# Patient Record
Sex: MALE | Race: WHITE | Employment: OTHER | URBAN - METROPOLITAN AREA
[De-identification: names, ages, dates, MRNs, and addresses within clinical notes are randomized per-mention and may not be internally consistent; named-entity substitution may affect disease eponyms.]

---

## 2017-01-23 ENCOUNTER — HOSPITAL ENCOUNTER (OUTPATIENT)
Age: 72
Setting detail: OUTPATIENT SURGERY
Discharge: HOME | End: 2017-01-23
Attending: INTERNAL MEDICINE | Admitting: INTERNAL MEDICINE
Payer: MEDICARE

## 2017-01-23 ENCOUNTER — ANESTHESIA (OUTPATIENT)
Dept: ENDOSCOPY | Age: 72
End: 2017-01-23
Payer: MEDICARE

## 2017-01-23 ENCOUNTER — ANESTHESIA EVENT (OUTPATIENT)
Dept: ENDOSCOPY | Age: 72
End: 2017-01-23
Payer: MEDICARE

## 2017-01-23 VITALS
TEMPERATURE: 97.4 F | WEIGHT: 156.25 LBS | DIASTOLIC BLOOD PRESSURE: 86 MMHG | BODY MASS INDEX: 23.68 KG/M2 | HEIGHT: 68 IN | SYSTOLIC BLOOD PRESSURE: 141 MMHG | RESPIRATION RATE: 20 BRPM | HEART RATE: 86 BPM | OXYGEN SATURATION: 96 %

## 2017-01-23 LAB
GLUCOSE BLD STRIP.AUTO-MCNC: 127 MG/DL (ref 65–100)
SERVICE CMNT-IMP: ABNORMAL

## 2017-01-23 PROCEDURE — 74011000250 HC RX REV CODE- 250

## 2017-01-23 PROCEDURE — 76040000019: Performed by: INTERNAL MEDICINE

## 2017-01-23 PROCEDURE — 77030019988 HC FCPS ENDOSC DISP BSC -B: Performed by: INTERNAL MEDICINE

## 2017-01-23 PROCEDURE — 74011250636 HC RX REV CODE- 250/636

## 2017-01-23 PROCEDURE — 88305 TISSUE EXAM BY PATHOLOGIST: CPT | Performed by: INTERNAL MEDICINE

## 2017-01-23 PROCEDURE — 76060000031 HC ANESTHESIA FIRST 0.5 HR: Performed by: INTERNAL MEDICINE

## 2017-01-23 PROCEDURE — 74011250636 HC RX REV CODE- 250/636: Performed by: INTERNAL MEDICINE

## 2017-01-23 PROCEDURE — 82962 GLUCOSE BLOOD TEST: CPT

## 2017-01-23 RX ORDER — LIDOCAINE HYDROCHLORIDE 20 MG/ML
INJECTION, SOLUTION EPIDURAL; INFILTRATION; INTRACAUDAL; PERINEURAL AS NEEDED
Status: DISCONTINUED | OUTPATIENT
Start: 2017-01-23 | End: 2017-01-23 | Stop reason: HOSPADM

## 2017-01-23 RX ORDER — SODIUM CHLORIDE 9 MG/ML
100 INJECTION, SOLUTION INTRAVENOUS CONTINUOUS
Status: DISCONTINUED | OUTPATIENT
Start: 2017-01-23 | End: 2017-01-23 | Stop reason: HOSPADM

## 2017-01-23 RX ORDER — ATROPINE SULFATE 0.1 MG/ML
0.5 INJECTION INTRAVENOUS
Status: DISCONTINUED | OUTPATIENT
Start: 2017-01-23 | End: 2017-01-23 | Stop reason: HOSPADM

## 2017-01-23 RX ORDER — SODIUM CHLORIDE 0.9 % (FLUSH) 0.9 %
5-10 SYRINGE (ML) INJECTION AS NEEDED
Status: DISCONTINUED | OUTPATIENT
Start: 2017-01-23 | End: 2017-01-23 | Stop reason: HOSPADM

## 2017-01-23 RX ORDER — SODIUM CHLORIDE 0.9 % (FLUSH) 0.9 %
5-10 SYRINGE (ML) INJECTION EVERY 8 HOURS
Status: DISCONTINUED | OUTPATIENT
Start: 2017-01-23 | End: 2017-01-23 | Stop reason: HOSPADM

## 2017-01-23 RX ORDER — MIDAZOLAM HYDROCHLORIDE 1 MG/ML
.25-5 INJECTION, SOLUTION INTRAMUSCULAR; INTRAVENOUS
Status: DISCONTINUED | OUTPATIENT
Start: 2017-01-23 | End: 2017-01-23 | Stop reason: HOSPADM

## 2017-01-23 RX ORDER — EPINEPHRINE 0.1 MG/ML
1 INJECTION INTRACARDIAC; INTRAVENOUS
Status: DISCONTINUED | OUTPATIENT
Start: 2017-01-23 | End: 2017-01-23 | Stop reason: HOSPADM

## 2017-01-23 RX ORDER — MIDAZOLAM HYDROCHLORIDE 1 MG/ML
1-2 INJECTION, SOLUTION INTRAMUSCULAR; INTRAVENOUS
Status: DISCONTINUED | OUTPATIENT
Start: 2017-01-23 | End: 2017-01-23 | Stop reason: HOSPADM

## 2017-01-23 RX ORDER — FLUMAZENIL 0.1 MG/ML
0.2 INJECTION INTRAVENOUS
Status: DISCONTINUED | OUTPATIENT
Start: 2017-01-23 | End: 2017-01-23 | Stop reason: HOSPADM

## 2017-01-23 RX ORDER — DEXTROMETHORPHAN/PSEUDOEPHED 2.5-7.5/.8
1.2 DROPS ORAL
Status: DISCONTINUED | OUTPATIENT
Start: 2017-01-23 | End: 2017-01-23 | Stop reason: HOSPADM

## 2017-01-23 RX ORDER — PROPOFOL 10 MG/ML
INJECTION, EMULSION INTRAVENOUS AS NEEDED
Status: DISCONTINUED | OUTPATIENT
Start: 2017-01-23 | End: 2017-01-23 | Stop reason: HOSPADM

## 2017-01-23 RX ORDER — GLYCOPYRROLATE 0.2 MG/ML
INJECTION INTRAMUSCULAR; INTRAVENOUS AS NEEDED
Status: DISCONTINUED | OUTPATIENT
Start: 2017-01-23 | End: 2017-01-23 | Stop reason: HOSPADM

## 2017-01-23 RX ORDER — NALOXONE HYDROCHLORIDE 0.4 MG/ML
0.4 INJECTION, SOLUTION INTRAMUSCULAR; INTRAVENOUS; SUBCUTANEOUS
Status: DISCONTINUED | OUTPATIENT
Start: 2017-01-23 | End: 2017-01-23 | Stop reason: HOSPADM

## 2017-01-23 RX ADMIN — SODIUM CHLORIDE 100 ML/HR: 900 INJECTION, SOLUTION INTRAVENOUS at 13:33

## 2017-01-23 RX ADMIN — PROPOFOL 180 MG: 10 INJECTION, EMULSION INTRAVENOUS at 14:08

## 2017-01-23 RX ADMIN — GLYCOPYRROLATE 0.2 MG: 0.2 INJECTION INTRAMUSCULAR; INTRAVENOUS at 13:59

## 2017-01-23 RX ADMIN — LIDOCAINE HYDROCHLORIDE 80 MG: 20 INJECTION, SOLUTION EPIDURAL; INFILTRATION; INTRACAUDAL; PERINEURAL at 13:59

## 2017-01-23 NOTE — ANESTHESIA POSTPROCEDURE EVALUATION
Post-Anesthesia Evaluation and Assessment    Patient: Юлия Castillo MRN: [id number]  SSN: [id number]    YOB: 1945  Age: 80 y.o. Sex: male       Cardiovascular Function/Vital Signs  Visit Vitals    /86    Pulse 86    Temp 36.3 °C (97.4 °F)    Resp 20    Ht 5' 8\" (1.727 m)    Wt 70.9 kg (156 lb 4 oz)    SpO2 96%    BMI 23.76 kg/m2       Patient is status post total IV anesthesia anesthesia for Procedure(s):  ESOPHAGOGASTRODUODENOSCOPY (EGD)  ESOPHAGOGASTRODUODENAL (EGD) BIOPSY. Nausea/Vomiting: None    Postoperative hydration reviewed and adequate. Pain:  Pain Scale 1: Numeric (0 - 10) (01/23/17 1435)  Pain Intensity 1: 0 (01/23/17 1435)   Managed    Neurological Status: At baseline    Mental Status and Level of Consciousness: Arousable    Pulmonary Status:   O2 Device: Room air (01/23/17 1435)   Adequate oxygenation and airway patent    Complications related to anesthesia: None    Post-anesthesia assessment completed.  No concerns    Signed By: Юлия CastilloDO     January 23, 2017
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
## 2017-01-23 NOTE — ANESTHESIA PREPROCEDURE EVALUATION
Anesthetic History   No history of anesthetic complications            Review of Systems / Medical History  Patient summary reviewed, nursing notes reviewed and pertinent labs reviewed    Pulmonary          Smoker (EX, 40 pk yr, quit in [facility])         Neuro/Psych   Within defined limits           Cardiovascular    Hypertension          Hyperlipidemia    Exercise tolerance: >4 METS  Comments: 9-2016 EKG: NSR   GI/Hepatic/Renal     GERD          Comments: Hernadez's esoph, GERD    Hx of colon polyps Endo/Other    Diabetes: using insulin    Arthritis     Other Findings   Comments: DDD, cervical    hearing loss  Glaucoma  Bell's palsy    Polycythemia    7 oz alc per week         Physical Exam    Airway  Mallampati: I  TM Distance: > 6 cm  Neck ROM: normal range of motion        Cardiovascular    Rhythm: regular  Rate: normal         Dental  No notable dental hx       Pulmonary  Breath sounds clear to auscultation               Abdominal  GI exam deferred       Other Findings            Anesthetic Plan    ASA: 3  Anesthesia type: total IV anesthesia          Induction: Intravenous  Anesthetic plan and risks discussed with: Patient

## 2017-01-23 NOTE — DISCHARGE INSTRUCTIONS
Sav Hawley MD  [facility], [address]  [place], 200 [facility]  [phone]  [email or web address]  [id number]  1945    EGD DISCHARGE INSTRUCTIONS    Discomfort:  Sore throat- throat lozenges or warm salt water gargle  redness at IV site- apply warm compress to area; if redness or soreness persist- contact your physician  Gaseous discomfort- walking, belching will help relieve any discomfort  You may not operate a vehicle for 12 hours  You may not engage in an occupation involving machinery or appliances for rest of today  You may not drink alcoholic beverages for at least 12 hours  Avoid making any critical decisions for at least 24 hour  DIET  You may have anything by mouth. You may eat and drink immediately. You may resume your regular diet - however -  remember your colon is empty and a heavy meal will produce gas. Avoid these foods:  vegetables, fried / greasy foods, carbonated drinks      ACTIVITY  You may resume your normal daily activities   Spend the remainder of the day resting -  avoid any strenuous activity. CALL M.D. ANY SIGN OF   Increasing pain, nausea, vomiting  Abdominal distension (swelling)  New increased bleeding (oral or rectal)  Fever (chills)  Pain in chest area  Bloody discharge from nose or mouth  Shortness of breath    Follow-up Instructions:   Call Dr. Sav Hawley for any questions or problems. Telephone # [phone]  Dr. Lane [place] office will notify you of the biopsy results available  Within 7 to 10 days. We will call you or send a letter   Can take a motilium 10 mg at bedtime. Can take gaviscon before bed too. Would like to get a gastric emptying scan. ENDOSCOPY FINDINGS:   Your endoscopy showed the Hernadez's esophagus looks the same as before and was biopsied. The Nissen fundoplication is somewhat loose. Charles Barrett       DISCHARGE SUMMARY from Nurse    The following personal items collected during your admission are returned to you:   Dental Appliance: Dental Appliances: None  Vision: Visual Aid: Glasses  Hearing Aid:    Jewelry:    Clothing:    Other Valuables:    Valuables sent to safe: Airseedhart Activation    Thank you for requesting access to MyBuilder. Please follow the instructions below to securely access and download your online medical record. MyBuilder allows you to send messages to your doctor, view your test results, renew your prescriptions, schedule appointments, and more. How Do I Sign Up? 1. In your internet browser, go to [email or web address]  2. Click on the First Time User? Click Here link in the Sign In box. You will be redirect to the New Member Sign Up page. 3. Enter your MyBuilder Access Code exactly as it appears below. You will not need to use this code after youve completed the sign-up process. If you do not sign up before the expiration date, you must request a new code. MyBuilder Access Code: Activation code not generated  Current MyBuilder Status: Active (This is the date your MyBuilder access code will )    4. Enter the last four digits of your Social Security Number (xxxx) and Date of Birth (mm/dd/yyyy) as indicated and click Submit. You will be taken to the next sign-up page. 5. Create a MyBuilder ID. This will be your MyBuilder login ID and cannot be changed, so think of one that is secure and easy to remember. 6. Create a MyBuilder password. You can change your password at any time. 7. Enter your Password Reset Question and Answer. This can be used at a later time if you forget your password. 8. Enter your e-mail address. You will receive e-mail notification when new information is available in [address] 9. Click Sign Up. You can now view and download portions of your medical record. 10. Click the Download Summary menu link to download a portable copy of your medical information.     Additional Information    If you have questions, please visit the Frequently Asked Questions section of the CyActive website at [email or web address] Remember, CyActive is NOT to be used for urgent needs. For medical emergencies, dial 911.

## 2017-01-23 NOTE — PROGRESS NOTES
[facility]  1945  [id number]    Situation:  Verbal report received from: Colette Mirza rn  Procedure: Procedure(s):  ESOPHAGOGASTRODUODENOSCOPY (EGD)  ESOPHAGOGASTRODUODENAL (EGD) BIOPSY    Background:    Preoperative diagnosis: BARRETTS ESOPHAGUS, GERD  Postoperative diagnosis: barretts    :  Dr. Jeanette Solorio  Assistant(s): Endoscopy Technician-1: Korin Medel  Endoscopy RN-1: Mariza Barnes    Specimens:   ID Type Source Tests Collected by Time Destination   1 : bx Preservative Esophagus, Distal  Vernon Jack MD 1/23/2017 1404 Pathology     H. Pylori  no    Assessment:  Intra-procedure medications       Anesthesia gave intra-procedure sedation and medications, see anesthesia flow sheet yes    Intravenous fluids: NS@ KVO     Vital signs stable  yes    Abdominal assessment: round and soft  yes    Recommendation:  Discharge patient per MD order yes.   Family or Friend  yes  Permission to share finding with family or friend yes

## 2017-01-23 NOTE — IP AVS SNAPSHOT
[address] 
[phone] Patient: Modesto Clay MRN: [id number] GAJ:[birth year] You are allergic to the following Allergen Reactions Contrast Agent (Iodine) Hives Itching IVP dye Recent Documentation Height Weight BMI Smoking Status 1.727 m 70.9 kg 23.76 kg/m2 Former Smoker Emergency Contacts Name Discharge Info Relation Home Work Mobile [address] [facility] [place]  Spouse [3] [phone] About your hospitalization You were admitted on:  January 23, 2017 You last received care in the:  [facility] [place] END[facility]COPY You were discharged on:  January 23, 2017 Unit phone number:  [phone] Why you were hospitalized Your primary diagnosis was:  Not on File Providers Seen During Your Hospitalizations Provider Role Specialty Primary office phone Risa Perales MD Attending Provider Gastroenterology [phone] Your Primary Care Physician (PCP) Primary Care Physician Office Phone Office Fax Jose J Montes [phone] Follow-up Information Follow up With Details Comments Contact Info Krishan Olsen MD   [address] [facility] [address] [phone] Your Appointments Wednesday March 08, 2017  9:15 AM EST  
ROUTINE CARE with Krishan Olsen, [address] [place] [address],[address] [place] [address]) [address] [phone] Current Discharge Medication List  
  
CONTINUE these medications which have NOT CHANGED Dose & Instructions Dispensing Information Comments Morning Noon Evening Bedtime  
 aspirin delayed-release 81 mg tablet Your next dose is: Today, Tomorrow Other:  _________ Dose:  81 mg Take 81 mg by mouth daily. Refills:  0 COLCRYS 0.6 mg tablet Generic drug:  colchicine Your next dose is: Today, Tomorrow Other:  _________ TAKE 1 TABLET DAILY Quantity:  90 Tab Refills:  3  
     
   
   
   
  
 esomeprazole 40 mg capsule Commonly known as:  NexIUM Your next dose is: Today, Tomorrow Other:  _________ Dose:  40 mg Take 1 Cap by mouth two (2) times a day. Quantity:  180 Cap Refills:  3  
     
   
   
   
  
 fosinopril 40 mg tablet Commonly known as:  MONOPRIL Your next dose is: Today, Tomorrow Other:  _________ TAKE 1 TABLET DAILY Quantity:  90 Tab Refills:  3  
     
   
   
   
  
 * glucose blood VI test strips strip Commonly known as:  ASCENSIA AUTODISC VI, ONE TOUCH ULTRA TEST VI Your next dose is: Today, Tomorrow Other:  _________ Check fsbs bid 250.02  One touch ultra touch strips Quantity:  3 Package Refills:  3  
     
   
   
   
  
 * ONETOUCH ULTRA TEST strip Generic drug:  glucose blood VI test strips Your next dose is: Today, Tomorrow Other:  _________ CHECK FASTING BLOOD SUGAR T WICE A DAY Quantity:  100 Strip Refills:  11  
     
   
   
   
  
 insulin lispro 100 unit/mL kwikpen Commonly known as:  HUMALOG Your next dose is: Today, Tomorrow Other:  _________  
   
   
 3 units ac dinner Quantity:  3 mL Refills:  11 Insulin Needles (Disposable) 31 gauge x 5/16\" Ndle Your next dose is: Today, Tomorrow Other:  _________  
   
   
 by SubCUTAneous route daily. Use qd with levemir pen 250.02 Quantity:  1 Package Refills:  11  
     
   
   
   
  
 * Insulin Needles (Disposable) 30 gauge x 1/3\" Commonly known as:  NOVOFINE 30 Your next dose is: Today, Tomorrow Other:  _________ USE WITH LEVEMIR FLEXPEN TO INJECT SUBQ DAILY Quantity:  300 Pen Needle Refills:  3 * Insulin Needles (Disposable) 32 gauge x 5/32\" Ndle Commonly known as:  Kaylen Pen Needle Your next dose is: Today, Tomorrow Other:  _________ Use to inject Humalog daily. ICD-10: E11.65 Quantity:  100 Pen Needle Refills:  0  
     
   
   
   
  
 * Insulin Needles (Disposable) 32 gauge x 5/32\" Ndle Commonly known as:  Kaylen Pen Needle Your next dose is: Today, Tomorrow Other:  _________ Use to inject Humalog daily. ICD-10: E11.65 Quantity:  100 Pen Needle Refills:  11 JANUVIA 50 mg tablet Generic drug:  SITagliptin Your next dose is: Today, Tomorrow Other:  _________ TAKE 1 TABLET DAILY Quantity:  90 Tab Refills:  2 LEVEMIR FLEXTOUCH 100 unit/mL (3 mL) Inpn Generic drug:  insulin detemir Your next dose is: Today, Tomorrow Other:  _________ INJECT 8 UNITS SUBCUTANEOUSLY EVERY ONCE   DAILY AS DIRECTED Quantity:  15 mL Refills:  6 OTHER Your next dose is: Today, Tomorrow Other:  _________  
   
   
 motilium 10 mg daily (patient states drug not made in [place]) Refills:  0  
     
   
   
   
  
 pravastatin 20 mg tablet Commonly known as:  PRAVACHOL Your next dose is: Today, Tomorrow Other:  _________ TAKE 1 TABLET DAILY Quantity:  90 Tab Refills:  2  
     
   
   
   
  
 * Notice: This list has 5 medication(s) that are the same as other medications prescribed for you. Read the directions carefully, and ask your doctor or other care provider to review them with you. Discharge Instructions Kaushik Hart MD 
[facility], [address] [place] [address] [address] 
[phone] 
[email or web address] [email or web address] Roll Goad [id number] 
1945 EGD DISCHARGE INSTRUCTIONS Discomfort: Sore throat- throat lozenges or warm salt water gargle 
redness at IV site- apply warm compress to area; if redness or soreness persist- contact your physician Gaseous discomfort- walking, belching will help relieve any discomfort You may not operate a vehicle for 12 hours You may not engage in an occupation involving machinery or appliances for rest of today You may not drink alcoholic beverages for at least 12 hours Avoid making any critical decisions for at least 24 hour DIET You may have anything by mouth. You may eat and drink immediately. You may resume your regular diet  however -  remember your colon is empty and a heavy meal will produce gas. Avoid these foods:  vegetables, fried / greasy foods, carbonated drinks ACTIVITY You may resume your normal daily activities Spend the remainder of the day resting -  avoid any strenuous activity. CALL M.D. ANY SIGN OF Increasing pain, nausea, vomiting Abdominal distension (swelling) New increased bleeding (oral or rectal) Fever (chills) Pain in chest area Bloody discharge from nose or mouth Shortness of breath Follow-up Instructions: 
 Call Dr. Cristian Michele for any questions or problems. Telephone # [phone] Dr. Isma Jensen office will notify you of the biopsy results available  Within 7 to 10 days. We will call you or send a letter Can take a motilium 10 mg at bedtime. Can take gaviscon before bed too. Would like to get a gastric emptying scan. ENDOSCOPY FINDINGS: 
 Your endoscopy showed the Hernadez's esophagus looks the same as before and was biopsied. The Nissen fundoplication is somewhat loose. Daisy Roberts DISCHARGE SUMMARY from Nurse The following personal items collected during your admission are returned to you:  
Dental Appliance: Dental Appliances: None Vision: Visual Aid: Glasses Hearing Aid:   
Jewelry:   
Clothing:   
Other Valuables:   
Valuables sent to safe: MyChart Activation Thank you for requesting access to ParinGenix. Please follow the instructions below to securely access and download your online medical record. ParinGenix allows you to send messages to your doctor, view your test results, renew your prescriptions, schedule appointments, and more. How Do I Sign Up? 1. In your internet browser, go to [email or web address]. 
2. Click on the First Time User? Click Here link in the Sign In box. You will be redirect to the New Member Sign Up page. 3. Enter your ParinGenix Access Code exactly as it appears below. You will not need to use this code after youve completed the sign-up process. If you do not sign up before the expiration date, you must request a new code. ParinGenix Access Code: Activation code not generated Current ParinGenix Status: Active (This is the date your ParinGenix access code will ) 4. Enter the last four digits of your Social Security Number (xxxx) and Date of Birth (mm/dd/yyyy) as indicated and click Submit. You will be taken to the next sign-up page. 5. Create a ParinGenix ID. This will be your ParinGenix login ID and cannot be changed, so think of one that is secure and easy to remember. 6. Create a ParinGenix password. You can change your password at any time. 7. Enter your Password Reset Question and Answer. This can be used at a later time if you forget your password. 8. Enter your e-mail address. You will receive e-mail notification when new information is available in [address] 9. Click Sign Up. You can now view and download portions of your medical record. 10. Click the Download Summary menu link to download a portable copy of your medical information. Additional Information If you have questions, please visit the Frequently Asked Questions section of the ParinGenix website at [email or web address] Remember, ParinGenix is NOT to be used for urgent needs. For medical emergencies, dial 911. Discharge Orders None Introducing [facility]! Dear Christopher Bernard: Thank you for requesting a LikeLike.com account. Our records indicate that you already have an active LikeLike.com account. You can access your account anytime at [email or web address] Did you know that you can access your hospital and ER discharge instructions at any time in LikeLike.com? You can also review all of your test results from your hospital stay or ER visit. Additional Information If you have questions, please visit the Frequently Asked Questions section of the [email or web address] website at [email or web address] Remember, LikeLike.com is NOT to be used for urgent needs. For medical emergencies, dial 911. Now available from your iPhone and Android! General Information Please provide this summary of care documentation to your next provider. Patient Signature:  ____________________________________________________________ Date:  ____________________________________________________________  
  
Kirk Chester Provider Signature:  ____________________________________________________________ Date:  ____________________________________________________________

## 2017-01-23 NOTE — PERIOP NOTES
Anesthesia reports 180 mg Propofol, 80 mg Lidocaine, 0.2 mg Robinul  and 400 mL NS given during procedure. Received report from anesthesia staff on vital signs and status of patient. Glasses returned to pt.

## 2017-01-23 NOTE — PROCEDURES
[facility]                   Endoscopic Gastroduodenoscopy Procedure Note      1/23/2017  J Luis Brown  1945  [id number]    Procedure: Endoscopic Gastroduodenoscopy with biopsy    Indication: Worsening GERD and hx of ivory's     Pre-operative Diagnosis: see indication above    Post-operative Diagnosis: see findings below    : ELENITA Silveira MD    Referring Provider:  Ela Monterroso MD      Anesthesia/Sedation:  MAC anesthesia Propofol    Airway assessment: No airway problems anticipated    Pre-Procedural Exam:      Airway: clear, no airway problems anticipated  Heart: RRR, without gallops or rubs  Lungs: clear bilaterally without wheezes, crackles, or rhonchi  Abdomen: soft, nontender, nondistended, bowel sounds present  Mental Status: awake, alert and oriented to person, place and time       Procedure Details     After infomed consent was obtained for the procedure, with all risks and benefits of procedure explained the patient was taken to the endoscopy suite and placed in the left lateral decubitus position. Following sequential administration of sedation as per above, the endoscope was inserted into the mouth and advanced under direct vision to second portion of the duodenum. A careful inspection was made as the gastroscope was withdrawn, including a retroflexed view of the proximal stomach; findings and interventions are described below. Findings:   Esophagus:Ivory's mucosa extends from 27 to 38 cm. O3Q51. Unchanged from last EGD. Multiple biopsies obtained. Stomach: Retroflexion shows the Nissen to be somewhat loose. No food in stomach. Duodenum: normal    Therapies:  biopsy of esophagus    Specimens: biopsies of the esophagus           Complications:   None; patient tolerated the procedure well. EBL:  None.             Impression:      -Ivory's esophagus  -Loose appearing Nissen fundoplication    Recommendations:  -Continue acid suppression. , -Await pathology. , -Gastric emptying scan. Repeat EGD in 2 years. Gaviscon prn.  May need to add another dose of domperidone 10 mg qhs    Shreya Giang MD1/23/2017

## 2017-02-28 ENCOUNTER — OFFICE VISIT (OUTPATIENT)
Dept: ENDOCRINOLOGY | Age: 72
End: 2017-02-28

## 2017-02-28 VITALS
BODY MASS INDEX: 24.25 KG/M2 | HEIGHT: 68 IN | WEIGHT: 160 LBS | SYSTOLIC BLOOD PRESSURE: 147 MMHG | DIASTOLIC BLOOD PRESSURE: 81 MMHG | HEART RATE: 69 BPM

## 2017-02-28 DIAGNOSIS — E11.9 TYPE 2 DIABETES MELLITUS WITHOUT COMPLICATION, WITH LONG-TERM CURRENT USE OF INSULIN (HCC): Primary | ICD-10-CM

## 2017-02-28 DIAGNOSIS — I10 ESSENTIAL HYPERTENSION, BENIGN: ICD-10-CM

## 2017-02-28 DIAGNOSIS — Z79.4 TYPE 2 DIABETES MELLITUS WITHOUT COMPLICATION, WITH LONG-TERM CURRENT USE OF INSULIN (HCC): Primary | ICD-10-CM
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
## 2017-02-28 NOTE — MR AVS SNAPSHOT
Visit Information Date & Time Provider Department Dept. Phone Encounter #  
 2/28/2017  8:50 AM Aquiles Franco, [address] [facility] [phone] [id number] Follow-up Instructions Return in about 3 months (around 5/28/2017). Your Appointments 3/8/2017  9:15 AM  
ROUTINE CARE with [facility], [address] [facility]) Appt Note: 4 month follow up dm-2 htn hld  
 [facility] [address] [phone]  
[address] [facility] [address] [facility] Upcoming Health Maintenance Date Due  
 GLAUCOMA SCREENING Q2Y 4/18/2015 EYE EXAM RETINAL OR DILATED Q1 12/1/2015 FOOT EXAM Q1 3/22/2017 MICROALBUMIN Q1 3/22/2017 MEDICARE YEARLY EXAM 3/23/2017 HEMOGLOBIN A1C Q6M 5/8/2017 LIPID PANEL Q1 7/26/2017 Pneumococcal 65+ High/Highest Risk (2 of 2 - PPSV23) 8/20/2017 COLONOSCOPY 4/10/2018 DTaP/Tdap/Td series (2 - Td) 5/9/2025 Allergies as of 2/28/2017  Review Complete On: 2/28/2017 By: Aquiles Franco MD  
  
 Severity Noted Reaction Type Reactions Contrast Agent [Iodine]  10/26/2009   Side Effect Hives, Itching IVP dye Current Immunizations  Reviewed on 12/21/2012 Name Date Influenza High Dose Vaccine PF 11/8/2016 Influenza Vaccine 11/19/2014, 9/16/2013 Influenza Vaccine (Quad) PF 10/20/2015 Influenza Vaccine Split 12/7/2010 Pneumococcal Vaccine (Unspecified Type) 8/20/2012  8:39 AM  
 Tdap 5/9/2015  1:21 PM  
 Zoster 12/20/2012 Not reviewed this visit You Were Diagnosed With   
  
 Codes Comments Type 2 diabetes mellitus without complication, with long-term current use of insulin (HCC)    -  Primary ICD-10-CM: E11.9, Z79.4 ICD-9-CM: 250.00, V58.67 Essential hypertension, benign     ICD-10-CM: I10 
ICD-9-CM: 401.1 Vitals BP  
  
  
  
  
  
 147/81 Vitals History BMI and BSA Data Body Mass Index Body Surface Area  
 24.33 kg/m 2 1.87 m 2 Your Updated Medication List  
  
   
This list is accurate as of: 2/28/17 10:23 AM.  Always use your most recent med list.  
  
  
  
  
 aspirin delayed-release 81 mg tablet Take 81 mg by mouth daily. COLCRYS 0.6 mg tablet Generic drug:  colchicine TAKE 1 TABLET DAILY  
  
 esomeprazole 40 mg capsule Commonly known as:  NexIUM Take 1 Cap by mouth two (2) times a day. fosinopril 40 mg tablet Commonly known as:  MONOPRIL  
TAKE 1 TABLET DAILY * glucose blood VI test strips strip Commonly known as:  ASCENSIA AUTODISC VI, ONE TOUCH ULTRA TEST VI Check fsbs bid 250.02  One touch ultra touch strips * ONETOUCH ULTRA TEST strip Generic drug:  glucose blood VI test strips CHECK FASTING BLOOD SUGAR T WICE A DAY  
  
 insulin lispro 100 unit/mL kwikpen Commonly known as:  HUMALOG  
3 units ac dinner Insulin Needles (Disposable) 31 gauge x 5/16\" Ndle  
by SubCUTAneous route daily. Use qd with levemir pen 250.02 * Insulin Needles (Disposable) 30 gauge x 1/3\" Commonly known as:  NOVOFINE 30  
USE WITH LEVEMIR FLEXPEN TO INJECT SUBQ DAILY * Insulin Needles (Disposable) 32 gauge x 5/32\" Ndle Commonly known as:  Kaylen Pen Needle Use to inject Humalog daily. ICD-10: E11.65 * Insulin Needles (Disposable) 32 gauge x 5/32\" Ndle Commonly known as:  Kaylen Pen Needle Use to inject Humalog daily. ICD-10: E11.65 JANUVIA 50 mg tablet Generic drug:  SITagliptin TAKE 1 TABLET DAILY LEVEMIR FLEXTOUCH 100 unit/mL (3 mL) Inpn Generic drug:  insulin detemir INJECT 8 UNITS SUBCUTANEOUSLY EVERY ONCE   DAILY AS DIRECTED Liraglutide 0.6 mg/0.1 mL (18 mg/3 mL) sub-q pen Commonly known as:  VICTOZA  
0.2 mL by SubCUTAneous route daily. OTHER  
two (2) times a day. motilium 10 mg daily (patient states drug not made in [place]) pravastatin 20 mg tablet Commonly known as:  PRAVACHOL  
TAKE 1 TABLET DAILY * Notice: This list has 5 medication(s) that are the same as other medications prescribed for you. Read the directions carefully, and ask your doctor or other care provider to review them with you. Follow-up Instructions Return in about 3 months (around 5/28/2017). Patient Instructions Diabetes. Watch carbohydrate intake with meals and aim to keep this less than 30-45 grams per meal. 
 
A Mediterranean style diet with 55% or less of calories from carbohydrates has been shown to be very helpful for people with diabetes. This diet consists of vegetables, whole fruit, nuts, whole grains, beans, lentils, olive oil, fish, chicken, and less refined grains, red and processed meats. Exercise: work up to 30 minutes 5 days weekly of walking, or any other activity that gets your heart rate up. Make sure you are getting enough sleep - at least 7 hours/night. Medications: 
Continue Levemir 14 units daily - follow trend from bedtime to fasting to assess Add Victoza. Start with 0.6 mg daily x 7 days. Increase to 1.2 mg dose in a week. When you increase to the 1.2 mg dose, this will lower your blood sugars some and may cause nausea. ** when you increase Victoza to 1.2 mg daily, Stop Januvia AND Humalog Goals for blood sugars: Follow them periodically before lunch, supper and bedtime to see how you tolerate meals/carbohydrates Fasting  (less than 150) Other times -  (less than 180). Introducing [facility]! Dear Ganesh Espinal: Thank you for requesting a [employer] account. Our records indicate that you already have an active [employer] account. You can access your account anytime at [email or web address] Did you know that you can access your hospital and ER discharge instructions at any time in [employer]? You can also review all of your test results from your hospital stay or ER visit. Additional Information If you have questions, please visit the Frequently Asked Questions section of the [employer] website at [email or web address] Remember, [employer] is NOT to be used for urgent needs. For medical emergencies, dial 911. Now available from your iPhone and Android! Please provide this summary of care documentation to your next provider. Your primary care clinician is listed as Chuckie MANTILLA. If you have any questions after today's visit, please call [phone].

## 2017-02-28 NOTE — PROGRESS NOTES
Patient stated he is retired and often does not take Humalog if he is in [employer] or traveling. He would like to get a better understanding of diabetes in general and would like to understand the importance of medication and why he needs to take it. He monitors blood sugar once daily in the morning and would like to discuss the importance of monitoring. He complains of neuropathy that seems to be worse when he drives a lot.

## 2017-02-28 NOTE — PATIENT INSTRUCTIONS
Diabetes. Watch carbohydrate intake with meals and aim to keep this less than 30-45 grams per meal.    A Mediterranean style diet with 55% or less of calories from carbohydrates has been shown to be very helpful for people with diabetes. This diet consists of vegetables, whole fruit, nuts, whole grains, beans, lentils, olive oil, fish, chicken, and less refined grains, red and processed meats. Exercise: work up to 30 minutes 5 days weekly of walking, or any other activity that gets your heart rate up. Make sure you are getting enough sleep - at least 7 hours/night. Medications:  Continue Levemir 14 units daily - follow trend from bedtime to fasting to assess    Add Victoza. Start with 0.6 mg daily x 7 days. Increase to 1.2 mg dose in a week. When you increase to the 1.2 mg dose, this will lower your blood sugars some and may cause nausea. ** when you increase Victoza to 1.2 mg daily, Stop Januvia AND Humalog      Goals for blood sugars: Follow them periodically before lunch, supper and bedtime to see how you tolerate meals/carbohydrates    Fasting  (less than 150)  Other times -  (less than 180).

## 2017-03-11 ENCOUNTER — HOSPITAL ENCOUNTER (EMERGENCY)
Age: 72
Discharge: HOME | End: 2017-03-11
Attending: EMERGENCY MEDICINE

## 2017-03-11 VITALS
BODY MASS INDEX: 24.4 KG/M2 | HEART RATE: 94 BPM | HEIGHT: 68 IN | RESPIRATION RATE: 16 BRPM | OXYGEN SATURATION: 97 % | DIASTOLIC BLOOD PRESSURE: 72 MMHG | SYSTOLIC BLOOD PRESSURE: 143 MMHG | TEMPERATURE: 98.3 F | WEIGHT: 161 LBS

## 2017-03-11 DIAGNOSIS — J20.9 ACUTE BRONCHITIS, UNSPECIFIED ORGANISM: Primary | ICD-10-CM

## 2017-03-11 RX ORDER — AZITHROMYCIN 250 MG/1
TABLET, FILM COATED ORAL
Qty: 6 TAB | Refills: 0 | Status: SHIPPED | OUTPATIENT
Start: 2017-03-11 | End: 2017-03-16

## 2017-03-11 NOTE — DISCHARGE INSTRUCTIONS
Bronchitis: Care Instructions  Your Care Instructions    Bronchitis is inflammation of the bronchial tubes, which carry air to the lungs. The tubes swell and produce mucus, or phlegm. The mucus and inflamed bronchial tubes make you cough. You may have trouble breathing. Most cases of bronchitis are caused by viruses like those that cause colds. Antibiotics usually do not help and they may be harmful. Bronchitis usually develops rapidly and lasts about 2 to 3 weeks in otherwise healthy people. Follow-up care is a key part of your treatment and safety. Be sure to make and go to all appointments, and call your doctor if you are having problems. It's also a good idea to know your test results and keep a list of the medicines you take. How can you care for yourself at home? · Take all medicines exactly as prescribed. Call your doctor if you think you are having a problem with your medicine. · Get some extra rest.  · Take an over-the-counter pain medicine, such as acetaminophen (Tylenol), ibuprofen (Advil, Motrin), or naproxen (Aleve) to reduce fever and relieve body aches. Read and follow all instructions on the label. · Do not take two or more pain medicines at the same time unless the doctor told you to. Many pain medicines have acetaminophen, which is Tylenol. Too much acetaminophen (Tylenol) can be harmful. · Take an over-the-counter cough medicine that contains dextromethorphan to help quiet a dry, hacking cough so that you can sleep. Avoid cough medicines that have more than one active ingredient. Read and follow all instructions on the label. · Breathe moist air from a humidifier, hot shower, or sink filled with hot water. The heat and moisture will thin mucus so you can cough it out. · Do not smoke. Smoking can make bronchitis worse. If you need help quitting, talk to your doctor about stop-smoking programs and medicines. These can increase your chances of quitting for good.   When should you call for help? Call 911 anytime you think you may need emergency care. For example, call if:  · You have severe trouble breathing. Call your doctor now or seek immediate medical care if:  · You have new or worse trouble breathing. · You cough up dark brown or bloody mucus (sputum). · You have a new or higher fever. · You have a new rash. Watch closely for changes in your health, and be sure to contact your doctor if:  · You cough more deeply or more often, especially if you notice more mucus or a change in the color of your mucus. · You are not getting better as expected. Where can you learn more? Go to http://allan-scout.info/. Enter H333 in the search box to learn more about \"Bronchitis: Care Instructions. \"  Current as of: May 23, 2016  Content Version: 11.1  © 4227-4229 [employer]. Care instructions adapted under license by [employer] (which disclaims liability or warranty for this information). If you have questions about a medical condition or this instruction, always ask your healthcare professional. [address] any warranty or liability for your use of this information.
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
## 2017-03-27 RX ORDER — FOSINOPRIL SODIUM 40 MG/1
TABLET ORAL
Qty: 90 TAB | Refills: 2 | Status: SHIPPED | OUTPATIENT
Start: 2017-03-27 | End: 2017-12-03 | Stop reason: SDUPTHER

## 2017-05-11 ENCOUNTER — TELEPHONE (OUTPATIENT)
Dept: INTERNAL MEDICINE CLINIC | Age: 72
End: 2017-05-11

## 2017-05-11 DIAGNOSIS — E78.00 PURE HYPERCHOLESTEROLEMIA: ICD-10-CM

## 2017-05-11 DIAGNOSIS — E11.9 TYPE 2 DIABETES MELLITUS WITHOUT COMPLICATION, WITHOUT LONG-TERM CURRENT USE OF INSULIN (HCC): ICD-10-CM

## 2017-05-11 DIAGNOSIS — I10 ESSENTIAL HYPERTENSION, BENIGN: Primary | ICD-10-CM

## 2017-05-30 ENCOUNTER — OFFICE VISIT (OUTPATIENT)
Dept: ENDOCRINOLOGY | Age: 72
End: 2017-05-30

## 2017-05-30 VITALS
SYSTOLIC BLOOD PRESSURE: 140 MMHG | HEIGHT: 68 IN | BODY MASS INDEX: 24.4 KG/M2 | HEART RATE: 89 BPM | WEIGHT: 161 LBS | DIASTOLIC BLOOD PRESSURE: 92 MMHG

## 2017-05-30 DIAGNOSIS — E11.9 TYPE 2 DIABETES MELLITUS WITHOUT COMPLICATION, WITH LONG-TERM CURRENT USE OF INSULIN (HCC): Primary | ICD-10-CM

## 2017-05-30 DIAGNOSIS — R79.89 ELEVATED SERUM CREATININE: ICD-10-CM

## 2017-05-30 DIAGNOSIS — E78.5 DYSLIPIDEMIA: ICD-10-CM

## 2017-05-30 DIAGNOSIS — I10 HTN (HYPERTENSION), BENIGN: ICD-10-CM

## 2017-05-30 DIAGNOSIS — Z79.4 TYPE 2 DIABETES MELLITUS WITHOUT COMPLICATION, WITH LONG-TERM CURRENT USE OF INSULIN (HCC): Primary | ICD-10-CM

## 2017-05-30 NOTE — MR AVS SNAPSHOT
Visit Information Date & Time Provider Department Dept. Phone Encounter #  
 5/30/2017  9:50 AM Domenica Rutledge, [address] [facility] End[facility]ology [phone] Follow-up Instructions Return in about 3 months (around 8/30/2017). Your Appointments 6/20/2017  9:15 AM  
ROUTINE CARE with Caroline Friedman, [address] [facility] [address] [place] [address]) Appt Note: o/b mychart - Medicare Yearly Exam; [facility] [address] [address]  
[address] [facility] [address] [place] [address] Upcoming Health Maintenance Date Due  
 GLAUCOMA SCREENING Q2Y 4/18/2015 EYE EXAM RETINAL OR DILATED Q1 12/1/2015 FOOT EXAM Q1 3/22/2017 MICROALBUMIN Q1 3/22/2017 MEDICARE YEARLY EXAM 3/23/2017 HEMOGLOBIN A1C Q6M 5/8/2017 LIPID PANEL Q1 7/26/2017 INFLUENZA AGE 9 TO ADULT 8/1/2017 Pneumococcal 65+ Low/Medium Risk (2 of 2 - PPSV23) 8/20/2017 COLONOSCOPY 4/10/2018 DTaP/Tdap/Td series (2 - Td) 5/9/2025 Allergies as of 5/30/2017  Review Complete On: 5/30/2017 By: Domenica Rutledge MD  
  
 Severity Noted Reaction Type Reactions Contrast Agent [Iodine]  10/26/2009   Side Effect Hives, Itching IVP dye Current Immunizations  Reviewed on 12/21/2012 Name Date Influenza High Dose Vaccine PF 11/8/2016 Influenza Vaccine 11/19/2014, 9/16/2013 Influenza Vaccine (Quad) PF 10/20/2015 Influenza Vaccine Split 12/7/2010 Pneumococcal Vaccine (Unspecified Type) 8/20/2012  8:39 AM  
 Tdap 5/9/2015  1:21 PM  
 Zoster 12/20/2012 Not reviewed this visit You Were Diagnosed With   
  
 Codes Comments Type 2 diabetes mellitus without complication, with long-term current use of insulin (HCC)    -  Primary ICD-10-CM: E11.9, Z79.4 ICD-9-CM: 250.00, V58.67 HTN (hypertension), benign     ICD-10-CM: I10 
ICD-9-CM: 401.1 Dyslipidemia     ICD-10-CM: E78.5 ICD-9-CM: 272.4 Vitals BP Pulse Height(growth percentile) Weight(growth percentile) BMI Smoking Status (!) 140/92 89 5' 8\" (1.727 m) 161 lb (73 kg) 24.48 kg/m2 Former Smoker Vitals History BMI and BSA Data Body Mass Index Body Surface Area  
 24.48 kg/m 2 1.87 m 2 Preferred Pharmacy Pharmacy Name Phone [address] [phone] Your Updated Medication List  
  
   
This list is accurate as of: 5/30/17 10:36 AM.  Always use your most recent med list.  
  
  
  
  
 aspirin delayed-release 81 mg tablet Take 81 mg by mouth daily. COLCRYS 0.6 mg tablet Generic drug:  colchicine TAKE 1 TABLET DAILY  
  
 esomeprazole 40 mg capsule Commonly known as:  NexIUM Take 1 Cap by mouth two (2) times a day. fosinopril 40 mg tablet Commonly known as:  MONOPRIL  
TAKE 1 TABLET DAILY  
  
 glucose blood VI test strips strip Commonly known as:  NoteWagonter OneTouch Verio strips. Monitoring 3 times daily. Dx E11.9 Insulin Needles (Disposable) 31 gauge x 5/16\" Ndle  
by SubCUTAneous route daily. Use qd with levemir pen 250.02 Insulin Needles (Disposable) 32 gauge x 5/32\" Ndle Commonly known as:  Kaylen Pen Needle Use to inject Humalog daily. ICD-10: E11.65 LEVEMIR FLEXTOUCH 100 unit/mL (3 mL) Inpn Generic drug:  insulin detemir INJECT 8 UNITS SUBCUTANEOUSLY EVERY ONCE   DAILY AS DIRECTED Liraglutide 0.6 mg/0.1 mL (18 mg/3 mL) sub-q pen Commonly known as:  VICTOZA 3-DONELL  
0.2 mL by SubCUTAneous route daily. Inject 1.2 mg subcutaneously daily OTHER  
two (2) times a day. motilium 10 mg daily (patient states drug not made in [place]) pravastatin 20 mg tablet Commonly known as:  PRAVACHOL  
TAKE 1 TABLET DAILY We Performed the Following HEMOGLOBIN A1C WITH EAG [53520 CPT(R)] LIPID PANEL [96423 CPT(R)] METABOLIC PANEL, COMPREHENSIVE [86589 CPT(R)] MICROALBUMIN, UR, RAND W/ MICROALBUMIN/CREA RATIO [id number] CPT(R)] Follow-up Instructions Return in about 3 months (around 8/30/2017). Patient Instructions Diabetes. Continue efforts to limit carbohydrate intake Medications: 
Continue Levemir 14 units daily - follow trend from bedtime to fasting to assess Continue Victoza 1.2 mg daily. Goals for blood sugars: Follow them periodically before lunch, supper and bedtime to see how you tolerate meals/carbohydrates Fasting  (less than 150) Other times -  (less than 180). Blood pressure: May need second BP medication I recommend monitoring this at home. Goal BP is less than 130/80. Cholesterol Will reassess on pravastatin. Introducing [facility]! Dear Crissy Maury: Thank you for requesting a Gameyeeeah account. Our records indicate that you already have an active Gameyeeeah account. You can access your account anytime at [email or web address] Did you know that you can access your hospital and ER discharge instructions at any time in Gameyeeeah? You can also review all of your test results from your hospital stay or ER visit. Additional Information If you have questions, please visit the Frequently Asked Questions section of the Gameyeeeah website at [email or web address] Remember, Gameyeeeah is NOT to be used for urgent needs. For medical emergencies, dial 911. Now available from your iPhone and Android! Please provide this summary of care documentation to your next provider. Your primary care clinician is listed as Marlene MANTILLA. If you have any questions after today's visit, please call [phone].

## 2017-05-30 NOTE — PROGRESS NOTES
History of Present Illness: Yeison Campbell is a 80 y.o. male presents for follow-up of diabetes. He was diagnosed in 2009. He also has Hernadez's esophagus and reports being told he has slowed stomach emptying    Diabetes related complications:  No microalbumin  Does have occ sx of neuropathy in feet. Current diabetes regimen:  - did not tolerate metformin - GI side effects  Levemir 15 units daily  After last visit Victoza 1.2 mg replaced Januvia and Humalog. Overall tolerating Victoza well. Having some challenges with injection due to how pen works    Glucoses:  110s-150s fasting. Not checking at other times other than fasting. A typical day is as follows:  - Breakfast: oatmeal most morning.    - Lunch:  Granby and diet coke. May have some thin cookies  - Dinner: eats out with some frequency. May have decreased this a little. - Beverages: water. Wt very stable. Exercise - walks often. golfs once a week. BP is mildly elevated consistently on fosinopril. Does not monitor at home, but his wife has a machine, so he can    Lipids - taking pravastatin 20 mg. Past Medical History:   Diagnosis Date    Chronic kidney disease     Colon polyps 2007    Dr. Timothy Clayton DDD (degenerative disc disease), cervical     Diabetes ([facility] [address]     GERD (gastroesophageal reflux disease)     Hernadez's esophagus long segment    Gout     Hearing loss     Hypercholesterolemia     Hypertension     Ill-defined condition     Hernadez's esophagus    Ill-defined condition     Glaucoma, Bell's palsy    Other ill-defined conditions     gout    Other ill-defined conditions     lipids    Other ill-defined conditions     BPH     Current Outpatient Prescriptions   Medication Sig    fosinopril (MONOPRIL) 40 mg tablet TAKE 1 TABLET DAILY    Liraglutide (VICTOZA 3-DONELL) 0.6 mg/0.1 mL (18 mg/3 mL) sub-q pen 0.2 mL by SubCUTAneous route daily.  Inject 1.2 mg subcutaneously daily    glucose blood VI test strips (ONETOUCH VERIO) strip OneTouch Verio strips. Monitoring 3 times daily. Dx E11.9    Insulin Needles, Disposable, (GAYATRI PEN NEEDLE) 32 gauge x 5/32\" ndle Use to inject Humalog daily. ICD-10: E11.65    pravastatin (PRAVACHOL) 20 mg tablet TAKE 1 TABLET DAILY    COLCRYS 0.6 mg tablet TAKE 1 TABLET DAILY    LEVEMIR FLEXTOUCH 100 unit/mL (3 mL) pen INJECT 8 UNITS SUBCUTANEOUSLY EVERY ONCE   DAILY AS DIRECTED (Patient taking differently: 15 units in the morning)    OTHER two (2) times a day. motilium 10 mg daily (patient states drug not made in [place])     Insulin Needles, Disposable, 31 X 5/16 \" ndle by SubCUTAneous route daily. Use qd with levemir pen 250.02    esomeprazole (NEXIUM) 40 mg capsule Take 1 Cap by mouth two (2) times a day.  aspirin delayed-release 81 mg tablet Take 81 mg by mouth daily.  JANUVIA 50 mg tablet TAKE 1 TABLET DAILY    insulin lispro (HUMALOG) 100 unit/mL kwikpen 3 units ac dinner (Patient taking differently: 5 units ac dinner)     No current facility-administered medications for this visit.       Allergies   Allergen Reactions    Contrast Agent [Iodine] Hives and Itching     IVP dye       Review of Systems:  - Eyes: no blurry vision or double vision  - Cardiovascular: no chest pain  - Respiratory: no shortness of breath  - Musculoskeletal: no myalgias  - Neurological: having less neuropathy sensations in feet    Physical Examination:  Visit Vitals    BP (!) 140/92    Pulse 89    Ht 5' 8\" (1.727 m)    Wt 161 lb (73 kg)    BMI 24.48 kg/m2   -   - General: pleasant, no distress, normal gait  - Cardiovascular: regular, normal rate   - Respiratory: normal effort  - Integumentary: no edema   Diabetic foot exam:     Right: Filament test normal sensation with micro filament   Pulse DP: 2+ (normal)   Deformities: None    - Psychiatric: normal mood and affect    Data Reviewed:   Component      Latest Ref Rng & Units 11/8/2016 7/26/2016 7/26/2016 3/22/2016           3:47 PM 9:56 AM  7:47 AM  1:30 PM   Cholesterol, total      100 - 199 mg/dL  175     Triglyceride      0 - 149 mg/dL  293 (H)     HDL Cholesterol      >39 mg/dL  47     VLDL, calculated      5 - 40 mg/dL  59 (H)     LDL, calculated      0 - 99 mg/dL  69     Creatinine, urine      22.0 - 328.0 mg/dL       Microalbumin, urine      0.0 - 17.0 ug/mL       Microalbumin/Creat. Ratio      0.0 - 30.0 mg/g creat       Hemoglobin A1c, (calculated)      4.8 - 5.6 %       Hemoglobin A1c (POC)      4.8 - 5.6 % 7.8 (A)  7.6 (A) 7.1 (A)     Component      Latest Ref Rng & Units 3/22/2016          11:43 AM   Cholesterol, total      100 - 199 mg/dL    Triglyceride      0 - 149 mg/dL    HDL Cholesterol      >39 mg/dL    VLDL, calculated      5 - 40 mg/dL    LDL, calculated      0 - 99 mg/dL    Creatinine, urine      22.0 - 328.0 mg/dL 26.7   Microalbumin, urine      0.0 - 17.0 ug/mL <3.0   Microalbumin/Creat. Ratio      0.0 - 30.0 mg/g creat <11.2   Hemoglobin A1c, (calculated)      4.8 - 5.6 %    Hemoglobin A1c (POC)      4.8 - 5.6 %        Assessment/Plan:   1. Type 2 diabetes mellitus without complication, with long-term current use of insulin (HCC)   - control overall seems reasonable  - check A1c  - continue Victoza  - recommend he assess glucoses at lunch, dinner and bedtime too to help us adjsut Levemir dose. May need higher dose to control overnight. 2. HTN (hypertension), benign   - BP high on only fosinopril  - recommended he monitor at home  - may add amlodipine if BP remains elevated. 3. Dyslipidemia   - reviewed how lower LDL goal likely advisable due to age and presence of diabetes. Will reassess. May change to more potent statin   4. Elevated serum creatinine - repeat. Patient Instructions   Diabetes. Continue efforts to limit carbohydrate intake    Medications:  Continue Levemir 14 units daily - follow trend from bedtime to fasting to assess    Continue Victoza 1.2 mg daily. Goals for blood sugars:   Follow them periodically before lunch, supper and bedtime to see how you tolerate meals/carbohydrates    Fasting  (less than 150)  Other times -  (less than 180). Blood pressure: May need second BP medication  I recommend monitoring this at home. Goal BP is less than 130/80. Cholesterol  Will reassess on pravastatin. Follow-up Disposition:  Return in about 3 months (around 8/30/2017).     Copy sent to:

## 2017-05-30 NOTE — PATIENT INSTRUCTIONS
Diabetes. Continue efforts to limit carbohydrate intake    Medications:  Continue Levemir 14 units daily - follow trend from bedtime to fasting to assess    Continue Victoza 1.2 mg daily. Goals for blood sugars: Follow them periodically before lunch, supper and bedtime to see how you tolerate meals/carbohydrates    Fasting  (less than 150)  Other times -  (less than 180). Blood pressure: May need second BP medication  I recommend monitoring this at home. Goal BP is less than 130/80. Cholesterol  Will reassess on pravastatin.

## 2017-05-31 LAB
ALBUMIN SERPL-MCNC: 4.5 G/DL (ref 3.5–4.8)
ALBUMIN/CREAT UR: <12.9 MG/G CREAT (ref 0–30)
ALBUMIN/GLOB SERPL: 1.8 {RATIO} (ref 1.2–2.2)
ALP SERPL-CCNC: 82 IU/L (ref 39–117)
ALT SERPL-CCNC: 21 IU/L (ref 0–44)
AST SERPL-CCNC: 15 IU/L (ref 0–40)
BILIRUB SERPL-MCNC: 0.4 MG/DL (ref 0–1.2)
BUN SERPL-MCNC: 19 MG/DL (ref 8–27)
BUN/CREAT SERPL: 15 (ref 10–24)
CALCIUM SERPL-MCNC: 9.5 MG/DL (ref 8.6–10.2)
CHLORIDE SERPL-SCNC: 98 MMOL/L (ref 96–106)
CHOLEST SERPL-MCNC: 135 MG/DL (ref 100–199)
CO2 SERPL-SCNC: 20 MMOL/L (ref 18–29)
CREAT SERPL-MCNC: 1.24 MG/DL (ref 0.76–1.27)
CREAT UR-MCNC: 23.3 MG/DL
EST. AVERAGE GLUCOSE BLD GHB EST-MCNC: 169 MG/DL
GLOBULIN SER CALC-MCNC: 2.5 G/DL (ref 1.5–4.5)
GLUCOSE SERPL-MCNC: 116 MG/DL (ref 65–99)
HBA1C MFR BLD: 7.5 % (ref 4.8–5.6)
HDLC SERPL-MCNC: 48 MG/DL
LDLC SERPL CALC-MCNC: 55 MG/DL (ref 0–99)
MICROALBUMIN UR-MCNC: <3 UG/ML
POTASSIUM SERPL-SCNC: 4.3 MMOL/L (ref 3.5–5.2)
PROT SERPL-MCNC: 7 G/DL (ref 6–8.5)
SODIUM SERPL-SCNC: 136 MMOL/L (ref 134–144)
TRIGL SERPL-MCNC: 161 MG/DL (ref 0–149)
VLDLC SERPL CALC-MCNC: 32 MG/DL (ref 5–40)

## 2017-05-31 NOTE — PROGRESS NOTES
Will send mychart message  Cholesterol is improved. a1c improved, but above goal. Will have him monitor at other times to determine if adjustment in Levemir is indicated.

## 2017-06-07 ENCOUNTER — HOSPITAL ENCOUNTER (EMERGENCY)
Age: 72
Discharge: HOME | End: 2017-06-08
Attending: EMERGENCY MEDICINE
Payer: MEDICARE

## 2017-06-07 ENCOUNTER — APPOINTMENT (OUTPATIENT)
Dept: GENERAL RADIOLOGY | Age: 72
End: 2017-06-07
Attending: EMERGENCY MEDICINE
Payer: MEDICARE

## 2017-06-07 ENCOUNTER — APPOINTMENT (OUTPATIENT)
Dept: CT IMAGING | Age: 72
End: 2017-06-07
Attending: EMERGENCY MEDICINE
Payer: MEDICARE

## 2017-06-07 DIAGNOSIS — I95.1 ORTHOSTATIC HYPOTENSION: ICD-10-CM

## 2017-06-07 DIAGNOSIS — R73.9 ELEVATED BLOOD SUGAR: ICD-10-CM

## 2017-06-07 DIAGNOSIS — R42 DIZZINESS: Primary | ICD-10-CM

## 2017-06-07 DIAGNOSIS — R06.02 SOB (SHORTNESS OF BREATH): ICD-10-CM

## 2017-06-07 LAB
ALBUMIN SERPL BCP-MCNC: 3.3 G/DL (ref 3.5–5)
ALBUMIN/GLOB SERPL: 1 {RATIO} (ref 1.1–2.2)
ALP SERPL-CCNC: 75 U/L (ref 45–117)
ALT SERPL-CCNC: 36 U/L (ref 12–78)
ANION GAP BLD CALC-SCNC: 8 MMOL/L (ref 5–15)
AST SERPL W P-5'-P-CCNC: 19 U/L (ref 15–37)
BASOPHILS # BLD AUTO: 0 K/UL (ref 0–0.1)
BASOPHILS # BLD: 1 % (ref 0–1)
BILIRUB SERPL-MCNC: 0.3 MG/DL (ref 0.2–1)
BUN SERPL-MCNC: 20 MG/DL (ref 6–20)
BUN/CREAT SERPL: 15 (ref 12–20)
CALCIUM SERPL-MCNC: 8.6 MG/DL (ref 8.5–10.1)
CHLORIDE SERPL-SCNC: 108 MMOL/L (ref 97–108)
CK SERPL-CCNC: 92 U/L (ref 39–308)
CO2 SERPL-SCNC: 22 MMOL/L (ref 21–32)
CREAT SERPL-MCNC: 1.33 MG/DL (ref 0.7–1.3)
EOSINOPHIL # BLD: 0.3 K/UL (ref 0–0.4)
EOSINOPHIL NFR BLD: 4 % (ref 0–7)
ERYTHROCYTE [DISTWIDTH] IN BLOOD BY AUTOMATED COUNT: 14.5 % (ref 11.5–14.5)
GLOBULIN SER CALC-MCNC: 3.2 G/DL (ref 2–4)
GLUCOSE SERPL-MCNC: 288 MG/DL (ref 65–100)
HCT VFR BLD AUTO: 38.6 % (ref 36.6–50.3)
HGB BLD-MCNC: 13.1 G/DL (ref 12.1–17)
LYMPHOCYTES # BLD AUTO: 24 % (ref 12–49)
LYMPHOCYTES # BLD: 1.8 K/UL (ref 0.8–3.5)
MCH RBC QN AUTO: 31.1 PG (ref 26–34)
MCHC RBC AUTO-ENTMCNC: 33.9 G/DL (ref 30–36.5)
MCV RBC AUTO: 91.7 FL (ref 80–99)
MONOCYTES # BLD: 0.8 K/UL (ref 0–1)
MONOCYTES NFR BLD AUTO: 10 % (ref 5–13)
NEUTS SEG # BLD: 4.5 K/UL (ref 1.8–8)
NEUTS SEG NFR BLD AUTO: 61 % (ref 32–75)
PLATELET # BLD AUTO: 193 K/UL (ref 150–400)
POTASSIUM SERPL-SCNC: 4.4 MMOL/L (ref 3.5–5.1)
PROT SERPL-MCNC: 6.5 G/DL (ref 6.4–8.2)
RBC # BLD AUTO: 4.21 M/UL (ref 4.1–5.7)
SODIUM SERPL-SCNC: 138 MMOL/L (ref 136–145)
TROPONIN I SERPL-MCNC: <0.04 NG/ML
WBC # BLD AUTO: 7.4 K/UL (ref 4.1–11.1)

## 2017-06-07 PROCEDURE — 70450 CT HEAD/BRAIN W/O DYE: CPT

## 2017-06-07 PROCEDURE — 74011250636 HC RX REV CODE- 250/636: Performed by: EMERGENCY MEDICINE

## 2017-06-07 PROCEDURE — 36415 COLL VENOUS BLD VENIPUNCTURE: CPT | Performed by: EMERGENCY MEDICINE

## 2017-06-07 PROCEDURE — 85025 COMPLETE CBC W/AUTO DIFF WBC: CPT | Performed by: EMERGENCY MEDICINE

## 2017-06-07 PROCEDURE — 99285 EMERGENCY DEPT VISIT HI MDM: CPT

## 2017-06-07 PROCEDURE — 80053 COMPREHEN METABOLIC PANEL: CPT | Performed by: EMERGENCY MEDICINE

## 2017-06-07 PROCEDURE — 96361 HYDRATE IV INFUSION ADD-ON: CPT

## 2017-06-07 PROCEDURE — 93005 ELECTROCARDIOGRAM TRACING: CPT

## 2017-06-07 PROCEDURE — 82550 ASSAY OF CK (CPK): CPT | Performed by: EMERGENCY MEDICINE

## 2017-06-07 PROCEDURE — 96360 HYDRATION IV INFUSION INIT: CPT

## 2017-06-07 PROCEDURE — 71010 XR CHEST PORT: CPT

## 2017-06-07 PROCEDURE — 84484 ASSAY OF TROPONIN QUANT: CPT | Performed by: EMERGENCY MEDICINE

## 2017-06-07 RX ADMIN — SODIUM CHLORIDE 1000 ML: 900 INJECTION, SOLUTION INTRAVENOUS at 23:41

## 2017-06-08 VITALS
TEMPERATURE: 97.8 F | HEART RATE: 81 BPM | BODY MASS INDEX: 25.11 KG/M2 | HEIGHT: 67 IN | SYSTOLIC BLOOD PRESSURE: 147 MMHG | DIASTOLIC BLOOD PRESSURE: 80 MMHG | OXYGEN SATURATION: 97 % | RESPIRATION RATE: 22 BRPM | WEIGHT: 160 LBS

## 2017-06-08 LAB
ATRIAL RATE: 89 BPM
CALCULATED P AXIS, ECG09: 48 DEGREES
CALCULATED R AXIS, ECG10: 73 DEGREES
CALCULATED T AXIS, ECG11: 61 DEGREES
DIAGNOSIS, 93000: NORMAL
P-R INTERVAL, ECG05: 120 MS
Q-T INTERVAL, ECG07: 346 MS
QRS DURATION, ECG06: 66 MS
QTC CALCULATION (BEZET), ECG08: 420 MS
VENTRICULAR RATE, ECG03: 89 BPM

## 2017-06-08 PROCEDURE — 74011250636 HC RX REV CODE- 250/636: Performed by: EMERGENCY MEDICINE

## 2017-06-08 RX ORDER — LANOLIN ALCOHOL/MO/W.PET/CERES
1000 CREAM (GRAM) TOPICAL DAILY
COMMUNITY
End: 2021-12-17

## 2017-06-08 RX ORDER — BACLOFEN 20 MG
1 TABLET ORAL DAILY
COMMUNITY

## 2017-06-08 RX ADMIN — SODIUM CHLORIDE 1000 ML: 900 INJECTION, SOLUTION INTRAVENOUS at 00:36

## 2017-06-08 NOTE — DISCHARGE INSTRUCTIONS
Lightheadedness or Faintness: Care Instructions  Your Care Instructions  Lightheadedness is a feeling that you are about to faint or \"pass out. \" You do not feel as if you or your surroundings are moving. It is different from vertigo, which is the feeling that you or things around you are spinning or tilting. Lightheadedness usually goes away or gets better when you lie down. If lightheadedness gets worse, it can lead to a fainting spell. It is common to feel lightheaded from time to time. Lightheadedness usually is not caused by a serious problem. It often is caused by a short-lasting drop in blood pressure and blood flow to your head that occurs when you get up too quickly from a seated or lying position. Follow-up care is a key part of your treatment and safety. Be sure to make and go to all appointments, and call your doctor if you are having problems. It's also a good idea to know your test results and keep a list of the medicines you take. How can you care for yourself at home? · Lie down for 1 or 2 minutes when you feel lightheaded. After lying down, sit up slowly and remain sitting for 1 to 2 minutes before slowly standing up. · Avoid movements, positions, or activities that have made you lightheaded in the past.  · Get plenty of rest, especially if you have a cold or flu, which can cause lightheadedness. · Make sure you drink plenty of fluids, especially if you have a fever or have been sweating. · Do not drive or put yourself and others in danger while you feel lightheaded. When should you call for help? Call 911 anytime you think you may need emergency care. For example, call if:  · You have symptoms of a stroke. These may include:  ¨ Sudden numbness, tingling, weakness, or loss of movement in your face, arm, or leg, especially on only one side of your body. ¨ Sudden vision changes. ¨ Sudden trouble speaking. ¨ Sudden confusion or trouble understanding simple statements.   ¨ Sudden problems with walking or balance. ¨ A sudden, severe headache that is different from past headaches. · You have symptoms of a heart attack. These may include:  ¨ Chest pain or pressure, or a strange feeling in the chest.  ¨ Sweating. ¨ Shortness of breath. ¨ Nausea or vomiting. ¨ Pain, pressure, or a strange feeling in the back, neck, jaw, or upper belly or in one or both shoulders or arms. ¨ Lightheadedness or sudden weakness. ¨ A fast or irregular heartbeat. After you call 911, the  may tell you to chew 1 adult-strength or 2 to 4 low-dose aspirin. Wait for an ambulance. Do not try to drive yourself. Watch closely for changes in your health, and be sure to contact your doctor if:  · Your lightheadedness gets worse or does not get better with home care. Where can you learn more? Go to http://allan-scout.info/. Enter K585 in the search box to learn more about \"Lightheadedness or Faintness: Care Instructions. \"  Current as of: May 27, 2016  Content Version: 11.2  © 9637-0079 [employer]. Care instructions adapted under license by [employer] (which disclaims liability or warranty for this information). If you have questions about a medical condition or this instruction, always ask your healthcare professional. [address] any warranty or liability for your use of this information.

## 2017-06-08 NOTE — ED NOTES
______Handy__________________ in to talk with patient and explain plan of care with  understanding and  written & verbal instructions.
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
## 2017-06-08 NOTE — ED NOTES
Care assumed of patient @ this time & intro with rounding done, with report from __Demetria GUTIERRES RN_________________. Patient resting quietly on stretcher without verbal complaints.

## 2017-06-08 NOTE — ED NOTES
Patient walked from room # 29 to x-ray & back without s/s of dizziness & Dr Rebecca Jacobson notified

## 2017-06-08 NOTE — ED TRIAGE NOTES
Pt arrives by EMS from home for sudden onset of dizziness/diaphoresis/shortness of breath/R headache onset 20:30 tonight while walking dog outside.  Denies chest pain/LOC/fall/injury    Blood sugar 310 (history DM II)  Took 325mg Aspirin PTA

## 2017-06-09 ENCOUNTER — TELEPHONE (OUTPATIENT)
Dept: ENDOCRINOLOGY | Age: 72
End: 2017-06-09

## 2017-06-09 NOTE — TELEPHONE ENCOUNTER
I returned patient's call. He had an episode Wednesday night of feeling sweaty, light headed/dizzy, and as if he was going to pass out. He was transported to [facility] ED and was told he was dehydrated. Patient stated he drinks plenty of water and his habits have not changed. He wonders how he became dehydrated. He read online dehydration is a side effect of Victoza and decided to discontinue this on his own starting today. Also, patient complains of black tarry stool that started a few days ago. He denies abdominal pain, nausea, vomiting. He follows with Dr. Vivian Ramos for Hernadez's esophagus and is planning to schedule a colonoscopy in the near future. He had the black tarry stool before ED visit, but did not mention it there. Blood sugar has been around 130. Patient would like a call back from Dr. Shawnee Chambers to discuss this.

## 2017-06-09 NOTE — TELEPHONE ENCOUNTER
----- Message from Lillian Hendrickson sent at 6/9/2017 10:54 AM EDT -----  Regarding: Dr. Cristobal Lr Telephone  Patient would like a call back regarding some issues he having, will discuss with nurse. Contact is [phone]

## 2017-06-10 NOTE — TELEPHONE ENCOUNTER
I did call Mr Casey Anderson around 5 pm on Friday June 9. He describes having tarry stools for the last few days. Forgot to mention this to ED team.  He has contacted Dr Dominick Issa for evaluation. Scheduled to see him Friday the 16th. Noted that HGB had dropped about 2 points from his prior baseline  Recommended he stop taking aspirin and avoid alcoholic beverages and NSAIDs. Encouraged adequate fluid intake. He is travelling to [facility] for talk on Sunday the 11th. He is feeling well currently.

## 2017-06-10 NOTE — TELEPHONE ENCOUNTER
----- Message from Niraj Jennings JODYMIGNON sent at 0/5/6864 10:40 AM EDT -----  Regarding: FW: Non-Urgent Medical Question  Contact: [phone]      ----- Message -----     From: Samuel Speck     Sent: 6/8/2017  10:14 PM       To: [facility] Nurse Pool  Subject: Non-Urgent Medical Question                      Thinking I may not inject Victoza while on trip but increasing Levimir to 14 from 18 while gone unless you say otherwise. I will be home tomorrow until noon and will have cell phone [phone] and e-mail. Blood sugar this evening was 133 at 9:00 p.m. Have been drinking plenty of water today. If possible please call me on Friday to discuss before we leave. Many thanks for you help.   Art

## 2017-06-19 ENCOUNTER — HOSPITAL ENCOUNTER (OUTPATIENT)
Age: 72
Setting detail: OUTPATIENT SURGERY
Discharge: HOME | End: 2017-06-19
Attending: INTERNAL MEDICINE | Admitting: INTERNAL MEDICINE
Payer: MEDICARE

## 2017-06-19 ENCOUNTER — ANESTHESIA EVENT (OUTPATIENT)
Dept: ENDOSCOPY | Age: 72
End: 2017-06-19
Payer: MEDICARE

## 2017-06-19 ENCOUNTER — ANESTHESIA (OUTPATIENT)
Dept: ENDOSCOPY | Age: 72
End: 2017-06-19
Payer: MEDICARE

## 2017-06-19 VITALS
DIASTOLIC BLOOD PRESSURE: 81 MMHG | SYSTOLIC BLOOD PRESSURE: 140 MMHG | OXYGEN SATURATION: 100 % | TEMPERATURE: 97.7 F | BODY MASS INDEX: 24.19 KG/M2 | HEIGHT: 67 IN | RESPIRATION RATE: 16 BRPM | HEART RATE: 76 BPM | WEIGHT: 154.13 LBS

## 2017-06-19 LAB
GLUCOSE BLD STRIP.AUTO-MCNC: 120 MG/DL (ref 65–100)
SERVICE CMNT-IMP: ABNORMAL

## 2017-06-19 PROCEDURE — 76060000032 HC ANESTHESIA 0.5 TO 1 HR: Performed by: INTERNAL MEDICINE

## 2017-06-19 PROCEDURE — 77030003657 HC NDL SCLER BSC -B: Performed by: INTERNAL MEDICINE

## 2017-06-19 PROCEDURE — 74011250636 HC RX REV CODE- 250/636

## 2017-06-19 PROCEDURE — 74011000250 HC RX REV CODE- 250

## 2017-06-19 PROCEDURE — 82962 GLUCOSE BLOOD TEST: CPT

## 2017-06-19 PROCEDURE — 74011250637 HC RX REV CODE- 250/637: Performed by: INTERNAL MEDICINE

## 2017-06-19 PROCEDURE — 74011250636 HC RX REV CODE- 250/636: Performed by: INTERNAL MEDICINE

## 2017-06-19 PROCEDURE — 76040000007: Performed by: INTERNAL MEDICINE

## 2017-06-19 PROCEDURE — 74011000250 HC RX REV CODE- 250: Performed by: ANESTHESIOLOGY

## 2017-06-19 RX ORDER — ATROPINE SULFATE 0.1 MG/ML
0.5 INJECTION INTRAVENOUS
Status: DISCONTINUED | OUTPATIENT
Start: 2017-06-19 | End: 2017-06-19 | Stop reason: HOSPADM

## 2017-06-19 RX ORDER — PROPOFOL 10 MG/ML
INJECTION, EMULSION INTRAVENOUS AS NEEDED
Status: DISCONTINUED | OUTPATIENT
Start: 2017-06-19 | End: 2017-06-19 | Stop reason: HOSPADM

## 2017-06-19 RX ORDER — SODIUM CHLORIDE 0.9 % (FLUSH) 0.9 %
5-10 SYRINGE (ML) INJECTION AS NEEDED
Status: DISCONTINUED | OUTPATIENT
Start: 2017-06-19 | End: 2017-06-19 | Stop reason: HOSPADM

## 2017-06-19 RX ORDER — SODIUM CHLORIDE 9 MG/ML
100 INJECTION, SOLUTION INTRAVENOUS CONTINUOUS
Status: DISCONTINUED | OUTPATIENT
Start: 2017-06-19 | End: 2017-06-19 | Stop reason: HOSPADM

## 2017-06-19 RX ORDER — SODIUM CHLORIDE 0.9 % (FLUSH) 0.9 %
5-10 SYRINGE (ML) INJECTION EVERY 8 HOURS
Status: DISCONTINUED | OUTPATIENT
Start: 2017-06-19 | End: 2017-06-19 | Stop reason: HOSPADM

## 2017-06-19 RX ORDER — MIDAZOLAM HYDROCHLORIDE 1 MG/ML
1-2 INJECTION, SOLUTION INTRAMUSCULAR; INTRAVENOUS
Status: DISCONTINUED | OUTPATIENT
Start: 2017-06-19 | End: 2017-06-19 | Stop reason: HOSPADM

## 2017-06-19 RX ORDER — DEXTROMETHORPHAN/PSEUDOEPHED 2.5-7.5/.8
1.2 DROPS ORAL
Status: DISCONTINUED | OUTPATIENT
Start: 2017-06-19 | End: 2017-06-19 | Stop reason: HOSPADM

## 2017-06-19 RX ORDER — GLYCOPYRROLATE 0.2 MG/ML
INJECTION INTRAMUSCULAR; INTRAVENOUS AS NEEDED
Status: DISCONTINUED | OUTPATIENT
Start: 2017-06-19 | End: 2017-06-19 | Stop reason: HOSPADM

## 2017-06-19 RX ORDER — ALBUTEROL SULFATE 0.83 MG/ML
2.5 SOLUTION RESPIRATORY (INHALATION)
Status: COMPLETED | OUTPATIENT
Start: 2017-06-19 | End: 2017-06-19

## 2017-06-19 RX ORDER — LIDOCAINE HYDROCHLORIDE 20 MG/ML
INJECTION, SOLUTION EPIDURAL; INFILTRATION; INTRACAUDAL; PERINEURAL AS NEEDED
Status: DISCONTINUED | OUTPATIENT
Start: 2017-06-19 | End: 2017-06-19 | Stop reason: HOSPADM

## 2017-06-19 RX ORDER — FLUMAZENIL 0.1 MG/ML
0.2 INJECTION INTRAVENOUS
Status: DISCONTINUED | OUTPATIENT
Start: 2017-06-19 | End: 2017-06-19 | Stop reason: HOSPADM

## 2017-06-19 RX ORDER — EPINEPHRINE 0.1 MG/ML
1 INJECTION INTRACARDIAC; INTRAVENOUS
Status: DISCONTINUED | OUTPATIENT
Start: 2017-06-19 | End: 2017-06-19 | Stop reason: HOSPADM

## 2017-06-19 RX ADMIN — PROPOFOL 350 MG: 10 INJECTION, EMULSION INTRAVENOUS at 14:18

## 2017-06-19 RX ADMIN — SODIUM CHLORIDE 100 ML/HR: 900 INJECTION, SOLUTION INTRAVENOUS at 13:05

## 2017-06-19 RX ADMIN — ALBUTEROL SULFATE 2.5 MG: 2.5 SOLUTION RESPIRATORY (INHALATION) at 14:47

## 2017-06-19 RX ADMIN — GLYCOPYRROLATE 0.2 MG: 0.2 INJECTION INTRAMUSCULAR; INTRAVENOUS at 13:47

## 2017-06-19 RX ADMIN — EPINEPHRINE 0.3 MG: 0.1 INJECTION, SOLUTION ENDOTRACHEAL; INTRACARDIAC; INTRAVENOUS at 14:00

## 2017-06-19 RX ADMIN — LIDOCAINE HYDROCHLORIDE 60 MG: 20 INJECTION, SOLUTION EPIDURAL; INFILTRATION; INTRACAUDAL; PERINEURAL at 13:47

## 2017-06-19 RX ADMIN — SIMETHICONE 80 MG: 20 SUSPENSION/ DROPS ORAL at 13:50

## 2017-06-19 NOTE — IP AVS SNAPSHOT
[address] [facility] 
[phone] Patient: Frank Plascencia MRN: [id number] RFM:[birth year] You are allergic to the following Allergen Reactions Contrast Agent (Iodine) Hives Itching IVP dye Recent Documentation Height Weight BMI Smoking Status 1.702 m 69.9 kg 24.14 kg/m2 Former Smoker Emergency Contacts Name Discharge Info Relation Home Work Mobile [facility]  Spouse [3] [phone] About your hospitalization You were admitted on:  June 19, 2017 You last received care in the:  [facility] You were discharged on:  June 19, 2017 Unit phone number:  [phone] Why you were hospitalized Your primary diagnosis was:  Not on File Providers Seen During Your Hospitalizations Provider Role Specialty Primary office phone Eleuterio Klinefelter., MD Attending Provider Gastroenterology [phone] Your Primary Care Physician (PCP) Primary Care Physician Office Phone Office Fax [facility] [phone] Follow-up Information Follow up With Details Comments Contact Info Caroline Friedman MD   [address] [facility] 
[phone] Current Discharge Medication List  
  
CONTINUE these medications which have CHANGED Dose & Instructions Dispensing Information Comments Morning Noon Evening Bedtime LEVEMIR FLEXTOUCH 100 unit/mL (3 mL) Inpn Generic drug:  insulin detemir What changed:  See the new instructions. Your last dose was: Your next dose is: INJECT 10UNITS UNDER THE SKIN ONCE DAILY AS DIRECTED Quantity:  15 mL Refills:  11 CONTINUE these medications which have NOT CHANGED Dose & Instructions Dispensing Information Comments Morning Noon Evening Bedtime  
 aspirin delayed-release 81 mg tablet Your last dose was: Your next dose is:    
   
   
 Dose:  81 mg Take 81 mg by mouth daily. Refills:  0  
     
   
   
   
  
 COLCRYS 0.6 mg tablet Generic drug:  colchicine Your last dose was: Your next dose is: TAKE 1 TABLET DAILY Quantity:  90 Tab Refills:  3  
     
   
   
   
  
 cyanocobalamin 1,000 mcg tablet Your last dose was: Your next dose is:    
   
   
 Dose:  1000 mcg Take 1,000 mcg by mouth daily. Refills:  0  
     
   
   
   
  
 esomeprazole 40 mg capsule Commonly known as:  NexIUM Your last dose was: Your next dose is:    
   
   
 Dose:  40 mg Take 1 Cap by mouth two (2) times a day. Quantity:  180 Cap Refills:  3  
     
   
   
   
  
 fosinopril 40 mg tablet Commonly known as:  MONOPRIL Your last dose was: Your next dose is: TAKE 1 TABLET DAILY Quantity:  90 Tab Refills:  2  
     
   
   
   
  
 glucose blood VI test strips strip Commonly known as:  Seamus Kj Your last dose was: Your next dose is:    
   
   
 OneTouch Verio strips. Monitoring 3 times daily. Dx E11.9 Quantity:  300 Strip Refills:  3 Insulin Needles (Disposable) 31 gauge x 5/16\" Ndle Your last dose was: Your next dose is:    
   
   
 by SubCUTAneous route daily. Use qd with levemir pen 250.02 Quantity:  1 Package Refills:  11 Insulin Needles (Disposable) 32 gauge x 5/32\" Ndle Commonly known as:  Kaylen Pen Needle Your last dose was: Your next dose is:    
   
   
 Use to inject Humalog daily. ICD-10: E11.65 Quantity:  100 Pen Needle Refills:  0 Liraglutide 0.6 mg/0.1 mL (18 mg/3 mL) sub-q pen Commonly known as:  [place] Bucy 3-DONELL Your last dose was: Your next dose is: Dose:  1.2 mg  
0.2 mL by SubCUTAneous route daily. Inject 1.2 mg subcutaneously daily Quantity:  6 Each Refills:  3  
 6 pens for 90 day supply  
    
   
   
   
  
 magnesium oxide 500 mg Tab Your last dose was: Your next dose is: Take  by mouth daily. Refills:  0  
     
   
   
   
  
 OTHER Your last dose was: Your next dose is:    
   
   
 two (2) times a day. motilium 10 mg daily (patient states drug not made in [facility]) Refills:  0  
     
   
   
   
  
 pravastatin 20 mg tablet Commonly known as:  PRAVACHOL Your last dose was: Your next dose is: TAKE 1 TABLET DAILY Quantity:  90 Tab Refills:  2 Discharge Instructions Ana Hernandez MD 
[facility], [address] [place] [address] [address] [place] [phone] 
[email or web address] Violette Bamberger [id number] 
1945 EGD DISCHARGE INSTRUCTIONS Discomfort: 
Sore throat- throat lozenges or warm salt water gargle 
redness at IV site- apply warm compress to area; if redness or soreness persist- contact your physician Gaseous discomfort- walking, belching will help relieve any discomfort You may not operate a vehicle for 12 hours You may not engage in an occupation involving machinery or appliances for rest of today You may not drink alcoholic beverages for at least 12 hours Avoid making any critical decisions for at least 24 hour DIET You may have anything by mouth. You may eat and drink immediately. You may resume your regular diet  however -  remember your colon is empty and a heavy meal will produce gas. Avoid these foods:  vegetables, fried / greasy foods, carbonated drinks ACTIVITY You may resume your normal daily activities Spend the remainder of the day resting -  avoid any strenuous activity. CALL M.D.   ANY SIGN OF  
 Increasing pain, nausea, vomiting Abdominal distension (swelling) New increased bleeding (oral or rectal) Fever (chills) Pain in chest area Bloody discharge from nose or mouth Shortness of breath Follow-up Instructions: 
 Call Dr. Kobe Wayne for any questions or problems. Telephone # [phone] Dr. Naseem Tillman office will notify you of the biopsy results available  Within 7 to 10 days. We will call you or send a letter Continue same medications. ENDOSCOPY FINDINGS: 
 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 DISCHARGE SUMMARY from Nurse The following personal items collected during your admission are returned to you:  
Dental Appliance: Dental Appliances: None Vision: Visual Aid: None Hearing Aid:   
Jewelry:   
Clothing:   
Other Valuables:   
Valuables sent to safe:   
 
Discharge Orders None ACO Transitions of Care [facility] [address] [place] [address] offers a voluntary care coordination program to provide high quality service and care to [facility] fee-for-service beneficiaries. [facility] was designed to help you enhance your health and well-being through the following services: ? Transitions of Care  support for individuals who are transitioning from one care setting to another (example: Hospital to home). ? Chronic and Complex Care Coordination  support for individuals and caregivers of those with serious or chronic illnesses or with more than one chronic (ongoing) condition and those who take a number of different medications. If you meet specific medical criteria, a [address] [place] [address] Rd may call you directly to coordinate your care with your primary care physician and your other care providers. For questions about the [facility] [place] programs, please, contact your physicians office. For general questions or additional information about Accountable Care Organizations: 
Please visit www.medicare.gov/acos. html or call 1-800-MEDICARE (7-194.265.4554) TT users should call 7-933.311.9195. Introducing [facility] SERVICES! Dear Teofilo Vinson: Thank you for requesting a [employer] account. Our records indicate that you already have an active [employer] account. You can access your account anytime at [email or web address] Did you know that you can access your hospital and ER discharge instructions at any time in [employer]? You can also review all of your test results from your hospital stay or ER visit. Additional Information If you have questions, please visit the Frequently Asked Questions section of the [employer] website at [email or web address] Remember, [employer] is NOT to be used for urgent needs. For medical emergencies, dial 911. Now available from your iPhone and Android! General Information Please provide this summary of care documentation to your next provider. Patient Signature:  ____________________________________________________________ Date:  ____________________________________________________________  
  
Yumiko Mendoza Provider Signature:  ____________________________________________________________ Date:  ____________________________________________________________

## 2017-06-19 NOTE — H&P
Kary Limon MD  [facility], [address]  [place], [address] [facility]  [phone]  [email or web address]      See office H and P. No interval change. Date of Surgery Update:  Ariana Hernandez was seen and examined. History and physical has been reviewed. The patient has been examined.  There have been no significant clinical changes since the completion of the originally dated History and Physical.    Signed By: Ammy Christianson MD     June 19, 2017 1:45 PM

## 2017-06-19 NOTE — ANESTHESIA POSTPROCEDURE EVALUATION
Post-Anesthesia Evaluation and Assessment    Patient: Silver Archuleta MRN: [id number]  SSN: [id number]    YOB: 1945  Age: 80 y.o. Sex: male       Cardiovascular Function/Vital Signs  Visit Vitals    /81    Pulse 76    Temp 36.5 °C (97.7 °F)    Resp 16    Ht 5' 7\" (1.702 m)    Wt 69.9 kg (154 lb 2 oz)    SpO2 100%    BMI 24.14 kg/m2       Patient is status post total IV anesthesia anesthesia for Procedure(s):  ESOPHAGOGASTRODUODENOSCOPY (EGD)  BICAP  INJECTION. Nausea/Vomiting: None    Postoperative hydration reviewed and adequate. Pain:  Pain Scale 1: Visual (06/19/17 1458)  Pain Intensity 1: 0 (06/19/17 1458)   Managed    Neurological Status: At baseline    Mental Status and Level of Consciousness: Arousable    Pulmonary Status:   O2 Device: Room air (06/19/17 1458)   Adequate oxygenation and airway patent    Complications related to anesthesia: None    Post-anesthesia assessment completed. No concerns    Signed By: Silver Archuleta DO     June 19, 2017       Pt stable in PACU, no signs of aspiration. Breathing treatment given prophylactically , saturations remained %, lungs remained CTA bilaterally, VSS, discussed with pt and wife symptoms of aspiration and what to do.   Pt and wife voiced understanding

## 2017-06-19 NOTE — DISCHARGE INSTRUCTIONS
Robin Thomas MD  [facility], [address] [place] [address], [address] [facility]  [phone]  [facility].[place]. [facility]  [id number]  1945    EGD DISCHARGE INSTRUCTIONS    Discomfort:  Sore throat- throat lozenges or warm salt water gargle  redness at IV site- apply warm compress to area; if redness or soreness persist- contact your physician  Gaseous discomfort- walking, belching will help relieve any discomfort  You may not operate a vehicle for 12 hours  You may not engage in an occupation involving machinery or appliances for rest of today  You may not drink alcoholic beverages for at least 12 hours  Avoid making any critical decisions for at least 24 hour  DIET  You may have anything by mouth. You may eat and drink immediately. You may resume your regular diet - however -  remember your colon is empty and a heavy meal will produce gas. Avoid these foods:  vegetables, fried / greasy foods, carbonated drinks      ACTIVITY  You may resume your normal daily activities   Spend the remainder of the day resting -  avoid any strenuous activity. CALL M.D. ANY SIGN OF   Increasing pain, nausea, vomiting  Abdominal distension (swelling)  New increased bleeding (oral or rectal)  Fever (chills)  Pain in chest area  Bloody discharge from nose or mouth  Shortness of breath    Follow-up Instructions:   Call Dr. Robin hTomas for any questions or problems. Telephone # [phone]  Dr. Jeana Grant office will notify you of the biopsy results available  Within 7 to 10 days. We will call you or send a letter   Continue same medications. 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       DISCHARGE SUMMARY from Nurse    The following personal items collected during your admission are returned to you:   Dental Appliance: Dental Appliances: None  Vision: Visual Aid: None  Hearing Aid:    Jewelry:    Clothing:    Other Valuables:    Valuables sent to safe:

## 2017-06-19 NOTE — PROCEDURES
[facility]                   Endoscopic Gastroduodenoscopy Procedure Note      6/19/2017  Ariana Settle  1945  [id number]    Procedure: Endoscopic Gastroduodenoscopy with control of bleeding    Indication: Melena, acute blood loss anemia     Pre-operative Diagnosis: see indication above    Post-operative Diagnosis: see findings below    : P. Delano Holter. MD    Referring Provider:  Raymundo Dillard MD      Anesthesia/Sedation:  MAC anesthesia Propofol    Airway assessment: No airway problems anticipated    Pre-Procedural Exam:      Airway: clear, no airway problems anticipated  Heart: RRR, without gallops or rubs  Lungs: clear bilaterally without wheezes, crackles, or rhonchi  Abdomen: soft, nontender, nondistended, bowel sounds present  Mental Status: awake, alert and oriented to person, place and time       Procedure Details     After infomed consent was obtained for the procedure, with all risks and benefits of procedure explained the patient was taken to the endoscopy suite and placed in the left lateral decubitus position. Following sequential administration of sedation as per above, the endoscope was inserted into the mouth and advanced under direct vision to second portion of the duodenum. A careful inspection was made as the gastroscope was withdrawn, including a retroflexed view of the proximal stomach; findings and interventions are described below. 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 When touched with bicap it began bleeding significantly. Hemostasis achieved with injection of Epi 1:10,000, 3cc and then cautery with 7 Fr bicap. Duodenum: normal    Therapies:  See above    Specimens: none           Complications:   Pt vomited bloody fluid at end of procedure. Did not appear to aspirate.     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 Recommendations:  -Continue acid suppression. , -Follow clinical symptoms and laboratory studies for evidence of rebleeding. , -Repeat CBC in 1 week. Repeat EGD in 2-3 months when doing colonoscopy.     David Tellez MD6/19/2017

## 2017-06-19 NOTE — ANESTHESIA PREPROCEDURE EVALUATION
Anesthetic History   No history of anesthetic complications            Review of Systems / Medical History  Patient summary reviewed, nursing notes reviewed and pertinent labs reviewed    Pulmonary          Smoker (EX, 40 pk yr, quit in [facility])         Neuro/Psych   Within defined limits           Cardiovascular    Hypertension          Hyperlipidemia    Exercise tolerance: >4 METS  Comments: 6-2017 EKG: NSR   GI/Hepatic/Renal     GERD          Comments: Hx of colon polyps, melena    Hx of nissen    Hernadez's esoph, GERD Endo/Other    Diabetes: using insulin    Arthritis     Other Findings   Comments: DDD, cervical    hearing loss  Glaucoma  Bell's palsy    Polycythemia    7 oz alc per week  Gout         Physical Exam    Airway  Mallampati: I  TM Distance: > 6 cm  Neck ROM: normal range of motion   Mouth opening: Normal     Cardiovascular    Rhythm: regular  Rate: normal         Dental  No notable dental hx       Pulmonary  Breath sounds clear to auscultation               Abdominal  GI exam deferred       Other Findings            Anesthetic Plan    ASA: 3  Anesthesia type: total IV anesthesia          Induction: Intravenous  Anesthetic plan and risks discussed with: Patient

## 2017-06-19 NOTE — PROGRESS NOTES
Anesthesia reports 350 mg Propofol, 60 mg Lidocaine, 0.2 mg Robinul and 950 ml of Normal Saline were given during procedure. Received report from anesthesia staff on vital signs and status of patient.

## 2017-06-19 NOTE — ROUTINE PROCESS
TRANSFER - IN REPORT:    Verbal report received from Reese RN(name) on [place] Screen  being received from endo(unit) for ordered procedure      Report consisted of patients Situation, Background, Assessment and   Recommendations(SBAR). Information from the following report(s) SBAR was reviewed with the receiving nurse. Opportunity for questions and clarification was provided. Assessment completed upon patients arrival to unit and care assumed.

## 2017-07-26 ENCOUNTER — TELEPHONE (OUTPATIENT)
Dept: INTERNAL MEDICINE CLINIC | Age: 72
End: 2017-07-26

## 2017-07-26 NOTE — TELEPHONE ENCOUNTER
Pt's wife states pt needs to be seen and wants to see Dr. Alvin Yanez. Pt had light headed feeling with sweats. Pt is diabetic. Please call pt to go over this as pt didn't want to see another physician.

## 2017-07-27 ENCOUNTER — ANESTHESIA EVENT (OUTPATIENT)
Dept: ENDOSCOPY | Age: 72
DRG: 378 | End: 2017-07-27
Payer: MEDICARE

## 2017-07-27 ENCOUNTER — HOSPITAL ENCOUNTER (INPATIENT)
Age: 72
LOS: 2 days | Discharge: HOME | DRG: 378 | End: 2017-07-29
Attending: EMERGENCY MEDICINE | Admitting: INTERNAL MEDICINE
Payer: MEDICARE

## 2017-07-27 DIAGNOSIS — K92.1 MELENA: Primary | ICD-10-CM

## 2017-07-27 PROBLEM — K92.2 GI BLEED: Status: ACTIVE | Noted: 2017-07-27

## 2017-07-27 LAB
ABO + RH BLD: NORMAL
ALBUMIN SERPL BCP-MCNC: 3.9 G/DL (ref 3.5–5)
ALBUMIN/GLOB SERPL: 1 {RATIO} (ref 1.1–2.2)
ALP SERPL-CCNC: 80 U/L (ref 45–117)
ALT SERPL-CCNC: 31 U/L (ref 12–78)
ANION GAP BLD CALC-SCNC: 8 MMOL/L (ref 5–15)
AST SERPL W P-5'-P-CCNC: 18 U/L (ref 15–37)
BASOPHILS # BLD AUTO: 0 K/UL (ref 0–0.1)
BASOPHILS # BLD: 0 % (ref 0–1)
BILIRUB SERPL-MCNC: 0.3 MG/DL (ref 0.2–1)
BLOOD GROUP ANTIBODIES SERPL: NORMAL
BUN SERPL-MCNC: 31 MG/DL (ref 6–20)
BUN/CREAT SERPL: 21 (ref 12–20)
CALCIUM SERPL-MCNC: 9.3 MG/DL (ref 8.5–10.1)
CHLORIDE SERPL-SCNC: 103 MMOL/L (ref 97–108)
CO2 SERPL-SCNC: 22 MMOL/L (ref 21–32)
CREAT SERPL-MCNC: 1.48 MG/DL (ref 0.7–1.3)
EOSINOPHIL # BLD: 0.1 K/UL (ref 0–0.4)
EOSINOPHIL NFR BLD: 1 % (ref 0–7)
ERYTHROCYTE [DISTWIDTH] IN BLOOD BY AUTOMATED COUNT: 16.6 % (ref 11.5–14.5)
GLOBULIN SER CALC-MCNC: 3.8 G/DL (ref 2–4)
GLUCOSE BLD STRIP.AUTO-MCNC: 253 MG/DL (ref 65–100)
GLUCOSE BLD STRIP.AUTO-MCNC: 265 MG/DL (ref 65–100)
GLUCOSE SERPL-MCNC: 104 MG/DL (ref 65–100)
HCT VFR BLD AUTO: 35.4 % (ref 36.6–50.3)
HEMOCCULT STL QL: POSITIVE
HGB BLD-MCNC: 10.9 G/DL (ref 12.1–17)
LYMPHOCYTES # BLD AUTO: 24 % (ref 12–49)
LYMPHOCYTES # BLD: 1.9 K/UL (ref 0.8–3.5)
MCH RBC QN AUTO: 25.5 PG (ref 26–34)
MCHC RBC AUTO-ENTMCNC: 30.8 G/DL (ref 30–36.5)
MCV RBC AUTO: 82.9 FL (ref 80–99)
MONOCYTES # BLD: 0.3 K/UL (ref 0–1)
MONOCYTES NFR BLD AUTO: 3 % (ref 5–13)
NEUTS SEG # BLD: 5.4 K/UL (ref 1.8–8)
NEUTS SEG NFR BLD AUTO: 72 % (ref 32–75)
PLATELET # BLD AUTO: 232 K/UL (ref 150–400)
POTASSIUM SERPL-SCNC: 4.3 MMOL/L (ref 3.5–5.1)
PROT SERPL-MCNC: 7.7 G/DL (ref 6.4–8.2)
RBC # BLD AUTO: 4.27 M/UL (ref 4.1–5.7)
SERVICE CMNT-IMP: ABNORMAL
SERVICE CMNT-IMP: ABNORMAL
SODIUM SERPL-SCNC: 133 MMOL/L (ref 136–145)
SPECIMEN EXP DATE BLD: NORMAL
WBC # BLD AUTO: 7.7 K/UL (ref 4.1–11.1)

## 2017-07-27 PROCEDURE — 74011250637 HC RX REV CODE- 250/637: Performed by: INTERNAL MEDICINE

## 2017-07-27 PROCEDURE — 36415 COLL VENOUS BLD VENIPUNCTURE: CPT | Performed by: EMERGENCY MEDICINE

## 2017-07-27 PROCEDURE — 65660000000 HC RM CCU STEPDOWN

## 2017-07-27 PROCEDURE — 93005 ELECTROCARDIOGRAM TRACING: CPT

## 2017-07-27 PROCEDURE — 85025 COMPLETE CBC W/AUTO DIFF WBC: CPT | Performed by: EMERGENCY MEDICINE

## 2017-07-27 PROCEDURE — 86900 BLOOD TYPING SEROLOGIC ABO: CPT | Performed by: EMERGENCY MEDICINE

## 2017-07-27 PROCEDURE — 74011000250 HC RX REV CODE- 250: Performed by: INTERNAL MEDICINE

## 2017-07-27 PROCEDURE — 80053 COMPREHEN METABOLIC PANEL: CPT | Performed by: EMERGENCY MEDICINE

## 2017-07-27 PROCEDURE — 82272 OCCULT BLD FECES 1-3 TESTS: CPT | Performed by: EMERGENCY MEDICINE

## 2017-07-27 PROCEDURE — 82962 GLUCOSE BLOOD TEST: CPT

## 2017-07-27 PROCEDURE — C9113 INJ PANTOPRAZOLE SODIUM, VIA: HCPCS | Performed by: INTERNAL MEDICINE

## 2017-07-27 PROCEDURE — 74011250636 HC RX REV CODE- 250/636: Performed by: INTERNAL MEDICINE

## 2017-07-27 PROCEDURE — 99285 EMERGENCY DEPT VISIT HI MDM: CPT

## 2017-07-27 RX ORDER — ACETAMINOPHEN 325 MG/1
650 TABLET ORAL
Status: DISCONTINUED | OUTPATIENT
Start: 2017-07-27 | End: 2017-07-29 | Stop reason: HOSPADM

## 2017-07-27 RX ORDER — INSULIN LISPRO 100 [IU]/ML
INJECTION, SOLUTION INTRAVENOUS; SUBCUTANEOUS
Status: DISCONTINUED | OUTPATIENT
Start: 2017-07-27 | End: 2017-07-27

## 2017-07-27 RX ORDER — SODIUM CHLORIDE 0.9 % (FLUSH) 0.9 %
5-10 SYRINGE (ML) INJECTION AS NEEDED
Status: DISCONTINUED | OUTPATIENT
Start: 2017-07-27 | End: 2017-07-29 | Stop reason: HOSPADM

## 2017-07-27 RX ORDER — PRAVASTATIN SODIUM 10 MG/1
20 TABLET ORAL
Status: DISCONTINUED | OUTPATIENT
Start: 2017-07-27 | End: 2017-07-29 | Stop reason: HOSPADM

## 2017-07-27 RX ORDER — HYDRALAZINE HYDROCHLORIDE 20 MG/ML
10 INJECTION INTRAMUSCULAR; INTRAVENOUS
Status: DISCONTINUED | OUTPATIENT
Start: 2017-07-27 | End: 2017-07-29 | Stop reason: HOSPADM

## 2017-07-27 RX ORDER — BISACODYL 5 MG
5 TABLET, DELAYED RELEASE (ENTERIC COATED) ORAL DAILY PRN
Status: DISCONTINUED | OUTPATIENT
Start: 2017-07-27 | End: 2017-07-29 | Stop reason: HOSPADM

## 2017-07-27 RX ORDER — SODIUM CHLORIDE 9 MG/ML
100 INJECTION, SOLUTION INTRAVENOUS CONTINUOUS
Status: DISCONTINUED | OUTPATIENT
Start: 2017-07-27 | End: 2017-07-28

## 2017-07-27 RX ORDER — ONDANSETRON 2 MG/ML
4 INJECTION INTRAMUSCULAR; INTRAVENOUS
Status: DISCONTINUED | OUTPATIENT
Start: 2017-07-27 | End: 2017-07-29 | Stop reason: HOSPADM

## 2017-07-27 RX ORDER — SODIUM CHLORIDE 0.9 % (FLUSH) 0.9 %
5-10 SYRINGE (ML) INJECTION EVERY 8 HOURS
Status: DISCONTINUED | OUTPATIENT
Start: 2017-07-27 | End: 2017-07-29 | Stop reason: HOSPADM

## 2017-07-27 RX ORDER — INSULIN GLARGINE 100 [IU]/ML
10 INJECTION, SOLUTION SUBCUTANEOUS
Status: DISCONTINUED | OUTPATIENT
Start: 2017-07-27 | End: 2017-07-28

## 2017-07-27 RX ORDER — MAGNESIUM SULFATE 100 %
4 CRYSTALS MISCELLANEOUS AS NEEDED
Status: DISCONTINUED | OUTPATIENT
Start: 2017-07-27 | End: 2017-07-29 | Stop reason: HOSPADM

## 2017-07-27 RX ORDER — DEXTROSE 50 % IN WATER (D50W) INTRAVENOUS SYRINGE
12.5-25 AS NEEDED
Status: DISCONTINUED | OUTPATIENT
Start: 2017-07-27 | End: 2017-07-29 | Stop reason: HOSPADM

## 2017-07-27 RX ADMIN — SODIUM CHLORIDE 40 MG: 9 INJECTION INTRAMUSCULAR; INTRAVENOUS; SUBCUTANEOUS at 15:41

## 2017-07-27 RX ADMIN — SODIUM CHLORIDE 100 ML/HR: 900 INJECTION, SOLUTION INTRAVENOUS at 15:38

## 2017-07-27 RX ADMIN — PRAVASTATIN SODIUM 20 MG: 10 TABLET ORAL at 21:18

## 2017-07-27 RX ADMIN — SODIUM CHLORIDE 40 MG: 9 INJECTION INTRAMUSCULAR; INTRAVENOUS; SUBCUTANEOUS at 21:18

## 2017-07-27 RX ADMIN — Medication 10 ML: at 21:19

## 2017-07-27 RX ADMIN — Medication 10 ML: at 15:45
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
## 2017-07-27 NOTE — ED NOTES
TRANSFER - OUT REPORT:    Verbal report given to Missael Donnelly on Huy Liz  being transferred to [facility](unit) for routine progression of care       Report consisted of patients Situation, Background, Assessment and   Recommendations(SBAR). Information from the following report(s) SBAR, Kardex, ED Summary, [facility] and Recent Results was reviewed with the receiving nurse. Lines:   Peripheral IV 07/27/17 Right Wrist (Active)   Site Assessment Clean, dry, & intact 7/27/2017 11:46 AM   Phlebitis Assessment 0 7/27/2017 11:46 AM   Infiltration Assessment 0 7/27/2017 11:46 AM   Dressing Status Clean, dry, & intact 7/27/2017 11:46 AM   Dressing Type Transparent 7/27/2017 11:46 AM   Hub Color/Line Status Flushed 7/27/2017 11:46 AM   Action Taken Blood drawn 7/27/2017 11:46 AM        Opportunity for questions and clarification was provided.       Patient transported with:   Tello
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
## 2017-07-27 NOTE — ED TRIAGE NOTES
Pt ambulatory from triage with wife. Pt placed on monitor x3. VSS. Pt reports black stool and weakness a month ago. Pt was diagnosed with an \"AVM\" that was treated. Yesterday pt reports weakness and sweats and dark stools. This morning reports black stools and very lethargic. Pt in position of comfort with call bell and wife at side.
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
## 2017-07-27 NOTE — PROGRESS NOTES
[facility] SHIFT NURSING NOTE    Bedside shift change report given to Juan Maza RN (oncoming nurse) by Sean José RN (offgoing nurse). Report included the following information SBAR, Kardex, MAR, Recent Results and Cardiac Rhythm NSR.    SHIFT SUMMARY: 15:45 - Primary Nurse Alejandro Domínguez RN and JO Alfonso performed a dual skin assessment on this patient No impairment noted, incision to R neck with sutures, bandaid D+I. Giuseppe score is 22.  16:34 - Patient refused SSI, wants to take his own Victoza per home regimen. He is ok with Lantus in substitute for his Levimir, but prefers to take it in AM per home regimen. Messaged Dr Hosea Moreland. 17:18 - Ok to DC SSI. MD aware that wife will bring Victoza. Admission Date 7/27/2017   Admission Diagnosis Melena  GI bleed   Consults IP CONSULT TO GASTROENTEROLOGY  IP CONSULT TO HOSPITALIST        Consults   [] PT   [] OT   [] Speech   [] Palliative      [] Hospice    [] Case Management   [] None   Cardiac Monitoring   [x] Yes   [] No     Antibiotics   [] Yes   [x] No   GI Prophylaxis  (Ex: Protonix, Pepcid, etc,.)   [x] Yes   [] No          DVT Prophylaxis   SCDs:             Brady stockings:         [] Medication (Ex: Lovenox, Eliquis, Brilinta, Coumadin,  Heparin, etc..)   [] Contraindicated   [x] No VTE needed       Urinary Catheter             LDAs               Peripheral IV 07/27/17 Right Wrist (Active)   Site Assessment Clean, dry, & intact 7/27/2017  3:57 PM   Phlebitis Assessment 0 7/27/2017  3:57 PM   Infiltration Assessment 0 7/27/2017  3:57 PM   Dressing Status Clean, dry, & intact 7/27/2017  3:57 PM   Dressing Type Tape;Transparent 7/27/2017  3:57 PM   Hub Color/Line Status Pink; Infusing 7/27/2017  3:57 PM   Action Taken Blood drawn 7/27/2017 11:46 AM                      I/Os No intake or output data in the 24 hours ending 07/27/17 1650      Activity Level Activity Level: Up ad johnnie     Activity Assistance: No assistance needed   Diet Active Orders   Diet    DIET DIABETIC CONSISTENT CARB Regular    DIET NPO      Purposeful Rounding every 1-2 hour? [x] Yes    Binu Score  Total Score: 1   Bed Alarm (If score 3 or >)   [] Yes    [] Refused (See signed refusal form in chart)   Giuseppe Score  Giuseppe Score: 22       Giuseppe Score (if score 14 or less)   [] PMT consult   [] Nutrition consult   [] Wound Care consult      []  Specialty bed         Influenza Vaccine Received Flu Vaccine for Current Season (usually Sept-March): Not Flu Season               Needs prior to discharge:   Home O2 required:    [] Yes   [x] No     If yes, how much O2 required?     Other:    Last Bowel Movement Date: 07/27/17   Readmission Risk Assessment Tool Score High Risk            27       Total Score        3 Relationship with PCP    2 Patient Living Status    4 More than 1 Admission in calendar year    5 Patient Insurance is Medicare, Medicaid or Self Pay    13 Charlson Comorbidity Score        Criteria that do not apply:    Patient Length of Stay > 5       Expected Length of Stay - - -   Actual Length of Stay 0

## 2017-07-27 NOTE — IP AVS SNAPSHOT
[address] [facility] 
[phone] Patient: Bib Villasenor MRN: [id number] FNN:[birth year] You are allergic to the following Allergen Reactions Contrast Agent (Iodine) Hives Itching IVP dye Recent Documentation Weight BMI Smoking Status 68.9 kg 23.79 kg/m2 Former Smoker Emergency Contacts Name Discharge Info Relation Home Work Mobile [address] [facility]  Spouse [3] [phone] About your hospitalization You were admitted on:  July 27, 2017 You last received care in the:  [facility] 2 CARDIOPULMONARY CARE You were discharged on:  July 29, 2017 Unit phone number:  [phone] Why you were hospitalized Your primary diagnosis was:  Not on File Your diagnoses also included:  Gi Bleed Providers Seen During Your Hospitalizations Provider Role Specialty Primary office phone Jossue Mcdowell DO Attending Provider Emergency Medicine [phone] Trever Rosales MD Attending Provider Internal Medicine [phone] Your Primary Care Physician (PCP) Primary Care Physician Office Phone Office Fax Lei Egan [phone] Follow-up Information Follow up With Details Comments Contact Info Heaven Elder MD   [address] [facility] 
[phone] Your Appointments Tuesday August 29, 2017 10:10 AM EDT ROUTINE CARE with Luis Lacy MD  
[facility] [address] [place] [address]) [address] [place] [address] [address] [facility]  
[phone] Friday September 01, 2017 COLONOSCOPY, ESOPHAGOGASTRODUODENOSCOPY (EGD) with Charan Cuello MD  
[facility] ENDOSCOPY (RI OR PRE ASSESSMENT) [facility]  
[phone] Current Discharge Medication List  
  
 CONTINUE these medications which have CHANGED Dose & Instructions Dispensing Information Comments Morning Noon Evening Bedtime LEVEMIR FLEXTOUCH 100 unit/mL (3 mL) Inpn Generic drug:  insulin detemir What changed:  See the new instructions. Your last dose was: Your next dose is: INJECT 10UNITS UNDER THE SKIN ONCE DAILY AS DIRECTED Quantity:  15 mL Refills:  11 CONTINUE these medications which have NOT CHANGED Dose & Instructions Dispensing Information Comments Morning Noon Evening Bedtime COLCRYS 0.6 mg tablet Generic drug:  colchicine Your last dose was: Your next dose is: TAKE 1 TABLET DAILY Quantity:  90 Tab Refills:  3  
     
   
   
   
  
 cyanocobalamin 1,000 mcg tablet Your last dose was: Your next dose is:    
   
   
 Dose:  1000 mcg Take 1,000 mcg by mouth daily. Refills:  0  
     
   
   
   
  
 esomeprazole 40 mg capsule Commonly known as:  NexIUM Your last dose was: Your next dose is:    
   
   
 Dose:  40 mg Take 1 Cap by mouth two (2) times a day. Quantity:  180 Cap Refills:  3  
     
   
   
   
  
 fosinopril 40 mg tablet Commonly known as:  MONOPRIL Your last dose was: Your next dose is: TAKE 1 TABLET DAILY Quantity:  90 Tab Refills:  2  
     
   
   
   
  
 glucose blood VI test strips strip Commonly known as:  Jaspal Rojas Your last dose was: Your next dose is:    
   
   
 OneTouch Verio strips. Monitoring 3 times daily. Dx E11.9 Quantity:  300 Strip Refills:  3 Insulin Needles (Disposable) 31 gauge x 5/16\" Ndle Your last dose was: Your next dose is:    
   
   
 by SubCUTAneous route daily. Use qd with levemir pen 250.02 Quantity:  1 Package Refills:  11  
     
   
   
   
  
 Insulin Needles (Disposable) 32 gauge x 5/32\" Ndle Commonly known as:  Kaylen Pen Needle Your last dose was: Your next dose is:    
   
   
 Use to inject Humalog daily. ICD-10: E11.65 Quantity:  100 Pen Needle Refills:  0 Liraglutide 0.6 mg/0.1 mL (18 mg/3 mL) sub-q pen Commonly known as:  Tylene [place] 3-DONELL Your last dose was: Your next dose is:    
   
   
 Dose:  1.2 mg  
0.2 mL by SubCUTAneous route daily. Inject 1.2 mg subcutaneously daily Quantity:  6 Each Refills:  3  
 6 pens for 90 day supply  
    
   
   
   
  
 magnesium oxide 500 mg Tab Your last dose was: Your next dose is: Take  by mouth daily. Refills:  0  
     
   
   
   
  
 OTHER Your last dose was: Your next dose is:    
   
   
 two (2) times a day. motilium 10 mg daily (patient states drug not made in [place]) Refills:  0  
     
   
   
   
  
 pravastatin 20 mg tablet Commonly known as:  PRAVACHOL Your last dose was: Your next dose is: TAKE 1 TABLET DAILY Quantity:  90 Tab Refills:  2 STOP taking these medications   
 aspirin delayed-release 81 mg tablet Discharge Instructions None Discharge Orders None ACO Transitions of Care Introducing [facility] [address] Dagoberto offers a voluntary care coordination program to provide high quality service and care to [facility] fee-for-service beneficiaries. Chio Lamb was designed to help you enhance your health and well-being through the following services: ? Transitions of Care  support for individuals who are transitioning from one care setting to another (example: Hospital to home). ?  Chronic and Complex Care Coordination  support for individuals and caregivers of those with serious or chronic illnesses or with more than one chronic (ongoing) condition and those who take a number of different medications. If you meet specific medical criteria, a [facility] may call you directly to coordinate your care with your primary care physician and your other care providers. For questions about the [facility] programs, please, contact your physicians office. For general questions or additional information about Accountable Care Organizations: 
Please visit www.medicare.gov/acos. html or call 1-800-MEDICARE (7-203.714.2958) TTY users should call 3-320.646.5331. [employer] Announcement We are excited to announce that we are making your provider's discharge notes available to you in [employer]. You will see these notes when they are completed and signed by the physician that discharged you from your recent hospital stay. If you have any questions or concerns about any information you see in [employer], please call the Health Information Department where you were seen or reach out to your Primary Care Provider for more information about your plan of care. Introducing [address] Dear Benjamin Do: Thank you for requesting a [employer] account. Our records indicate that you already have an active [employer] account. You can access your account anytime at [email or web address] [email or web address] Did you know that you can access your hospital and ER discharge instructions at any time in [employer]? You can also review all of your test results from your hospital stay or ER visit. Additional Information If you have questions, please visit the Frequently Asked Questions section of the [employer] website at [email or web address] [email or web address] Remember, [employer] is NOT to be used for urgent needs. For medical emergencies, dial 911. Now available from your iPhone and Android! General Information Please provide this summary of care documentation to your next provider. Patient Signature:  ____________________________________________________________ Date:  ____________________________________________________________  
  
Rafia Jermaine Provider Signature:  ____________________________________________________________ Date:  ____________________________________________________________

## 2017-07-27 NOTE — IP AVS SNAPSHOT
[address] [facility] 
[phone] Patient: Shelbi Saleh MRN: [id number] UGD:2/4/4957 Current Discharge Medication List  
  
CONTINUE these medications which have CHANGED Dose & Instructions Dispensing Information Comments Morning Noon Evening Bedtime LEVEMIR FLEXTOUCH 100 unit/mL (3 mL) Inpn Generic drug:  insulin detemir What changed:  See the new instructions. Your last dose was: Your next dose is: INJECT 10UNITS UNDER THE SKIN ONCE DAILY AS DIRECTED Quantity:  15 mL Refills:  11 CONTINUE these medications which have NOT CHANGED Dose & Instructions Dispensing Information Comments Morning Noon Evening Bedtime COLCRYS 0.6 mg tablet Generic drug:  colchicine Your last dose was: Your next dose is: TAKE 1 TABLET DAILY Quantity:  90 Tab Refills:  3  
     
   
   
   
  
 cyanocobalamin 1,000 mcg tablet Your last dose was: Your next dose is:    
   
   
 Dose:  1000 mcg Take 1,000 mcg by mouth daily. Refills:  0  
     
   
   
   
  
 esomeprazole 40 mg capsule Commonly known as:  NexIUM Your last dose was: Your next dose is:    
   
   
 Dose:  40 mg Take 1 Cap by mouth two (2) times a day. Quantity:  180 Cap Refills:  3  
     
   
   
   
  
 fosinopril 40 mg tablet Commonly known as:  MONOPRIL Your last dose was: Your next dose is: TAKE 1 TABLET DAILY Quantity:  90 Tab Refills:  2  
     
   
   
   
  
 glucose blood VI test strips strip Commonly known as:  Ailyn Fried Your last dose was: Your next dose is:    
   
   
 OneTouch Verio strips. Monitoring 3 times daily. Dx E11.9 Quantity:  300 Strip Refills:  3 Insulin Needles (Disposable) 31 gauge x 5/16\" Ndle Your last dose was: Your next dose is:    
   
   
 by SubCUTAneous route daily. Use qd with levemir pen 250.02 Quantity:  1 Package Refills:  11 Insulin Needles (Disposable) 32 gauge x 5/32\" Ndle Commonly known as:  Kaylen Pen Needle Your last dose was: Your next dose is:    
   
   
 Use to inject Humalog daily. ICD-10: E11.65 Quantity:  100 Pen Needle Refills:  0 Liraglutide 0.6 mg/0.1 mL (18 mg/3 mL) sub-q pen Commonly known as:  Melissa Banuelos 3-DONELL Your last dose was: Your next dose is:    
   
   
 Dose:  1.2 mg  
0.2 mL by SubCUTAneous route daily. Inject 1.2 mg subcutaneously daily Quantity:  6 Each Refills:  3  
 6 pens for 90 day supply  
    
   
   
   
  
 magnesium oxide 500 mg Tab Your last dose was: Your next dose is: Take  by mouth daily. Refills:  0  
     
   
   
   
  
 OTHER Your last dose was: Your next dose is:    
   
   
 two (2) times a day. motilium 10 mg daily (patient states drug not made in [place]) Refills:  0  
     
   
   
   
  
 pravastatin 20 mg tablet Commonly known as:  PRAVACHOL Your last dose was: Your next dose is: TAKE 1 TABLET DAILY Quantity:  90 Tab Refills:  2 STOP taking these medications   
 aspirin delayed-release 81 mg tablet

## 2017-07-28 ENCOUNTER — ANESTHESIA (OUTPATIENT)
Dept: ENDOSCOPY | Age: 72
DRG: 378 | End: 2017-07-28
Payer: MEDICARE

## 2017-07-28 LAB
ANION GAP BLD CALC-SCNC: 6 MMOL/L (ref 5–15)
ATRIAL RATE: 82 BPM
BASOPHILS # BLD AUTO: 0 K/UL (ref 0–0.1)
BASOPHILS # BLD: 1 % (ref 0–1)
BUN SERPL-MCNC: 23 MG/DL (ref 6–20)
BUN/CREAT SERPL: 18 (ref 12–20)
CALCIUM SERPL-MCNC: 8.5 MG/DL (ref 8.5–10.1)
CALCULATED P AXIS, ECG09: 59 DEGREES
CALCULATED R AXIS, ECG10: 78 DEGREES
CALCULATED T AXIS, ECG11: 35 DEGREES
CHLORIDE SERPL-SCNC: 108 MMOL/L (ref 97–108)
CO2 SERPL-SCNC: 24 MMOL/L (ref 21–32)
CREAT SERPL-MCNC: 1.25 MG/DL (ref 0.7–1.3)
DIAGNOSIS, 93000: NORMAL
EOSINOPHIL # BLD: 0.2 K/UL (ref 0–0.4)
EOSINOPHIL NFR BLD: 3 % (ref 0–7)
ERYTHROCYTE [DISTWIDTH] IN BLOOD BY AUTOMATED COUNT: 16.5 % (ref 11.5–14.5)
GLUCOSE BLD STRIP.AUTO-MCNC: 130 MG/DL (ref 65–100)
GLUCOSE SERPL-MCNC: 109 MG/DL (ref 65–100)
HCT VFR BLD AUTO: 29 % (ref 36.6–50.3)
HGB BLD-MCNC: 9.5 G/DL (ref 12.1–17)
LYMPHOCYTES # BLD AUTO: 26 % (ref 12–49)
LYMPHOCYTES # BLD: 1.5 K/UL (ref 0.8–3.5)
MCH RBC QN AUTO: 27 PG (ref 26–34)
MCHC RBC AUTO-ENTMCNC: 32.8 G/DL (ref 30–36.5)
MCV RBC AUTO: 82.4 FL (ref 80–99)
MONOCYTES # BLD: 0.5 K/UL (ref 0–1)
MONOCYTES NFR BLD AUTO: 9 % (ref 5–13)
NEUTS SEG # BLD: 3.7 K/UL (ref 1.8–8)
NEUTS SEG NFR BLD AUTO: 61 % (ref 32–75)
P-R INTERVAL, ECG05: 134 MS
PLATELET # BLD AUTO: 180 K/UL (ref 150–400)
POTASSIUM SERPL-SCNC: 4.1 MMOL/L (ref 3.5–5.1)
Q-T INTERVAL, ECG07: 358 MS
QRS DURATION, ECG06: 70 MS
QTC CALCULATION (BEZET), ECG08: 418 MS
RBC # BLD AUTO: 3.52 M/UL (ref 4.1–5.7)
SERVICE CMNT-IMP: ABNORMAL
SODIUM SERPL-SCNC: 138 MMOL/L (ref 136–145)
VENTRICULAR RATE, ECG03: 82 BPM
WBC # BLD AUTO: 5.9 K/UL (ref 4.1–11.1)

## 2017-07-28 PROCEDURE — 65660000000 HC RM CCU STEPDOWN

## 2017-07-28 PROCEDURE — 76060000032 HC ANESTHESIA 0.5 TO 1 HR: Performed by: INTERNAL MEDICINE

## 2017-07-28 PROCEDURE — 76040000007: Performed by: INTERNAL MEDICINE

## 2017-07-28 PROCEDURE — 85025 COMPLETE CBC W/AUTO DIFF WBC: CPT | Performed by: INTERNAL MEDICINE

## 2017-07-28 PROCEDURE — 74011636637 HC RX REV CODE- 636/637: Performed by: INTERNAL MEDICINE

## 2017-07-28 PROCEDURE — 82962 GLUCOSE BLOOD TEST: CPT

## 2017-07-28 PROCEDURE — 77030008565 HC TBNG SUC IRR ERBE -B: Performed by: INTERNAL MEDICINE

## 2017-07-28 PROCEDURE — 77030022875 HC PRB AR PLSM COAG ERBE -C: Performed by: INTERNAL MEDICINE

## 2017-07-28 PROCEDURE — 74011250637 HC RX REV CODE- 250/637: Performed by: INTERNAL MEDICINE

## 2017-07-28 PROCEDURE — 80048 BASIC METABOLIC PNL TOTAL CA: CPT | Performed by: INTERNAL MEDICINE

## 2017-07-28 PROCEDURE — 74011250636 HC RX REV CODE- 250/636: Performed by: INTERNAL MEDICINE

## 2017-07-28 PROCEDURE — 0W3P8ZZ CONTROL BLEEDING IN GASTROINTESTINAL TRACT, VIA NATURAL OR ARTIFICIAL OPENING ENDOSCOPIC: ICD-10-PCS | Performed by: INTERNAL MEDICINE

## 2017-07-28 PROCEDURE — 77030010936 HC CLP LIG BSC -C: Performed by: INTERNAL MEDICINE

## 2017-07-28 PROCEDURE — 77030014179 HC APPL CLP RESOL BSC -C: Performed by: INTERNAL MEDICINE

## 2017-07-28 PROCEDURE — 74011000250 HC RX REV CODE- 250

## 2017-07-28 PROCEDURE — 36415 COLL VENOUS BLD VENIPUNCTURE: CPT | Performed by: INTERNAL MEDICINE

## 2017-07-28 PROCEDURE — 74011250636 HC RX REV CODE- 250/636

## 2017-07-28 PROCEDURE — C9113 INJ PANTOPRAZOLE SODIUM, VIA: HCPCS | Performed by: INTERNAL MEDICINE

## 2017-07-28 PROCEDURE — 74011000250 HC RX REV CODE- 250: Performed by: INTERNAL MEDICINE

## 2017-07-28 PROCEDURE — 77030011640 HC PAD GRND REM COVD -A: Performed by: INTERNAL MEDICINE

## 2017-07-28 PROCEDURE — 0D568ZZ DESTRUCTION OF STOMACH, VIA NATURAL OR ARTIFICIAL OPENING ENDOSCOPIC: ICD-10-PCS | Performed by: INTERNAL MEDICINE

## 2017-07-28 RX ORDER — MIDAZOLAM HYDROCHLORIDE 1 MG/ML
.25-5 INJECTION, SOLUTION INTRAMUSCULAR; INTRAVENOUS
Status: DISCONTINUED | OUTPATIENT
Start: 2017-07-28 | End: 2017-07-28 | Stop reason: HOSPADM

## 2017-07-28 RX ORDER — DEXTROMETHORPHAN/PSEUDOEPHED 2.5-7.5/.8
1.2 DROPS ORAL
Status: DISCONTINUED | OUTPATIENT
Start: 2017-07-28 | End: 2017-07-28 | Stop reason: HOSPADM

## 2017-07-28 RX ORDER — PROPOFOL 10 MG/ML
INJECTION, EMULSION INTRAVENOUS AS NEEDED
Status: DISCONTINUED | OUTPATIENT
Start: 2017-07-28 | End: 2017-07-28 | Stop reason: HOSPADM

## 2017-07-28 RX ORDER — LIDOCAINE HYDROCHLORIDE 20 MG/ML
INJECTION, SOLUTION EPIDURAL; INFILTRATION; INTRACAUDAL; PERINEURAL AS NEEDED
Status: DISCONTINUED | OUTPATIENT
Start: 2017-07-28 | End: 2017-07-28 | Stop reason: HOSPADM

## 2017-07-28 RX ORDER — SODIUM CHLORIDE 9 MG/ML
100 INJECTION, SOLUTION INTRAVENOUS CONTINUOUS
Status: DISPENSED | OUTPATIENT
Start: 2017-07-28 | End: 2017-07-28

## 2017-07-28 RX ORDER — INSULIN GLARGINE 100 [IU]/ML
10 INJECTION, SOLUTION SUBCUTANEOUS DAILY
Status: DISCONTINUED | OUTPATIENT
Start: 2017-07-29 | End: 2017-07-29 | Stop reason: HOSPADM

## 2017-07-28 RX ORDER — PROPOFOL 10 MG/ML
10-1000 INJECTION, EMULSION INTRAVENOUS
Status: DISCONTINUED | OUTPATIENT
Start: 2017-07-28 | End: 2017-07-28 | Stop reason: HOSPADM

## 2017-07-28 RX ORDER — EPINEPHRINE 0.1 MG/ML
1 INJECTION INTRACARDIAC; INTRAVENOUS
Status: DISCONTINUED | OUTPATIENT
Start: 2017-07-28 | End: 2017-07-28 | Stop reason: HOSPADM

## 2017-07-28 RX ORDER — NALOXONE HYDROCHLORIDE 0.4 MG/ML
0.4 INJECTION, SOLUTION INTRAMUSCULAR; INTRAVENOUS; SUBCUTANEOUS
Status: DISCONTINUED | OUTPATIENT
Start: 2017-07-28 | End: 2017-07-28 | Stop reason: HOSPADM

## 2017-07-28 RX ORDER — FENTANYL CITRATE 50 UG/ML
25 INJECTION, SOLUTION INTRAMUSCULAR; INTRAVENOUS
Status: DISCONTINUED | OUTPATIENT
Start: 2017-07-28 | End: 2017-07-28 | Stop reason: HOSPADM

## 2017-07-28 RX ORDER — MIDAZOLAM HYDROCHLORIDE 1 MG/ML
1-2 INJECTION, SOLUTION INTRAMUSCULAR; INTRAVENOUS
Status: DISCONTINUED | OUTPATIENT
Start: 2017-07-28 | End: 2017-07-28 | Stop reason: HOSPADM

## 2017-07-28 RX ORDER — SODIUM CHLORIDE 0.9 % (FLUSH) 0.9 %
5-10 SYRINGE (ML) INJECTION EVERY 8 HOURS
Status: ACTIVE | OUTPATIENT
Start: 2017-07-28 | End: 2017-07-28

## 2017-07-28 RX ORDER — GLYCOPYRROLATE 0.2 MG/ML
INJECTION INTRAMUSCULAR; INTRAVENOUS AS NEEDED
Status: DISCONTINUED | OUTPATIENT
Start: 2017-07-28 | End: 2017-07-28 | Stop reason: HOSPADM

## 2017-07-28 RX ORDER — ATROPINE SULFATE 0.1 MG/ML
0.5 INJECTION INTRAVENOUS
Status: DISCONTINUED | OUTPATIENT
Start: 2017-07-28 | End: 2017-07-28 | Stop reason: HOSPADM

## 2017-07-28 RX ORDER — FLUMAZENIL 0.1 MG/ML
0.2 INJECTION INTRAVENOUS
Status: DISCONTINUED | OUTPATIENT
Start: 2017-07-28 | End: 2017-07-28 | Stop reason: HOSPADM

## 2017-07-28 RX ORDER — SODIUM CHLORIDE 0.9 % (FLUSH) 0.9 %
5-10 SYRINGE (ML) INJECTION AS NEEDED
Status: ACTIVE | OUTPATIENT
Start: 2017-07-28 | End: 2017-07-28

## 2017-07-28 RX ADMIN — PRAVASTATIN SODIUM 20 MG: 10 TABLET ORAL at 22:18

## 2017-07-28 RX ADMIN — SODIUM CHLORIDE 40 MG: 9 INJECTION INTRAMUSCULAR; INTRAVENOUS; SUBCUTANEOUS at 22:18

## 2017-07-28 RX ADMIN — SODIUM CHLORIDE 100 ML/HR: 900 INJECTION, SOLUTION INTRAVENOUS at 07:48

## 2017-07-28 RX ADMIN — SODIUM CHLORIDE 100 ML/HR: 900 INJECTION, SOLUTION INTRAVENOUS at 00:55

## 2017-07-28 RX ADMIN — LIDOCAINE HYDROCHLORIDE 80 MG: 20 INJECTION, SOLUTION EPIDURAL; INFILTRATION; INTRACAUDAL; PERINEURAL at 07:59

## 2017-07-28 RX ADMIN — INSULIN GLARGINE 10 UNITS: 100 INJECTION, SOLUTION SUBCUTANEOUS at 09:14

## 2017-07-28 RX ADMIN — Medication 10 ML: at 14:56

## 2017-07-28 RX ADMIN — Medication 10 ML: at 22:19

## 2017-07-28 RX ADMIN — PROPOFOL 120 MG: 10 INJECTION, EMULSION INTRAVENOUS at 08:18

## 2017-07-28 RX ADMIN — SODIUM CHLORIDE 40 MG: 9 INJECTION INTRAMUSCULAR; INTRAVENOUS; SUBCUTANEOUS at 09:14

## 2017-07-28 RX ADMIN — GLYCOPYRROLATE 0.2 MG: 0.2 INJECTION INTRAMUSCULAR; INTRAVENOUS at 07:59

## 2017-07-28 NOTE — PROGRESS NOTES
[address] SHIFT NURSING NOTE    Bedside shift change report given to Dorothy Villalobos RN (oncoming nurse) by Laverne Hannon RN (offgoing nurse). Report included the following information SBAR, Kardex, MAR, Recent Results and Cardiac Rhythm SR.    SHIFT SUMMARY: 09:14 - Patient returned from Endo. 10:15 - Patient may take his own Victoza per MD.  Ordered and message sent to pharmacy to verify. 19:00 - Patient tolerating full liquids. Admission Date 7/27/2017   Admission Diagnosis Melena  GI bleed   Consults IP CONSULT TO GASTROENTEROLOGY        Consults   [] PT   [] OT   [] Speech   [] Palliative      [] Hospice    [] Case Management   [x] None   Cardiac Monitoring   [x] Yes   [] No     Antibiotics   [] Yes   [x] No   GI Prophylaxis  (Ex: Protonix, Pepcid, etc,.)   [x] Yes   [] No          DVT Prophylaxis   SCDs:             Brady stockings:         [] Medication (Ex: Lovenox, Eliquis, Brilinta, Coumadin,  Heparin, etc..)   [] Contraindicated   [x] No VTE needed       Urinary Catheter             LDAs               Peripheral IV 07/27/17 Right Wrist (Active)   Site Assessment Clean, dry, & intact 7/28/2017  7:24 AM   Phlebitis Assessment 0 7/28/2017  7:24 AM   Infiltration Assessment 0 7/28/2017  7:24 AM   Dressing Status Clean, dry, & intact 7/28/2017  7:24 AM   Dressing Type Tape;Transparent 7/28/2017  7:24 AM   Hub Color/Line Status Pink; Infusing 7/28/2017  7:24 AM   Action Taken Blood drawn 7/27/2017 11:46 AM                      I/Os   Intake/Output Summary (Last 24 hours) at 07/28/17 1024  Last data filed at 07/28/17 1021   Gross per 24 hour   Intake          2631.66 ml   Output                0 ml   Net          2631.66 ml         Activity Level Activity Level: Up ad johnnie     Activity Assistance: No assistance needed   Diet Active Orders   Diet    DIET CLEAR LIQUID      Purposeful Rounding every 1-2 hour?    [x] Yes    Binu Score  Total Score: 1   Bed Alarm (If score 3 or >)   [] Yes    [] Refused (See signed refusal form in chart)   Giuseppe Score  Giuseppe Score: 22       Giuseppe Score (if score 14 or less)   [] PMT consult   [] Nutrition consult   [] Wound Care consult      []  Specialty bed         Influenza Vaccine Received Flu Vaccine for Current Season (usually Sept-March): Not Flu Season               Needs prior to discharge:   Home O2 required:    [] Yes   [x] No     If yes, how much O2 required? Other:    Last Bowel Movement Date: 07/27/17   Readmission Risk Assessment Tool Score High Risk            23       Total Score        3 Has Seen PCP in Last 6 Months (Yes=3, No=0)    2 . Living with Significant Other. Assisted Living. LTAC. SNF. or   Rehab    5 Pt.  Coverage (Medicare=5 , Medicaid, or Self-Pay=4)    13 Charlson Comorbidity Score (Age + Comorbid Conditions)        Criteria that do not apply:    Patient Length of Stay (>5 days = 3)    IP Visits Last 12 Months (1-3=4, 4=9, >4=11)       Expected Length of Stay - - -   Actual Length of Stay 1

## 2017-07-28 NOTE — PROGRESS NOTES
Bedside shift change report given to JO Stallworth (oncoming nurse) by Darren Iverson RN (offgoing nurse). Report included the following information SBAR, Kardex, MAR, Recent Results and Cardiac Rhythm NSR.

## 2017-07-28 NOTE — DISCHARGE SUMMARY
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  male  pmhx significant for PMH HTN, T2DM, HLD, ivory's esophagus, follows with Dr Svitlana Ortiz. Pt presented to ED c/o melena started this morning in additional to generalized weakness. Patient had EGD done last month for bleeding AVM. He recently restarted ASA, however held it since yesterday due to generalized weakness (similar symptoms as last time he had bleeding AVM). In the ED, patient was not orthostatic. He denies any dizziness, cp, sob, palpitations, n/v/d. Vitals:    T 97.9, P 121, /85  Pertinent labs: hgb 10.3, Na 133, cr 1.48, heme occult positive     We were asked to admit for work up and evaluation of the above problems.      Brief Hospital Course by Main Problems:   Acute GI bleed  Hx of Barretts esophagus  Hx of ivory esophagus, s/p recent EGD in 6/2017 for bleeding AVM. 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 Patient remained hemodynamically stable with stable H/H. He did not required blood transfusions while inpt. ASA hold until he follows with GI outpt. Patient tolerated full liquid diet well. Advised no solid food until Sunday 7/30 as per GI's rec. He has outpt f/u with Dr Svitlana Ortiz scheduled.       HTN, resume home meds      T2DM  Resume home meds, Victoza, levemir      HLD  Resume pravastatin     CKD 4, cr stable    Discharge Exam:  Patient seen and examined by me on discharge day.  Pertinent Findings:  Visit Vitals    /77 (BP 1 Location: Left arm, BP Patient Position: At rest)    Pulse 73    Temp 98 °F (36.7 °C)    Resp 16    Wt 68.9 kg (151 lb 14.4 oz)    SpO2 95%    BMI 23.79 kg/m2     Gen:    Not in distress  Chest: Clear lungs  CVS:   Regular rhythm. No edema  Abd:  Soft, not distended, not tender    Discharge/Recent Laboratory Results:  Recent Labs      07/28/17   0027   NA  138   K  4.1   CL  108   CO2  24   BUN  23*   GLU  109*   CA  8.5     Recent Labs      07/29/17   0354   HGB  9.5*   HCT  31.1*   WBC  6.2   PLT  177       Discharge Medications:  Current Discharge Medication List      CONTINUE these medications which have NOT CHANGED    Details   magnesium oxide 500 mg tab Take  by mouth daily. LEVEMIR FLEXTOUCH 100 unit/mL (3 mL) inpn INJECT 10UNITS UNDER THE SKIN ONCE DAILY AS DIRECTED  Qty: 15 mL, Refills: 11      fosinopril (MONOPRIL) 40 mg tablet TAKE 1 TABLET DAILY  Qty: 90 Tab, Refills: 2      Liraglutide (VICTOZA 3-DONELL) 0.6 mg/0.1 mL (18 mg/3 mL) sub-q pen 0.2 mL by SubCUTAneous route daily. Inject 1.2 mg subcutaneously daily  Qty: 6 Each, Refills: 3    Comments: 6 pens for 90 day supply      glucose blood VI test strips (ONETOUCH VERIO) strip OneTouch Verio strips. Monitoring 3 times daily. Dx E11.9  Qty: 300 Strip, Refills: 3      Insulin Needles, Disposable, (GAYATRI PEN NEEDLE) 32 gauge x 5/32\" ndle Use to inject Humalog daily. ICD-10: E11.65  Qty: 100 Pen Needle, Refills: 0      pravastatin (PRAVACHOL) 20 mg tablet TAKE 1 TABLET DAILY  Qty: 90 Tab, Refills: 2      COLCRYS 0.6 mg tablet TAKE 1 TABLET DAILY  Qty: 90 Tab, Refills: 3      OTHER two (2) times a day. motilium 10 mg daily (patient states drug not made in [facility])       esomeprazole (NEXIUM) 40 mg capsule Take 1 Cap by mouth two (2) times a day. Qty: 180 Cap, Refills: 3      cyanocobalamin 1,000 mcg tablet Take 1,000 mcg by mouth daily.       Insulin Needles, Disposable, 31 X 5/16 \" ndle by SubCUTAneous route daily.  Use qd with levemir pen 250.02  Qty: 1 Package, Refills: 11         STOP taking these medications       aspirin delayed-release 81 mg tablet Comments:   Reason for Stopping:               DISPOSITION:    Home with Family: x   Home with HH/PT/OT/RN:    SNF/LTC:    CHELSIE:    OTHER:          Follow up with:   PCP : Jeremi Keene MD  Follow-up Information     Follow up With Details Hu Ballard MD   [address]  [facility] [address]  [facility].  [phone]            Diet: diabetic    Total time in minutes spent coordinating this discharge (includes going over instructions, follow-up, prescriptions, and preparing report for sign off to her PCP) :  35 minutes

## 2017-07-28 NOTE — ROUTINE PROCESS
TRANSFER - OUT REPORT:    Verbal report given to C. Cherlyn Sever, RN (name) on Bg Reid  being transferred to 2219 (unit) for routine progression of care       Report consisted of patients Situation, Background, Assessment and   Recommendations(SBAR). Information from the following report(s) Procedure Summary was reviewed with the receiving nurse. Lines:   Peripheral IV 07/27/17 Right Wrist (Active)   Site Assessment Clean, dry, & intact 7/28/2017  7:24 AM   Phlebitis Assessment 0 7/28/2017  7:24 AM   Infiltration Assessment 0 7/28/2017  7:24 AM   Dressing Status Clean, dry, & intact 7/28/2017  7:24 AM   Dressing Type Tape;Transparent 7/28/2017  7:24 AM   Hub Color/Line Status Pink; Infusing 7/28/2017  7:24 AM   Action Taken Blood drawn 7/27/2017 11:46 AM        Opportunity for questions and clarification was provided.       Patient transported with:   [employer]

## 2017-07-28 NOTE — ANESTHESIA PREPROCEDURE EVALUATION
Anesthetic History   No history of anesthetic complications            Review of Systems / Medical History  Patient summary reviewed, nursing notes reviewed and pertinent labs reviewed    Pulmonary  Within defined limits                 Neuro/Psych   Within defined limits           Cardiovascular    Hypertension          Hyperlipidemia    Exercise tolerance: >4 METS     GI/Hepatic/Renal     GERD           Endo/Other    Diabetes    Arthritis and anemia     Other Findings   Comments: ivory esophagus           Physical Exam    Airway  Mallampati: II  TM Distance: 4 - 6 cm  Neck ROM: normal range of motion   Mouth opening: Normal     Cardiovascular  Regular rate and rhythm,  S1 and S2 normal,  no murmur, click, rub, or gallop             Dental  No notable dental hx       Pulmonary  Breath sounds clear to auscultation               Abdominal  GI exam deferred       Other Findings            Anesthetic Plan    ASA: 2  Anesthesia type: total IV anesthesia and MAC          Induction: Intravenous  Anesthetic plan and risks discussed with: Patient

## 2017-07-28 NOTE — ANESTHESIA POSTPROCEDURE EVALUATION
Post-Anesthesia Evaluation and Assessment    Patient: Glen Keita MRN: [id number]  SSN: [id number]    YOB: 1945  Age: 80 y.o. Sex: male       Cardiovascular Function/Vital Signs  Visit Vitals    /86    Pulse 80    Temp 36.6 °C (97.9 °F)    Resp 13    Wt 68.9 kg (151 lb 14.4 oz)    SpO2 98%    BMI 23.79 kg/m2       Patient is status post total IV anesthesia anesthesia for Procedure(s):  ESOPHAGOGASTRODUODENOSCOPY (EGD)  ENDOSCOPIC ARGON PLASMA COAGULATION  RESOLUTION CLIP. Nausea/Vomiting: None    Postoperative hydration reviewed and adequate. Pain:  Pain Scale 1: Visual (07/28/17 0857)  Pain Intensity 1: 0 (07/28/17 0857)   Managed    Neurological Status: At baseline    Mental Status and Level of Consciousness: Arousable    Pulmonary Status:   O2 Device: Room air (07/28/17 0857)   Adequate oxygenation and airway patent    Complications related to anesthesia: None    Post-anesthesia assessment completed.  No concerns    Signed By: Glen Keita DO     July 28, 2017

## 2017-07-28 NOTE — PROGRESS NOTES
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 Low padua score  GI Prophylaxis: not indicated     Baseline: independent     Subjective:     Chief Complaint / Reason for Physician Visit  Pt seen post EGD. Tolerating clears well. Wife at bedside. No acute complaints. No BM since admisison. Discussed with RN events overnight. Review of Systems:  Symptom Y/N Comments  Symptom Y/N Comments   Fever/Chills n   Chest Pain n    Poor Appetite    Edema n    Cough    Abdominal Pain n    Sputum    Joint Pain     SOB/DALEY n   Pruritis/Rash     Nausea/vomit n   Tolerating PT/OT     Diarrhea n   Tolerating Diet y    Constipation n   Other       Could NOT obtain due to:      Objective:     VITALS:   Last 24hrs VS reviewed since prior progress note.  Most recent are:  Patient Vitals for the past 24 hrs:   Temp Pulse Resp BP SpO2   17 0857 - 80 13 113/86 98 %   17 0852 - 85 15 124/71 98 %   17 0847 - 79 16 113/70 98 %   17 0842 - 83 19 116/69 99 %   17 0837 - 87 19 124/77 97 %   17 0825 - 91 25 103/78 97 %   17 0820 - 78 18 91/65 100 %   17 0749 - 72 24 125/73 100 %   17 0718 97.9 °F (36.6 °C) 81 17 131/69 95 %   17 0240 98.3 °F (36.8 °C) 65 16 109/62 95 %   07/27/17 2233 98 °F (36.7 °C) 70 16 110/61 95 %   07/27/17 1925 97.6 °F (36.4 °C) 80 18 147/69 99 %   07/27/17 1545 97.2 °F (36.2 °C) 87 16 135/66 100 %   07/27/17 1430 - 79 16 135/66 96 %   07/27/17 1336 - 68 - (!) 115/91 98 %   07/27/17 1139 - - - - 100 %   07/27/17 1130 - 78 - (!) 126/92 98 %       Intake/Output Summary (Last 24 hours) at 07/28/17 1123  Last data filed at 07/28/17 1021   Gross per 24 hour   Intake          2631.66 ml   Output                0 ml   Net          2631.66 ml        PHYSICAL EXAM:  General: WD, WN. Alert, cooperative, no acute distress    EENT:  EOMI. Anicteric sclerae. MMM  Resp:  CTA bilaterally, no wheezing or rales. No accessory muscle use  CV:  Regular  rhythm,  No edema  GI:  Soft, Non distended, Non tender.  +BS  Neurologic:  Alert and oriented X 3, normal speech   Psych:   Good insight. Not anxious nor agitated  Skin:  No rashes. No jaundice    Reviewed most current lab test results and cultures  YES  Reviewed most current radiology test results   YES  Review and summation of old records today    NO  Reviewed patient's current orders and MAR    YES  PMH/ reviewed - no change compared to H&P  ________________________________________________________________________  Care Plan discussed with:    Comments   Patient x    Family  x wife   RN x    Care Manager     Consultant                        Multidiciplinary team rounds were held today with , nursing, pharmacist and clinical coordinator. Patient's plan of care was discussed; medications were reviewed and discharge planning was addressed.      ________________________________________________________________________  Total NON critical care TIME:  35   Minutes    Total CRITICAL CARE TIME Spent:   Minutes non procedure based      Comments   >50% of visit spent in counseling and coordination of care     ________________________________________________________________________  Amanda Coleman MD Procedures: see electronic medical records for all procedures/Xrays and details which were not copied into this note but were reviewed prior to creation of Plan. LABS:  I reviewed today's most current labs and imaging studies.   Pertinent labs include:  Recent Labs      07/28/17 0027  07/27/17   1148   WBC  5.9  7.7   HGB  9.5*  10.9*   HCT  29.0*  35.4*   PLT  180  232     Recent Labs      07/28/17 0027  07/27/17   1148   NA  138  133*   K  4.1  4.3   CL  108  103   CO2  24  22   GLU  109*  104*   BUN  23*  31*   CREA  1.25  1.48*   CA  8.5  9.3   ALB   --   3.9   TBILI   --   0.3   SGOT   --   18   ALT   --   31       Signed: Sherly Chen MD

## 2017-07-28 NOTE — PROCEDURES
[facility]                   Endoscopic Gastroduodenoscopy Procedure Note      7/28/2017  Jurtorie Heal  1945  [id number]    Procedure: Endoscopic Gastroduodenoscopy with control of bleeding    Indication: Melena, acute blood loss anemia. Hx of bleeding avm of gastric cardai June 2017     Pre-operative Diagnosis: see indication above    Post-operative Diagnosis: see findings below    : ELENITA Lee MD    Referring Provider:  Luz Elena Morton MD      Anesthesia/Sedation:  MAC anesthesia Propofol    Airway assessment: No airway problems anticipated    Pre-Procedural Exam:      Airway: clear, no airway problems anticipated  Heart: RRR, without gallops or rubs  Lungs: clear bilaterally without wheezes, crackles, or rhonchi  Abdomen: soft, nontender, nondistended, bowel sounds present  Mental Status: awake, alert and oriented to person, place and time       Procedure Details     After infomed consent was obtained for the procedure, with all risks and benefits of procedure explained the patient was taken to the endoscopy suite and placed in the left lateral decubitus position. Following sequential administration of sedation as per above, the endoscope was inserted into the mouth and advanced under direct vision to second portion of the duodenum. A careful inspection was made as the gastroscope was withdrawn, including a retroflexed view of the proximal stomach; findings and interventions are described below. Findings:   Esophagus: Hernadez's mucosa 28-38 cm, not biopsied  Stomach: Nissen fundoplication. There is  a 7-8 mm avm just below the z-line up under the wrap. When touched with  it began bleeding significantly. Treated with APC.  Clip applied which induced more bleeding which was stopped with more APC  Duodenum: normal    Therapies:  Argon Plasma Coagulation of gastric avm    Specimens: none           Complications:   None; patient tolerated the procedure well.    EBL:  None.             Impression:      -Bleeding from gastric cardia avm up under the Nissen fundoplication  -Treated with Argon Plasma Coagulation    Recommendations:  -clear liquids this am, can advance later today to full liquids, BUT no solid food until Sunday    [facility] Aisha Reyes MD7/28/2017

## 2017-07-28 NOTE — PERIOP NOTES
Miguel Aguilera  1945  [id number]    Situation:  Verbal report received from: Marsha Martinez RN  Procedure: Procedure(s) with comments:  ESOPHAGOGASTRODUODENOSCOPY (EGD) - egd  ENDOSCOPIC ARGON PLASMA COAGULATION  RESOLUTION CLIP    Background:    Preoperative diagnosis: Melena  Postoperative diagnosis: GASTRIC CARDIA ULCER     :  Dr. Adarsh Muñiz  Assistant(s): Endoscopy Technician-1: Francesca Baldwin  Endoscopy RN-1: Brooklynn Shay RN    Specimens: * No specimens in log *  H. Pylori  no    Assessment:  Intra-procedure medications    Anesthesia gave intra-procedure sedation and medications, see anesthesia flow sheet yes    Intravenous fluids: NS@ KVO     Vital signs stable     Abdominal assessment: round and soft     Recommendation:  Return to floor  Family or Friend  Permission to share finding with family or friend yes

## 2017-07-29 VITALS
DIASTOLIC BLOOD PRESSURE: 77 MMHG | TEMPERATURE: 98 F | OXYGEN SATURATION: 95 % | BODY MASS INDEX: 23.79 KG/M2 | SYSTOLIC BLOOD PRESSURE: 126 MMHG | WEIGHT: 151.9 LBS | HEART RATE: 73 BPM | RESPIRATION RATE: 16 BRPM

## 2017-07-29 LAB
BASOPHILS # BLD AUTO: 0 K/UL (ref 0–0.1)
BASOPHILS # BLD: 1 % (ref 0–1)
EOSINOPHIL # BLD: 0.2 K/UL (ref 0–0.4)
EOSINOPHIL NFR BLD: 4 % (ref 0–7)
ERYTHROCYTE [DISTWIDTH] IN BLOOD BY AUTOMATED COUNT: 16.6 % (ref 11.5–14.5)
GLUCOSE BLD STRIP.AUTO-MCNC: 122 MG/DL (ref 65–100)
HCT VFR BLD AUTO: 31.1 % (ref 36.6–50.3)
HGB BLD-MCNC: 9.5 G/DL (ref 12.1–17)
LYMPHOCYTES # BLD AUTO: 23 % (ref 12–49)
LYMPHOCYTES # BLD: 1.4 K/UL (ref 0.8–3.5)
MCH RBC QN AUTO: 25.5 PG (ref 26–34)
MCHC RBC AUTO-ENTMCNC: 30.5 G/DL (ref 30–36.5)
MCV RBC AUTO: 83.4 FL (ref 80–99)
MONOCYTES # BLD: 0.2 K/UL (ref 0–1)
MONOCYTES NFR BLD AUTO: 4 % (ref 5–13)
NEUTS SEG # BLD: 4.3 K/UL (ref 1.8–8)
NEUTS SEG NFR BLD AUTO: 68 % (ref 32–75)
PLATELET # BLD AUTO: 177 K/UL (ref 150–400)
RBC # BLD AUTO: 3.73 M/UL (ref 4.1–5.7)
SERVICE CMNT-IMP: ABNORMAL
WBC # BLD AUTO: 6.2 K/UL (ref 4.1–11.1)

## 2017-07-29 PROCEDURE — 36415 COLL VENOUS BLD VENIPUNCTURE: CPT | Performed by: INTERNAL MEDICINE

## 2017-07-29 PROCEDURE — C9113 INJ PANTOPRAZOLE SODIUM, VIA: HCPCS | Performed by: INTERNAL MEDICINE

## 2017-07-29 PROCEDURE — 74011636637 HC RX REV CODE- 636/637: Performed by: INTERNAL MEDICINE

## 2017-07-29 PROCEDURE — 85025 COMPLETE CBC W/AUTO DIFF WBC: CPT | Performed by: INTERNAL MEDICINE

## 2017-07-29 PROCEDURE — 74011250636 HC RX REV CODE- 250/636: Performed by: INTERNAL MEDICINE

## 2017-07-29 PROCEDURE — 74011000250 HC RX REV CODE- 250: Performed by: INTERNAL MEDICINE

## 2017-07-29 PROCEDURE — 82962 GLUCOSE BLOOD TEST: CPT

## 2017-07-29 RX ADMIN — SODIUM CHLORIDE 40 MG: 9 INJECTION INTRAMUSCULAR; INTRAVENOUS; SUBCUTANEOUS at 09:12

## 2017-07-29 RX ADMIN — INSULIN GLARGINE 10 UNITS: 100 INJECTION, SOLUTION SUBCUTANEOUS at 09:09

## 2017-07-29 NOTE — PROGRESS NOTES
Bedside shift change report given to Laruen Lamb RN (oncoming nurse) by [facility], RN (offgoing nurse). Report included the following information SBAR, Kardex, MAR, Recent Results and Cardiac Rhythm NSR.

## 2017-07-29 NOTE — PROGRESS NOTES
[address] SHIFT NURSING NOTE      SHIFT SUMMARY:   Pt's victoza pen returned to him. Discharge instructions reviewed with pt and spouse. They deny further questions. Copy of discharge instructions given to them and one on pt's paper chart. Iv and tele removed.

## 2017-07-31 ENCOUNTER — TELEPHONE (OUTPATIENT)
Dept: INTERNAL MEDICINE CLINIC | Age: 72
End: 2017-07-31

## 2017-07-31 NOTE — TELEPHONE ENCOUNTER
----- Message from Emily Osman sent at 7/31/2017  3:25 PM EDT -----  Regarding: Dr. Ivette Cuenca telephone  Contact: [phone]  Pt's wife  Lise Montes is requesting a call back to schedule a hosp f/u for upper gi bleed. Pt would like to be seen within the next week.  Best contact number is [phone]

## 2017-07-31 NOTE — TELEPHONE ENCOUNTER
Spoke with patient after 2 patient identifiers being note and advised that the next appt with Dr. Bruce Yee was a week out, pt advised he wanted an appt with any doctor. I offered and pt accepted appt with Dr. Abram Rasheed on Thursday, August 3, 2017 01:15 PM. Patient expressed understanding and has no further questions at this time.

## 2017-08-01 ENCOUNTER — PATIENT OUTREACH (OUTPATIENT)
Dept: INTERNAL MEDICINE CLINIC | Age: 72
End: 2017-08-01

## 2017-08-01 NOTE — PROGRESS NOTES
NNTOCIP Post Hospitalization           Referral from [facility]. Patient admitted to [facility] on 7/27/17 and discharged on 7/29/17 with diagnosis of Acute GI Bleed. - s/p Endoscopic Gastroduodenoscopy with control of bleeding (Dr. Maximino Felder) on 7/28/17. Significant Lab/Diagnostic Findings:  Lab Results  Component Value Date/Time   WBC 6.2 07/29/2017 03:54 AM   HGB 9.5 07/29/2017 03:54 AM   HCT 31.1 07/29/2017 03:54 AM   PLATELET 049 89/04/5785 03:54 AM   MCV 83.4 07/29/2017 03:54 AM     Lab Results   Component Value Date/Time    Sodium 138 07/28/2017 12:27 AM    Potassium 4.1 07/28/2017 12:27 AM    Chloride 108 07/28/2017 12:27 AM    CO2 24 07/28/2017 12:27 AM    Anion gap 6 07/28/2017 12:27 AM    Glucose 109 07/28/2017 12:27 AM    BUN 23 07/28/2017 12:27 AM    Creatinine 1.25 07/28/2017 12:27 AM    BUN/Creatinine ratio 18 07/28/2017 12:27 AM    GFR est AA >60 07/28/2017 12:27 AM    GFR est non-AA 57 07/28/2017 12:27 AM    Calcium 8.5 07/28/2017 12:27 AM      Lab Results   Component Value Date/Time    Sodium 138 07/28/2017 12:27 AM    Potassium 4.1 07/28/2017 12:27 AM    Chloride 108 07/28/2017 12:27 AM    CO2 24 07/28/2017 12:27 AM    Anion gap 6 07/28/2017 12:27 AM    Glucose 109 07/28/2017 12:27 AM    BUN 23 07/28/2017 12:27 AM    Creatinine 1.25 07/28/2017 12:27 AM    BUN/Creatinine ratio 18 07/28/2017 12:27 AM    GFR est AA >60 07/28/2017 12:27 AM    GFR est non-AA 57 07/28/2017 12:27 AM    Calcium 8.5 07/28/2017 12:27 AM    Bilirubin, total 0.3 07/27/2017 11:48 AM    ALT (SGPT) 31 07/27/2017 11:48 AM    AST (SGOT) 18 07/27/2017 11:48 AM    Alk.  phosphatase 80 07/27/2017 11:48 AM    Protein, total 7.7 07/27/2017 11:48 AM    Albumin 3.9 07/27/2017 11:48 AM    Globulin 3.8 07/27/2017 11:48 AM    A-G Ratio 1.0 07/27/2017 11:48 AM      Component      Latest Ref Rng & Units 7/27/2017          12:37 PM   Occult blood, stool      NEG   POSITIVE (A)                     RRAT score: Medium Risk            18       Total Score        3 Has Seen PCP in Last 6 Months (Yes=3, No=0)    2 . Living with Significant Other. Assisted Living. LTAC. SNF. or   Rehab    13 Charlson Comorbidity Score (Age + Comorbid Conditions)        Criteria that do not apply:    Patient Length of Stay (>5 days = 3)    IP Visits Last 12 Months (1-3=4, 4=9, >4=11)    Pt. Coverage (Medicare=5 , Medicaid, or Self-Pay=4)              Inpatient RRAT Score: 23  VBD: No      - Advance Medical Directive on file in EMR? Yes; has an advanced directive - a copy has been provided. - Case Management Consult during this Hospital Admission:  No.  - Discharge Disposition: Home. - Recommended Post-Hospital Discharge follow-up (see below as listed on Hospital Discharge AVS/Instructions). Follow-up Information     Follow up With Details Comments [address], [place]     [facility] [address] [place], [place] [phone]                 Most recent HIPAA form in the patient's chart (dated 11/8/16) was reviewed; authorized individual(s) listed on HIPAA form include Mago Haas (Spouse), Rich Ferguson (Daughter). NN follow-up phone call  NN contacted the patient today to complete NN post-hospital discharge assessment. Two patient identifiers verified. NN introduced self to the patient, including NN role and purpose of NN follow-up phone call; patient verbalizes understanding. Subjective:  NN inquired about the patient's current condition and how the patient is feeling. Symptoms:    Nausea: No  Vomiting: No  Fever: No  Chills: No  Shortness of Breath: No  Chest Pain:  No  Pain: No  Dizziness: No  Abdominal Pain/Discomfort: No  Black/Bloody/Tarry Stools: Reports one black stool yesterday; states, \"That's to be expected because I haven't had a bowel movement since he did the procedure, so there was still some blood in there. \"          Functional Assessment - Completed with patient. Living Situation: With spouse. Support System: spouse, Daughter, family/friends; reports a good support system and denies any concerns regarding support system. ADLs:   Medication Management Independent  Driving Independent  Hygiene Independent. Mobility: Independent. DME: none. Barriers to care? No . None. Medication:  Patient allergies Reviewed during this call:  Yes   Medication Reconciliation completed during this call: Yes  Medication Reconciliation completed with: patient. New medications at discharge include: N/A  Medication(s) changed at hospital discharge include: Levemir  Medication(s) discontinued at hospital discharge include: Aspirin  Able to fill/pickup new medications without difficulty: N/A. Any Questions/Concerns regarding new Medication(s) and/or Medication Change(s): No. compliant with all meds. Med Rec during this call  Current Outpatient Prescriptions   Medication Sig    magnesium oxide 500 mg tab Take 1 Tab by mouth daily.  cyanocobalamin 1,000 mcg tablet Take 1,000 mcg by mouth daily.  LEVEMIR FLEXTOUCH 100 unit/mL (3 mL) inpn INJECT 10UNITS UNDER THE SKIN ONCE DAILY AS DIRECTED (Patient taking differently: INJECT 14UNITS UNDER THE SKIN ONCE DAILY AS DIRECTED)    fosinopril (MONOPRIL) 40 mg tablet TAKE 1 TABLET DAILY    Liraglutide (VICTOZA 3-DONELL) 0.6 mg/0.1 mL (18 mg/3 mL) sub-q pen 0.2 mL by SubCUTAneous route daily. Inject 1.2 mg subcutaneously daily    glucose blood VI test strips (ONETOUCH VERIO) strip OneTouch Verio strips. Monitoring 3 times daily. Dx E11.9    Insulin Needles, Disposable, (GAYATRI PEN NEEDLE) 32 gauge x 5/32\" ndle Use to inject Humalog daily. ICD-10: E11.65    pravastatin (PRAVACHOL) 20 mg tablet TAKE 1 TABLET DAILY    COLCRYS 0.6 mg tablet TAKE 1 TABLET DAILY    OTHER two (2) times a day.  motilium 10 mg daily (patient states drug not made in [place])     Insulin Needles, Disposable, 31 X 5/16 \" ndle by SubCUTAneous route daily. Use qd with levemir pen 250.02    esomeprazole (NEXIUM) 40 mg capsule Take 1 Cap by mouth two (2) times a day. No current facility-administered medications for this visit. There are no discontinued medications. - Patient Care Team Updated. - patient verbalizes understanding of discharge instructions: Yes.  - Red Flags reviewed with patient; verbalizes understanding of when to contact Dr. Casey Pittman office versus use of EMS. - Red Flags:  Fever, chills, dizziness, chest pain, shortness of breath, abdominal pain/discomfort, nausea, vomiting, black/bloody/tarry stools, new/worsened symptoms          patient expressed no questions, concerns or needs for this NN at this time. patient  verbalized understanding of all information discussed. NN contact information provided and patient advised to contact NN as needed. PLAN    - Reports FSBS once daily at home    - Transitions Of Care Appointment scheduled with Dr. Ivy Barrera on 8/3/17.  - Patient to follow-up with Surgeon(if applicable) and/or Speciality Provider(s): Reports routine colonoscopy scheduled with Dr. Jak Nelson on 9/1/17.    - patient to contact this NN and/or PCP office with any questions/concerns.  -Notified of availability of PCP on-call physician after-hours and on the weekends for non-emergent/non-life threatening medical questions/concerns. -Goals:  Goals Addressed        Post Hospitalization     Prevent complications post hospitalization.                   8/1/17  - discharge diagnosis of Acute GI Bleed  - RRAT: 23  - VBD: No  - discharged from [facility] to [address] [place] [address] previously scheduled with PCP for 8/3/17 (Dr. Ivy Barrera)  - no social barriers to care reported/identified at this time  - Med Rec completed; patient reports medication compliance  - Reports routine colonoscopy scheduled with Dr. Jak Nelson for 9/1/1/7  - red flags reviewed                    Future Appointments  Date Time Provider Braxton Rice   8/3/2017 1:15 PM Gloria Vasquez MD [address]   8/29/2017 10:10 AM Vinh Lam MD  [facility] Appointment My Department:  Visit date not found                NN will route this encounter to Breonna Coleman MD for notification/review. This note will not be viewable in [address]

## 2017-08-03 ENCOUNTER — HOSPITAL ENCOUNTER (OUTPATIENT)
Dept: LAB | Age: 72
Discharge: HOME | End: 2017-08-03
Payer: MEDICARE

## 2017-08-03 ENCOUNTER — OFFICE VISIT (OUTPATIENT)
Dept: INTERNAL MEDICINE CLINIC | Age: 72
End: 2017-08-03

## 2017-08-03 VITALS
DIASTOLIC BLOOD PRESSURE: 70 MMHG | TEMPERATURE: 98 F | OXYGEN SATURATION: 99 % | SYSTOLIC BLOOD PRESSURE: 133 MMHG | HEART RATE: 79 BPM | HEIGHT: 67 IN | BODY MASS INDEX: 24.96 KG/M2 | WEIGHT: 159 LBS | RESPIRATION RATE: 18 BRPM

## 2017-08-03 DIAGNOSIS — D50.0 IRON DEFICIENCY ANEMIA DUE TO CHRONIC BLOOD LOSS: Primary | ICD-10-CM

## 2017-08-03 DIAGNOSIS — K92.2 GASTROINTESTINAL HEMORRHAGE, UNSPECIFIED GASTROINTESTINAL HEMORRHAGE TYPE: ICD-10-CM

## 2017-08-03 DIAGNOSIS — Q27.30 AVM (ARTERIOVENOUS MALFORMATION): ICD-10-CM

## 2017-08-03 LAB
BASOPHILS # BLD AUTO: 0 X10E3/UL (ref 0–0.2)
BASOPHILS NFR BLD AUTO: 1 %
EOSINOPHIL # BLD AUTO: 0.1 X10E3/UL (ref 0–0.4)
EOSINOPHIL NFR BLD AUTO: 2 %
ERYTHROCYTE [DISTWIDTH] IN BLOOD BY AUTOMATED COUNT: 18.3 % (ref 12.3–15.4)
HCT VFR BLD AUTO: 29 % (ref 37.5–51)
HGB BLD-MCNC: 8.3 G/DL
HGB BLD-MCNC: 9.5 G/DL (ref 12.6–17.7)
LYMPHOCYTES # BLD AUTO: 1.1 X10E3/UL (ref 0.7–3.1)
LYMPHOCYTES NFR BLD AUTO: 19 %
MCH RBC QN AUTO: 25.7 PG (ref 26.6–33)
MCHC RBC AUTO-ENTMCNC: 32.8 G/DL (ref 31.5–35.7)
MCV RBC AUTO: 79 FL (ref 79–97)
MONOCYTES # BLD AUTO: 0.5 X10E3/UL (ref 0.1–0.9)
MONOCYTES NFR BLD AUTO: 8 %
NEUTROPHILS # BLD AUTO: 4.1 X10E3/UL (ref 1.4–7)
NEUTROPHILS NFR BLD AUTO: 70 %
PLATELET # BLD AUTO: 237 X10E3/UL (ref 150–379)
RBC # BLD AUTO: 3.69 X10E6/UL (ref 4.14–5.8)
WBC # BLD AUTO: 5.8 X10E3/UL (ref 3.4–10.8)

## 2017-08-03 PROCEDURE — 82728 ASSAY OF FERRITIN: CPT

## 2017-08-03 PROCEDURE — 36415 COLL VENOUS BLD VENIPUNCTURE: CPT

## 2017-08-03 PROCEDURE — 85025 COMPLETE CBC W/AUTO DIFF WBC: CPT

## 2017-08-03 PROCEDURE — 83540 ASSAY OF IRON: CPT

## 2017-08-03 NOTE — PATIENT INSTRUCTIONS
If you have any recurrence of blood in the stool ( dark stools or bright red blood ) go to emergency room   ( prefer [facility] given nature of problem)     Make appointment with Dr Karolina Borja at [facility]    Return for blood check next week in clinic

## 2017-08-03 NOTE — PROGRESS NOTES
Chief Complaint   Patient presents with   [facility] Follow Up     July 27,2017 for Acute GI bleed     1. Have you been to the ER, urgent care clinic since your last visit? Hospitalized since your last visit? Yes When: 7/27/17 Where: ED [facility] Reason for visit: Acute GI bleed    2. Have you seen or consulted any other health care providers outside of the [address] [place] [address] since your last visit? Include any pap smears or colon screening.  No

## 2017-08-03 NOTE — MR AVS SNAPSHOT
Visit Information Date & Time Provider Department Dept. Phone Encounter #  
 8/3/2017  1:15 PM Booker Hines, [address] [place] [address],[address] [phone] [id number] Follow-up Instructions Return in about 3 months (around 11/3/2017) for anemia. Your Appointments 8/29/2017 10:10 AM  
ROUTINE CARE with MD [facility]) Appt Note: f/u diabetes cp0.00  
 [address]  
[phone] [address] [address] Upcoming Health Maintenance Date Due  
 GLAUCOMA SCREENING Q2Y 4/18/2015 EYE EXAM RETINAL OR DILATED Q1 12/1/2015 FOOT EXAM Q1 3/22/2017 MEDICARE YEARLY EXAM 3/23/2017 INFLUENZA AGE 9 TO ADULT 8/1/2017 Pneumococcal 65+ Low/Medium Risk (2 of 2 - PPSV23) 8/20/2017 HEMOGLOBIN A1C Q6M 11/30/2017 COLONOSCOPY 4/10/2018 MICROALBUMIN Q1 5/30/2018 LIPID PANEL Q1 5/30/2018 DTaP/Tdap/Td series (2 - Td) 5/9/2025 Allergies as of 8/3/2017  Review Complete On: 8/3/2017 By: aLla Ruiz Severity Noted Reaction Type Reactions Contrast Agent [Iodine]  10/26/2009   Side Effect Hives, Itching IVP dye Current Immunizations  Reviewed on 12/21/2012 Name Date Influenza High Dose Vaccine PF 11/8/2016 Influenza Vaccine 11/19/2014, 9/16/2013 Influenza Vaccine (Quad) PF 10/20/2015 Influenza Vaccine Split 12/7/2010 Tdap 5/9/2015  1:21 PM  
 ZZZ-RETIRED (DO NOT USE) Pneumococcal Vaccine (Unspecified Type) 8/20/2012  8:39 AM  
 Zoster 12/20/2012 Not reviewed this visit You Were Diagnosed With   
  
 Codes Comments Iron deficiency anemia due to chronic blood loss    -  Primary ICD-10-CM: D50.0 ICD-9-CM: 280.0 Gastrointestinal hemorrhage, unspecified gastrointestinal hemorrhage type     ICD-10-CM: K92.2 ICD-9-CM: 578.9 AVM (arteriovenous malformation)     ICD-10-CM: Q27.30 ICD-9-CM: 747.60 Vitals BP Pulse Temp Resp Height(growth percentile) Weight(growth percentile) 133/70 (BP 1 Location: Right arm, BP Patient Position: Sitting) 79 98 °F (36.7 °C) (Oral) 18 5' 7\" (1.702 m) 159 lb (72.1 kg) SpO2 BMI Smoking Status 99% 24.9 kg/m2 Former Smoker BMI and BSA Data Body Mass Index Body Surface Area 24.9 kg/m 2 1.85 m 2 Preferred Pharmacy Pharmacy Name [facility], [facility] [address] [facility] [phone] Your Updated Medication List  
  
   
This list is accurate as of: 8/3/17  2:22 PM.  Always use your most recent med list.  
  
  
  
  
 COLCRYS 0.6 mg tablet Generic drug:  colchicine TAKE 1 TABLET DAILY  
  
 cyanocobalamin 1,000 mcg tablet Take 1,000 mcg by mouth daily. esomeprazole 40 mg capsule Commonly known as:  NexIUM Take 1 Cap by mouth two (2) times a day. fosinopril 40 mg tablet Commonly known as:  MONOPRIL  
TAKE 1 TABLET DAILY  
  
 glucose blood VI test strips strip Commonly known as:  [employer] OneTouch Verio strips. Monitoring 3 times daily. Dx E11.9 Insulin Needles (Disposable) 31 gauge x 5/16\" Ndle  
by SubCUTAneous route daily. Use qd with levemir pen 250.02 Insulin Needles (Disposable) 32 gauge x 5/32\" Ndle Commonly known as:  Kaylen Pen Needle Use to inject Humalog daily. ICD-10: E11.65 LEVEMIR FLEXTOUCH 100 unit/mL (3 mL) Inpn Generic drug:  insulin detemir INJECT 10UNITS UNDER THE SKIN ONCE DAILY AS DIRECTED Liraglutide 0.6 mg/0.1 mL (18 mg/3 mL) sub-q pen Commonly known as:  VICTOZA 3-DONELL  
0.2 mL by SubCUTAneous route daily. Inject 1.2 mg subcutaneously daily  
  
 magnesium oxide 500 mg Tab Take 1 Tab by mouth daily. OTHER  
two (2) times a day. motilium 10 mg daily (patient states drug not made in [place]) pravastatin 20 mg tablet Commonly known as:  PRAVACHOL  
TAKE 1 TABLET DAILY We Performed the Following AMB POC HEMOGLOBIN (HGB) [16973 CPT(R)] CBC WITH AUTOMATED DIFF [90018 CPT(R)] Comments:  
 anemia FERRITIN [32114 CPT(R)] IRON [id number] CPT(R)] REFERRAL TO GENERAL SURGERY [REF27 Custom] Comments:  
 Dr Sandra Frye at [facility] Nissen fundoplication/  7-8 mm avm just below the z-line up under the wrap. Follow-up Instructions Return in about 3 months (around 11/3/2017) for anemia. Referral Information Referral ID Referred By Referred To  
  
 [id number] SALUD Saurabh Wells Not Available Visits Status Start Date End Date 1 New Request 8/3/17 8/3/18 If your referral has a status of pending review or denied, additional information will be sent to support the outcome of this decision. Patient Instructions If you have any recurrence of blood in the stool ( dark stools or bright red blood ) go to emergency room  
( prefer [facility] given nature of problem) Make appointment with Dr Neetu Dominguez at [facility] Return for blood check next week in clinic Introducing [facility] SERVICES! Dear Caesar Birmingham: Thank you for requesting a [employer] account. Our records indicate that you already have an active [employer] account. You can access your account anytime at [email or web address] Did you know that you can access your hospital and ER discharge instructions at any time in [employer]? You can also review all of your test results from your hospital stay or ER visit. Additional Information If you have questions, please visit the Frequently Asked Questions section of the [employer] website at [email or web address] Remember, [employer] is NOT to be used for urgent needs. For medical emergencies, dial 911. Now available from your iPhone and Android! Please provide this summary of care documentation to your next provider. Your primary care clinician is listed as Gabriel MANTILLA. If you have any questions after today's visit, please call [phone].
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
## 2017-08-04 LAB
FERRITIN SERPL-MCNC: 5 NG/ML (ref 30–400)
IRON SERPL-MCNC: 13 UG/DL (ref 38–169)

## 2017-08-11 ENCOUNTER — HOSPITAL ENCOUNTER (OUTPATIENT)
Dept: LAB | Age: 72
Discharge: HOME | End: 2017-08-11
Payer: MEDICARE

## 2017-08-11 ENCOUNTER — LAB ONLY (OUTPATIENT)
Dept: INTERNAL MEDICINE CLINIC | Age: 72
End: 2017-08-11

## 2017-08-11 DIAGNOSIS — Q27.30 AVM (ARTERIOVENOUS MALFORMATION): ICD-10-CM

## 2017-08-11 DIAGNOSIS — D50.0 IRON DEFICIENCY ANEMIA DUE TO CHRONIC BLOOD LOSS: ICD-10-CM

## 2017-08-11 DIAGNOSIS — K92.2 GASTROINTESTINAL HEMORRHAGE, UNSPECIFIED GASTROINTESTINAL HEMORRHAGE TYPE: ICD-10-CM

## 2017-08-11 PROCEDURE — 36415 COLL VENOUS BLD VENIPUNCTURE: CPT

## 2017-08-11 PROCEDURE — 85025 COMPLETE CBC W/AUTO DIFF WBC: CPT

## 2017-08-12 LAB
BASOPHILS # BLD AUTO: 0.1 X10E3/UL (ref 0–0.2)
BASOPHILS NFR BLD AUTO: 1 %
EOSINOPHIL # BLD AUTO: 0.2 X10E3/UL (ref 0–0.4)
EOSINOPHIL NFR BLD AUTO: 3 %
ERYTHROCYTE [DISTWIDTH] IN BLOOD BY AUTOMATED COUNT: 17.2 % (ref 12.3–15.4)
HCT VFR BLD AUTO: 32.7 % (ref 37.5–51)
HGB BLD-MCNC: 9.5 G/DL (ref 12.6–17.7)
IMM GRANULOCYTES # BLD: 0 X10E3/UL (ref 0–0.1)
IMM GRANULOCYTES NFR BLD: 0 %
LYMPHOCYTES # BLD AUTO: 1.6 X10E3/UL (ref 0.7–3.1)
LYMPHOCYTES NFR BLD AUTO: 26 %
MCH RBC QN AUTO: 24.1 PG (ref 26.6–33)
MCHC RBC AUTO-ENTMCNC: 29.1 G/DL (ref 31.5–35.7)
MCV RBC AUTO: 83 FL (ref 79–97)
MONOCYTES # BLD AUTO: 0.5 X10E3/UL (ref 0.1–0.9)
MONOCYTES NFR BLD AUTO: 8 %
NEUTROPHILS # BLD AUTO: 3.8 X10E3/UL (ref 1.4–7)
NEUTROPHILS NFR BLD AUTO: 62 %
PLATELET # BLD AUTO: 298 X10E3/UL (ref 150–379)
RBC # BLD AUTO: 3.94 X10E6/UL (ref 4.14–5.8)
WBC # BLD AUTO: 6.2 X10E3/UL (ref 3.4–10.8)

## 2017-08-25 RX ORDER — PRAVASTATIN SODIUM 20 MG/1
TABLET ORAL
Qty: 90 TAB | Refills: 2 | Status: SHIPPED | OUTPATIENT
Start: 2017-08-25 | End: 2018-05-24 | Stop reason: SDUPTHER

## 2017-08-29 ENCOUNTER — OFFICE VISIT (OUTPATIENT)
Dept: ENDOCRINOLOGY | Age: 72
End: 2017-08-29

## 2017-08-29 VITALS
BODY MASS INDEX: 24.8 KG/M2 | WEIGHT: 158 LBS | DIASTOLIC BLOOD PRESSURE: 77 MMHG | HEIGHT: 67 IN | SYSTOLIC BLOOD PRESSURE: 133 MMHG | HEART RATE: 67 BPM

## 2017-08-29 DIAGNOSIS — Z79.4 TYPE 2 DIABETES MELLITUS WITHOUT COMPLICATION, WITH LONG-TERM CURRENT USE OF INSULIN (HCC): Primary | ICD-10-CM

## 2017-08-29 DIAGNOSIS — E78.00 PURE HYPERCHOLESTEROLEMIA: ICD-10-CM

## 2017-08-29 DIAGNOSIS — I10 ESSENTIAL HYPERTENSION, BENIGN: ICD-10-CM

## 2017-08-29 DIAGNOSIS — E11.9 TYPE 2 DIABETES MELLITUS WITHOUT COMPLICATION, WITH LONG-TERM CURRENT USE OF INSULIN (HCC): Primary | ICD-10-CM

## 2017-08-29 NOTE — MR AVS SNAPSHOT
Visit Information Date & Time Provider Department Dept. Phone Encounter #  
 8/29/2017 10:10 AM Vaishnavi Quezada, [address] [place] [address] Diabetes and Endocrinology [phone] [id number] Follow-up Instructions Return in about 4 months (around 12/29/2017). Upcoming Health Maintenance Date Due  
 GLAUCOMA SCREENING Q2Y 4/18/2015 EYE EXAM RETINAL OR DILATED Q1 12/1/2015 FOOT EXAM Q1 3/22/2017 MEDICARE YEARLY EXAM 3/23/2017 INFLUENZA AGE 9 TO ADULT 8/1/2017 Pneumococcal 65+ Low/Medium Risk (2 of 2 - PPSV23) 8/20/2017 HEMOGLOBIN A1C Q6M 11/30/2017 COLONOSCOPY 4/10/2018 MICROALBUMIN Q1 5/30/2018 LIPID PANEL Q1 5/30/2018 DTaP/Tdap/Td series (2 - Td) 5/9/2025 Allergies as of 8/29/2017  Review Complete On: 8/29/2017 By: Vaishnavi Quezada MD  
  
 Severity Noted Reaction Type Reactions Contrast Agent [Iodine]  10/26/2009   Side Effect Hives, Itching IVP dye Current Immunizations  Reviewed on 12/21/2012 Name Date Influenza High Dose Vaccine PF 11/8/2016 Influenza Vaccine 11/19/2014, 9/16/2013 Influenza Vaccine (Quad) PF 10/20/2015 Influenza Vaccine Split 12/7/2010 Tdap 5/9/2015  1:21 PM  
 ZZZ-RETIRED (DO NOT USE) Pneumococcal Vaccine (Unspecified Type) 8/20/2012  8:39 AM  
 Zoster 12/20/2012 Not reviewed this visit You Were Diagnosed With   
  
 Codes Comments Type 2 diabetes mellitus without complication, with long-term current use of insulin (HCC)    -  Primary ICD-10-CM: E11.9, Z79.4 ICD-9-CM: 250.00, V58.67 Essential hypertension, benign     ICD-10-CM: I10 
ICD-9-CM: 401.1 Pure hypercholesterolemia     ICD-10-CM: E78.00 ICD-9-CM: 272.0 Vitals BP Pulse Height(growth percentile) Weight(growth percentile) BMI Smoking Status 133/77 67 5' 7\" (1.702 m) 158 lb (71.7 kg) 24.75 kg/m2 Former Smoker Vitals History BMI and BSA Data Body Mass Index Body Surface Area  24.75 kg/m 2 1.84 m 2  
  
  
 Preferred Pharmacy Pharmacy Name Phone [address] [facility] [phone] Your Updated Medication List  
  
   
This list is accurate as of: 8/29/17 11:18 AM.  Always use your most recent med list.  
  
  
  
  
 COLCRYS 0.6 mg tablet Generic drug:  colchicine TAKE 1 TABLET DAILY  
  
 cyanocobalamin 1,000 mcg tablet Take 1,000 mcg by mouth daily. esomeprazole 40 mg capsule Commonly known as:  NexIUM Take 1 Cap by mouth two (2) times a day. FERROUS SULFATE PO Take  by mouth. fosinopril 40 mg tablet Commonly known as:  MONOPRIL  
TAKE 1 TABLET DAILY  
  
 glucose blood VI test strips strip Commonly known as:  Wolm Litten OneTouch Verio strips. Monitoring 3 times daily. Dx E11.9 Insulin Needles (Disposable) 31 gauge x 5/16\" Ndle  
by SubCUTAneous route daily. Use qd with levemir pen 250.02 Insulin Needles (Disposable) 32 gauge x 5/32\" Ndle Commonly known as:  Kaylen Pen Needle Use to inject Humalog daily. ICD-10: E11.65 LEVEMIR FLEXTOUCH 100 unit/mL (3 mL) Inpn Generic drug:  insulin detemir INJECT 10UNITS UNDER THE SKIN ONCE DAILY AS DIRECTED Liraglutide 0.6 mg/0.1 mL (18 mg/3 mL) Pnij Commonly known as:  VICTOZA 3-DONELL  
0.2 mL by SubCUTAneous route daily. Inject 1.2 mg subcutaneously daily  
  
 magnesium oxide 500 mg Tab Take 1 Tab by mouth daily. OTHER  
two (2) times a day. motilium 10 mg daily (patient states drug not made in [place]) pravastatin 20 mg tablet Commonly known as:  PRAVACHOL  
TAKE 1 TABLET DAILY We Performed the Following HEMOGLOBIN A1C WITH EAG [85955 CPT(R)] METABOLIC PANEL, COMPREHENSIVE [59252 CPT(R)] Follow-up Instructions Return in about 4 months (around 12/29/2017). Patient Instructions Diabetes. Continue efforts to limit carbohydrate intake Medications: Continue Levemir 12 units daily in AM. Continue Victoza 1.2 mg daily. Goals for blood sugars: Follow them periodically before lunch, supper and bedtime Fasting  (less than 150) Other times -  (less than 180). Blood pressure: 
Continue fosinopril Cholesterol Continue  pravastatin. Introducing [facility] SERVICES! Dear Marcelino Rouse: Thank you for requesting a [employer] account. Our records indicate that you already have an active [email or web address] account. You can access your account anytime at [email or web address] Did you know that you can access your hospital and ER discharge instructions at any time in [employer].? You can also review all of your test results from your hospital stay or ER visit. Additional Information If you have questions, please visit the Frequently Asked Questions section of the [email or web address] website at [email or web address] Remember, [employer] is NOT to be used for urgent needs. For medical emergencies, dial 911. Now available from your iPhone and Android! Please provide this summary of care documentation to your next provider. Your primary care clinician is listed as Makayla MANTILLA. If you have any questions after today's visit, please call [phone].

## 2017-08-29 NOTE — PATIENT INSTRUCTIONS
Diabetes. Continue efforts to limit carbohydrate intake    Medications:  Continue Levemir 12 units daily in AM.     Continue Victoza 1.2 mg daily. Goals for blood sugars: Follow them periodically before lunch, supper and bedtime     Fasting  (less than 150)  Other times -  (less than 180). Blood pressure:  Continue fosinopril      Cholesterol  Continue  pravastatin.

## 2017-08-29 NOTE — PROGRESS NOTES
History of Present Illness: Adarsh Stone is a 80 y.o. male presents for follow-up of diabetes. He was diagnosed in 2009. 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 Started iron about one week ago. Ferritin level was 5.      Diabetes related complications:  No microalbumin  Does have occ sx of neuropathy in feet.     Current diabetes regimen:  - did not tolerate metformin - GI side effects  Levemir 12 units daily  Victoza 1.2 mg daily.     Glucoses:  Fastings as low as 80s. Generally fasting values 100s-130. Recent bedtime value 188. Highest was around 300 when having GI bleed and under stress.     Wt very stable.      Exercise - walks often. golfs once a week.         Lipids - taking pravastatin 20 mg. Past Medical History:   Diagnosis Date    Chronic kidney disease     Colon polyps 2007    Dr. Maria Luz Philip DDD (degenerative disc disease), cervical     Diabetes ([facility] [address]     GERD (gastroesophageal reflux disease)     Hernadez's esophagus long segment    Gout     Hearing loss     Hypercholesterolemia     Hypertension     Ill-defined condition     Hernadez's esophagus    Ill-defined condition     Glaucoma, Bell's palsy    Other ill-defined conditions     gout    Other ill-defined conditions     lipids    Other ill-defined conditions     BPH     Current Outpatient Prescriptions   Medication Sig    FERROUS SULFATE PO Take  by mouth.  pravastatin (PRAVACHOL) 20 mg tablet TAKE 1 TABLET DAILY    magnesium oxide 500 mg tab Take 1 Tab by mouth daily.  cyanocobalamin 1,000 mcg tablet Take 1,000 mcg by mouth daily.  LEVEMIR FLEXTOUCH 100 unit/mL (3 mL) inpn INJECT 10UNITS UNDER THE SKIN ONCE DAILY AS DIRECTED (Patient taking differently: 12 units daily)    fosinopril (MONOPRIL) 40 mg tablet TAKE 1 TABLET DAILY    Liraglutide (VICTOZA 3-DONELL) 0.6 mg/0.1 mL (18 mg/3 mL) sub-q pen 0.2 mL by SubCUTAneous route daily.  Inject 1.2 mg subcutaneously daily    glucose blood VI test strips (ONETOUCH VERIO) strip OneTouch Verio strips. Monitoring 3 times daily. Dx E11.9    Insulin Needles, Disposable, (GAYATRI PEN NEEDLE) 32 gauge x 5/32\" ndle Use to inject Humalog daily. ICD-10: E11.65    COLCRYS 0.6 mg tablet TAKE 1 TABLET DAILY    OTHER two (2) times a day. motilium 10 mg daily (patient states drug not made in [place])     Insulin Needles, Disposable, 31 X 5/16 \" ndle by SubCUTAneous route daily. Use qd with levemir pen 250.02    esomeprazole (NEXIUM) 40 mg capsule Take 1 Cap by mouth two (2) times a day. No current facility-administered medications for this visit.       Allergies   Allergen Reactions    Contrast Agent [Iodine] Hives and Itching     IVP dye       Review of Systems:  - Eyes: no blurry vision or double vision  - Cardiovascular: no chest pain  - Respiratory: no shortness of breath  - Musculoskeletal: no myalgias  - Neurological: no numbness/tingling in extremities    Physical Examination:  Visit Vitals    /77    Pulse 67    Ht 5' 7\" (1.702 m)    Wt 158 lb (71.7 kg)    BMI 24.75 kg/m2   -   - General: pleasant, no distress, normal gait   HEENT: hearing intact, EOMI, clear sclera without icterus  - Neck: no thyromegaly or LAD  - Cardiovascular: regular, normal rate   - Respiratory: normal effort  - Integumentary: no edema   Diabetic foot exam:     Right: Filament test normal sensation with micro filament   Pulse DP: 2+ (normal)   Deformities: None    - Psychiatric: normal mood and affect    Data Reviewed:   Component      Latest Ref Rng & Units 5/30/2017 5/30/2017 5/30/2017 5/30/2017          11:18 AM 11:18 AM 11:18 AM 11:18 AM   Glucose      65 - 99 mg/dL    116 (H)   BUN      8 - 27 mg/dL    19   Creatinine      0.76 - 1.27 mg/dL    1.24   GFR est non-AA      >59 mL/min/1.73    59 (L)   GFR est AA      >59 mL/min/1.73    68   BUN/Creatinine ratio      10 - 24    15   Sodium      134 - 144 mmol/L    136   Potassium      3.5 - 5.2 mmol/L    4.3   Chloride      96 - 106 mmol/L    98   CO2      18 - 29 mmol/L    20   Calcium      8.6 - 10.2 mg/dL    9.5   Protein, total      6.0 - 8.5 g/dL    7.0   Albumin      3.5 - 4.8 g/dL    4.5   GLOBULIN, TOTAL      1.5 - 4.5 g/dL    2.5   A-G Ratio      1.2 - 2.2    1.8   Bilirubin, total      0.0 - 1.2 mg/dL    0.4   Alk. phosphatase      39 - 117 IU/L    82   AST      0 - 40 IU/L    15   ALT (SGPT)      0 - 44 IU/L    21   Cholesterol, total      100 - 199 mg/dL   135    Triglyceride      0 - 149 mg/dL   161 (H)    HDL Cholesterol      >39 mg/dL   48    VLDL, calculated      5 - 40 mg/dL   32    LDL, calculated      0 - 99 mg/dL   55    Creatinine, urine      Not Estab. mg/dL 23.3      Microalbumin, urine      Not Estab. ug/mL <3.0      Microalbumin/Creat. Ratio      0.0 - 30.0 mg/g creat <12.9      Hemoglobin A1c, (calculated)      4.8 - 5.6 %  7.5 (H)     Estimated average glucose      mg/dL  169         Assessment/Plan:   1. Type 2 diabetes mellitus without complication, with long-term current use of insulin (HCC)  Diabetes appears well controlled. Continue Levemir 12 units and Victoza 1.2 mg daily     2. Essential hypertension, benign   continue fosinopril       3. Pure hypercholesterolemia   Continue pravastatin. LDL is near 55     Patient Instructions   Diabetes. Continue efforts to limit carbohydrate intake    Medications:  Continue Levemir 12 units daily in AM.     Continue Victoza 1.2 mg daily. Goals for blood sugars: Follow them periodically before lunch, supper and bedtime     Fasting  (less than 150)  Other times -  (less than 180). Blood pressure:  Continue fosinopril      Cholesterol  Continue  pravastatin. Follow-up Disposition:  Return in about 4 months (around 12/29/2017).     Copy sent to:

## 2017-08-29 NOTE — PROGRESS NOTES
Patient stated since last visit he was diagnosed with GI bleed. He had surgery to cauterize this. A month later, had another GI bleed and surgery. He has lost blood from the bleeds and surgeries.

## 2017-10-17 ENCOUNTER — OFFICE VISIT (OUTPATIENT)
Dept: INTERNAL MEDICINE CLINIC | Age: 72
End: 2017-10-17

## 2017-10-17 ENCOUNTER — HOSPITAL ENCOUNTER (OUTPATIENT)
Dept: LAB | Age: 72
Discharge: HOME | End: 2017-10-17
Payer: MEDICARE

## 2017-10-17 VITALS
TEMPERATURE: 97.5 F | BODY MASS INDEX: 24.96 KG/M2 | HEART RATE: 71 BPM | SYSTOLIC BLOOD PRESSURE: 137 MMHG | HEIGHT: 67 IN | DIASTOLIC BLOOD PRESSURE: 76 MMHG | WEIGHT: 159 LBS | OXYGEN SATURATION: 98 %

## 2017-10-17 DIAGNOSIS — D50.9 IRON DEFICIENCY ANEMIA, UNSPECIFIED IRON DEFICIENCY ANEMIA TYPE: Primary | ICD-10-CM

## 2017-10-17 DIAGNOSIS — Z23 ENCOUNTER FOR IMMUNIZATION: ICD-10-CM

## 2017-10-17 DIAGNOSIS — Z00.00 MEDICARE ANNUAL WELLNESS VISIT, SUBSEQUENT: ICD-10-CM

## 2017-10-17 PROCEDURE — 85027 COMPLETE CBC AUTOMATED: CPT

## 2017-10-17 PROCEDURE — 82728 ASSAY OF FERRITIN: CPT

## 2017-10-17 PROCEDURE — 83550 IRON BINDING TEST: CPT

## 2017-10-17 PROCEDURE — 36415 COLL VENOUS BLD VENIPUNCTURE: CPT

## 2017-10-17 NOTE — PATIENT INSTRUCTIONS

## 2017-10-17 NOTE — MR AVS SNAPSHOT
Visit Information Date & Time Provider Department Dept. Phone Encounter #  
 10/17/2017  9:30 AM Max Meraz, [address] [place] [address],[address] [phone] [id number] Follow-up Instructions Return in about 6 months (around 4/17/2018) for anemia htn. Your Appointments 12/21/2017 10:10 AM  
ROUTINE CARE with MD Chavez [facility] Appt Note: f/u diabetes cp0.00  
 [address]  
[address] [facility] [address] [id number] Upcoming Health Maintenance Date Due  
 GLAUCOMA SCREENING Q2Y 4/18/2015 EYE EXAM RETINAL OR DILATED Q1 12/1/2015 MEDICARE YEARLY EXAM 3/23/2017 Pneumococcal 65+ Low/Medium Risk (2 of 2 - PPSV23) 8/20/2017 COLONOSCOPY 4/10/2018 HEMOGLOBIN A1C Q6M 11/30/2017 MICROALBUMIN Q1 5/30/2018 LIPID PANEL Q1 5/30/2018 FOOT EXAM Q1 8/29/2018 DTaP/Tdap/Td series (2 - Td) 5/9/2025 Allergies as of 10/17/2017  Review Complete On: 10/17/2017 By: Max Meraz MD  
  
 Severity Noted Reaction Type Reactions Contrast Agent [Iodine]  10/26/2009   Side Effect Hives, Itching IVP dye Current Immunizations  Reviewed on 12/21/2012 Name Date Influenza High Dose Vaccine PF 11/8/2016 Influenza Vaccine 11/19/2014, 9/16/2013 Influenza Vaccine (Quad) PF 10/20/2015 Influenza Vaccine Split 12/7/2010 Pneumococcal Conjugate (PCV-13)  Incomplete Tdap 5/9/2015  1:21 PM  
 ZZZ-RETIRED (DO NOT USE) Pneumococcal Vaccine (Unspecified Type) 8/20/2012  8:39 AM  
 Zoster 12/20/2012 Not reviewed this visit You Were Diagnosed With   
  
 Codes Comments Iron deficiency anemia, unspecified iron deficiency anemia type    -  Primary ICD-10-CM: D50.9 ICD-9-CM: 280.9 Encounter for immunization     ICD-10-CM: T82 ICD-9-CM: V03.89 Vitals BP Pulse Temp Height(growth percentile) Weight(growth percentile) SpO2  
 137/76 (BP 1 Location: Left arm, BP Patient Position: Sitting) 71 97.5 °F (36.4 °C) (Oral) 5' 7\" (1.702 m) 159 lb (72.1 kg) 98% BMI Smoking Status 24.9 kg/m2 Former Smoker BMI and BSA Data Body Mass Index Body Surface Area 24.9 kg/m 2 1.85 m 2 Preferred Pharmacy Pharmacy Name Phone [facility] [phone] Your Updated Medication List  
  
   
This list is accurate as of: 10/17/17 10:17 AM.  Always use your most recent med list.  
  
  
  
  
 COLCRYS 0.6 mg tablet Generic drug:  colchicine TAKE 1 TABLET DAILY  
  
 cyanocobalamin 1,000 mcg tablet Take 1,000 mcg by mouth daily. esomeprazole 40 mg capsule Commonly known as:  NexIUM Take 1 Cap by mouth two (2) times a day. FERROUS SULFATE PO Take  by mouth. 1 bid  
  
 fosinopril 40 mg tablet Commonly known as:  MONOPRIL  
TAKE 1 TABLET DAILY  
  
 glucose blood VI test strips strip Commonly known as:  Warren Rear OneTouch Verio strips. Monitoring 3 times daily. Dx E11.9 Insulin Needles (Disposable) 31 gauge x 5/16\" Ndle  
by SubCUTAneous route daily. Use qd with levemir pen 250.02 Insulin Needles (Disposable) 32 gauge x 5/32\" Ndle Commonly known as:  Kaylen Pen Needle Use to inject Humalog daily. ICD-10: E11.65 LEVEMIR FLEXTOUCH 100 unit/mL (3 mL) Inpn Generic drug:  insulin detemir INJECT 10UNITS UNDER THE SKIN ONCE DAILY AS DIRECTED Liraglutide 0.6 mg/0.1 mL (18 mg/3 mL) Pnij Commonly known as:  VICTOZA 3-DONELL  
0.2 mL by SubCUTAneous route daily. Inject 1.2 mg subcutaneously daily  
  
 magnesium oxide 500 mg Tab Take 1 Tab by mouth daily. OTHER  
two (2) times a day. motilium 10 mg daily (patient states drug not made in [place]) pravastatin 20 mg tablet Commonly known as:  PRAVACHOL  
 TAKE 1 TABLET DAILY We Performed the Following CBC W/O DIFF [67241 CPT(R)] FERRITIN [16513 CPT(R)] IRON PROFILE E215555 CPT(R)] PNEUMOCOCCAL CONJ VACCINE 13 VALENT IM [id number] CPT(R)] Follow-up Instructions Return in about 6 months (around 4/17/2018) for anemia htn. Patient Instructions Medicare Wellness Visit, Male The best way to live healthy is to have a healthy lifestyle by eating a well-balanced diet, exercising regularly, limiting alcohol and stopping smoking. Regular physical exams and screening tests are another way to keep healthy. Preventive exams provided by your health care provider can find health problems before they become diseases or illnesses. Preventive services including immunizations, screening tests, monitoring and exams can help you take care of your own health. All people over age 72 should have a pneumovax  and and a prevnar shot to prevent pneumonia. These are once in a lifetime unless you and your provider decide differently. All people over 65 should have a yearly flu shot and a tetanus vaccine every 10 years. Screening for diabetes mellitus with a blood sugar test should be done every year. Glaucoma is a disease of the eye due to increased ocular pressure that can lead to blindness and it should be done every year by an eye professional. 
 
Cardiovascular screening tests that check for elevated lipids (fatty part of blood) which can lead to heart disease and strokes should be done every 5 years. Colorectal screening that evaluates for blood or polyps in your colon should be done yearly as a stool test or every five years as a flexible sigmoidoscope or every 10 years as a colonoscopy up to age 76. Men up to age 76 may need a screening blood test for prostate cancer at certain intervals, depending on their personal and family history. This decision is between the patient and his provider. If you have been a smoker or had family history of abdominal aortic aneurysms, you and your provider may decide to schedule an ultrasound test of your aorta. Hepatitis C screening is also recommended for anyone born between [birth year] through [address]. A shingles vaccine is also recommended once in a lifetime after age 61. Your Medicare Wellness Exam is recommended annually. Here is a list of your current Health Maintenance items with a due date: 
Health Maintenance Due Topic Date Due  Glaucoma Screening   04/18/2015 [employer] Eye Exam  12/01/2015 [employer] Annual Well Visit  03/23/2017  Pneumococcal Vaccine (2 of 2 - PPSV23) 08/20/2017  Colonoscopy  04/10/2018 Introducing [facility] SERVICES! Dear Jodi Em: Thank you for requesting a [employer] account. Our records indicate that you already have an active [employer] account. You can access your account anytime at [email or web address] Did you know that you can access your hospital and ER discharge instructions at any time in [employer]? You can also review all of your test results from your hospital stay or ER visit. Additional Information If you have questions, please visit the Frequently Asked Questions section of the [employer] website at [email or web address] Remember, [employer] is NOT to be used for urgent needs. For medical emergencies, dial 911. Now available from your iPhone and Android! Please provide this summary of care documentation to your next provider. Your primary care clinician is listed as Slava MANTILLA. If you have any questions after today's visit, please call [phone].

## 2017-10-17 NOTE — PROGRESS NOTES
Patient is here today for 3 month follow up   Concerning anemia and lab test. Patient is   due  For medicare wellness visit.
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
## 2017-10-18 LAB
ERYTHROCYTE [DISTWIDTH] IN BLOOD BY AUTOMATED COUNT: 24.2 % (ref 12.3–15.4)
FERRITIN SERPL-MCNC: 45 NG/ML (ref 30–400)
HCT VFR BLD AUTO: 47.6 % (ref 37.5–51)
HGB BLD-MCNC: 15.4 G/DL (ref 12.6–17.7)
IRON SATN MFR SERPL: 41 % (ref 15–55)
IRON SERPL-MCNC: 137 UG/DL (ref 38–169)
MCH RBC QN AUTO: 26.9 PG (ref 26.6–33)
MCHC RBC AUTO-ENTMCNC: 32.4 G/DL (ref 31.5–35.7)
MCV RBC AUTO: 83 FL (ref 79–97)
PLATELET # BLD AUTO: 140 X10E3/UL (ref 150–379)
RBC # BLD AUTO: 5.72 X10E6/UL (ref 4.14–5.8)
TIBC SERPL-MCNC: 331 UG/DL (ref 250–450)
UIBC SERPL-MCNC: 194 UG/DL (ref 111–343)
WBC # BLD AUTO: 6.6 X10E3/UL (ref 3.4–10.8)

## 2017-10-18 NOTE — PROGRESS NOTES
Tell pt he is no longer anbemia Hb up to 15.4 with normal iron levels.  He can decrease iron to one tab every day and repeat h/h in 3-6 mos

## 2017-10-20 ENCOUNTER — TELEPHONE (OUTPATIENT)
Dept: INTERNAL MEDICINE CLINIC | Age: 72
End: 2017-10-20

## 2017-10-20 NOTE — TELEPHONE ENCOUNTER
Call completed to patient, two identifiers verified. Lab results and recommendations reviewed. Patient verbalized an understanding and agrees with recommendations. Patient denies questions at this time.

## 2017-10-20 NOTE — PROGRESS NOTES
Call attempted to patient at [phone], there was no answer. Left a voice mail message requesting a return call to review lab results and recommendations.

## 2017-10-20 NOTE — TELEPHONE ENCOUNTER
----- Message from Gene Quintanilla sent at 10/20/2017  3:54 PM EDT -----  Regarding: Dr. Greer Davila  Patient returning a call from [facility].  Contact is [phone]      Message copied/pasted from Ervin Osullivan

## 2017-10-31 DIAGNOSIS — E78.00 PURE HYPERCHOLESTEROLEMIA: Primary | ICD-10-CM

## 2017-11-15 ENCOUNTER — CLINICAL SUPPORT (OUTPATIENT)
Dept: INTERNAL MEDICINE CLINIC | Age: 72
End: 2017-11-15

## 2017-11-15 ENCOUNTER — HOSPITAL ENCOUNTER (OUTPATIENT)
Dept: LAB | Age: 72
Discharge: HOME | End: 2017-11-15
Payer: MEDICARE

## 2017-11-15 ENCOUNTER — APPOINTMENT (OUTPATIENT)
Dept: INTERNAL MEDICINE CLINIC | Age: 72
End: 2017-11-15

## 2017-11-15 VITALS
HEIGHT: 67 IN | BODY MASS INDEX: 24.96 KG/M2 | TEMPERATURE: 97.8 F | HEART RATE: 68 BPM | RESPIRATION RATE: 16 BRPM | WEIGHT: 159 LBS | OXYGEN SATURATION: 100 %

## 2017-11-15 DIAGNOSIS — Z23 ENCOUNTER FOR IMMUNIZATION: Primary | ICD-10-CM

## 2017-11-15 PROCEDURE — 84443 ASSAY THYROID STIM HORMONE: CPT

## 2017-11-15 PROCEDURE — 36415 COLL VENOUS BLD VENIPUNCTURE: CPT

## 2017-11-15 NOTE — PROGRESS NOTES
Chief Complaint   Patient presents with    Immunization/Injection     1. Have you been to the ER, urgent care clinic since your last visit? Hospitalized since your last visit? No    2. Have you seen or consulted any other health care providers outside of the [address] [place] [address] since your last visit? Include any pap smears or colon screening. No    In the event something were to happen to you and you were unable to speak on your behalf, do you have an Advance Directive/ Living Will in place stating your wishes? Yes    If yes, do we have a copy on file: Yes    If no, would you like information:  No        Verbal with readback order received from Dr. Wil Weller MD to administer 0.5 ml of Influenza High-Dose 6548-2390 Formula  After obtaining written consent from the patient, Flu vaccine was administered to left  deltoid by Daniel Mary LPN, JODYN.  [employer]: [id number], Lot:[id number], Exp: 05/24/2018Manf: [employer]. Patient provided VIS & observed with no s/s of adverse reactions.

## 2017-11-15 NOTE — PATIENT INSTRUCTIONS
Vaccine Information Statement    Influenza (Flu) Vaccine (Inactivated or Recombinant): What you need to know    Many Vaccine Information Statements are available in Sinhala and other languages. See www.immunize.org/vis  Hojas de Información Sobre Vacunas están disponibles en Español y en muchos otros idiomas. Visite www.immunize.org/vis    1. Why get vaccinated? Influenza (flu) is a contagious disease that spreads around the [place] every year, usually between October and May. Flu is caused by influenza viruses, and is spread mainly by coughing, sneezing, and close contact. Anyone can get flu. Flu strikes suddenly and can last several days. Symptoms vary by age, but can include:   fever/chills   sore throat   muscle aches   fatigue   cough   headache    runny or stuffy nose    Flu can also lead to pneumonia and blood infections, and cause diarrhea and seizures in children. If you have a medical condition, such as heart or lung disease, flu can make it worse. Flu is more dangerous for some people. Infants and young children, people 72years of age and older, pregnant women, and people with certain health conditions or a weakened immune system are at greatest risk. Each year thousands of people in the [facility] die from flu, and many more are hospitalized. Flu vaccine can:   keep you from getting flu,   make flu less severe if you do get it, and   keep you from spreading flu to your family and other people. 2. Inactivated and recombinant flu vaccines    A dose of flu vaccine is recommended every flu season. Children 6 months through 6years of age may need two doses during the same flu season. Everyone else needs only one dose each flu season.        Some inactivated flu vaccines contain a very small amount of a mercury-based preservative called thimerosal. Studies have not shown thimerosal in vaccines to be harmful, but flu vaccines that do not contain thimerosal are available. There is no live flu virus in flu shots. They cannot cause the flu. There are many flu viruses, and they are always changing. Each year a new flu vaccine is made to protect against three or four viruses that are likely to cause disease in the upcoming flu season. But even when the vaccine doesnt exactly match these viruses, it may still provide some protection    Flu vaccine cannot prevent:   flu that is caused by a virus not covered by the vaccine, or   illnesses that look like flu but are not. It takes about 2 weeks for protection to develop after vaccination, and protection lasts through the flu season. 3. Some people should not get this vaccine    Tell the person who is giving you the vaccine:     If you have any severe, life-threatening allergies. If you ever had a life-threatening allergic reaction after a dose of flu vaccine, or have a severe allergy to any part of this vaccine, you may be advised not to get vaccinated. Most, but not all, types of flu vaccine contain a small amount of egg protein.  If you ever had Guillain-Barré Syndrome (also called GBS). Some people with a history of GBS should not get this vaccine. This should be discussed with your doctor.  If you are not feeling well. It is usually okay to get flu vaccine when you have a mild illness, but you might be asked to come back when you feel better. 4. Risks of a vaccine reaction    With any medicine, including vaccines, there is a chance of reactions. These are usually mild and go away on their own, but serious reactions are also possible. Most people who get a flu shot do not have any problems with it.      Minor problems following a flu shot include:    soreness, redness, or swelling where the shot was given     hoarseness   sore, red or itchy eyes   cough   fever   aches   headache   itching   fatigue  If these problems occur, they usually begin soon after the shot and last 1 or 2 days. More serious problems following a flu shot can include the following:     There may be a small increased risk of Guillain-Barré Syndrome (GBS) after inactivated flu vaccine. This risk has been estimated at 1 or 2 additional cases per million people vaccinated. This is much lower than the risk of severe complications from flu, which can be prevented by flu vaccine.  Young children who get the flu shot along with pneumococcal vaccine (PCV13) and/or DTaP vaccine at the same time might be slightly more likely to have a seizure caused by fever. Ask your doctor for more information. Tell your doctor if a child who is getting flu vaccine has ever had a seizure. Problems that could happen after any injected vaccine:      People sometimes faint after a medical procedure, including vaccination. Sitting or lying down for about 15 minutes can help prevent fainting, and injuries caused by a fall. Tell your doctor if you feel dizzy, or have vision changes or ringing in the ears.  Some people get severe pain in the shoulder and have difficulty moving the arm where a shot was given. This happens very rarely.  Any medication can cause a severe allergic reaction. Such reactions from a vaccine are very rare, estimated at about 1 in a million doses, and would happen within a few minutes to a few hours after the vaccination. As with any medicine, there is a very remote chance of a vaccine causing a serious injury or death. The safety of vaccines is always being monitored. For more information, visit: www.cdc.gov/vaccinesafety/    5. What if there is a serious reaction? What should I look for?  Look for anything that concerns you, such as signs of a severe allergic reaction, very high fever, or unusual behavior.     Signs of a severe allergic reaction can include hives, swelling of the face and throat, difficulty breathing, a fast heartbeat, dizziness, and weakness  usually within a few minutes to a few hours after the vaccination. What should I do?  If you think it is a severe allergic reaction or other emergency that cant wait, call 9-1-1 and get the person to the nearest hospital. Otherwise, call your doctor.  Reactions should be reported to the Vaccine Adverse Event Reporting System (VAERS). Your doctor should file this report, or you can do it yourself through  the VAERS web site at www.vaers. [facility].gov, or by calling 2-190.810.6270. VAERS does not give medical advice. 6. The National Vaccine Injury Compensation Program    The [facility] Vaccine Injury Compensation Program (VICP) is a federal program that was created to compensate people who may have been injured by certain vaccines. Persons who believe they may have been injured by a vaccine can learn about the program and about filing a claim by calling 7-257.580.4908 or visiting the [employer] website at www.[facility].gov/vaccinecompensation. There is a time limit to file a claim for compensation. 7. How can I learn more?  Ask your healthcare provider. He or she can give you the vaccine package insert or suggest other sources of information.  Call your local or state health department.  Contact the Centers for Disease Control and Prevention (CDC):  - Call 6-966.177.6884 (1-800-CDC-INFO) or  - Visit CDCs website at www.cdc.gov/flu    Vaccine Information Statement   Inactivated Influenza Vaccine   8/7/2015  42 EDWINA Alcantara [id number]    Department of Health and Human Services  Centers for Disease Control and Prevention    Office Use Only

## 2017-11-15 NOTE — MR AVS SNAPSHOT
Visit Information Date & Time Provider Department Dept. Phone Encounter #  
 11/15/2017  2:30 PM Torito Loo [id number] Follow-up Instructions Return if symptoms worsen or fail to improve. Follow-up and Disposition History Your Appointments 12/21/2017 10:10 AM  
ROUTINE CARE with MD Skyler [facility] [address] [facility]) Appt Note: f/u diabetes cp0.00  
 [address] [facility] [place] [address]  
[address] [facility] [address] [id number] Upcoming Health Maintenance Date Due  
 GLAUCOMA SCREENING Q2Y 4/18/2015 EYE EXAM RETINAL OR DILATED Q1 12/1/2015 HEMOGLOBIN A1C Q6M 11/30/2017 COLONOSCOPY 4/10/2018 MICROALBUMIN Q1 5/30/2018 LIPID PANEL Q1 5/30/2018 FOOT EXAM Q1 8/29/2018 MEDICARE YEARLY EXAM 10/18/2018 DTaP/Tdap/Td series (2 - Td) 5/9/2025 Allergies as of 11/15/2017  Review Complete On: 11/15/2017 By: Matti Hancock LPN Severity Noted Reaction Type Reactions Contrast Agent [Iodine]  10/26/2009   Side Effect Hives, Itching IVP dye Current Immunizations  Reviewed on 11/15/2017 Name Date Influenza High Dose Vaccine PF 11/15/2017  2:48 PM, 11/8/2016 Influenza Vaccine 11/19/2014, 9/16/2013 Influenza Vaccine (Quad) PF 10/20/2015 Influenza Vaccine Split 12/7/2010 Pneumococcal Conjugate (PCV-13) 10/17/2017 Tdap 5/9/2015  1:21 PM  
 ZZZ-RETIRED (DO NOT USE) Pneumococcal Vaccine (Unspecified Type) 8/20/2012  8:39 AM  
 Zoster 12/20/2012 Reviewed by Matti Hancock LPN on 38/18/4892 at  2:49 PM  
You Were Diagnosed With   
  
 Codes Comments Encounter for immunization    -  Primary ICD-10-CM: R18 ICD-9-CM: V03.89 Vitals Pulse Temp Resp Height(growth percentile) Weight(growth percentile) SpO2  
 68 97.8 °F (36.6 °C) (Oral) 16 5' 7\" (1.702 m) 159 lb (72.1 kg) 100% BMI Smoking Status 24.9 kg/m2 Former Smoker Vitals History BMI and BSA Data Body Mass Index Body Surface Area 24.9 kg/m 2 1.85 m 2 Preferred Pharmacy Pharmacy Name Phone [facility] [phone] Your Updated Medication List  
  
   
This list is accurate as of: 11/15/17  2:49 PM.  Always use your most recent med list.  
  
  
  
  
 COLCRYS 0.6 mg tablet Generic drug:  colchicine TAKE 1 TABLET DAILY  
  
 cyanocobalamin 1,000 mcg tablet Take 1,000 mcg by mouth daily. esomeprazole 40 mg capsule Commonly known as:  NexIUM Take 1 Cap by mouth two (2) times a day. FERROUS SULFATE PO Take  by mouth. 1 bid  
  
 fosinopril 40 mg tablet Commonly known as:  MONOPRIL  
TAKE 1 TABLET DAILY  
  
 glucose blood VI test strips strip Commonly known as:  Sofi Locket OneTouch Verio strips. Monitoring 3 times daily. Dx E11.9 Insulin Needles (Disposable) 31 gauge x 5/16\" Ndle  
by SubCUTAneous route daily. Use qd with levemir pen 250.02 Insulin Needles (Disposable) 32 gauge x 5/32\" Ndle Commonly known as:  Kaylen Pen Needle Use to inject Humalog daily. ICD-10: E11.65 LEVEMIR FLEXTOUCH 100 unit/mL (3 mL) Inpn Generic drug:  insulin detemir INJECT 10UNITS UNDER THE SKIN ONCE DAILY AS DIRECTED Liraglutide 0.6 mg/0.1 mL (18 mg/3 mL) Pnij Commonly known as:  VICTOZA 3-DONELL  
0.2 mL by SubCUTAneous route daily. Inject 1.2 mg subcutaneously daily  
  
 magnesium oxide 500 mg Tab Take 1 Tab by mouth daily. OTHER  
two (2) times a day. motilium 10 mg daily (patient states drug not made in [place]) pravastatin 20 mg tablet Commonly known as:  PRAVACHOL  
TAKE 1 TABLET DAILY We Performed the Following INFLUENZA VIRUS VACCINE, HIGH DOSE SEASONAL, PRESERVATIVE FREE [43117 CPT(R)] Follow-up Instructions Return if symptoms worsen or fail to improve. Patient Instructions Vaccine Information Statement Influenza (Flu) Vaccine (Inactivated or Recombinant): What you need to know Many Vaccine Information Statements are available in Thai and other languages. See www.immunize.org/vis Hojas de Información Sobre Vacunas están disponibles en Español y en muchos otros idiomas. Visite www.immunize.org/vis 1. Why get vaccinated? Influenza (flu) is a contagious disease that spreads around the [place] [facility] every year, usually between October and May. Flu is caused by influenza viruses, and is spread mainly by coughing, sneezing, and close contact. Anyone can get flu. Flu strikes suddenly and can last several days. Symptoms vary by age, but can include: 
 fever/chills  sore throat  muscle aches  fatigue  cough  headache  runny or stuffy nose Flu can also lead to pneumonia and blood infections, and cause diarrhea and seizures in children. If you have a medical condition, such as heart or lung disease, flu can make it worse. Flu is more dangerous for some people. Infants and young children, people 72years of age and older, pregnant women, and people with certain health conditions or a weakened immune system are at greatest risk. Each year thousands of people in the [facility] die from flu, and many more are hospitalized. Flu vaccine can: 
 keep you from getting flu, 
 make flu less severe if you do get it, and 
 keep you from spreading flu to your family and other people. 2. Inactivated and recombinant flu vaccines A dose of flu vaccine is recommended every flu season. Children 6 months through 6years of age may need two doses during the same flu season. Everyone else needs only one dose each flu season.   
 
 
Some inactivated flu vaccines contain a very small amount of a mercury-based preservative called thimerosal. Studies have not shown thimerosal in vaccines to be harmful, but flu vaccines that do not contain thimerosal are available. There is no live flu virus in flu shots. They cannot cause the flu. There are many flu viruses, and they are always changing. Each year a new flu vaccine is made to protect against three or four viruses that are likely to cause disease in the upcoming flu season. But even when the vaccine doesnt exactly match these viruses, it may still provide some protection Flu vaccine cannot prevent: 
 flu that is caused by a virus not covered by the vaccine, or 
 illnesses that look like flu but are not. It takes about 2 weeks for protection to develop after vaccination, and protection lasts through the flu season. 3. Some people should not get this vaccine Tell the person who is giving you the vaccine:  If you have any severe, life-threatening allergies. If you ever had a life-threatening allergic reaction after a dose of flu vaccine, or have a severe allergy to any part of this vaccine, you may be advised not to get vaccinated. Most, but not all, types of flu vaccine contain a small amount of egg protein.  If you ever had Guillain-Barré Syndrome (also called GBS). Some people with a history of GBS should not get this vaccine. This should be discussed with your doctor.  If you are not feeling well. It is usually okay to get flu vaccine when you have a mild illness, but you might be asked to come back when you feel better. 4. Risks of a vaccine reaction With any medicine, including vaccines, there is a chance of reactions. These are usually mild and go away on their own, but serious reactions are also possible. Most people who get a flu shot do not have any problems with it. Minor problems following a flu shot include:  
 soreness, redness, or swelling where the shot was given  hoarseness  sore, red or itchy eyes  cough  fever  aches  headache  itching  fatigue If these problems occur, they usually begin soon after the shot and last 1 or 2 days. More serious problems following a flu shot can include the following:  There may be a small increased risk of Guillain-Barré Syndrome (GBS) after inactivated flu vaccine. This risk has been estimated at 1 or 2 additional cases per million people vaccinated. This is much lower than the risk of severe complications from flu, which can be prevented by flu vaccine.  Young children who get the flu shot along with pneumococcal vaccine (PCV13) and/or DTaP vaccine at the same time might be slightly more likely to have a seizure caused by fever. Ask your doctor for more information. Tell your doctor if a child who is getting flu vaccine has ever had a seizure. Problems that could happen after any injected vaccine:  People sometimes faint after a medical procedure, including vaccination. Sitting or lying down for about 15 minutes can help prevent fainting, and injuries caused by a fall. Tell your doctor if you feel dizzy, or have vision changes or ringing in the ears.  Some people get severe pain in the shoulder and have difficulty moving the arm where a shot was given. This happens very rarely.  Any medication can cause a severe allergic reaction. Such reactions from a vaccine are very rare, estimated at about 1 in a million doses, and would happen within a few minutes to a few hours after the vaccination. As with any medicine, there is a very remote chance of a vaccine causing a serious injury or death. The safety of vaccines is always being monitored. For more information, visit: www.cdc.gov/vaccinesafety/ 
 
5. What if there is a serious reaction? What should I look for?  Look for anything that concerns you, such as signs of a severe allergic reaction, very high fever, or unusual behavior.  
 
Signs of a severe allergic reaction can include hives, swelling of the face and throat, difficulty breathing, a fast heartbeat, dizziness, and weakness  usually within a few minutes to a few hours after the vaccination. What should I do?  If you think it is a severe allergic reaction or other emergency that cant wait, call 9-1-1 and get the person to the nearest hospital. Otherwise, call your doctor.  Reactions should be reported to the Vaccine Adverse Event Reporting System (VAERS). Your doctor should file this report, or you can do it yourself through  the VAERS web site at www.vaers. [facility].gov, or by calling 6-165.967.5709. VAERS does not give medical advice. 6. The National Vaccine Injury Compensation Program 
 
The [facility] Vaccine Injury Compensation Program (VICP) is a federal program that was created to compensate people who may have been injured by certain vaccines. Persons who believe they may have been injured by a vaccine can learn about the program and about filing a claim by calling 2-996.543.2306 or visiting the [employer] website at www.[facility].gov/vaccinecompensation. There is a time limit to file a claim for compensation. 7. How can I learn more?  Ask your healthcare provider. He or she can give you the vaccine package insert or suggest other sources of information.  Call your local or state health department.  Contact the Centers for Disease Control and Prevention (CDC): 
- Call 7-885.636.2159 (1-800-CDC-INFO) or 
- Visit CDCs website at www.cdc.gov/flu Vaccine Information Statement Inactivated Influenza Vaccine 8/7/2015 
42 EDWINA Ojeda [id number] Department of [facility] and [employer] Centers for Disease Control and Prevention Office Use Only [facility] SERVICES! Dear Leslie Zuniga: Thank you for requesting a [employer] account. Our records indicate that you already have an active [employer] account. You can access your account anytime at [email or web address] Did you know that you can access your hospital and ER discharge instructions at any time in [employer]? You can also review all of your test results from your hospital stay or ER visit. Additional Information If you have questions, please visit the Frequently Asked Questions section of the [employer] website at [email or web address] Remember, [employer] is NOT to be used for urgent needs. For medical emergencies, dial 911. Now available from your iPhone and Android! Please provide this summary of care documentation to your next provider. Your primary care clinician is listed as Lonny MANTILLA. If you have any questions after today's visit, please call [phone].

## 2017-11-16 LAB — TSH SERPL DL<=0.005 MIU/L-ACNC: 1.49 UIU/ML (ref 0.45–4.5)

## 2017-12-03 RX ORDER — FOSINOPRIL SODIUM 40 MG/1
TABLET ORAL
Qty: 90 TAB | Refills: 2 | Status: SHIPPED | OUTPATIENT
Start: 2017-12-03 | End: 2018-08-07 | Stop reason: SDUPTHER

## 2017-12-06 RX ORDER — PEN NEEDLE, DIABETIC 30 GX3/16"
NEEDLE, DISPOSABLE MISCELLANEOUS DAILY
Qty: 1 PACKAGE | Refills: 11 | Status: SHIPPED | OUTPATIENT
Start: 2017-12-06 | End: 2018-09-10

## 2017-12-06 NOTE — TELEPHONE ENCOUNTER
From: Charise Lombard  To: Tay Hernandez MD  Sent: 12/6/2017 11:52 AM EST  Subject: Medication Renewal Request    Original authorizing provider: Tay Hernandez MD    Ruthie Libman Wingo would like a refill of the following medications:  LEVEMIR FLEXTOUCH 100 unit/mL (3 mL) inpn Tay Hernandez MD]    Preferred pharmacy: [facility] [place]    Comment:  Dr Alejandra Nobles; Please approve the pending refill [facility] for the Levemir. Thanks for your help with this.  [employer]

## 2017-12-19 LAB
ALBUMIN SERPL-MCNC: 4.5 G/DL (ref 3.5–4.8)
ALBUMIN/GLOB SERPL: 1.6 {RATIO} (ref 1.2–2.2)
ALP SERPL-CCNC: 98 IU/L (ref 39–117)
ALT SERPL-CCNC: 28 IU/L (ref 0–44)
AST SERPL-CCNC: 20 IU/L (ref 0–40)
BILIRUB SERPL-MCNC: 0.4 MG/DL (ref 0–1.2)
BUN SERPL-MCNC: 16 MG/DL (ref 8–27)
BUN/CREAT SERPL: 12 (ref 10–24)
CALCIUM SERPL-MCNC: 9.6 MG/DL (ref 8.6–10.2)
CHLORIDE SERPL-SCNC: 101 MMOL/L (ref 96–106)
CO2 SERPL-SCNC: 23 MMOL/L (ref 18–29)
CREAT SERPL-MCNC: 1.36 MG/DL (ref 0.76–1.27)
EST. AVERAGE GLUCOSE BLD GHB EST-MCNC: 186 MG/DL
GFR SERPLBLD CREATININE-BSD FMLA CKD-EPI: 52 ML/MIN/1.73
GFR SERPLBLD CREATININE-BSD FMLA CKD-EPI: 60 ML/MIN/1.73
GLOBULIN SER CALC-MCNC: 2.9 G/DL (ref 1.5–4.5)
GLUCOSE SERPL-MCNC: 106 MG/DL (ref 65–99)
HBA1C MFR BLD: 8.1 % (ref 4.8–5.6)
POTASSIUM SERPL-SCNC: 4.8 MMOL/L (ref 3.5–5.2)
PROT SERPL-MCNC: 7.4 G/DL (ref 6–8.5)
SODIUM SERPL-SCNC: 139 MMOL/L (ref 134–144)

## 2017-12-21 ENCOUNTER — OFFICE VISIT (OUTPATIENT)
Dept: ENDOCRINOLOGY | Age: 72
End: 2017-12-21

## 2017-12-21 VITALS
DIASTOLIC BLOOD PRESSURE: 95 MMHG | WEIGHT: 156 LBS | HEART RATE: 71 BPM | HEIGHT: 67 IN | BODY MASS INDEX: 24.48 KG/M2 | SYSTOLIC BLOOD PRESSURE: 139 MMHG

## 2017-12-21 DIAGNOSIS — Z79.4 TYPE 2 DIABETES MELLITUS WITHOUT COMPLICATION, WITH LONG-TERM CURRENT USE OF INSULIN (HCC): Primary | ICD-10-CM

## 2017-12-21 DIAGNOSIS — E11.9 TYPE 2 DIABETES MELLITUS WITHOUT COMPLICATION, WITH LONG-TERM CURRENT USE OF INSULIN (HCC): Primary | ICD-10-CM

## 2017-12-21 DIAGNOSIS — I10 HTN (HYPERTENSION), BENIGN: ICD-10-CM

## 2017-12-21 DIAGNOSIS — E78.5 DYSLIPIDEMIA: ICD-10-CM

## 2017-12-21 PROBLEM — E11.21 TYPE 2 DIABETES MELLITUS WITH NEPHROPATHY (HCC): Status: ACTIVE | Noted: 2017-12-21

## 2017-12-21 NOTE — PROGRESS NOTES
History of Present Illness: Herby Favre is a 71 y.o. male presents for follow-up of diabetes. He was diagnosed in 2009. He also has Hernadez's esophagus and and has history of bleeding AVMs      Diabetes related complications:  No microalbumin  Does have occ sx of neuropathy in feet.     Current diabetes regimen:  - did not tolerate metformin - GI side effects  Levemir 12 units daily  Victoza 1.2 mg daily.     Glucoses:  Values on meter reviewed. Most days he monitors fasting - 110-140 generally. Few values in 160s. Values midday are monitored less frequently, but typically lower- . Very infrequently (every few months) has low glucose around noon. Hemoglobin A1c seems discordant with home monitoring and he is discouraged by the A1c value of 8.1. Diet: Reviewed  Has about 1 Pepsi per day - typically around noon. Plans to stop this. Breakfast: nutrigrain bar, Nabs, coffee. Lunch: small - cheese sandwich or pack of Nabs +/- soda  Dinner: Eating out often for dinner. Bowl of soup, coconut shrimp.      Exercise - walks often. golfs once a week.       Lipids - taking pravastatin 20 mg. Social:   and retired    Past Medical History:   Diagnosis Date    Chronic kidney disease     Colon polyps 2007    Dr. Jimmy Munoz DDD (degenerative disc disease), cervical     Diabetes ([facility] [address]     GERD (gastroesophageal reflux disease)     Hernadez's esophagus long segment    Gout     Hearing loss     Hypercholesterolemia     Hypertension     Ill-defined condition     Hernadez's esophagus    Ill-defined condition     Glaucoma, Bell's palsy    Other ill-defined conditions(799.89)     gout    Other ill-defined conditions(799.89)     lipids    Other ill-defined conditions(799.89)     BPH     Current Outpatient Prescriptions   Medication Sig    insulin detemir (LEVEMIR FLEXTOUCH) 100 unit/mL (3 mL) inpn 14 units daily (Patient taking differently: 12 Units.  14 units daily)    Insulin Needles, Disposable, 31 gauge x 5/16\" ndle by SubCUTAneous route daily. Use qd with levemir pen e11.9    fosinopril (MONOPRIL) 40 mg tablet TAKE 1 TABLET DAILY    NOVOFINE 30 30 gauge x 1/3\" USE WITH LEVEMIR FLEXPEN TO INJECT UNDER THE SKIN DAILY    pravastatin (PRAVACHOL) 20 mg tablet TAKE 1 TABLET DAILY    magnesium oxide 500 mg tab Take 1 Tab by mouth daily.  cyanocobalamin 1,000 mcg tablet Take 1,000 mcg by mouth daily.  Liraglutide (VICTOZA 3-DONELL) 0.6 mg/0.1 mL (18 mg/3 mL) sub-q pen 0.2 mL by SubCUTAneous route daily. Inject 1.2 mg subcutaneously daily    glucose blood VI test strips (ONETOUCH VERIO) strip OneTouch Verio strips. Monitoring 3 times daily. Dx E11.9    Insulin Needles, Disposable, (GAYATRI PEN NEEDLE) 32 gauge x 5/32\" ndle Use to inject Humalog daily. ICD-10: E11.65    COLCRYS 0.6 mg tablet TAKE 1 TABLET DAILY    OTHER daily. motilium 10 mg daily (patient states drug not made in [facility])     esomeprazole (NEXIUM) 40 mg capsule Take 1 Cap by mouth two (2) times a day.  FERROUS SULFATE PO Take  by mouth. 1 bid     No current facility-administered medications for this visit.       Allergies   Allergen Reactions    Contrast Agent [Iodine] Hives and Itching     IVP dye       Review of Systems:  - Eyes: no blurry vision or double vision  - Cardiovascular: no chest pain  - Respiratory: no shortness of breath  - Musculoskeletal: no myalgias  - Neurological: See HPI    Physical Examination:  Visit Vitals    BP (!) 139/95    Pulse 71    Ht 5' 7\" (1.702 m)    Wt 156 lb (70.8 kg)    BMI 24.43 kg/m2   -   - General: pleasant, no distress, normal gait   HEENT: hearing intact, EOMI, clear sclera without icterus  - Cardiovascular: regular, normal rate   - Respiratory: normal effort  - Integumentary: no edema  - Psychiatric: normal mood and affect    Data Reviewed:     Component      Latest Ref Rng & Units 12/18/2017 12/18/2017 5/30/2017 5/30/2017          11:54 AM 11:54 AM 11:18 AM 11:18 AM Glucose      65 - 99 mg/dL 106 (H)      BUN      8 - 27 mg/dL 16      Creatinine      0.76 - 1.27 mg/dL 1.36 (H)      GFR est non-AA      >59 mL/min/1.73 52 (L)      GFR est AA      >59 mL/min/1.73 60      BUN/Creatinine ratio      10 - 24 12      Sodium      134 - 144 mmol/L 139      Potassium      3.5 - 5.2 mmol/L 4.8      Chloride      96 - 106 mmol/L 101      CO2      18 - 29 mmol/L 23      Calcium      8.6 - 10.2 mg/dL 9.6      Protein, total      6.0 - 8.5 g/dL 7.4      Albumin      3.5 - 4.8 g/dL 4.5      GLOBULIN, TOTAL      1.5 - 4.5 g/dL 2.9      A-G Ratio      1.2 - 2.2 1.6      Bilirubin, total      0.0 - 1.2 mg/dL 0.4      Alk. phosphatase      39 - 117 IU/L 98      AST      0 - 40 IU/L 20      ALT (SGPT)      0 - 44 IU/L 28      Cholesterol, total      100 - 199 mg/dL    135   Triglyceride      0 - 149 mg/dL    161 (H)   HDL Cholesterol      >39 mg/dL    48   VLDL, calculated      5 - 40 mg/dL    32   LDL, calculated      0 - 99 mg/dL    55   Creatinine, urine      Not Estab. mg/dL   23.3    Microalbumin, urine      Not Estab. ug/mL   <3.0    Microalbumin/Creat. Ratio      0.0 - 30.0 mg/g creat   <12.9    Hemoglobin A1c, (calculated)      4.8 - 5.6 %  8.1 (H)     Estimated average glucose      mg/dL  186       Assessment/Plan:   1. Type 2 diabetes mellitus without complication, with long-term current use of insulin (HCC)   Hemoglobin A1c is discordant with home glucose monitoring. We will add fructosamine  Also recommended he monitor glucoses at bedtime. Continue Levemir 12 units daily. Discussed how this may not be lasting the full 24 hours. May consider changing to Lantus or Toujeo. Continue Victoza   2. Dyslipidemia   Continue pravastatin. Will reassess with next labs   3. HTN (hypertension), benign   Continue fosinopril. Patient Instructions   Diabetes. - possible the A1c may be inaccurate.  Will add on Fructosamine (2 week average glucose) to see if A1c is inaccurate for you.    Continue efforts to limit carbohydrate intake    Medications:  Continue Levemir 12 units daily in AM.     Continue Victoza 1.2 mg daily. Goals for blood sugars: Follow them periodically before supper and bedtime     Fasting  (less than 150)  Other times -  (less than 180). Blood pressure:  Continue fosinopril    Cholesterol  Continue  pravastatin. Follow-up Disposition:  Return in about 4 months (around 4/21/2018).     Copy sent to:

## 2017-12-21 NOTE — PROGRESS NOTES
Patient stated he has difficulty with mechanism of Victoza pen. Will fax release for eye exam. Foot exam and microalbumin up to date. Fructosamine added per Dr. Lan Marcano verbal order.

## 2017-12-21 NOTE — PATIENT INSTRUCTIONS
Diabetes. - possible the A1c may be inaccurate. Will add on Fructosamine (2 week average glucose) to see if A1c is inaccurate for you. Continue efforts to limit carbohydrate intake    Medications:  Continue Levemir 12 units daily in AM.     Continue Victoza 1.2 mg daily. Goals for blood sugars: Follow them periodically before supper and bedtime     Fasting  (less than 150)  Other times -  (less than 180). Blood pressure:  Continue fosinopril    Cholesterol  Continue  pravastatin.

## 2017-12-21 NOTE — MR AVS SNAPSHOT
Visit Information Date & Time Provider Department Dept. Phone Encounter #  
 12/21/2017 10:10 AM Selena Franklin, [address] [place] [address] Diabetes and Endocrinology [phone] [id number] Follow-up Instructions Return in about 4 months (around 4/21/2018). Upcoming Health Maintenance Date Due  
 GLAUCOMA SCREENING Q2Y 4/18/2015 EYE EXAM RETINAL OR DILATED Q1 12/1/2015 COLONOSCOPY 4/10/2018 MICROALBUMIN Q1 5/30/2018 LIPID PANEL Q1 5/30/2018 HEMOGLOBIN A1C Q6M 6/18/2018 FOOT EXAM Q1 8/29/2018 MEDICARE YEARLY EXAM 10/18/2018 DTaP/Tdap/Td series (2 - Td) 5/9/2025 Allergies as of 12/21/2017  Review Complete On: 12/21/2017 By: Selena Franklin MD  
  
 Severity Noted Reaction Type Reactions Contrast Agent [Iodine]  10/26/2009   Side Effect Hives, Itching IVP dye Current Immunizations  Reviewed on 11/15/2017 Name Date Influenza High Dose Vaccine PF 11/15/2017  2:48 PM, 11/8/2016 Influenza Vaccine 11/19/2014, 9/16/2013 Influenza Vaccine (Quad) PF 10/20/2015 Influenza Vaccine Split 12/7/2010 Pneumococcal Conjugate (PCV-13) 10/17/2017 Tdap 5/9/2015  1:21 PM  
 ZZZ-RETIRED (DO NOT USE) Pneumococcal Vaccine (Unspecified Type) 8/20/2012  8:39 AM  
 Zoster 12/20/2012 Not reviewed this visit You Were Diagnosed With   
  
 Codes Comments Type 2 diabetes mellitus without complication, with long-term current use of insulin (HCC)    -  Primary ICD-10-CM: E11.9, Z79.4 ICD-9-CM: 250.00, V58.67 Dyslipidemia     ICD-10-CM: E78.5 ICD-9-CM: 272.4 HTN (hypertension), benign     ICD-10-CM: I10 
ICD-9-CM: 401.1 Vitals BP Pulse Height(growth percentile) Weight(growth percentile) BMI Smoking Status (!) 139/95 71 5' 7\" (1.702 m) 156 lb (70.8 kg) 24.43 kg/m2 Former Smoker Vitals History BMI and BSA Data Body Mass Index Body Surface Area  
 24.43 kg/m 2 1.83 m 2 Preferred Pharmacy Pharmacy Name Phone [address] [facility] [phone] Your Updated Medication List  
  
   
This list is accurate as of: 12/21/17 11:12 AM.  Always use your most recent med list.  
  
  
  
  
 COLCRYS 0.6 mg tablet Generic drug:  colchicine TAKE 1 TABLET DAILY  
  
 cyanocobalamin 1,000 mcg tablet Take 1,000 mcg by mouth daily. esomeprazole 40 mg capsule Commonly known as:  NexIUM Take 1 Cap by mouth two (2) times a day. FERROUS SULFATE PO Take  by mouth. 1 bid  
  
 fosinopril 40 mg tablet Commonly known as:  MONOPRIL  
TAKE 1 TABLET DAILY  
  
 glucose blood VI test strips strip Commonly known as:  Ricardo Cocker OneTouch Verio strips. Monitoring 3 times daily. Dx E11.9  
  
 insulin detemir 100 unit/mL (3 mL) Inpn Commonly known as:  LEVEMIR FLEXTOUCH  
14 units daily * Insulin Needles (Disposable) 32 gauge x 5/32\" Ndle Commonly known as:  Kaylen Pen Needle Use to inject Humalog daily. ICD-10: E11.65  
  
 * NOVOFINE 30 30 gauge x 1/3\" Generic drug:  Insulin Needles (Disposable) USE WITH LEVEMIR FLEXPEN TO INJECT UNDER THE SKIN DAILY * Insulin Needles (Disposable) 31 gauge x 5/16\" Ndle  
by SubCUTAneous route daily. Use qd with levemir pen e11.9 Liraglutide 0.6 mg/0.1 mL (18 mg/3 mL) Pnij Commonly known as:  VICTOZA 3-DONELL  
0.2 mL by SubCUTAneous route daily. Inject 1.2 mg subcutaneously daily  
  
 magnesium oxide 500 mg Tab Take 1 Tab by mouth daily. OTHER  
daily. motilium 10 mg daily (patient states drug not made in [place]) pravastatin 20 mg tablet Commonly known as:  PRAVACHOL  
TAKE 1 TABLET DAILY * Notice: This list has 3 medication(s) that are the same as other medications prescribed for you. Read the directions carefully, and ask your doctor or other care provider to review them with you. We Performed the Following HEMOGLOBIN A1C WITH EAG [47500 CPT(R)] LIPID PANEL [09415 CPT(R)] METABOLIC PANEL, COMPREHENSIVE [52103 CPT(R)] MICROALBUMIN, UR, RAND W/ MICROALBUMIN/CREA RATIO [id number] CPT(R)] Follow-up Instructions Return in about 4 months (around 4/21/2018). Patient Instructions Diabetes. - possible the A1c may be inaccurate. Will add on Fructosamine (2 week average glucose) to see if A1c is inaccurate for you. Continue efforts to limit carbohydrate intake Medications: 
Continue Levemir 12 units daily in AM. Continue Victoza 1.2 mg daily. Goals for blood sugars: Follow them periodically before supper and bedtime Fasting  (less than 150) Other times -  (less than 180). Blood pressure: 
Continue fosinopril Cholesterol Continue  pravastatin. Introducing [facility] SERVICES! Dear Rosalia Lisa: Thank you for requesting a TheLocker account. Our records indicate that you already have an active TheLocker account. You can access your account anytime at [email or web address] Did you know that you can access your hospital and ER discharge instructions at any time in TheLocker? You can also review all of your test results from your hospital stay or ER visit. Additional Information If you have questions, please visit the Frequently Asked Questions section of the TheLocker website at [email or web address] Remember, TheLocker is NOT to be used for urgent needs. For medical emergencies, dial 911. Now available from your iPhone and Android! Please provide this summary of care documentation to your next provider. Your primary care clinician is listed as Raudel MANTILLA. If you have any questions after today's visit, please call [phone].

## 2017-12-27 LAB
FRUCTOSAMINE SERPL-SCNC: 334 UMOL/L (ref 0–285)
SPECIMEN STATUS REPORT, ROLRST: NORMAL

## 2017-12-27 NOTE — PROGRESS NOTES
Will send him mychart message. Fructosamine indicative of avg glucose around 180, which is concordant with A1c.

## 2018-02-19 RX ORDER — PEN NEEDLE, DIABETIC 32GX 5/32"
NEEDLE, DISPOSABLE MISCELLANEOUS
Qty: 90 PEN NEEDLE | Refills: 11 | Status: SHIPPED | OUTPATIENT
Start: 2018-02-19 | End: 2018-09-10

## 2018-02-24 RX ORDER — LIRAGLUTIDE 6 MG/ML
INJECTION SUBCUTANEOUS
Qty: 18 ML | Refills: 3 | Status: SHIPPED | OUTPATIENT
Start: 2018-02-24 | End: 2019-02-21 | Stop reason: SDUPTHER

## 2018-03-07 ENCOUNTER — PATIENT MESSAGE (OUTPATIENT)
Dept: ENDOCRINOLOGY | Age: 73
End: 2018-03-07

## 2018-03-08 NOTE — TELEPHONE ENCOUNTER
From: Dawna Preston MD  To: Nic Bates  Sent: 3/7/2018 11:38 AM EST  Subject: diabetes consideration    Mr Joie Ocampo,  I hope you are doing well. Your wife mentioned your glucoses were a little higher. If you are taking about 15 units Levemir and seeing values in 150 range fasting and no low glucoses, I recommend changing Levemir to twice daily dosing (morning and night) and taking 10 units twice daily. levemir's effects often do not last for full 24 hours, so sometimes twice daily dosing works a lot better    Hopefully we can achieve morning glucoses in the  range.     Josefina Giordano

## 2018-03-20 ENCOUNTER — OFFICE VISIT (OUTPATIENT)
Dept: INTERNAL MEDICINE CLINIC | Age: 73
End: 2018-03-20

## 2018-03-20 ENCOUNTER — HOSPITAL ENCOUNTER (OUTPATIENT)
Dept: LAB | Age: 73
Discharge: HOME | End: 2018-03-20
Payer: MEDICARE

## 2018-03-20 VITALS
HEART RATE: 69 BPM | SYSTOLIC BLOOD PRESSURE: 136 MMHG | HEIGHT: 67 IN | WEIGHT: 157 LBS | TEMPERATURE: 97.5 F | OXYGEN SATURATION: 97 % | BODY MASS INDEX: 24.64 KG/M2 | DIASTOLIC BLOOD PRESSURE: 72 MMHG

## 2018-03-20 DIAGNOSIS — D64.9 ANEMIA, UNSPECIFIED TYPE: ICD-10-CM

## 2018-03-20 DIAGNOSIS — H26.9 CATARACT OF BOTH EYES, UNSPECIFIED CATARACT TYPE: Primary | ICD-10-CM

## 2018-03-20 PROCEDURE — 36415 COLL VENOUS BLD VENIPUNCTURE: CPT

## 2018-03-20 PROCEDURE — 85018 HEMOGLOBIN: CPT

## 2018-03-20 NOTE — MR AVS SNAPSHOT
[address] [facility]. 
[phone] Patient: Wilfrido Self MRN: [id number] LKA:[id number] Visit Information Date & Time Provider Department Dept. Phone Encounter #  
 3/20/2018  2:15 PM Rosaura Sheth, [address] [place] [address],[address] [phone] [id number] Your Appointments 4/17/2018 10:10 AM  
ROUTINE CARE with Jaclyn Day MD  
[facility] [address] [place] [address]) Appt Note: f/u diabetes cp0.00  
 [address] [facility] [address] [place]  
[address] [place] [address] Upcoming Health Maintenance Date Due COLONOSCOPY 4/10/2018 MICROALBUMIN Q1 5/30/2018 LIPID PANEL Q1 5/30/2018 HEMOGLOBIN A1C Q6M 6/18/2018 FOOT EXAM Q1 8/29/2018 MEDICARE YEARLY EXAM 10/18/2018 EYE EXAM RETINAL OR DILATED Q1 12/22/2018 GLAUCOMA SCREENING Q2Y 12/22/2019 DTaP/Tdap/Td series (2 - Td) 5/9/2025 Allergies as of 3/20/2018  Review Complete On: 3/20/2018 By: Clarence Marvin LPN Severity Noted Reaction Type Reactions Contrast Agent [Iodine]  10/26/2009   Side Effect Hives, Itching IVP dye Current Immunizations  Reviewed on 11/15/2017 Name Date Influenza High Dose Vaccine PF 11/15/2017  2:48 PM, 11/8/2016 Influenza Vaccine 11/19/2014, 9/16/2013 Influenza Vaccine (Quad) PF 10/20/2015 Influenza Vaccine Split 12/7/2010 Pneumococcal Conjugate (PCV-13) 10/17/2017 Tdap 5/9/2015  1:21 PM  
 ZZZ-RETIRED (DO NOT USE) Pneumococcal Vaccine (Unspecified Type) 8/20/2012  8:39 AM  
 Zoster 12/20/2012 Not reviewed this visit You Were Diagnosed With   
  
 Codes Comments Cataract of both eyes, unspecified cataract type    -  Primary ICD-10-CM: H26.9 ICD-9-CM: 366.9 Anemia, unspecified type     ICD-10-CM: D64.9 ICD-9-CM: 951. 9 Vitals BP Pulse Temp Height(growth percentile) Weight(growth percentile) SpO2  
 136/72 69 97.5 °F (36.4 °C) (Oral) 5' 7\" (1.702 m) 157 lb (71.2 kg) 97% BMI Smoking Status 24.59 kg/m2 Former Smoker Vitals History BMI and BSA Data Body Mass Index Body Surface Area 24.59 kg/m 2 1.83 m 2 Preferred Pharmacy Pharmacy Name Phone [address] [facility] [phone] Your Updated Medication List  
  
   
This list is accurate as of 3/20/18  3:05 PM.  Always use your most recent med list.  
  
  
  
  
 COLCRYS 0.6 mg tablet Generic drug:  colchicine TAKE 1 TABLET DAILY  
  
 cyanocobalamin 1,000 mcg tablet Take 1,000 mcg by mouth daily. esomeprazole 40 mg capsule Commonly known as:  NexIUM Take 1 Cap by mouth two (2) times a day. FERROUS SULFATE PO Take  by mouth. 1 bid  
  
 fosinopril 40 mg tablet Commonly known as:  MONOPRIL  
TAKE 1 TABLET DAILY  
  
 glucose blood VI test strips strip Commonly known as:  Josph Held OneTouch Verio strips. Monitoring 3 times daily. Dx E11.9  
  
 insulin detemir U-100 100 unit/mL (3 mL) Inpn Commonly known as:  LEVEMIR FLEXTOUCH U-100 INSULN  
12 Units by SubCUTAneous route daily. * Insulin Needles (Disposable) 32 gauge x 5/32\" Ndle Commonly known as:  Kaylen Pen Needle Use to inject Humalog daily. ICD-10: E11.65  
  
 * NOVOFINE 30 30 gauge x 1/3\" Generic drug:  Insulin Needles (Disposable) USE WITH LEVEMIR FLEXPEN TO INJECT UNDER THE SKIN DAILY * Insulin Needles (Disposable) 31 gauge x 5/16\" Ndle  
by SubCUTAneous route daily. Use qd with levemir pen e11.9 * Kaylen Pen Needle 32 gauge x 5/32\" Ndle Generic drug:  Insulin Needles (Disposable) USE TO INJECT HUMALOG DAILY  
  
 magnesium oxide 500 mg Tab Take 1 Tab by mouth daily. OTHER  
daily. motilium 10 mg daily (patient states drug not made in [place]) pravastatin 20 mg tablet Commonly known as:  PRAVACHOL  
TAKE 1 TABLET DAILY  
  
 VICTOZA 3-DONELL 0.6 mg/0.1 mL (18 mg/3 mL) Pnij Generic drug:  Liraglutide INJECT 0.2 ML (1.2 MG) UNDER THE SKIN DAILY * Notice: This list has 4 medication(s) that are the same as other medications prescribed for you. Read the directions carefully, and ask your doctor or other care provider to review them with you. We Performed the Following HGB & HCT [44467 CPT(R)] Introducing [facility] SERVICES! Dear Sharon Lancaster: Thank you for requesting a uchoose account. Our records indicate that you already have an active uchoose account. You can access your account anytime at [email or web address] [email or web address] Did you know that you can access your hospital and ER discharge instructions at any time in uchoose? You can also review all of your test results from your hospital stay or ER visit. Additional Information If you have questions, please visit the Frequently Asked Questions section of the uchoose website at [email or web address] Remember, uchoose is NOT to be used for urgent needs. For medical emergencies, dial 911. Now available from your iPhone and Android! Please provide this summary of care documentation to your next provider. Your primary care clinician is listed as Clifford MANTILLA. If you have any questions after today's visit, please call [phone].

## 2018-03-20 NOTE — PROGRESS NOTES
Chief Complaint   Patient presents with    Pre-op Exam     cataract surgery right eye 3/29/18 and left eye 4/5/18  Manuel Salgado
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
## 2018-03-21 LAB
HCT VFR BLD AUTO: 45.4 % (ref 37.5–51)
HGB BLD-MCNC: 16.1 G/DL (ref 13–17.7)

## 2018-04-17 ENCOUNTER — OFFICE VISIT (OUTPATIENT)
Dept: ENDOCRINOLOGY | Age: 73
End: 2018-04-17

## 2018-04-17 VITALS
SYSTOLIC BLOOD PRESSURE: 116 MMHG | DIASTOLIC BLOOD PRESSURE: 72 MMHG | HEIGHT: 67 IN | WEIGHT: 159 LBS | HEART RATE: 72 BPM | BODY MASS INDEX: 24.96 KG/M2

## 2018-04-17 DIAGNOSIS — I10 ESSENTIAL HYPERTENSION, BENIGN: ICD-10-CM

## 2018-04-17 DIAGNOSIS — E11.9 TYPE 2 DIABETES MELLITUS WITHOUT COMPLICATION, WITH LONG-TERM CURRENT USE OF INSULIN (HCC): Primary | ICD-10-CM

## 2018-04-17 DIAGNOSIS — E78.00 PURE HYPERCHOLESTEROLEMIA: ICD-10-CM

## 2018-04-17 DIAGNOSIS — N18.30 CKD (CHRONIC KIDNEY DISEASE) STAGE 3, GFR 30-59 ML/MIN (HCC): ICD-10-CM

## 2018-04-17 DIAGNOSIS — Z79.4 TYPE 2 DIABETES MELLITUS WITHOUT COMPLICATION, WITH LONG-TERM CURRENT USE OF INSULIN (HCC): Primary | ICD-10-CM

## 2018-04-17 RX ORDER — INSULIN DEGLUDEC 100 U/ML
INJECTION, SOLUTION SUBCUTANEOUS
Qty: 1 PEN | Refills: 0 | Status: SHIPPED | COMMUNITY
Start: 2018-04-17 | End: 2018-09-10 | Stop reason: SDUPTHER

## 2018-04-17 RX ORDER — PREDNISOLONE ACETATE 10 MG/ML
1 SUSPENSION/ DROPS OPHTHALMIC 4 TIMES DAILY
COMMUNITY
End: 2019-02-14

## 2018-04-17 NOTE — MR AVS SNAPSHOT
[address] [facility] 
[phone] Patient: Yasmin Lennox MRN: [id number] UAL:[birth year] Visit Information Date & Time Provider Department Dept. Phone Encounter #  
 4/17/2018 10:10 AM Collette Creeks, [address] [place] [address] Diabetes and Endocrinology [id number] Follow-up Instructions Return in about 3 months (around 7/17/2018). Upcoming Health Maintenance Date Due COLONOSCOPY 4/10/2018 MICROALBUMIN Q1 5/30/2018 LIPID PANEL Q1 5/30/2018 HEMOGLOBIN A1C Q6M 6/18/2018 FOOT EXAM Q1 8/29/2018 MEDICARE YEARLY EXAM 10/18/2018 EYE EXAM RETINAL OR DILATED Q1 3/14/2019 GLAUCOMA SCREENING Q2Y 3/14/2020 DTaP/Tdap/Td series (2 - Td) 5/9/2025 Allergies as of 4/17/2018  Review Complete On: 4/17/2018 By: Eriberto Metzger LPN Severity Noted Reaction Type Reactions Contrast Agent [Iodine]  10/26/2009   Side Effect Hives, Itching IVP dye Current Immunizations  Reviewed on 11/15/2017 Name Date Influenza High Dose Vaccine PF 11/15/2017  2:48 PM, 11/8/2016 Influenza Vaccine 11/19/2014, 9/16/2013 Influenza Vaccine (Quad) PF 10/20/2015 Influenza Vaccine Split 12/7/2010 Pneumococcal Conjugate (PCV-13) 10/17/2017 Tdap 5/9/2015  1:21 PM  
 ZZZ-RETIRED (DO NOT USE) Pneumococcal Vaccine (Unspecified Type) 8/20/2012  8:39 AM  
 Zoster 12/20/2012 Not reviewed this visit You Were Diagnosed With   
  
 Codes Comments Type 2 diabetes mellitus without complication, with long-term current use of insulin (HCC)    -  Primary ICD-10-CM: E11.9, Z79.4 ICD-9-CM: 250.00, V58.67 Essential hypertension, benign     ICD-10-CM: I10 
ICD-9-CM: 401.1 Pure hypercholesterolemia     ICD-10-CM: E78.00 ICD-9-CM: 272.0 CKD (chronic kidney disease) stage 3, GFR 30-59 ml/min     ICD-10-CM: N18.3 ICD-9-CM: 693. 3 Vitals BP Pulse Height(growth percentile) Weight(growth percentile) BMI Smoking Status 116/72 72 5' 7\" (1.702 m) 159 lb (72.1 kg) 24.9 kg/m2 Former Smoker Vitals History BMI and BSA Data Body Mass Index Body Surface Area 24.9 kg/m 2 1.85 m 2 Preferred Pharmacy Pharmacy Name Phone Sean Valadez, [facility] [phone] Your Updated Medication List  
  
   
This list is accurate as of 4/17/18 11:00 AM.  Always use your most recent med list.  
  
  
  
  
 COLCRYS 0.6 mg tablet Generic drug:  colchicine TAKE 1 TABLET DAILY  
  
 cyanocobalamin 1,000 mcg tablet Take 1,000 mcg by mouth daily. esomeprazole 40 mg capsule Commonly known as:  NexIUM Take 1 Cap by mouth two (2) times a day. FERROUS SULFATE PO Take  by mouth. 1 bid  
  
 fosinopril 40 mg tablet Commonly known as:  MONOPRIL  
TAKE 1 TABLET DAILY  
  
 glucose blood VI test strips strip Commonly known as:  Atiya Fitzpatrick OneTouch Verio strips. Monitoring 3 times daily. Dx E11.9  
  
 insulin detemir U-100 100 unit/mL (3 mL) Inpn Commonly known as:  LEVEMIR FLEXTOUCH U-100 INSULN  
12 Units by SubCUTAneous route daily. * Insulin Needles (Disposable) 32 gauge x 5/32\" Ndle Commonly known as:  Kaylen Pen Needle Use to inject Humalog daily. ICD-10: E11.65  
  
 * NOVOFINE 30 30 gauge x 1/3\" Generic drug:  Insulin Needles (Disposable) USE WITH LEVEMIR FLEXPEN TO INJECT UNDER THE SKIN DAILY * Insulin Needles (Disposable) 31 gauge x 5/16\" Ndle  
by SubCUTAneous route daily. Use qd with levemir pen e11.9 * Kaylen Pen Needle 32 gauge x 5/32\" Ndle Generic drug:  Insulin Needles (Disposable) USE TO INJECT HUMALOG DAILY  
  
 magnesium oxide 500 mg Tab Take 1 Tab by mouth daily. OTHER  
daily. motilium 10 mg daily (patient states drug not made in [place]) pravastatin 20 mg tablet Commonly known as:  PRAVACHOL  
 TAKE 1 TABLET DAILY PRED FORTE 1 % ophthalmic suspension Generic drug:  prednisoLONE acetate Administer 1 Drop to both eyes four (4) times daily. VICTOZA 3-DONELL 0.6 mg/0.1 mL (18 mg/3 mL) Pnij Generic drug:  Liraglutide INJECT 0.2 ML (1.2 MG) UNDER THE SKIN DAILY * Notice: This list has 4 medication(s) that are the same as other medications prescribed for you. Read the directions carefully, and ask your doctor or other care provider to review them with you. We Performed the Following HEMOGLOBIN A1C WITH EAG [37007 CPT(R)] LIPID PANEL [06781 CPT(R)] METABOLIC PANEL, COMPREHENSIVE [89309 CPT(R)] MICROALBUMIN, UR, RAND W/ MICROALB/CREAT RATIO [id number] CPT(R)] Follow-up Instructions Return in about 3 months (around 7/17/2018). Patient Instructions Diabetes. Continue Levemir 10 units twice daily Continue Victoza 1.2 mg daily. Goals for blood glucose: 
Fasting  (less than 150) Lunch, Dinner, Bedtime -  (less than 180). Blood pressure: 
Continue fosinopril Cholesterol Continue  pravastatin. Introducing [facility]! Dear Jose Juan Galicia: Thank you for requesting a [employer] account. Our records indicate that you already have an active [employer] account. You can access your account anytime at [email or web address]. Fetch MD/[email or web address] Did you know that you can access your hospital and ER discharge instructions at any time in [employer]? You can also review all of your test results from your hospital stay or ER visit. Additional Information If you have questions, please visit the Frequently Asked Questions section of the [employer] website at [email or web address]. Fetch [email or web address]/. Remember, [employer] is NOT to be used for urgent needs. For medical emergencies, dial 911. Now available from your iPhone and Android! Please provide this summary of care documentation to your next provider. Your primary care clinician is listed as Hilaria MANTILLA. If you have any questions after today's visit, please call [phone].

## 2018-04-17 NOTE — PATIENT INSTRUCTIONS
Diabetes. Continue Levemir 10 units twice daily    Continue Victoza 1.2 mg daily. Goals for blood glucose:  Fasting  (less than 150)  Lunch, Dinner, Bedtime -  (less than 180). Blood pressure:  Continue fosinopril    Cholesterol  Continue  pravastatin.

## 2018-04-17 NOTE — PROGRESS NOTES
History of Present Illness: Miles De Leon is a 71 y.o. male presents for follow-up of diabetes. He was diagnosed in 2009. He also has Hernadez's esophagus and and has history of bleeding AVMs      Diabetes related complications:  No microalbumin  Does have occ sx of neuropathy in feet.     Current diabetes regimen:  - did not tolerate metformin - GI side effects  Levemir 12 units daily  Victoza 1.2 mg daily.     Glucoses: Brings meter and glucose log for review. AM values  generally  3-4 hours after dinner - 140 was lowest. 390 highest, but most are in 180-250 range. He reported often feeling low midday, but would  would not check these times. At the end of the visit, he felt low and when he checked glucose was 70. Diet: Reviewed  No longer drinking regular Pepsi. Having diet Pepsi now. Breakfast: NutriGrain bar and peanut butter crackers - 60 g carb. Lunch: sandwich or corn dogs. Chips. Dinner: pork, potatoes and spinach. Sunday night- 6 inch sub, cookies and jt food cake later. -> 353. Weight is stable.      Exercise - walks often. golfs once a week.       Lipids - taking pravastatin 20 mg. Social:   and retired      Past Medical History:   Diagnosis Date    Chronic kidney disease     Colon polyps 2007    Dr. Edgar Art DDD (degenerative disc disease), cervical     Diabetes ([facility] [address]     GERD (gastroesophageal reflux disease)     Hernadez's esophagus long segment    Gout     Hearing loss     Hypercholesterolemia     Hypertension     Ill-defined condition     Hernadez's esophagus    Ill-defined condition     Glaucoma, Bell's palsy    Other ill-defined conditions(799.89)     gout    Other ill-defined conditions(799.89)     lipids    Other ill-defined conditions(799.89)     BPH     Current Outpatient Prescriptions   Medication Sig    prednisoLONE acetate (PRED FORTE) 1 % ophthalmic suspension Administer 1 Drop to both eyes four (4) times daily.     insulin detemir U-100 (LEVEMIR FLEXTOUCH U-100 INSULN) 100 unit/mL (3 mL) inpn 12 Units by SubCUTAneous route daily. (Patient taking differently: 10 Units by SubCUTAneous route two (2) times a day.)    VICTOZA 3-DONELL 0.6 mg/0.1 mL (18 mg/3 mL) pnij INJECT 0.2 ML (1.2 MG) UNDER THE SKIN DAILY    GAYATRI PEN NEEDLE 32 gauge x 5/32\" ndle USE TO INJECT HUMALOG DAILY    Insulin Needles, Disposable, 31 gauge x 5/16\" ndle by SubCUTAneous route daily. Use qd with levemir pen e11.9    fosinopril (MONOPRIL) 40 mg tablet TAKE 1 TABLET DAILY    NOVOFINE 30 30 gauge x 1/3\" USE WITH LEVEMIR FLEXPEN TO INJECT UNDER THE SKIN DAILY    pravastatin (PRAVACHOL) 20 mg tablet TAKE 1 TABLET DAILY    magnesium oxide 500 mg tab Take 1 Tab by mouth daily.  cyanocobalamin 1,000 mcg tablet Take 1,000 mcg by mouth daily.  glucose blood VI test strips (ONETOUCH VERIO) strip OneTouch Verio strips. Monitoring 3 times daily. Dx E11.9    Insulin Needles, Disposable, (GAYATRI PEN NEEDLE) 32 gauge x 5/32\" ndle Use to inject Humalog daily. ICD-10: E11.65    COLCRYS 0.6 mg tablet TAKE 1 TABLET DAILY    OTHER daily. motilium 10 mg daily (patient states drug not made in [place])     esomeprazole (NEXIUM) 40 mg capsule Take 1 Cap by mouth two (2) times a day.  FERROUS SULFATE PO Take  by mouth. 1 bid     No current facility-administered medications for this visit.       Allergies   Allergen Reactions    Contrast Agent [Iodine] Hives and Itching     IVP dye       Review of Systems:  - Eyes: no blurry vision or double vision  - Cardiovascular: no chest pain  - Respiratory: no shortness of breath  - Musculoskeletal: no myalgias  - Neurological: no numbness/tingling in extremities    Physical Examination:  Visit Vitals    /72    Pulse 72    Ht 5' 7\" (1.702 m)    Wt 159 lb (72.1 kg)    BMI 24.9 kg/m2   -   - General: pleasant, no distress, normal gait   HEENT: hearing intact, EOMI, clear sclera without icterus  - Cardiovascular: regular, normal rate   - Respiratory: normal effort  - Integumentary: no edema  - Psychiatric: normal mood and affect    Data Reviewed:   Component      Latest Ref Rng & Units 12/18/2017 12/18/2017 5/30/2017 5/30/2017          11:54 AM 11:54 AM 11:18 AM 11:18 AM   Glucose      65 - 99 mg/dL 106 (H)      BUN      8 - 27 mg/dL 16      Creatinine      0.76 - 1.27 mg/dL 1.36 (H)      GFR est non-AA      >59 mL/min/1.73 52 (L)      GFR est AA      >59 mL/min/1.73 60      BUN/Creatinine ratio      10 - 24 12      Sodium      134 - 144 mmol/L 139      Potassium      3.5 - 5.2 mmol/L 4.8      Chloride      96 - 106 mmol/L 101      CO2      18 - 29 mmol/L 23      Calcium      8.6 - 10.2 mg/dL 9.6      Protein, total      6.0 - 8.5 g/dL 7.4      Albumin      3.5 - 4.8 g/dL 4.5      GLOBULIN, TOTAL      1.5 - 4.5 g/dL 2.9      A-G Ratio      1.2 - 2.2 1.6      Bilirubin, total      0.0 - 1.2 mg/dL 0.4      Alk. phosphatase      39 - 117 IU/L 98      AST      0 - 40 IU/L 20      ALT (SGPT)      0 - 44 IU/L 28      Cholesterol, total      100 - 199 mg/dL    135   Triglyceride      0 - 149 mg/dL    161 (H)   HDL Cholesterol      >39 mg/dL    48   VLDL, calculated      5 - 40 mg/dL    32   LDL, calculated      0 - 99 mg/dL    55   Creatinine, urine      Not Estab. mg/dL   23.3    Microalbumin, urine      Not Estab. ug/mL   <3.0    Microalbumin/Creat. Ratio      0.0 - 30.0 mg/g creat   <12.9    Hemoglobin A1c, (calculated)      4.8 - 5.6 %  8.1 (H)     Estimated average glucose      mg/dL  186         Assessment/Plan:   1. Type 2 diabetes mellitus without complication, with long-term current use of insulin (HCC)   Glucoses after dinner are often too high. Encouraged him to monitor his carbohydrate intake and to take noted which foods are not well tolerated. Having hypoglycemia before lunch and when active during the day. For now, recommend he decrease Levemir to 6 units in the morning and increase evening dose to 12 units.   I feel that Luis would better suit him. Recommend he take 15 units once daily. Reviewed how he would take several days to reach a steady state with this. 2. Essential hypertension, benign   Continue fosinopril   3. Pure hypercholesterolemia   Continue pravastatin   4. CKD (chronic kidney disease) stage 3, GFR 30-59 ml/min   Increased risk for hypoglycemia. Blood pressure is well controlled. Patient Instructions   Diabetes. Continue Levemir 10 units twice daily    Continue Victoza 1.2 mg daily. Goals for blood glucose:  Fasting  (less than 150)  Lunch, Dinner, Bedtime -  (less than 180). Blood pressure:  Continue fosinopril    Cholesterol  Continue  pravastatin. Follow-up Disposition:  Return in about 3 months (around 7/17/2018).     Copy sent to:

## 2018-04-18 LAB
ALBUMIN SERPL-MCNC: 4.6 G/DL (ref 3.5–4.8)
ALBUMIN/CREAT UR: <12.3 MG/G CREAT (ref 0–30)
ALBUMIN/GLOB SERPL: 2 {RATIO} (ref 1.2–2.2)
ALP SERPL-CCNC: 82 IU/L (ref 39–117)
ALT SERPL-CCNC: 32 IU/L (ref 0–44)
AST SERPL-CCNC: 25 IU/L (ref 0–40)
BILIRUB SERPL-MCNC: 0.4 MG/DL (ref 0–1.2)
BUN SERPL-MCNC: 19 MG/DL (ref 8–27)
BUN/CREAT SERPL: 15 (ref 10–24)
CALCIUM SERPL-MCNC: 9.3 MG/DL (ref 8.6–10.2)
CHLORIDE SERPL-SCNC: 101 MMOL/L (ref 96–106)
CHOLEST SERPL-MCNC: 154 MG/DL (ref 100–199)
CO2 SERPL-SCNC: 21 MMOL/L (ref 18–29)
CREAT SERPL-MCNC: 1.31 MG/DL (ref 0.76–1.27)
CREAT UR-MCNC: 24.3 MG/DL
EST. AVERAGE GLUCOSE BLD GHB EST-MCNC: 177 MG/DL
GFR SERPLBLD CREATININE-BSD FMLA CKD-EPI: 54 ML/MIN/1.73
GFR SERPLBLD CREATININE-BSD FMLA CKD-EPI: 63 ML/MIN/1.73
GLOBULIN SER CALC-MCNC: 2.3 G/DL (ref 1.5–4.5)
GLUCOSE SERPL-MCNC: 114 MG/DL (ref 65–99)
HBA1C MFR BLD: 7.8 % (ref 4.8–5.6)
HDLC SERPL-MCNC: 48 MG/DL
LDLC SERPL CALC-MCNC: 56 MG/DL (ref 0–99)
MICROALBUMIN UR-MCNC: <3 UG/ML
POTASSIUM SERPL-SCNC: 4.4 MMOL/L (ref 3.5–5.2)
PROT SERPL-MCNC: 6.9 G/DL (ref 6–8.5)
SODIUM SERPL-SCNC: 141 MMOL/L (ref 134–144)
TRIGL SERPL-MCNC: 250 MG/DL (ref 0–149)
VLDLC SERPL CALC-MCNC: 50 MG/DL (ref 5–40)

## 2018-04-23 ENCOUNTER — OFFICE VISIT (OUTPATIENT)
Dept: URGENT CARE | Age: 73
End: 2018-04-23

## 2018-04-23 VITALS
OXYGEN SATURATION: 98 % | BODY MASS INDEX: 24.96 KG/M2 | SYSTOLIC BLOOD PRESSURE: 164 MMHG | HEIGHT: 67 IN | DIASTOLIC BLOOD PRESSURE: 86 MMHG | TEMPERATURE: 97.7 F | WEIGHT: 159 LBS | RESPIRATION RATE: 18 BRPM | HEART RATE: 71 BPM

## 2018-04-23 DIAGNOSIS — J06.9 URI, ACUTE: Primary | ICD-10-CM

## 2018-04-23 PROBLEM — E11.21 TYPE 2 DIABETES WITH NEPHROPATHY (HCC): Status: ACTIVE | Noted: 2018-04-23

## 2018-04-23 RX ORDER — LATANOPROST 50 UG/ML
SOLUTION/ DROPS OPHTHALMIC
COMMUNITY
Start: 2018-02-24 | End: 2018-04-23

## 2018-04-23 RX ORDER — KETOROLAC TROMETHAMINE 5 MG/ML
SOLUTION OPHTHALMIC
Refills: 1 | COMMUNITY
Start: 2018-04-09 | End: 2018-04-23

## 2018-04-23 RX ORDER — OFLOXACIN 3 MG/ML
SOLUTION/ DROPS OPHTHALMIC
Refills: 1 | COMMUNITY
Start: 2018-04-09 | End: 2018-04-23

## 2018-04-23 RX ORDER — LATANOPROST 50 UG/ML
1 SOLUTION/ DROPS OPHTHALMIC
COMMUNITY
End: 2019-02-14
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
## 2018-04-23 NOTE — MR AVS SNAPSHOT
[address] [facility] [address] 
[phone] Patient: Erickson Barnard MRN: [id number] IZU:[birth year] Visit Information Date & Time Provider Department Dept. Phone Encounter #  
 4/23/2018 10:30 AM [address] [phone] [id number] Your Appointments 7/31/2018 10:10 AM  
ROUTINE CARE with MD Chavez Davidsonmond [facility] [address] [facility]) Appt Note: f/u diabetes cp0.00  
 [address]  
[address] [facility] [address] [address] Upcoming Health Maintenance Date Due COLONOSCOPY 4/10/2018 FOOT EXAM Q1 8/29/2018 HEMOGLOBIN A1C Q6M 10/17/2018 MEDICARE YEARLY EXAM 10/18/2018 EYE EXAM RETINAL OR DILATED Q1 3/14/2019 MICROALBUMIN Q1 4/17/2019 LIPID PANEL Q1 4/17/2019 GLAUCOMA SCREENING Q2Y 3/14/2020 DTaP/Tdap/Td series (2 - Td) 5/9/2025 Allergies as of 4/23/2018  Review Complete On: 4/23/2018 By: Lashell Baptiste RN Severity Noted Reaction Type Reactions Contrast Agent [Iodine]  10/26/2009   Side Effect Hives, Itching IVP dye Current Immunizations  Reviewed on 11/15/2017 Name Date Influenza High Dose Vaccine PF 11/15/2017  2:48 PM, 11/8/2016 Influenza Vaccine 11/19/2014, 9/16/2013 Influenza Vaccine (Quad) PF 10/20/2015 Influenza Vaccine Split 12/7/2010 Pneumococcal Conjugate (PCV-13) 10/17/2017 Tdap 5/9/2015  1:21 PM  
 ZZZ-RETIRED (DO NOT USE) Pneumococcal Vaccine (Unspecified Type) 8/20/2012  8:39 AM  
 Zoster 12/20/2012 Not reviewed this visit You Were Diagnosed With   
  
 Codes Comments URI, acute    -  Primary ICD-10-CM: J06.9 ICD-9-CM: 465.9 Vitals BP Pulse Temp Resp Height(growth percentile) Weight(growth percentile) 164/86 71 97.7 °F (36.5 °C) 18 5' 7\" (1.702 m) 159 lb (72.1 kg) SpO2 BMI Smoking Status 98% 24.9 kg/m2 Former Smoker BMI and BSA Data Body Mass Index Body Surface Area 24.9 kg/m 2 1.85 m 2 Preferred Pharmacy Pharmacy Name Phone [facility], [address] AT [address] [place] [address] Drive [phone] Your Updated Medication List  
  
   
This list is accurate as of 4/23/18 10:43 AM.  Always use your most recent med list.  
  
  
  
  
 COLCRYS 0.6 mg tablet Generic drug:  colchicine TAKE 1 TABLET DAILY  
  
 cyanocobalamin 1,000 mcg tablet Take 1,000 mcg by mouth daily. esomeprazole 40 mg capsule Commonly known as:  NexIUM Take 1 Cap by mouth two (2) times a day. fosinopril 40 mg tablet Commonly known as:  MONOPRIL  
TAKE 1 TABLET DAILY  
  
 glucose blood VI test strips strip Commonly known as:  Annamae Button OneTouch Verio strips. Monitoring 3 times daily. Dx E11.9  
  
 insulin degludec 100 unit/mL (3 mL) Inpn Commonly known as:  TRESIBA FLEXTOUCH U-100  
15 units daily. insulin detemir U-100 100 unit/mL (3 mL) Inpn Commonly known as:  LEVEMIR FLEXTOUCH U-100 INSULN  
12 Units by SubCUTAneous route daily. * Insulin Needles (Disposable) 32 gauge x 5/32\" Ndle Commonly known as:  Kaylen Pen Needle Use to inject Humalog daily. ICD-10: E11.65  
  
 * NOVOFINE 30 30 gauge x 1/3\" Generic drug:  Insulin Needles (Disposable) USE WITH LEVEMIR FLEXPEN TO INJECT UNDER THE SKIN DAILY * Insulin Needles (Disposable) 31 gauge x 5/16\" Ndle  
by SubCUTAneous route daily. Use qd with levemir pen e11.9 * Kaylen Pen Needle 32 gauge x 5/32\" Ndle Generic drug:  Insulin Needles (Disposable) USE TO INJECT HUMALOG DAILY  
  
 magnesium oxide 500 mg Tab Take 1 Tab by mouth daily. OTHER  
daily. motilium 10 mg daily (patient states drug not made in [address]) pravastatin 20 mg tablet Commonly known as:  PRAVACHOL  
TAKE 1 TABLET DAILY PRED FORTE 1 % ophthalmic suspension Generic drug:  prednisoLONE acetate Administer 1 Drop to both eyes four (4) times daily. VICTOZA 3-DONELL 0.6 mg/0.1 mL (18 mg/3 mL) Pnij Generic drug:  Liraglutide INJECT 0.2 ML (1.2 MG) UNDER THE SKIN DAILY  
  
 XALATAN 0.005 % ophthalmic solution Generic drug:  latanoprost  
Administer 1 Drop to both eyes nightly. * Notice: This list has 4 medication(s) that are the same as other medications prescribed for you. Read the directions carefully, and ask your doctor or other care provider to review them with you. Patient Instructions Upper Respiratory Infection (Cold): Care Instructions Your Care Instructions An upper respiratory infection, or URI, is an infection of the nose, sinuses, or throat. URIs are spread by coughs, sneezes, and direct contact. The common cold is the most frequent kind of URI. The flu and sinus infections are other kinds of URIs. Almost all URIs are caused by viruses. Antibiotics won't cure them. But you can treat most infections with home care. This may include drinking lots of fluids and taking over-the-counter pain medicine. You will probably feel better in 4 to 10 days. The doctor has checked you carefully, but problems can develop later. If you notice any problems or new symptoms, get medical treatment right away. Follow-up care is a key part of your treatment and safety. Be sure to make and go to all appointments, and call your doctor if you are having problems. It's also a good idea to know your test results and keep a list of the medicines you take. How can you care for yourself at home? · To prevent dehydration, drink plenty of fluids, enough so that your urine is light yellow or clear like water. Choose water and other caffeine-free clear liquids until you feel better.  If you have kidney, heart, or liver disease and have to limit fluids, talk with your doctor before you increase the amount of fluids you drink. · Take an over-the-counter pain medicine, such as acetaminophen (Tylenol), ibuprofen (Advil, Motrin), or naproxen (Aleve). Read and follow all instructions on the label. · Before you use cough and cold medicines, check the label. These medicines may not be safe for young children or for people with certain health problems. · Be careful when taking over-the-counter cold or flu medicines and Tylenol at the same time. Many of these medicines have acetaminophen, which is Tylenol. Read the labels to make sure that you are not taking more than the recommended dose. Too much acetaminophen (Tylenol) can be harmful. · Get plenty of rest. 
· Do not smoke or allow others to smoke around you. If you need help quitting, talk to your doctor about stop-smoking programs and medicines. These can increase your chances of quitting for good. When should you call for help? Call 911 anytime you think you may need emergency care. For example, call if: 
? · You have severe trouble breathing. ?Call your doctor now or seek immediate medical care if: 
? · You seem to be getting much sicker. ? · You have new or worse trouble breathing. ? · You have a new or higher fever. ? · You have a new rash. ? Watch closely for changes in your health, and be sure to contact your doctor if: 
? · You have a new symptom, such as a sore throat, an earache, or sinus pain. ? · You cough more deeply or more often, especially if you notice more mucus or a change in the color of your mucus. ? · You do not get better as expected. Where can you learn more? Go to http://allan-scout.info/. Enter S217 in the search box to learn more about \"Upper Respiratory Infection (Cold): Care Instructions. \" Current as of: May 12, 2017 Content Version: 11.4 © 3839-4134 Healthwise, Incorporated.  Care instructions adapted under license by [address] (which disclaims liability or warranty for this information). If you have questions about a medical condition or this instruction, always ask your healthcare professional. [address] any warranty or liability for your use of this information. Introducing [facility]! Dear Vinayak King: Thank you for requesting a [facility] account. Our records indicate that you already have an active [employer] account. You can access your account anytime at [email or web address] Did you know that you can access your hospital and ER discharge instructions at any time in [employer]mit? You can also review all of your test results from your hospital stay or ER visit. Additional Information If you have questions, please visit the Frequently Asked Questions section of the [employer]Admit website at [email or web address] Remember, KidAdmit is NOT to be used for urgent needs. For medical emergencies, dial 911. Now available from your iPhone and Android! Please provide this summary of care documentation to your next provider. Your primary care clinician is listed as Vin MANTILLA. If you have any questions after today's visit, please call [phone].

## 2018-04-23 NOTE — PATIENT INSTRUCTIONS
Upper Respiratory Infection (Cold): Care Instructions  Your Care Instructions    An upper respiratory infection, or URI, is an infection of the nose, sinuses, or throat. URIs are spread by coughs, sneezes, and direct contact. The common cold is the most frequent kind of URI. The flu and sinus infections are other kinds of URIs. Almost all URIs are caused by viruses. Antibiotics won't cure them. But you can treat most infections with home care. This may include drinking lots of fluids and taking over-the-counter pain medicine. You will probably feel better in 4 to 10 days. The doctor has checked you carefully, but problems can develop later. If you notice any problems or new symptoms, get medical treatment right away. Follow-up care is a key part of your treatment and safety. Be sure to make and go to all appointments, and call your doctor if you are having problems. It's also a good idea to know your test results and keep a list of the medicines you take. How can you care for yourself at home? · To prevent dehydration, drink plenty of fluids, enough so that your urine is light yellow or clear like water. Choose water and other caffeine-free clear liquids until you feel better. If you have kidney, heart, or liver disease and have to limit fluids, talk with your doctor before you increase the amount of fluids you drink. · Take an over-the-counter pain medicine, such as acetaminophen (Tylenol), ibuprofen (Advil, Motrin), or naproxen (Aleve). Read and follow all instructions on the label. · Before you use cough and cold medicines, check the label. These medicines may not be safe for young children or for people with certain health problems. · Be careful when taking over-the-counter cold or flu medicines and Tylenol at the same time. Many of these medicines have acetaminophen, which is Tylenol. Read the labels to make sure that you are not taking more than the recommended dose.  Too much acetaminophen (Tylenol) can be harmful. · Get plenty of rest.  · Do not smoke or allow others to smoke around you. If you need help quitting, talk to your doctor about stop-smoking programs and medicines. These can increase your chances of quitting for good. When should you call for help? Call 911 anytime you think you may need emergency care. For example, call if:  ? · You have severe trouble breathing. ?Call your doctor now or seek immediate medical care if:  ? · You seem to be getting much sicker. ? · You have new or worse trouble breathing. ? · You have a new or higher fever. ? · You have a new rash. ? Watch closely for changes in your health, and be sure to contact your doctor if:  ? · You have a new symptom, such as a sore throat, an earache, or sinus pain. ? · You cough more deeply or more often, especially if you notice more mucus or a change in the color of your mucus. ? · You do not get better as expected. Where can you learn more? Go to http://allan-scout.info/. Enter H157 in the search box to learn more about \"Upper Respiratory Infection (Cold): Care Instructions. \"  Current as of: May 12, 2017  Content Version: 11.4  © 5839-0990 Healthwise, Incorporated. Care instructions adapted under license by [employer] (which disclaims liability or warranty for this information). If you have questions about a medical condition or this instruction, always ask your healthcare professional. [place] [address] any warranty or liability for your use of this information.

## 2018-04-24 RX ORDER — AZITHROMYCIN 250 MG/1
250 TABLET, FILM COATED ORAL SEE ADMIN INSTRUCTIONS
Qty: 6 TAB | Refills: 0 | Status: SHIPPED | OUTPATIENT
Start: 2018-04-24 | End: 2018-04-29

## 2018-05-16 ENCOUNTER — TELEPHONE (OUTPATIENT)
Dept: INTERNAL MEDICINE CLINIC | Age: 73
End: 2018-05-16

## 2018-05-16 NOTE — TELEPHONE ENCOUNTER
----- Message from Austin Patel sent at 5/16/2018  8:03 AM EDT -----  Regarding: /Telephone  Pt sinus symptoms aren't getting any better from previous visit and is requesting a sooner appt. then next week, Pt declined ER.  P [phone]      Message copied/pasted from [facility]

## 2018-05-18 RX ORDER — AMOXICILLIN 500 MG/1
500 CAPSULE ORAL 3 TIMES DAILY
Qty: 30 CAP | Refills: 0 | Status: SHIPPED | OUTPATIENT
Start: 2018-05-18 | End: 2018-05-28

## 2018-05-18 NOTE — TELEPHONE ENCOUNTER
Unable to reach patient LVM to return call.  Sent patients mychart message to MD and Md has not responded yet, will attempt to call patient again on Monday

## 2018-05-24 RX ORDER — PRAVASTATIN SODIUM 20 MG/1
TABLET ORAL
Qty: 90 TAB | Refills: 2 | Status: SHIPPED | OUTPATIENT
Start: 2018-05-24 | End: 2019-02-04 | Stop reason: SDUPTHER

## 2018-06-05 NOTE — TELEPHONE ENCOUNTER
6/5/2018  9:54 AM        Would like to know Dr. Susi Lamb have a sample of insulin detemir U-100 (LEVEMIR FLEXTOUCH U-100 INSULN) 100 unit/mL (3 mL) inpn. Mrs. Villareal stated that she received an email confirmation for shipment but need one because they are heading out of town would like to pick it up today.     [phone]

## 2018-06-05 NOTE — TELEPHONE ENCOUNTER
Requested Prescriptions     Signed Prescriptions Disp Refills    insulin detemir U-100 (LEVEMIR FLEXTOUCH U-100 INSULN) 100 unit/mL (3 mL) inpn 1 Pen 0     Si Units by SubCUTAneous route daily. Authorizing Provider: Jasmine Boothe     Ordering User: Micky Baird    Per verbal order from Dr. Haydee Brown, medication ordered, faxed/phoned in/paper copy given to patient.

## 2018-08-05 RX ORDER — COLCHICINE 0.6 MG/1
0.6 CAPSULE ORAL DAILY
Qty: 90 CAP | Refills: 3 | Status: SHIPPED | OUTPATIENT
Start: 2018-08-05 | End: 2019-07-01 | Stop reason: SDUPTHER

## 2018-08-08 RX ORDER — FOSINOPRIL SODIUM 40 MG/1
TABLET ORAL
Qty: 90 TAB | Refills: 2 | Status: SHIPPED | OUTPATIENT
Start: 2018-08-08 | End: 2019-04-30 | Stop reason: SDUPTHER

## 2018-08-31 ENCOUNTER — OFFICE VISIT (OUTPATIENT)
Dept: URGENT CARE | Age: 73
End: 2018-08-31

## 2018-08-31 VITALS
RESPIRATION RATE: 18 BRPM | HEIGHT: 67 IN | SYSTOLIC BLOOD PRESSURE: 145 MMHG | HEART RATE: 72 BPM | BODY MASS INDEX: 24.64 KG/M2 | WEIGHT: 157 LBS | DIASTOLIC BLOOD PRESSURE: 74 MMHG | TEMPERATURE: 97.3 F | OXYGEN SATURATION: 97 %

## 2018-08-31 DIAGNOSIS — L98.9 SKIN LESION: Primary | ICD-10-CM

## 2018-08-31 NOTE — MR AVS SNAPSHOT
[address] [address] 
[phone] Patient: Marshall Arellano MRN: [id number] OYC:[birth year] Visit Information Date & Time Provider Department Dept. Phone Encounter #  
 8/31/2018  8:15 AM [address] [phone] [id number] Upcoming Health Maintenance Date Due COLONOSCOPY 4/10/2018 Influenza Age 5 to Adult 8/1/2018 FOOT EXAM Q1 8/29/2018 HEMOGLOBIN A1C Q6M 10/17/2018 MEDICARE YEARLY EXAM 10/18/2018 EYE EXAM RETINAL OR DILATED Q1 3/30/2019 MICROALBUMIN Q1 4/17/2019 LIPID PANEL Q1 4/17/2019 GLAUCOMA SCREENING Q2Y 7/18/2020 DTaP/Tdap/Td series (2 - Td) 5/9/2025 Allergies as of 8/31/2018  Review Complete On: 8/31/2018 By: July Sheridan MD  
  
 Severity Noted Reaction Type Reactions Contrast Agent [Iodine]  10/26/2009   Side Effect Hives, Itching IVP dye Current Immunizations  Reviewed on 11/15/2017 Name Date Influenza High Dose Vaccine PF 11/15/2017  2:48 PM, 11/8/2016 Influenza Vaccine 11/19/2014, 9/16/2013 Influenza Vaccine (Quad) PF 10/20/2015 Influenza Vaccine Split 12/7/2010 Pneumococcal Conjugate (PCV-13) 10/17/2017 Tdap 5/9/2015  1:21 PM  
 ZZZ-RETIRED (DO NOT USE) Pneumococcal Vaccine (Unspecified Type) 8/20/2012  8:39 AM  
 Zoster 12/20/2012 Not reviewed this visit You Were Diagnosed With   
  
 Codes Comments Skin lesion    -  Primary ICD-10-CM: L98.9 ICD-9-CM: 709.9 Vitals BP Pulse Temp Resp Height(growth percentile) Weight(growth percentile) 145/74 72 97.3 °F (36.3 °C) 18 5' 7\" (1.702 m) 157 lb (71.2 kg) SpO2 BMI Smoking Status 97% 24.59 kg/m2 Former Smoker Vitals History BMI and BSA Data Body Mass Index Body Surface Area 24.59 kg/m 2 1.83 m 2 Preferred Pharmacy Pharmacy Name Phone  [address] AT THE [facility] OF   [address] [phone] Your Updated Medication List  
  
   
This list is accurate as of 8/31/18  8:36 AM.  Always use your most recent med list.  
  
  
  
  
 colchicine 0.6 mg capsule Commonly known as:  Ladoris Plants Take 1 Cap by mouth daily. Brand mitigare please  
  
 cyanocobalamin 1,000 mcg tablet Take 1,000 mcg by mouth daily. esomeprazole 40 mg capsule Commonly known as:  NexIUM Take 1 Cap by mouth two (2) times a day. fosinopril 40 mg tablet Commonly known as:  MONOPRIL  
TAKE 1 TABLET DAILY  
  
 glucose blood VI test strips strip Commonly known as:  Yue Haynes OneTouch Verio strips. Monitoring 3 times daily. Dx E11.9  
  
 insulin degludec 100 unit/mL (3 mL) Inpn Commonly known as:  TRESIBA FLEXTOUCH U-100  
15 units daily. insulin detemir U-100 100 unit/mL (3 mL) Inpn Commonly known as:  LEVEMIR FLEXTOUCH U-100 INSULN  
10 Units by SubCUTAneous route two (2) times a day. * Insulin Needles (Disposable) 32 gauge x 5/32\" Ndle Commonly known as:  Kaylen Pen Needle Use to inject Humalog daily. ICD-10: E11.65  
  
 * NOVOFINE 30 30 gauge x 1/3\" Generic drug:  Insulin Needles (Disposable) USE WITH LEVEMIR FLEXPEN TO INJECT UNDER THE SKIN DAILY * Insulin Needles (Disposable) 31 gauge x 5/16\" Ndle  
by SubCUTAneous route daily. Use qd with levemir pen e11.9 * Kaylen Pen Needle 32 gauge x 5/32\" Ndle Generic drug:  Insulin Needles (Disposable) USE TO INJECT HUMALOG DAILY  
  
 magnesium oxide 500 mg Tab Take 1 Tab by mouth daily. OTHER  
daily. motilium 10 mg daily (patient states drug not made in [place]) pravastatin 20 mg tablet Commonly known as:  PRAVACHOL  
TAKE 1 TABLET DAILY PRED FORTE 1 % ophthalmic suspension Generic drug:  prednisoLONE acetate Administer 1 Drop to both eyes four (4) times daily. VICTOZA 3-DONELL 0.6 mg/0.1 mL (18 mg/3 mL) Pnij Generic drug:  Liraglutide INJECT 0.2 ML (1.2 MG) UNDER THE SKIN DAILY  
  
 XALATAN 0.005 % ophthalmic solution Generic drug:  latanoprost  
Administer 1 Drop to both eyes nightly. * Notice: This list has 4 medication(s) that are the same as other medications prescribed for you. Read the directions carefully, and ask your doctor or other care provider to review them with you. Patient Instructions Follow up with dermatologist 
 
 
 
 
  
Introducing [facility] SERVICES! Dear Edin Chambers: Thank you for requesting a [facility] account. Our records indicate that you already have an active [employer] account. You can access your account anytime at [email or web address] Did you know that you can access your hospital and ER discharge instructions at any time in [facility]? You can also review all of your test results from your hospital stay or ER visit. Additional Information If you have questions, please visit the Frequently Asked Questions section of the [employer] website at [email or web address] Remember, [facility] is NOT to be used for urgent needs. For medical emergencies, dial 911. Now available from your iPhone and Android! Please provide this summary of care documentation to your next provider. Your primary care clinician is listed as Brittani MANTILLA. If you have any questions after today's visit, please call [phone].
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
## 2018-09-10 DIAGNOSIS — Z79.4 TYPE 2 DIABETES MELLITUS WITHOUT COMPLICATION, WITH LONG-TERM CURRENT USE OF INSULIN (HCC): ICD-10-CM

## 2018-09-10 DIAGNOSIS — E11.9 TYPE 2 DIABETES MELLITUS WITHOUT COMPLICATION, WITH LONG-TERM CURRENT USE OF INSULIN (HCC): ICD-10-CM

## 2018-09-10 RX ORDER — INSULIN DEGLUDEC 100 U/ML
INJECTION, SOLUTION SUBCUTANEOUS
Qty: 15 ML | Refills: 3 | Status: SHIPPED | OUTPATIENT
Start: 2018-09-10 | End: 2019-03-04 | Stop reason: CLARIF

## 2018-09-10 NOTE — ADDENDUM NOTE
Addended by: Sarkis Nagy on: 9/10/2018 12:37 PM     Modules accepted: Orders
reaches/grasps/controls head/arm movements

## 2018-10-08 NOTE — TELEPHONE ENCOUNTER
Requested Prescriptions     Pending Prescriptions Disp Refills    glucose blood VI test strips (ONETOUCH VERIO) strip 300 Strip 3     Sig: OneTouch Verio strips. Monitoring 3 times daily.  Dx E11.9
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
## 2018-11-28 ENCOUNTER — OFFICE VISIT (OUTPATIENT)
Dept: INTERNAL MEDICINE CLINIC | Age: 73
End: 2018-11-28

## 2018-11-28 VITALS
SYSTOLIC BLOOD PRESSURE: 104 MMHG | HEART RATE: 68 BPM | TEMPERATURE: 97.6 F | OXYGEN SATURATION: 98 % | WEIGHT: 156 LBS | HEIGHT: 67 IN | DIASTOLIC BLOOD PRESSURE: 72 MMHG | BODY MASS INDEX: 24.48 KG/M2

## 2018-11-28 DIAGNOSIS — K22.70 BARRETT'S ESOPHAGUS WITHOUT DYSPLASIA: ICD-10-CM

## 2018-11-28 DIAGNOSIS — Z23 ENCOUNTER FOR IMMUNIZATION: ICD-10-CM

## 2018-11-28 DIAGNOSIS — E11.9 CONTROLLED TYPE 2 DIABETES MELLITUS WITHOUT COMPLICATION, WITH LONG-TERM CURRENT USE OF INSULIN (HCC): Primary | ICD-10-CM

## 2018-11-28 DIAGNOSIS — Z79.4 CONTROLLED TYPE 2 DIABETES MELLITUS WITHOUT COMPLICATION, WITH LONG-TERM CURRENT USE OF INSULIN (HCC): Primary | ICD-10-CM

## 2018-11-28 DIAGNOSIS — I10 ESSENTIAL HYPERTENSION: ICD-10-CM

## 2018-11-28 DIAGNOSIS — N18.30 CKD (CHRONIC KIDNEY DISEASE) STAGE 3, GFR 30-59 ML/MIN (HCC): ICD-10-CM

## 2018-11-28 DIAGNOSIS — Z00.00 MEDICARE ANNUAL WELLNESS VISIT, SUBSEQUENT: ICD-10-CM

## 2018-11-28 DIAGNOSIS — D64.9 ANEMIA, UNSPECIFIED TYPE: ICD-10-CM

## 2018-11-28 DIAGNOSIS — E78.00 PURE HYPERCHOLESTEROLEMIA: ICD-10-CM

## 2018-11-28 NOTE — PATIENT INSTRUCTIONS
Medicare Wellness Visit, Male The best way to live healthy is to have a lifestyle where you eat a well-balanced diet, exercise regularly, limit alcohol use, and quit all forms of tobacco/nicotine, if applicable. Regular preventive services are another way to keep healthy. Preventive services (vaccines, screening tests, monitoring & exams) can help personalize your care plan, which helps you manage your own care. Screening tests can find health problems at the earliest stages, when they are easiest to treat. [address] [facility] follows the current, evidence-based guidelines published by the [facility] ([facility]) when recommending preventive services for our patients. Because we follow these guidelines, sometimes recommendations change over time as research supports it. (For example, a prostate screening blood test is no longer routinely recommended for men with no symptoms.) Of course, you and your doctor may decide to screen more often for some diseases, based on your risk and co-morbidities (chronic disease you are already diagnosed with). Preventive services for you include: - Medicare offers their members a free annual wellness visit, which is time for you and your primary care provider to discuss and plan for your preventive service needs. Take advantage of this benefit every year! 
-All adults over age 72 should receive the recommended pneumonia vaccines. Current USPSTF guidelines recommend a series of two vaccines for the best pneumonia protection.  
-All adults should have a flu vaccine yearly and an ECG.  All adults age 61 and older should receive a shingles vaccine once in their lifetime.   
-All adults age 38-68 who are overweight should have a diabetes screening test once every three years.  
-Other screening tests & preventive services for persons with diabetes include: an eye exam to screen for diabetic retinopathy, a kidney function test, a foot exam, and stricter control over your cholesterol.  
-Cardiovascular screening for adults with routine risk involves an electrocardiogram (ECG) at intervals determined by the provider.  
-Colorectal cancer screening should be done for adults age 54-65 with no increased risk factors for colorectal cancer. There are a number of acceptable methods of screening for this type of cancer. Each test has its own benefits and drawbacks. Discuss with your provider what is most appropriate for you during your annual wellness visit. The different tests include: colonoscopy (considered the best screening method), a fecal occult blood test, a fecal DNA test, and sigmoidoscopy. 
-All adults born between [facility] should be screened once for Hepatitis C. 
-An Abdominal Aortic Aneurysm (AAA) Screening is recommended for men age 73-68 who has ever smoked in their lifetime. Here is a list of your current Health Maintenance items (your personalized list of preventive services) with a due date: 
Health Maintenance Due Topic Date Due  Shingles Vaccine (1 of 2) 09/02/1996  Colonoscopy  04/10/2018  Flu Vaccine  08/01/2018 [address] [place] [address] Diabetic Foot Care  08/29/2018  Hemoglobin A1C    10/17/2018 [address] [place] [address] Annual Well Visit  10/18/2018

## 2018-11-29 LAB
ALBUMIN SERPL-MCNC: 4.6 G/DL (ref 3.5–4.8)
ALBUMIN/GLOB SERPL: 1.8 {RATIO} (ref 1.2–2.2)
ALP SERPL-CCNC: 79 IU/L (ref 39–117)
ALT SERPL-CCNC: 23 IU/L (ref 0–44)
AST SERPL-CCNC: 22 IU/L (ref 0–40)
BILIRUB SERPL-MCNC: 0.4 MG/DL (ref 0–1.2)
BUN SERPL-MCNC: 17 MG/DL (ref 8–27)
BUN/CREAT SERPL: 12 (ref 10–24)
CALCIUM SERPL-MCNC: 9.8 MG/DL (ref 8.6–10.2)
CHLORIDE SERPL-SCNC: 104 MMOL/L (ref 96–106)
CO2 SERPL-SCNC: 22 MMOL/L (ref 20–29)
CREAT SERPL-MCNC: 1.37 MG/DL (ref 0.76–1.27)
ERYTHROCYTE [DISTWIDTH] IN BLOOD BY AUTOMATED COUNT: 15 % (ref 12.3–15.4)
EST. AVERAGE GLUCOSE BLD GHB EST-MCNC: 169 MG/DL
GLOBULIN SER CALC-MCNC: 2.6 G/DL (ref 1.5–4.5)
GLUCOSE SERPL-MCNC: 118 MG/DL (ref 65–99)
HBA1C MFR BLD: 7.5 % (ref 4.8–5.6)
HCT VFR BLD AUTO: 48.6 % (ref 37.5–51)
HGB BLD-MCNC: 16.9 G/DL (ref 13–17.7)
MCH RBC QN AUTO: 31.7 PG (ref 26.6–33)
MCHC RBC AUTO-ENTMCNC: 34.8 G/DL (ref 31.5–35.7)
MCV RBC AUTO: 91 FL (ref 79–97)
PLATELET # BLD AUTO: 167 X10E3/UL (ref 150–379)
POTASSIUM SERPL-SCNC: 4.6 MMOL/L (ref 3.5–5.2)
PROT SERPL-MCNC: 7.2 G/DL (ref 6–8.5)
RBC # BLD AUTO: 5.33 X10E6/UL (ref 4.14–5.8)
SODIUM SERPL-SCNC: 142 MMOL/L (ref 134–144)
WBC # BLD AUTO: 5.6 X10E3/UL (ref 3.4–10.8)

## 2019-02-04 RX ORDER — PRAVASTATIN SODIUM 20 MG/1
TABLET ORAL
Qty: 90 TAB | Refills: 2 | Status: SHIPPED | OUTPATIENT
Start: 2019-02-04 | End: 2019-10-08 | Stop reason: SDUPTHER

## 2019-02-14 ENCOUNTER — OFFICE VISIT (OUTPATIENT)
Dept: URGENT CARE | Age: 74
End: 2019-02-14

## 2019-02-14 VITALS
TEMPERATURE: 97.4 F | RESPIRATION RATE: 18 BRPM | HEART RATE: 84 BPM | OXYGEN SATURATION: 97 % | WEIGHT: 159 LBS | HEIGHT: 68 IN | BODY MASS INDEX: 24.1 KG/M2 | DIASTOLIC BLOOD PRESSURE: 88 MMHG | SYSTOLIC BLOOD PRESSURE: 146 MMHG

## 2019-02-14 DIAGNOSIS — J32.9 SINUSITIS, UNSPECIFIED CHRONICITY, UNSPECIFIED LOCATION: Primary | ICD-10-CM

## 2019-02-14 DIAGNOSIS — R05.9 COUGH: ICD-10-CM

## 2019-02-14 LAB
FLUAV+FLUBV AG NOSE QL IA.RAPID: NEGATIVE POS/NEG
FLUAV+FLUBV AG NOSE QL IA.RAPID: NEGATIVE POS/NEG
VALID INTERNAL CONTROL?: YES

## 2019-02-14 RX ORDER — INSULIN DEGLUDEC 100 U/ML
15 INJECTION, SOLUTION SUBCUTANEOUS
COMMUNITY
End: 2019-03-04

## 2019-02-14 RX ORDER — BENZONATATE 100 MG/1
100 CAPSULE ORAL
Qty: 20 CAP | Refills: 0 | Status: SHIPPED | OUTPATIENT
Start: 2019-02-14 | End: 2019-03-04

## 2019-02-14 RX ORDER — DOXYCYCLINE 100 MG/1
100 CAPSULE ORAL 2 TIMES DAILY
Qty: 20 CAP | Refills: 0 | Status: SHIPPED | OUTPATIENT
Start: 2019-02-14 | End: 2019-02-24

## 2019-02-14 NOTE — PATIENT INSTRUCTIONS
Sinusitis: Care Instructions  Your Care Instructions    Sinusitis is an infection of the lining of the sinus cavities in your head. Sinusitis often follows a cold. It causes pain and pressure in your head and face. In most cases, sinusitis gets better on its own in 1 to 2 weeks. But some mild symptoms may last for several weeks. Sometimes antibiotics are needed. Follow-up care is a key part of your treatment and safety. Be sure to make and go to all appointments, and call your doctor if you are having problems. It's also a good idea to know your test results and keep a list of the medicines you take. How can you care for yourself at home? · Take an over-the-counter pain medicine, such as acetaminophen (Tylenol), ibuprofen (Advil, Motrin), or naproxen (Aleve). Read and follow all instructions on the label. · If the doctor prescribed antibiotics, take them as directed. Do not stop taking them just because you feel better. You need to take the full course of antibiotics. · Be careful when taking over-the-counter cold or flu medicines and Tylenol at the same time. Many of these medicines have acetaminophen, which is Tylenol. Read the labels to make sure that you are not taking more than the recommended dose. Too much acetaminophen (Tylenol) can be harmful. · Breathe warm, moist air from a steamy shower, a hot bath, or a sink filled with hot water. Avoid cold, dry air. Using a humidifier in your home may help. Follow the directions for cleaning the machine. · Use saline (saltwater) nasal washes to help keep your nasal passages open and wash out mucus and bacteria. You can buy saline nose drops at a grocery store or drugstore. Or you can make your own at home by adding 1 teaspoon of salt and 1 teaspoon of baking soda to 2 cups of distilled water. If you make your own, fill a bulb syringe with the solution, insert the tip into your nostril, and squeeze gently. Sabiha Mock your nose.   · Put a hot, wet towel or a warm gel pack on your face 3 or 4 times a day for 5 to 10 minutes each time. · Try a decongestant nasal spray like oxymetazoline (Afrin). Do not use it for more than 3 days in a row. Using it for more than 3 days can make your congestion worse. When should you call for help? Call your doctor now or seek immediate medical care if:    · You have new or worse swelling or redness in your face or around your eyes.     · You have a new or higher fever.    Watch closely for changes in your health, and be sure to contact your doctor if:    · You have new or worse facial pain.     · The mucus from your nose becomes thicker (like pus) or has new blood in it.     · You are not getting better as expected. Where can you learn more? Go to http://allan-scout.info/. Enter C533 in the search box to learn more about \"Sinusitis: Care Instructions. \"  Current as of: March 27, 2018  Content Version: 11.9  © 0629-9063 [employer]. Care instructions adapted under license by [employer] (which disclaims liability or warranty for this information). If you have questions about a medical condition or this instruction, always ask your healthcare professional. [place] [address] any warranty or liability for your use of this information.
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
## 2019-02-15 RX ORDER — AZITHROMYCIN 250 MG/1
250 TABLET, FILM COATED ORAL SEE ADMIN INSTRUCTIONS
Qty: 6 TAB | Refills: 0 | Status: SHIPPED | OUTPATIENT
Start: 2019-02-15 | End: 2019-02-20

## 2019-02-21 RX ORDER — LIRAGLUTIDE 6 MG/ML
INJECTION SUBCUTANEOUS
Qty: 18 ML | Refills: 3 | Status: SHIPPED | OUTPATIENT
Start: 2019-02-21 | End: 2020-02-13

## 2019-03-04 ENCOUNTER — OFFICE VISIT (OUTPATIENT)
Dept: ENDOCRINOLOGY | Age: 74
End: 2019-03-04

## 2019-03-04 VITALS
BODY MASS INDEX: 23.82 KG/M2 | SYSTOLIC BLOOD PRESSURE: 128 MMHG | WEIGHT: 157.2 LBS | HEART RATE: 65 BPM | DIASTOLIC BLOOD PRESSURE: 78 MMHG | HEIGHT: 68 IN

## 2019-03-04 DIAGNOSIS — E78.5 DYSLIPIDEMIA: ICD-10-CM

## 2019-03-04 DIAGNOSIS — N18.30 CKD (CHRONIC KIDNEY DISEASE) STAGE 3, GFR 30-59 ML/MIN (HCC): ICD-10-CM

## 2019-03-04 DIAGNOSIS — I10 ESSENTIAL HYPERTENSION, BENIGN: ICD-10-CM

## 2019-03-04 DIAGNOSIS — E11.9 TYPE 2 DIABETES MELLITUS WITHOUT COMPLICATION, WITHOUT LONG-TERM CURRENT USE OF INSULIN (HCC): Primary | ICD-10-CM

## 2019-03-04 LAB — HBA1C MFR BLD HPLC: 7.4 %

## 2019-03-04 RX ORDER — INSULIN GLARGINE 300 U/ML
20 INJECTION, SOLUTION SUBCUTANEOUS DAILY
Qty: 6 ML | Refills: 3 | Status: SHIPPED | OUTPATIENT
Start: 2019-03-04 | End: 2020-03-17

## 2019-03-04 NOTE — PATIENT INSTRUCTIONS
Diabetes. Continue efforts to improve diet and exercise Change Tresiba to Toujeo 15 units daily. May need slightly more or slightly less. Continue Victoza 1.2 mg daily. - Consider changing to Ozempic once weekly, which is also likely to be more effective for glucose lowering and for appetite decrease. Goals for blood glucose: 
Fasting  (less than 150) Lunch, Dinner, Bedtime -  (less than 180). Blood pressure: 
Continue fosinopril Cholesterol Continue  pravastatin.

## 2019-03-04 NOTE — PROGRESS NOTES
History of Present Illness: Shelbi Saleh is a 68 y.o. male presents for follow-up of diabetes. He was diagnosed in 2009. He also has Hernadez's esophagus and and has history of bleeding AVMs  
  
Diabetes related complications: 
No microalbumin Does have occ sx of neuropathy in feet. 
  
Current diabetes regimen: 
- did not tolerate metformin - GI side effects Sweetie Parcel . This replaced Levemir. He feels this is working much better for himtaking 15 units. Formulary will change. Toujeo is covered alternative long-lasting insulin. Victoza 1.2 mg daily. 
  
Glucoses: Brings meter and glucose log for review. Fasting values as low as 85. Often in 100-130 range 9 pm values are often higher- 200s and as high as 300s. Lowest was last night - 134 - ate a very late lunch and did not eat dinner. Diet: Reviewed Higher starch meals run glucoses upwards. Weight is stable.  
  
Exercise -started playing golf again. Plans to increase walking.  
   
Lipids - taking pravastatin 20 mg. Social: 
 and retired Past Medical History:  
Diagnosis Date  Chronic kidney disease  Colon polyps 2007 Dr. Brenda Brewster  DDD (degenerative disc disease), cervical   
 Diabetes ([facility] [address]  GERD (gastroesophageal reflux disease) Hernadez's esophagus long segment  Gout  Hearing loss  Hypercholesterolemia  Hypertension  Ill-defined condition Hernadez's esophagus  Ill-defined condition Glaucoma, Bell's palsy  Other ill-defined conditions(799.89)   
 gout  Other ill-defined conditions(799.89) lipids  Other ill-defined conditions(799.89) BPH Current Outpatient Medications Medication Sig  VICTOZA 3-DONELL 0.6 mg/0.1 mL (18 mg/3 mL) pnij INJECT 0.2 ML (1.2 MG) UNDER THE SKIN DAILY  insulin degludec (TRESIBA U-100 INSULIN) 100 unit/mL soln 15 Units by SubCUTAneous route.  pravastatin (PRAVACHOL) 20 mg tablet TAKE 1 TABLET DAILY  glucose blood VI test strips (ONETOUCH VERIO) strip OneTouch Verio strips. Monitoring 3 times daily. Dx E11.9  
 insulin degludec (TRESIBA FLEXTOUCH U-100) 100 unit/mL (3 mL) inpn 15 units daily.  pen needle, diabetic (NOVOTWIST) 32 gauge x 1/5\" ndle For use with tresiba and Victoza - twice daily.  fosinopril (MONOPRIL) 40 mg tablet TAKE 1 TABLET DAILY  colchicine (MITIGARE) 0.6 mg capsule Take 1 Cap by mouth daily. Brand mitigare please  magnesium oxide 500 mg tab Take 1 Tab by mouth daily.  cyanocobalamin 1,000 mcg tablet Take 1,000 mcg by mouth daily.  Insulin Needles, Disposable, (GAYATRI PEN NEEDLE) 32 gauge x 5/32\" ndle Use to inject Humalog daily. ICD-10: E11.65  
 OTHER daily. motilium 10 mg daily (patient states drug not made in [place])  esomeprazole (NEXIUM) 40 mg capsule Take 1 Cap by mouth two (2) times a day. No current facility-administered medications for this visit. Allergies Allergen Reactions  Aspirin Other (comments) GI bleed  Contrast Agent [Iodine] Hives and Itching IVP dye Review of Systems: - Eyes: no blurry vision or double vision - Cardiovascular: no chest pain - Respiratory: no shortness of breath - Musculoskeletal: no myalgias - Neurological: no numbness/tingling in extremities Physical Examination: 
Visit Vitals /78 (BP 1 Location: Right arm, BP Patient Position: Sitting) Pulse 65 Ht 5' 8\" (1.727 m) Wt 157 lb 3.2 oz (71.3 kg) BMI 23.90 kg/m²  
-  
- General: pleasant, no distress, normal gait HEENT: hearing intact, EOMI, clear sclera without icterus - Cardiovascular: regular, normal rate - Respiratory: normal effort - Integumentary: no edema Diabetic foot exam:  
   
Right Foot: 
 Visual Exam: normal  
 Pulse DP: 2+ (normal) Filament test: normal sensation - Psychiatric: normal mood and affect Data Reviewed:  
Lab Results Component Value Date/Time Hemoglobin A1c 7.5 11/28/2018 11:44 AM  
  
Lab Results Component Value Date/Time Sodium 142 11/28/2018 11:44 AM  
 Potassium 4.6 11/28/2018 11:44 AM  
 Creatinine 1.37 11/28/2018 11:44 AM  
 Microalb/Creat ratio (ug/mg creat.) <12.3 04/17/2018 11:34 AM  
  
 
Lab Results Component Value Date/Time Cholesterol, total 154 04/17/2018 11:34 AM  
 HDL Cholesterol 48 04/17/2018 11:34 AM  
 LDL, calculated 56 04/17/2018 11:34 AM  
 Triglyceride 250 04/17/2018 11:34 AM  
  
Lab Results Component Value Date/Time TSH 1.490 11/15/2017 02:53 PM  
  
 
Assessment/Plan: 1. Type 2 diabetes mellitus without complication, without long-term current use of insulin ([address] Fasting values are at goal, but bedtime values remain above goal.  Encouraged him to continue to make efforts to improve dinner. Ideally if he could discontinue having cookies after dinner, things will help substantially Change Tresiba to Toujeo15 units daily, due to formulary change with [facility]. Continue Victoza. Did discuss how Ozempic is a once weekly alternative to Victoza may work even better. This can be considered in the future. 2. Essential hypertension, benign Controlled at today's visit. Continue fosinopril 3. CKD (chronic kidney disease) stage 3, GFR 30-59 ml/min (HCC) Will reassess with next labs. Continue blood pressure control diabetes control 4. Dyslipidemia Continue pravastatin. Encouraged dietary effortslower sugar intake and weight loss. Patient Instructions Diabetes. Continue efforts to improve diet and exercise Change Tresiba to Toujeo 15 units daily. May need slightly more or slightly less. Continue Victoza 1.2 mg daily. - Consider changing to Ozempic once weekly, which is also likely to be more effective for glucose lowering and for appetite decrease. Goals for blood glucose: 
Fasting  (less than 150) Lunch, Dinner, Bedtime -  (less than 180). Blood pressure: Continue fosinopril Cholesterol Continue  pravastatin. Follow-up Disposition: 
Return in about 4 months (around 7/4/2019). Copy sent to:

## 2019-03-11 RX ORDER — PEN NEEDLE, DIABETIC 31 GX3/16"
NEEDLE, DISPOSABLE MISCELLANEOUS
Qty: 100 PEN NEEDLE | Refills: 3 | Status: SHIPPED | OUTPATIENT
Start: 2019-03-11 | End: 2019-04-19 | Stop reason: SDUPTHER

## 2019-04-05 ENCOUNTER — TELEPHONE (OUTPATIENT)
Dept: INTERNAL MEDICINE CLINIC | Age: 74
End: 2019-04-05

## 2019-04-05 NOTE — TELEPHONE ENCOUNTER
Called, spoke with [facility] (HIPAA). Two pt identifiers confirmed. Nik Michael stated Pt started to urinate blood on the way out of town. Nik Michael stated stopped at an Urgent Care in [facility] and they did a Urinalysis and Culture on Pt's urine. Nik Michael states Pt was put on Cipro for a UTI but pt's urine is still bloody. Nik Rodriguez stated Pt see's Dr. Toño Blackwell at [facility]. Christa informed per Dr. Aline Mandel, it's best to follow up with urology Dr. Adali Velez verbalized understanding of information discussed w/ no further questions at this time.

## 2019-04-05 NOTE — TELEPHONE ENCOUNTER
Patient's wife, Milady Lynn, states she needs a call back in reference to getting an Acute appt with Dr. Marco A Quiroz for next week for patient with Blood in Urine. Please call.  Thank you

## 2019-04-30 RX ORDER — FOSINOPRIL SODIUM 40 MG/1
TABLET ORAL
Qty: 90 TAB | Refills: 2 | Status: SHIPPED | OUTPATIENT
Start: 2019-04-30 | End: 2020-01-13

## 2019-06-25 ENCOUNTER — HOSPITAL ENCOUNTER (OUTPATIENT)
Dept: MRI IMAGING | Age: 74
Discharge: HOME | End: 2019-06-25
Attending: ORTHOPAEDIC SURGERY
Payer: MEDICARE

## 2019-06-25 DIAGNOSIS — M54.2 CERVICALGIA: ICD-10-CM

## 2019-06-25 DIAGNOSIS — M47.812 CERVICAL SPONDYLOSIS WITHOUT MYELOPATHY: ICD-10-CM

## 2019-06-25 PROCEDURE — 72141 MRI NECK SPINE W/O DYE: CPT

## 2019-06-27 ENCOUNTER — OFFICE VISIT (OUTPATIENT)
Dept: URGENT CARE | Age: 74
End: 2019-06-27

## 2019-06-27 VITALS
BODY MASS INDEX: 23.34 KG/M2 | HEART RATE: 82 BPM | SYSTOLIC BLOOD PRESSURE: 141 MMHG | OXYGEN SATURATION: 99 % | DIASTOLIC BLOOD PRESSURE: 89 MMHG | WEIGHT: 154 LBS | HEIGHT: 68 IN | RESPIRATION RATE: 18 BRPM | TEMPERATURE: 99.5 F

## 2019-06-27 DIAGNOSIS — J01.90 ACUTE SINUSITIS, RECURRENCE NOT SPECIFIED, UNSPECIFIED LOCATION: Primary | ICD-10-CM

## 2019-06-27 DIAGNOSIS — R05.8 PRODUCTIVE COUGH: ICD-10-CM

## 2019-06-27 DIAGNOSIS — R09.82 POST-NASAL DRIP: ICD-10-CM

## 2019-06-27 RX ORDER — DOXYCYCLINE 100 MG/1
100 CAPSULE ORAL 2 TIMES DAILY
Qty: 14 CAP | Refills: 0 | Status: SHIPPED | OUTPATIENT
Start: 2019-06-27 | End: 2019-07-04
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
## 2019-06-27 NOTE — PATIENT INSTRUCTIONS
Follow up with PCP without improvement over next 5-7 days sooner/immediately for new or worsening       Sinusitis: Care Instructions  Your Care Instructions    Sinusitis is an infection of the lining of the sinus cavities in your head. Sinusitis often follows a cold. It causes pain and pressure in your head and face. In most cases, sinusitis gets better on its own in 1 to 2 weeks. But some mild symptoms may last for several weeks. Sometimes antibiotics are needed. Follow-up care is a key part of your treatment and safety. Be sure to make and go to all appointments, and call your doctor if you are having problems. It's also a good idea to know your test results and keep a list of the medicines you take. How can you care for yourself at home? · Take an over-the-counter pain medicine, such as acetaminophen (Tylenol), ibuprofen (Advil, Motrin), or naproxen (Aleve). Read and follow all instructions on the label. · If the doctor prescribed antibiotics, take them as directed. Do not stop taking them just because you feel better. You need to take the full course of antibiotics. · Be careful when taking over-the-counter cold or flu medicines and Tylenol at the same time. Many of these medicines have acetaminophen, which is Tylenol. Read the labels to make sure that you are not taking more than the recommended dose. Too much acetaminophen (Tylenol) can be harmful. · Breathe warm, moist air from a steamy shower, a hot bath, or a sink filled with hot water. Avoid cold, dry air. Using a humidifier in your home may help. Follow the directions for cleaning the machine. · Use saline (saltwater) nasal washes to help keep your nasal passages open and wash out mucus and bacteria. You can buy saline nose drops at a grocery store or drugstore. Or you can make your own at home by adding 1 teaspoon of salt and 1 teaspoon of baking soda to 2 cups of distilled water.  If you make your own, fill a bulb syringe with the solution, insert the tip into your nostril, and squeeze gently. [place] your nose. · Put a hot, wet towel or a warm gel pack on your face 3 or 4 times a day for 5 to 10 minutes each time. · Try a decongestant nasal spray like oxymetazoline (Afrin). Do not use it for more than 3 days in a row. Using it for more than 3 days can make your congestion worse. When should you call for help? Call your doctor now or seek immediate medical care if:    · You have new or worse swelling or redness in your face or around your eyes.     · You have a new or higher fever.    Watch closely for changes in your health, and be sure to contact your doctor if:    · You have new or worse facial pain.     · The mucus from your nose becomes thicker (like pus) or has new blood in it.     · You are not getting better as expected. Where can you learn more? Go to http://allan-scout.info/. Enter F105 in the search box to learn more about \"Sinusitis: Care Instructions. \"  Current as of: March 27, 2018  Content Version: 11.9  © 4531-1630 [employer]. Care instructions adapted under license by [employer] (which disclaims liability or warranty for this information). If you have questions about a medical condition or this instruction, always ask your healthcare professional. [address] any warranty or liability for your use of this information. Cough: Care Instructions  Your Care Instructions    A cough is your body's response to something that bothers your throat or airways. Many things can cause a cough. You might cough because of a cold or the flu, bronchitis, or asthma. Smoking, postnasal drip, allergies, and stomach acid that backs up into your throat also can cause coughs. A cough is a symptom, not a disease. Most coughs stop when the cause, such as a cold, goes away. You can take a few steps at home to cough less and feel better.   Follow-up care is a key part of your treatment and safety. Be sure to make and go to all appointments, and call your doctor if you are having problems. It's also a good idea to know your test results and keep a list of the medicines you take. How can you care for yourself at home? · Drink lots of water and other fluids. This helps thin the mucus and soothes a dry or sore throat. Honey or lemon juice in hot water or tea may ease a dry cough. · Take cough medicine as directed by your doctor. · Prop up your head on pillows to help you breathe and ease a dry cough. · Try cough drops to soothe a dry or sore throat. Cough drops don't stop a cough. Medicine-flavored cough drops are no better than candy-flavored drops or hard candy. · Do not smoke. Avoid secondhand smoke. If you need help quitting, talk to your doctor about stop-smoking programs and medicines. These can increase your chances of quitting for good. When should you call for help? Call 911 anytime you think you may need emergency care. For example, call if:    · You have severe trouble breathing.    Call your doctor now or seek immediate medical care if:    · You cough up blood.     · You have new or worse trouble breathing.     · You have a new or higher fever.     · You have a new rash.    Watch closely for changes in your health, and be sure to contact your doctor if:    · You cough more deeply or more often, especially if you notice more mucus or a change in the color of your mucus.     · You have new symptoms, such as a sore throat, an earache, or sinus pain.     · You do not get better as expected. Where can you learn more? Go to http://allan-scout.info/. Enter D279 in the search box to learn more about \"Cough: Care Instructions. \"  Current as of: September 5, 2018  Content Version: 11.9  © 4376-8979 [employer]. Care instructions adapted under license by [employer] (which disclaims liability or warranty for this information).  If you have questions about a medical condition or this instruction, always ask your healthcare professional. [place] [address] any warranty or liability for your use of this information.

## 2019-07-01 RX ORDER — COLCHICINE 0.6 MG/1
CAPSULE ORAL
Qty: 90 CAP | Refills: 3 | Status: SHIPPED | OUTPATIENT
Start: 2019-07-01 | End: 2020-07-18

## 2019-07-09 ENCOUNTER — OFFICE VISIT (OUTPATIENT)
Dept: ENDOCRINOLOGY | Age: 74
End: 2019-07-09

## 2019-07-09 VITALS
RESPIRATION RATE: 16 BRPM | DIASTOLIC BLOOD PRESSURE: 74 MMHG | HEIGHT: 68 IN | OXYGEN SATURATION: 98 % | SYSTOLIC BLOOD PRESSURE: 132 MMHG | WEIGHT: 156 LBS | BODY MASS INDEX: 23.64 KG/M2 | HEART RATE: 67 BPM

## 2019-07-09 DIAGNOSIS — E11.9 TYPE 2 DIABETES MELLITUS WITHOUT COMPLICATION, WITHOUT LONG-TERM CURRENT USE OF INSULIN (HCC): Primary | ICD-10-CM

## 2019-07-09 DIAGNOSIS — E78.5 DYSLIPIDEMIA: ICD-10-CM

## 2019-07-09 DIAGNOSIS — N18.30 CKD (CHRONIC KIDNEY DISEASE) STAGE 3, GFR 30-59 ML/MIN (HCC): ICD-10-CM

## 2019-07-09 DIAGNOSIS — I10 ESSENTIAL HYPERTENSION, BENIGN: ICD-10-CM

## 2019-07-09 LAB — HBA1C MFR BLD HPLC: 6.9 %

## 2019-07-09 NOTE — PROGRESS NOTES
History of Present Illness: Karie Becerra is a 68 y.o. male presents for follow-up of diabetes. He was diagnosed in 2009. He also has Hernadez's esophagus and and has history of bleeding AVMs      A1c 7.4 last visit in 3/2019. 6.9 today with POC test    Diabetes related complications:  No microalbumin  Does have occ sx of neuropathy in feet.     Current diabetes regimen:  - did not tolerate metformin - GI side effects  Toujeo: 16 units daily. Victoza 1.2 mg daily.     Glucoses: Brings meter and glucose log for review. Checking fasting and bedtime  Fasting values range from 80s-150 generally with most in the 100s-130 range  Bedtime values - generally in high 100s to low 200s Highest 300s. Lower values in 120s -150s. Diet: eating out more. His wife has had trouble with diverticulitis  Weight is stable to down 5 lbs     Exercise -Playing golf once a week. Remains active around the house and with volunteering. Lipids - taking pravastatin 20 mg. He did have some hematuria earlier this spring. Treated for UTI. More recently been dealing with neck pain, which is chronic. Will need a steroid injection.        Social:   and retired      Past Medical History:   Diagnosis Date    Chronic kidney disease     Colon polyps 2007    Dr. Vilma Lei DDD (degenerative disc disease), cervical     Diabetes ([facility] [address]     GERD (gastroesophageal reflux disease)     Hernadez's esophagus long segment    Gout     Hearing loss     Hypercholesterolemia     Hypertension     Ill-defined condition     Hernadez's esophagus    Ill-defined condition     Glaucoma, Bell's palsy    Other ill-defined conditions(799.89)     gout    Other ill-defined conditions(799.89)     lipids    Other ill-defined conditions(799.89)     BPH     Current Outpatient Medications   Medication Sig    MITIGARE 0.6 mg capsule TAKE 1 CAPSULE DAILY    fosinopril (MONOPRIL) 40 mg tablet TAKE 1 TABLET DAILY    Insulin Needles, Disposable, (GAYATRI PEN NEEDLE) 32 gauge x 5/32\" ndle For use with Victoza and Toujeo daily. Two pen needles/day. ICD-10: E11.65    TOUJEO SOLOSTAR U-300 INSULIN 300 unit/mL (1.5 mL) inpn 20 Units by SubCUTAneous route daily.  VICTOZA 3-DONELL 0.6 mg/0.1 mL (18 mg/3 mL) pnij INJECT 0.2 ML (1.2 MG) UNDER THE SKIN DAILY    pravastatin (PRAVACHOL) 20 mg tablet TAKE 1 TABLET DAILY    glucose blood VI test strips (ONETOUCH VERIO) strip OneTouch Verio strips. Monitoring 3 times daily. Dx E11.9    pen needle, diabetic (NOVOTWIST) 32 gauge x 1/5\" ndle For use with tresiba and Victoza - twice daily.  magnesium oxide 500 mg tab Take 1 Tab by mouth daily.  cyanocobalamin 1,000 mcg tablet Take 1,000 mcg by mouth daily.  OTHER daily. motilium 10 mg daily (patient states drug not made in [place])     esomeprazole (NEXIUM) 40 mg capsule Take 1 Cap by mouth two (2) times a day. No current facility-administered medications for this visit.       Allergies   Allergen Reactions    Aspirin Other (comments)     GI bleed    Contrast Agent [Iodine] Hives and Itching     IVP dye       Review of Systems:  - Eyes: no blurry vision or double vision  - Cardiovascular: no chest pain  - Respiratory: no shortness of breath  - Musculoskeletal: See HPI  - Neurological: no numbness/tingling in extremities    Physical Examination:  Visit Vitals  /74   Pulse 67   Resp 16   Ht 5' 8\" (1.727 m)   Wt 156 lb (70.8 kg)   SpO2 98%   BMI 23.72 kg/m²     - General: pleasant, no distress, normal gait   HEENT: hearing intact, EOMI, clear sclera without icterus  - Cardiovascular: regular, normal rate   - Respiratory: normal effort  - Integumentary: no edema  - Psychiatric: normal mood and affect    Data Reviewed:   Lab Results   Component Value Date/Time    Hemoglobin A1c 7.5 11/28/2018 11:44 AM      Lab Results   Component Value Date/Time    Sodium 142 11/28/2018 11:44 AM    Potassium 4.6 11/28/2018 11:44 AM    Creatinine 1.37 11/28/2018 11:44 AM    Microalb/Creat ratio (ug/mg creat.) <12.3 04/17/2018 11:34 AM        Lab Results   Component Value Date/Time    Cholesterol, total 154 04/17/2018 11:34 AM    HDL Cholesterol 48 04/17/2018 11:34 AM    LDL, calculated 56 04/17/2018 11:34 AM    Triglyceride 250 04/17/2018 11:34 AM      No results found for: TSH, FT4, TRALT, TMCLT, TSHEXT   Component      Latest Ref Rng & Units 7/9/2019 3/4/2019           1:50 PM  3:43 PM   Hemoglobin A1c (POC)      % 6.9 7.4   Assessment/Plan:   1. Type 2 diabetes mellitus without complication, without long-term current use of insulin ([facility])   The bedtime monitoring he is doing is helpful. It appears this is likely the highest his glucoses are all day. Encouraged him to pay attention to these readings and use the bedtime glucose levels to adjust meal choices at dinnertime. As glucoses are occasionally in the high 80s, we will have him continue the Toujeo 16 units daily. Continue Victoza  Encouraged continued lifestyle efforts   2. Essential hypertension, benign   Controlled at today's visit. Continue fosinopril   3. CKD (chronic kidney disease) stage 3, GFR 30-59 ml/min ([facility])   GFR is been in the 50s. Reassess. Continue fosinopril   4. Dyslipidemia   Labs drawn today to reassess. We will consider changing pravastatin to rosuvastatin or atorvastatin     Patient Instructions   Diabetes. Continue exercise. Do you best to make good choices with meals    Continue Toujeo 16 units daily. Continue Victoza 1.2 mg daily. Goals for blood glucose:  Fasting  (less than 150)  Lunch, Dinner, Bedtime -  (less than 180). Blood pressure:  Continue fosinopril    Cholesterol  Continue  pravastatin. Follow-up and Dispositions    · Return in about 4 months (around 11/9/2019).            Copy sent to:

## 2019-07-09 NOTE — PATIENT INSTRUCTIONS
Diabetes. Continue exercise. Do you best to make good choices with meals    Continue Toujeo 16 units daily. Continue Victoza 1.2 mg daily. Goals for blood glucose:  Fasting  (less than 150)  Lunch, Dinner, Bedtime -  (less than 180). Blood pressure:  Continue fosinopril    Cholesterol  Continue  pravastatin.

## 2019-07-10 LAB
ALBUMIN SERPL-MCNC: 4.6 G/DL (ref 3.5–4.8)
ALBUMIN/CREAT UR: <14.7 MG/G CREAT (ref 0–30)
ALBUMIN/GLOB SERPL: 1.8 {RATIO} (ref 1.2–2.2)
ALP SERPL-CCNC: 90 IU/L (ref 39–117)
ALT SERPL-CCNC: 35 IU/L (ref 0–44)
AST SERPL-CCNC: 30 IU/L (ref 0–40)
BILIRUB SERPL-MCNC: 0.3 MG/DL (ref 0–1.2)
BUN SERPL-MCNC: 18 MG/DL (ref 8–27)
BUN/CREAT SERPL: 16 (ref 10–24)
CALCIUM SERPL-MCNC: 9.7 MG/DL (ref 8.6–10.2)
CHLORIDE SERPL-SCNC: 107 MMOL/L (ref 96–106)
CHOLEST SERPL-MCNC: 150 MG/DL (ref 100–199)
CO2 SERPL-SCNC: 21 MMOL/L (ref 20–29)
CREAT SERPL-MCNC: 1.15 MG/DL (ref 0.76–1.27)
CREAT UR-MCNC: 20.4 MG/DL
GLOBULIN SER CALC-MCNC: 2.6 G/DL (ref 1.5–4.5)
GLUCOSE SERPL-MCNC: 92 MG/DL (ref 65–99)
HBA1C MFR BLD: NORMAL %
HDLC SERPL-MCNC: 48 MG/DL
LDLC SERPL CALC-MCNC: 57 MG/DL (ref 0–99)
MICROALBUMIN UR-MCNC: <3 UG/ML
POTASSIUM SERPL-SCNC: 4.5 MMOL/L (ref 3.5–5.2)
PROT SERPL-MCNC: 7.2 G/DL (ref 6–8.5)
SODIUM SERPL-SCNC: 141 MMOL/L (ref 134–144)
SPECIMEN STATUS REPORT, ROLRST: NORMAL
TRIGL SERPL-MCNC: 226 MG/DL (ref 0–149)
VLDLC SERPL CALC-MCNC: 45 MG/DL (ref 5–40)

## 2019-08-16 ENCOUNTER — TELEPHONE (OUTPATIENT)
Dept: ENDOCRINOLOGY | Age: 74
End: 2019-08-16

## 2019-08-16 NOTE — TELEPHONE ENCOUNTER
----- Message from Yanely Pratt sent at 8/16/2019 10:55 AM EDT -----  Regarding: /Telephone  Tania Hall pt's wife called requesting a call back in regards to pt is inquiring how to take insulin prior to prep and procedure for colonoscopy scheduled for next Tuesday and Wednesday . Best contact number is [phone] .

## 2019-08-16 NOTE — TELEPHONE ENCOUNTER
Called pt wife mobile phone. x2 ID verified. Spoke with wife in regards to insulin regime pre op Colonoscopy. Pt Dr. Estela Bray pt can continue Victoza as prescribed and lower the toujeo to 12 units during the time taking the prep. And the day of the procedure take his normal amount of toujeo and victoza after he returns home from the procedure. Pt's wife verbalized understanding, No other questions or concerns voiced at this time.

## 2019-08-16 NOTE — TELEPHONE ENCOUNTER
8/16/2019  11:09 AM          Please see message from answering service regarding a call back about insulin.           Thanks

## 2019-08-21 ENCOUNTER — ANESTHESIA EVENT (OUTPATIENT)
Dept: ENDOSCOPY | Age: 74
End: 2019-08-21
Payer: MEDICARE

## 2019-08-21 ENCOUNTER — HOSPITAL ENCOUNTER (OUTPATIENT)
Age: 74
Setting detail: OUTPATIENT SURGERY
Discharge: HOME | End: 2019-08-21
Attending: INTERNAL MEDICINE | Admitting: INTERNAL MEDICINE
Payer: MEDICARE

## 2019-08-21 ENCOUNTER — ANESTHESIA (OUTPATIENT)
Dept: ENDOSCOPY | Age: 74
End: 2019-08-21
Payer: MEDICARE

## 2019-08-21 VITALS
WEIGHT: 153 LBS | BODY MASS INDEX: 23.19 KG/M2 | RESPIRATION RATE: 10 BRPM | DIASTOLIC BLOOD PRESSURE: 79 MMHG | SYSTOLIC BLOOD PRESSURE: 128 MMHG | HEIGHT: 68 IN | OXYGEN SATURATION: 99 % | HEART RATE: 61 BPM | TEMPERATURE: 97.4 F

## 2019-08-21 LAB
GLUCOSE BLD STRIP.AUTO-MCNC: 124 MG/DL (ref 65–100)
GLUCOSE BLD STRIP.AUTO-MCNC: 69 MG/DL (ref 65–100)
SERVICE CMNT-IMP: ABNORMAL
SERVICE CMNT-IMP: NORMAL

## 2019-08-21 PROCEDURE — 82962 GLUCOSE BLOOD TEST: CPT

## 2019-08-21 PROCEDURE — 76040000007: Performed by: INTERNAL MEDICINE

## 2019-08-21 PROCEDURE — 74011000250 HC RX REV CODE- 250: Performed by: ANESTHESIOLOGY

## 2019-08-21 PROCEDURE — 88305 TISSUE EXAM BY PATHOLOGIST: CPT

## 2019-08-21 PROCEDURE — 74011250636 HC RX REV CODE- 250/636: Performed by: NURSE ANESTHETIST, CERTIFIED REGISTERED

## 2019-08-21 PROCEDURE — 74011250637 HC RX REV CODE- 250/637: Performed by: INTERNAL MEDICINE

## 2019-08-21 PROCEDURE — 74011250636 HC RX REV CODE- 250/636: Performed by: INTERNAL MEDICINE

## 2019-08-21 PROCEDURE — 76060000032 HC ANESTHESIA 0.5 TO 1 HR: Performed by: INTERNAL MEDICINE

## 2019-08-21 PROCEDURE — 77030021593 HC FCPS BIOP ENDOSC BSC -A: Performed by: INTERNAL MEDICINE

## 2019-08-21 RX ORDER — DEXTROMETHORPHAN/PSEUDOEPHED 2.5-7.5/.8
1.2 DROPS ORAL
Status: DISCONTINUED | OUTPATIENT
Start: 2019-08-21 | End: 2019-08-21 | Stop reason: HOSPADM

## 2019-08-21 RX ORDER — DEXTROMETHORPHAN/PSEUDOEPHED 2.5-7.5/.8
1.2 DROPS ORAL
Status: DISCONTINUED | OUTPATIENT
Start: 2019-08-21 | End: 2019-08-21 | Stop reason: SDUPTHER

## 2019-08-21 RX ORDER — FLUMAZENIL 0.1 MG/ML
0.2 INJECTION INTRAVENOUS
Status: DISCONTINUED | OUTPATIENT
Start: 2019-08-21 | End: 2019-08-21 | Stop reason: HOSPADM

## 2019-08-21 RX ORDER — MIDAZOLAM HYDROCHLORIDE 1 MG/ML
1-3 INJECTION, SOLUTION INTRAMUSCULAR; INTRAVENOUS
Status: DISCONTINUED | OUTPATIENT
Start: 2019-08-21 | End: 2019-08-21 | Stop reason: HOSPADM

## 2019-08-21 RX ORDER — EPINEPHRINE 0.1 MG/ML
1 INJECTION INTRACARDIAC; INTRAVENOUS
Status: DISCONTINUED | OUTPATIENT
Start: 2019-08-21 | End: 2019-08-21 | Stop reason: SDUPTHER

## 2019-08-21 RX ORDER — LIDOCAINE HYDROCHLORIDE 20 MG/ML
INJECTION, SOLUTION EPIDURAL; INFILTRATION; INTRACAUDAL; PERINEURAL AS NEEDED
Status: DISCONTINUED | OUTPATIENT
Start: 2019-08-21 | End: 2019-08-21 | Stop reason: HOSPADM

## 2019-08-21 RX ORDER — DEXTROSE 50 % IN WATER (D50W) INTRAVENOUS SYRINGE
Status: DISCONTINUED
Start: 2019-08-21 | End: 2019-08-21 | Stop reason: HOSPADM

## 2019-08-21 RX ORDER — SODIUM CHLORIDE 0.9 % (FLUSH) 0.9 %
5-40 SYRINGE (ML) INJECTION EVERY 8 HOURS
Status: DISCONTINUED | OUTPATIENT
Start: 2019-08-21 | End: 2019-08-21 | Stop reason: SDUPTHER

## 2019-08-21 RX ORDER — FLUMAZENIL 0.1 MG/ML
0.2 INJECTION INTRAVENOUS
Status: DISCONTINUED | OUTPATIENT
Start: 2019-08-21 | End: 2019-08-21 | Stop reason: SDUPTHER

## 2019-08-21 RX ORDER — SODIUM CHLORIDE 9 MG/ML
100 INJECTION, SOLUTION INTRAVENOUS CONTINUOUS
Status: DISCONTINUED | OUTPATIENT
Start: 2019-08-21 | End: 2019-08-21 | Stop reason: HOSPADM

## 2019-08-21 RX ORDER — PROPOFOL 10 MG/ML
INJECTION, EMULSION INTRAVENOUS AS NEEDED
Status: DISCONTINUED | OUTPATIENT
Start: 2019-08-21 | End: 2019-08-21 | Stop reason: HOSPADM

## 2019-08-21 RX ORDER — NALOXONE HYDROCHLORIDE 0.4 MG/ML
0.4 INJECTION, SOLUTION INTRAMUSCULAR; INTRAVENOUS; SUBCUTANEOUS
Status: DISCONTINUED | OUTPATIENT
Start: 2019-08-21 | End: 2019-08-21 | Stop reason: HOSPADM

## 2019-08-21 RX ORDER — ATROPINE SULFATE 0.1 MG/ML
0.5 INJECTION INTRAVENOUS
Status: DISCONTINUED | OUTPATIENT
Start: 2019-08-21 | End: 2019-08-21 | Stop reason: HOSPADM

## 2019-08-21 RX ORDER — SODIUM CHLORIDE 9 MG/ML
INJECTION, SOLUTION INTRAVENOUS
Status: DISCONTINUED | OUTPATIENT
Start: 2019-08-21 | End: 2019-08-21 | Stop reason: HOSPADM

## 2019-08-21 RX ORDER — DEXTROSE 50 % IN WATER (D50W) INTRAVENOUS SYRINGE
25 ONCE
Status: COMPLETED | OUTPATIENT
Start: 2019-08-21 | End: 2019-08-21

## 2019-08-21 RX ORDER — SODIUM CHLORIDE 0.9 % (FLUSH) 0.9 %
5-40 SYRINGE (ML) INJECTION AS NEEDED
Status: DISCONTINUED | OUTPATIENT
Start: 2019-08-21 | End: 2019-08-21 | Stop reason: SDUPTHER

## 2019-08-21 RX ORDER — EPINEPHRINE 0.1 MG/ML
1 INJECTION INTRACARDIAC; INTRAVENOUS
Status: DISCONTINUED | OUTPATIENT
Start: 2019-08-21 | End: 2019-08-21 | Stop reason: HOSPADM

## 2019-08-21 RX ORDER — SODIUM CHLORIDE 0.9 % (FLUSH) 0.9 %
5-40 SYRINGE (ML) INJECTION AS NEEDED
Status: DISCONTINUED | OUTPATIENT
Start: 2019-08-21 | End: 2019-08-21 | Stop reason: HOSPADM

## 2019-08-21 RX ORDER — ATROPINE SULFATE 0.1 MG/ML
0.5 INJECTION INTRAVENOUS
Status: DISCONTINUED | OUTPATIENT
Start: 2019-08-21 | End: 2019-08-21 | Stop reason: SDUPTHER

## 2019-08-21 RX ORDER — SODIUM CHLORIDE 0.9 % (FLUSH) 0.9 %
5-40 SYRINGE (ML) INJECTION EVERY 8 HOURS
Status: DISCONTINUED | OUTPATIENT
Start: 2019-08-21 | End: 2019-08-21 | Stop reason: HOSPADM

## 2019-08-21 RX ADMIN — LIDOCAINE HYDROCHLORIDE 100 MG: 20 INJECTION, SOLUTION EPIDURAL; INFILTRATION; INTRACAUDAL; PERINEURAL at 10:14

## 2019-08-21 RX ADMIN — PROPOFOL 40 MG: 10 INJECTION, EMULSION INTRAVENOUS at 10:33

## 2019-08-21 RX ADMIN — PROPOFOL 40 MG: 10 INJECTION, EMULSION INTRAVENOUS at 10:28

## 2019-08-21 RX ADMIN — PROPOFOL 40 MG: 10 INJECTION, EMULSION INTRAVENOUS at 10:16

## 2019-08-21 RX ADMIN — PROPOFOL 40 MG: 10 INJECTION, EMULSION INTRAVENOUS at 10:35

## 2019-08-21 RX ADMIN — PROPOFOL 40 MG: 10 INJECTION, EMULSION INTRAVENOUS at 10:22

## 2019-08-21 RX ADMIN — DEXTROSE MONOHYDRATE 12.5 G: 500 INJECTION PARENTERAL at 10:04

## 2019-08-21 RX ADMIN — SODIUM CHLORIDE 100 ML/HR: 900 INJECTION, SOLUTION INTRAVENOUS at 10:07

## 2019-08-21 RX ADMIN — PROPOFOL 40 MG: 10 INJECTION, EMULSION INTRAVENOUS at 10:30

## 2019-08-21 RX ADMIN — SODIUM CHLORIDE: 900 INJECTION, SOLUTION INTRAVENOUS at 09:43

## 2019-08-21 RX ADMIN — PROPOFOL 40 MG: 10 INJECTION, EMULSION INTRAVENOUS at 10:25

## 2019-08-21 RX ADMIN — SIMETHICONE 80 MG: 20 SUSPENSION/ DROPS ORAL at 10:24

## 2019-08-21 RX ADMIN — PROPOFOL 40 MG: 10 INJECTION, EMULSION INTRAVENOUS at 10:19

## 2019-08-21 RX ADMIN — PROPOFOL 80 MG: 10 INJECTION, EMULSION INTRAVENOUS at 10:14

## 2019-08-21 NOTE — ANESTHESIA PREPROCEDURE EVALUATION
Anesthetic History   No history of anesthetic complications            Review of Systems / Medical History  Patient summary reviewed, nursing notes reviewed and pertinent labs reviewed    Pulmonary                Comments: Former smoker - Quit 1991 - 40 pack years   Neuro/Psych   Within defined limits           Cardiovascular    Hypertension          Hyperlipidemia    Exercise tolerance: >4 METS     GI/Hepatic/Renal     GERD    Renal disease      Comments: H/O Colon Polyps  Melena    Hernadez's esophagus Endo/Other    Diabetes: well controlled, type 2    Arthritis and anemia     Other Findings   Comments: Glaucoma         Physical Exam    Airway  Mallampati: II  TM Distance: 4 - 6 cm  Neck ROM: normal range of motion   Mouth opening: Normal     Cardiovascular  Regular rate and rhythm,  S1 and S2 normal,  no murmur, click, rub, or gallop             Dental  No notable dental hx       Pulmonary  Breath sounds clear to auscultation               Abdominal  GI exam deferred       Other Findings            Anesthetic Plan    ASA: 2  Anesthesia type: total IV anesthesia and general          Induction: Intravenous  Anesthetic plan and risks discussed with: Patient

## 2019-08-21 NOTE — PROCEDURES
[facility]                  Colonoscopy Operative Report    8/21/2019      Bear Martinez  [id number]  1945    Procedure Type:   Colonoscopy --screening     Indications:    Personal history of colon polyps (screening only)     Pre-operative Diagnosis: see indication above    Post-operative Diagnosis:  See findings below    :  Brandon Abdi MD    Referring Provider: Raquel Smith MD      Sedation:  MAC anesthesia Propofol    Pre-Procedural Exam:      Airway: clear,  No airway problems anticipated  Heart: RRR, without gallops or rubs  Lungs: clear bilaterally without wheezes, crackles, or rhonchi  Abdomen: soft, nontender, nondistended, bowel sounds present  Mental Status: awake, alert and oriented to person, place and time     Procedure Details:  After informed consent was obtained with all risks and benefits of procedure explained and preoperative exam completed, the patient was taken to the endoscopy suite and placed in the left lateral decubitus position. Upon sequential sedation as per above, a digital rectal exam was performed . The Olympus videocolonoscope  was inserted in the rectum and carefully advanced to the cecum, which was identified by the ileocecal valve and appendiceal orifice. The cecum was identified by the ileocecal valve and appendiceal orifice. The quality of preparation was adequate. The colonoscope was slowly withdrawn with careful evaluation between folds. Retroflexion in the rectum was completed demonstrating internal hemorrhoids. Findings:   Rectum: Grade 1 internal hemorrhoid(s); Sigmoid: normal  Descending Colon: normal  Transverse Colon: normal  Ascending Colon: normal  Cecum: normal  Terminal Ileum: not intubated      Specimen Removed:  none    Complications: None. EBL:  None. Impression:    normal colonic mucosa throughout  hemorrhoids internal, Small in size    Recommendations: --Repeat colonoscopy in 5 years.  High fiber diet. Resume normal medication(s). Discharge Disposition:  Home in the company of a  when able to ambulate. Eulalia Fink MD    8/21/2019     ELENITA Aguilar MD  [facility], [address] [place] [address] [place] [address] [place] [address]  [phone]  [email or web address]
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
## 2019-08-21 NOTE — PERIOP NOTES
Anesthesia reports 400mg Propofol, 100mg Lidocaine and 700mL NS given during procedure. Received report from anesthesia staff on vital signs and status of patient.

## 2019-08-21 NOTE — ROUTINE PROCESS
Wiley Ally  1945  [id number]    Situation:  Verbal report received from: Peter Amin RN  Procedure: Procedure(s):  ESOPHAGOGASTRODUODENOSCOPY WITH BIOPSY  COLONOSCOPY  ESOPHAGOGASTRODUODENOSCOPY (EGD)    Background:    Preoperative diagnosis: HX OF POLYPS, MELENA  Postoperative diagnosis: egd- barretts esophagus  colon- hemorrhoids    :  Dr. Malini Jane  Assistant(s): Endoscopy Technician-1: Carola Roe  Endoscopy RN-1: Karina Saleem RN    Specimens:   ID Type Source Tests Collected by Time Destination   1 : distal esophagus biopsyh Preservative Esophagus, Distal  Liz Bermeo MD 8/21/2019 1028 Pathology     H. Pylori  no    Assessment:  Intra-procedure medications     Anesthesia gave intra-procedure sedation and medications, see anesthesia flow sheet     Intravenous fluids: NS@ KVO     Vital signs stable     Abdominal assessment: round and soft     Recommendation:  Discharge patient per MD order.     Family or Friend   Permission to share finding with family or friend yes

## 2019-08-21 NOTE — DISCHARGE INSTRUCTIONS
Genesis Spangler MD  [facility], [address]  [address] [facility], [address] [facility]  [phone]  www. [employer]. [facility]  [id number]  1945    EGD DISCHARGE INSTRUCTIONS    Discomfort:  Sore throat- throat lozenges or warm salt water gargle  redness at IV site- apply warm compress to area; if redness or soreness persist- contact your physician  Gaseous discomfort- walking, belching will help relieve any discomfort  You may not operate a vehicle for 12 hours  You may not engage in an occupation involving machinery or appliances for rest of today  You may not drink alcoholic beverages for at least 12 hours  Avoid making any critical decisions for at least 24 hour  DIET  You may have anything by mouth. You may eat and drink immediately. You may resume your regular diet - however -  remember your colon is empty and a heavy meal will produce gas. Avoid these foods:  vegetables, fried / greasy foods, carbonated drinks      ACTIVITY  You may resume your normal daily activities   Spend the remainder of the day resting -  avoid any strenuous activity. CALL M.D. ANY SIGN OF   Increasing pain, nausea, vomiting  Abdominal distension (swelling)  New increased bleeding (oral or rectal)  Fever (chills)  Pain in chest area  Bloody discharge from nose or mouth  Shortness of breath    Follow-up Instructions:   Call Dr. Genesis Spangler for any questions or problems. Telephone # [phone]  Dr. Nathan Faye office will notify you of the biopsy results available  Within 7 to 10 days. We will call you or send a letter   Continue same medications. ENDOSCOPY FINDINGS:   Your endoscopy showed the Hernadez's esophagus which was biopsied.       DISCHARGE SUMMARY from Nurse    The following personal items collected during your admission are returned to you:   Dental Appliance: Dental Appliances: None  Vision: Visual Aid: None  Hearing Aid:    Jewelry:    Clothing: Other Valuables:    Valuables sent to safe:     COLON DISCHARGE INSTRUCTIONS  Discomfort:  Redness at IV site- apply warm compress to area; if redness or soreness persist- contact your physician  There may be a slight amount of blood passed from the rectum  Gaseous discomfort- walking, belching will help relieve any discomfort  You may not operate a vehicle for 12 hours  You may not engage in an occupation involving machinery or appliances for rest of today  You may not drink alcoholic beverages for at least 12 hours  Avoid making any critical decisions for at least 24 hour  DIET:   High fiber diet. - however -  remember your colon is empty and a heavy meal will produce gas. Avoid these foods:  vegetables, fried / greasy foods, carbonated drinks for today      ACTIVITY:  You may resume your normal daily activities it is recommended that you spend the remainder of the day resting -  avoid any strenuous activity. CALL M.D. ANY SIGN OF:   Increasing pain, nausea, vomiting  Abdominal distension (swelling)  New increased bleeding (oral or rectal)  Fever (chills)  Pain in chest area  Bloody discharge from nose or mouth  Shortness of breath     COLONOSCOPY FINDINGS:  Your colonoscopy showed: no polyps seen. Follow-up Instructions:   Call Dr. Essence Turner if any questions or problems. Telephone # [phone]    Should have a repeat colonoscopy in 5 years.

## 2019-08-21 NOTE — PROCEDURES
[facility]                   Endoscopic Gastroduodenoscopy Procedure Note      8/21/2019  Karie Becerra  1945  [id number]    Procedure: Endoscopic Gastroduodenoscopy with biopsy    Indication: Hernadez's esophagus     Pre-operative Diagnosis: see indication above    Post-operative Diagnosis: see findings below    : ELENITA Avalos MD    Referring Provider:  Steve Douglass MD      Anesthesia/Sedation:  MAC anesthesia Propofol    Airway assessment: No airway problems anticipated    Pre-Procedural Exam:      Airway: clear, no airway problems anticipated  Heart: RRR, without gallops or rubs  Lungs: clear bilaterally without wheezes, crackles, or rhonchi  Abdomen: soft, nontender, nondistended, bowel sounds present  Mental Status: awake, alert and oriented to person, place and time       Procedure Details     After infomed consent was obtained for the procedure, with all risks and benefits of procedure explained the patient was taken to the endoscopy suite and placed in the left lateral decubitus position. Following sequential administration of sedation as per above, the endoscope was inserted into the mouth and advanced under direct vision to second portion of the duodenum. A careful inspection was made as the gastroscope was withdrawn, including a retroflexed view of the proximal stomach; findings and interventions are described below. Findings:   Esophagus: Extensive circumferential Hernadez's from 26 to 38 cm. Biopsied. S/p Nissen  Stomach: s/p Nissen. Normal mucosa  Duodenum: normal    Therapies:  biopsy of esophagus    Specimens: esophageal biopsies           Complications:   None; patient tolerated the procedure well. EBL:  None. Impression:      -long segment of barretts esophagus  -s/p nissen fundoplication  -normal stomach  -normal duodenum    Recommendations:  -Continue acid suppression. , -Await pathology. , -repeat egd in 2 years    Alexandra Frye MD8/21/2019

## 2019-08-21 NOTE — ANESTHESIA POSTPROCEDURE EVALUATION
Procedure(s):  ESOPHAGOGASTRODUODENOSCOPY WITH BIOPSY  COLONOSCOPY  ESOPHAGOGASTRODUODENOSCOPY (EGD). total IV anesthesia, general    Anesthesia Post Evaluation        Patient location during evaluation: PACU  Note status: Adequate. Level of consciousness: responsive to verbal stimuli and sleepy but conscious  Pain management: satisfactory to patient  Airway patency: patent  Anesthetic complications: no  Cardiovascular status: acceptable  Respiratory status: acceptable  Hydration status: acceptable  Comments: +Post-Anesthesia Evaluation and Assessment    Patient: Joelle Prakash MRN: [id number]  SSN: [id number]   YOB: 1945  Age: 68 y.o. Sex: male      Cardiovascular Function/Vital Signs    BP 93/57   Pulse (!) 59   Temp 36.8 °C (98.2 °F)   Resp 14   Ht 5' 8\" (1.727 m)   Wt 69.4 kg (153 lb)   SpO2 94%   BMI 23.26 kg/m²     Patient is status post Procedure(s):  ESOPHAGOGASTRODUODENOSCOPY WITH BIOPSY  COLONOSCOPY  ESOPHAGOGASTRODUODENOSCOPY (EGD). Nausea/Vomiting: Controlled. Postoperative hydration reviewed and adequate. Pain:  Pain Scale 1: Numeric (0 - 10) (08/21/19 0951)  Pain Intensity 1: 0 (08/21/19 0951)   Managed. Neurological Status: At baseline. Mental Status and Level of Consciousness: Arousable. Pulmonary Status:   O2 Device: Room air (08/21/19 1040)   Adequate oxygenation and airway patent. Complications related to anesthesia: None    Post-anesthesia assessment completed. No concerns.     Signed By: Valerio Das MD    8/21/2019  Post anesthesia nausea and vomiting:  controlled      Vitals Value Taken Time   BP 93/57 8/21/2019 10:40 AM   Temp     Pulse 59 8/21/2019 10:40 AM   Resp 14 8/21/2019 10:40 AM   SpO2 94 % 8/21/2019 10:40 AM

## 2019-10-08 RX ORDER — PRAVASTATIN SODIUM 20 MG/1
TABLET ORAL
Qty: 90 TAB | Refills: 4 | Status: SHIPPED | OUTPATIENT
Start: 2019-10-08 | End: 2020-12-24

## 2019-11-18 ENCOUNTER — OFFICE VISIT (OUTPATIENT)
Dept: ENDOCRINOLOGY | Age: 74
End: 2019-11-18

## 2019-11-18 VITALS
SYSTOLIC BLOOD PRESSURE: 124 MMHG | HEIGHT: 68 IN | DIASTOLIC BLOOD PRESSURE: 76 MMHG | HEART RATE: 63 BPM | WEIGHT: 157.8 LBS | BODY MASS INDEX: 23.92 KG/M2

## 2019-11-18 DIAGNOSIS — E11.9 TYPE 2 DIABETES MELLITUS WITHOUT COMPLICATION, WITHOUT LONG-TERM CURRENT USE OF INSULIN (HCC): Primary | ICD-10-CM

## 2019-11-18 DIAGNOSIS — M79.672 PAIN IN BOTH FEET: ICD-10-CM

## 2019-11-18 DIAGNOSIS — G62.9 NEUROPATHY: ICD-10-CM

## 2019-11-18 DIAGNOSIS — I10 ESSENTIAL HYPERTENSION, BENIGN: ICD-10-CM

## 2019-11-18 DIAGNOSIS — M79.671 PAIN IN BOTH FEET: ICD-10-CM

## 2019-11-18 LAB — HBA1C MFR BLD HPLC: 7.1 %

## 2019-11-18 RX ORDER — LATANOPROST 50 UG/ML
SOLUTION/ DROPS OPHTHALMIC
COMMUNITY
Start: 2019-09-16

## 2019-11-18 NOTE — PATIENT INSTRUCTIONS
Diabetes. Continue exercise. Continue Toujeo 17 units daily. Continue Victoza 1.2 mg daily. Goals for blood glucose: 
Fasting  (less than 150) Lunch, Dinner, Bedtime -  (less than 180). Blood pressure: 
Continue fosinopril Cholesterol Continue  pravastatin. Endocrinology options in [place] area: [facility]:  
Eze Michaels, [facility] ALINAON -- Address: [address] [place] [address], [address], [facility] [address] Phone: [phone] [facility] - Eze Ortiz and Rupert Light. Address: [place], [place]. [address] Phone: [phone] Non-[facility] Endocrinology groups:  
 
[facility] Two locations: [address] [place] [address] Phone: [phone] [facility] Two locations: [place] [address] Phone: [phone] Maritza Robles and Dr Savanna Moore and Dr [facility] location: 
[place] [address], [address] [place], [address] [facility] End Location: 
[address], 4 [facility], [address] [place] [address] Phone:  [phone] [place] [facility]: Dr Rogelio Ewing and Dr Robert Pearl [address] 
[phone] [address] [place], [place], [address] [phone]

## 2019-11-18 NOTE — PROGRESS NOTES
History of Present Illness: Monty Flores is a 69 y.o. male presents for follow-up of diabetes. Reviewed at office visit. He was diagnosed in 2009.     He also has Hernadez's esophagus and and has history of bleeding AVMs      A1c 7.1 with POC A1c.      Diabetes related complications:  No microalbumin  Does have occ sx of neuropathy in feet. He feels this is progressing. More noteworthy when driving. Symptoms are worse in his right foot. No pain, tingling, or burning, but does report decreased feeling.     Current diabetes regimen:  - did not tolerate metformin - GI side effects  Toujeo: 16 units daily. Victoza 1.2 mg daily.     Glucoses: Brings meter and glucose log for review. Checking fasting and bedtime  Fasting values range from 80s-110s  Bedtime values can be as low as 140s and as high as 200s. Has had some very high glucoses - in 300s-500s after steroid injections in neck - has had 3 all together - August - now. On occasions when glucoses were very high, he reports taking about 4 extra units of Toujeo. Lowest value, which was 73, occurred the day after I have value, likely in part due to higher doses of Toujeo the days prior. Diet: eating out more. Weight is overall stable     Exercise -Playing golf once a week. Remains active around the house and with volunteering. Reports his feet bother him significantly for several days after he does play golf.     Lipids - taking pravastatin 20 mg. Social:   and retired. Wife accompanies him  Retired banker.      Past Medical History:   Diagnosis Date    Chronic kidney disease     Colon polyps 2007    Dr. Vale Mcgowan DDD (degenerative disc disease), cervical     Diabetes ([facility] [address]     GERD (gastroesophageal reflux disease)     Hernadez's esophagus long segment    Gout     Hearing loss     Hypercholesterolemia     Hypertension     Ill-defined condition     Hernadez's esophagus    Ill-defined condition     Glaucoma, Bell's palsy    Other ill-defined conditions(799.89)     gout    Other ill-defined conditions(799.89)     lipids    Other ill-defined conditions(799.89)     BPH     Current Outpatient Medications   Medication Sig    latanoprost (XALATAN) 0.005 % ophthalmic solution INT 1 GTT IN OU QD HS    pravastatin (PRAVACHOL) 20 mg tablet TAKE 1 TABLET DAILY    MITIGARE 0.6 mg capsule TAKE 1 CAPSULE DAILY    fosinopril (MONOPRIL) 40 mg tablet TAKE 1 TABLET DAILY    Insulin Needles, Disposable, (GAYATRI PEN NEEDLE) 32 gauge x 5/32\" ndle For use with Victoza and Toujeo daily. Two pen needles/day. ICD-10: E11.65    TOUJEO SOLOSTAR U-300 INSULIN 300 unit/mL (1.5 mL) inpn 20 Units by SubCUTAneous route daily.  VICTOZA 3-DONELL 0.6 mg/0.1 mL (18 mg/3 mL) pnij INJECT 0.2 ML (1.2 MG) UNDER THE SKIN DAILY    glucose blood VI test strips (ONETOUCH VERIO) strip OneTouch Verio strips. Monitoring 3 times daily. Dx E11.9    pen needle, diabetic (NOVOTWIST) 32 gauge x 1/5\" ndle For use with tresiba and Victoza - twice daily.  magnesium oxide 500 mg tab Take 1 Tab by mouth daily.  cyanocobalamin 1,000 mcg tablet Take 1,000 mcg by mouth daily.  OTHER daily. motilium 10 mg daily (patient states drug not made in [place])     esomeprazole (NEXIUM) 40 mg capsule Take 1 Cap by mouth two (2) times a day. No current facility-administered medications for this visit.       Allergies   Allergen Reactions    Aspirin Other (comments)     GI bleed    Contrast Agent [Iodine] Hives and Itching     IVP dye       Review of Systems:  - Eyes: no blurry vision or double vision  - Cardiovascular: no chest pain  - Respiratory: no shortness of breath  - Musculoskeletal: see HPI - having neck pain  - Neurological: see HPI     Physical Examination:  Visit Vitals  /76 (BP 1 Location: Left arm, BP Patient Position: Sitting)   Pulse 63   Ht 5' 8\" (1.727 m)   Wt 157 lb 12.8 oz (71.6 kg)   BMI 23.99 kg/m²   -   - General: pleasant, no distress, normal gait   HEENT: hearing intact, EOMI, clear sclera without icterus  - Cardiovascular: regular, normal rate   - Respiratory: normal effort  - Integumentary: no edema  - Psychiatric: normal mood and affect    Data Reviewed:   Lab Results   Component Value Date/Time    Hemoglobin A1c CANCELED 07/09/2019 12:00 AM      Lab Results   Component Value Date/Time    Sodium 141 07/09/2019 12:00 AM    Potassium 4.5 07/09/2019 12:00 AM    Creatinine 1.15 07/09/2019 12:00 AM    Microalb/Creat ratio (ug/mg creat.) <14.7 07/09/2019 12:00 AM        Lab Results   Component Value Date/Time    Cholesterol, total 150 07/09/2019 12:00 AM    HDL Cholesterol 48 07/09/2019 12:00 AM    LDL, calculated 57 07/09/2019 12:00 AM    Triglyceride 226 07/09/2019 12:00 AM      No results found for: TSH, FT4, TRALT, TMCLT, TSHEXT     Assessment/Plan:   1. Type 2 diabetes mellitus without complication, without long-term current use of insulin (HCC)   Overall well controlled. Hemoglobin A1c would be lower without recent steroid injections and resultant high values  Continue Toujeo to 70 units daily and Victoza 1.2 mg/day  Reviewed that if he does have high glucoses, I do not recommend taking additional Toujeo. If needed, we could use a rapid acting insulin actions from us to take for high values. He and I agree this is not needed at this time. 2. Essential hypertension, benign    3. Pain in both feet   Recommend he see a podiatrist who may identify mechanical problems that could be addressed to help decreased discomfort after playing golf   4. Neuropathy in feet: As it is more pronounced on the right side, and asymmetric, wonder if this may be due to lumbar spine related problems. ? Could see neurology. As symptoms are mainly numbness, do not feel that medications would be helpful for him. Patient Instructions     Diabetes. Continue exercise. Continue Toujeo 17 units daily. Continue Victoza 1.2 mg daily.      Goals for blood glucose:  Fasting  (less than 150)  Lunch, Dinner, Bedtime -  (less than 180). Blood pressure:  Continue fosinopril    Cholesterol  Continue  pravastatin. Endocrinology options in [address]  area: [facility]:   Eze Delgado and [facility] and Endocr[facility]ology  FRED WOODALLUMU --   Address: [address] [place] [address] II, [address], [place] [facility] 136   Phone: [phone]     [facility] Endocrinology - Eze Robledo and [facility].   Address: [place], [place]. [address]   Phone: [phone]     Non-[facility] Endocrinology groups:     [facility]   Two locations: [address] [place] [address]   Phone: Adama Thornton 81 Diabetes and Endocrinology   Two locations: [address] [place] [address] Dr Claire Rothman   Phone: [phone]       [facility] - Dr Jossue Saini and Dr Kip Dunaway and Dr Ayesha Acosta location:  [address] [facility] [address], [address] [facility], [address] [facility] End Location:  [address], 301 [facility] 83,[address] 100 [address] [facility], [address]    Phone:  [phone]    OR    The [address] [place] [address] Road: Dr Jose Plascencia and Dr Katey Cabrera 443 [facility]  [address] [facility], [address] [facility]  [phone]      [address] [place] [address]  [address] Alonzo [address] [place] [address], [address] [phone]
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
## 2019-12-04 ENCOUNTER — HOSPITAL ENCOUNTER (OUTPATIENT)
Dept: LAB | Age: 74
Discharge: HOME | End: 2019-12-04
Payer: MEDICARE

## 2019-12-04 ENCOUNTER — OFFICE VISIT (OUTPATIENT)
Dept: INTERNAL MEDICINE CLINIC | Age: 74
End: 2019-12-04

## 2019-12-04 VITALS
OXYGEN SATURATION: 95 % | RESPIRATION RATE: 16 BRPM | HEIGHT: 68 IN | DIASTOLIC BLOOD PRESSURE: 64 MMHG | WEIGHT: 156 LBS | TEMPERATURE: 97.4 F | BODY MASS INDEX: 23.64 KG/M2 | SYSTOLIC BLOOD PRESSURE: 134 MMHG | HEART RATE: 88 BPM

## 2019-12-04 DIAGNOSIS — Z00.00 MEDICARE ANNUAL WELLNESS VISIT, SUBSEQUENT: ICD-10-CM

## 2019-12-04 DIAGNOSIS — D64.9 ANEMIA, UNSPECIFIED TYPE: ICD-10-CM

## 2019-12-04 DIAGNOSIS — I10 ESSENTIAL HYPERTENSION: ICD-10-CM

## 2019-12-04 DIAGNOSIS — Z79.4 CONTROLLED TYPE 2 DIABETES MELLITUS WITHOUT COMPLICATION, WITH LONG-TERM CURRENT USE OF INSULIN (HCC): Primary | ICD-10-CM

## 2019-12-04 DIAGNOSIS — E78.00 PURE HYPERCHOLESTEROLEMIA: ICD-10-CM

## 2019-12-04 DIAGNOSIS — E11.9 CONTROLLED TYPE 2 DIABETES MELLITUS WITHOUT COMPLICATION, WITH LONG-TERM CURRENT USE OF INSULIN (HCC): Primary | ICD-10-CM

## 2019-12-04 DIAGNOSIS — M54.2 NECK PAIN: ICD-10-CM

## 2019-12-04 PROCEDURE — 36415 COLL VENOUS BLD VENIPUNCTURE: CPT

## 2019-12-04 PROCEDURE — 80061 LIPID PANEL: CPT

## 2019-12-04 PROCEDURE — 80053 COMPREHEN METABOLIC PANEL: CPT

## 2019-12-04 PROCEDURE — 85025 COMPLETE CBC W/AUTO DIFF WBC: CPT

## 2019-12-04 NOTE — PROGRESS NOTES
Chief Complaint   Patient presents with   [facility]     Non Fasting    Annual Wellness Visit     annual

## 2019-12-04 NOTE — PATIENT INSTRUCTIONS
Office Policies Phone calls/patient messages: Please allow up to 24 hours for someone in the office to contact you about your call or message. Be mindful your provider may be out of the office or your message may require further review. We encourage you to use [employer] for your messages as this is a faster, more efficient way to communicate with our office Medication Refills: 
         
Prescription medications require 48-72 business hours to process. We encourage you to use [employer] for your refills. For controlled medications: Please allow 72 business hours to process. Certain medications may require you to  a written prescription at our office. NO narcotic/controlled medications will be prescribed after 4pm Monday through Friday or on weekends Form/Paperwork Completion: 
         
Please note a $25 fee may incur for all paperwork for completed by our providers. We ask that you allow 7-10 business days. Pre-payment is due prior to picking up/faxing the completed form. You may also download your forms to [employer] to have your doctor print off. Medicare Wellness Visit, Male The best way to live healthy is to have a lifestyle where you eat a well-balanced diet, exercise regularly, limit alcohol use, and quit all forms of tobacco/nicotine, if applicable. Regular preventive services are another way to keep healthy. Preventive services (vaccines, screening tests, monitoring & exams) can help personalize your care plan, which helps you manage your own care. Screening tests can find health problems at the earliest stages, when they are easiest to treat. [facility] follows the current, evidence-based guidelines published by the [facility] (USPSTF) when recommending preventive services for our patients.  Because we follow these guidelines, sometimes recommendations change over time as research supports it. (For example, a prostate screening blood test is no longer routinely recommended for men with no symptoms). Of course, you and your doctor may decide to screen more often for some diseases, based on your risk and co-morbidities (chronic disease you are already diagnosed with). Preventive services for you include: - Medicare offers their members a free annual wellness visit, which is time for you and your primary care provider to discuss and plan for your preventive service needs. Take advantage of this benefit every year! 
-All adults over age 72 should receive the recommended pneumonia vaccines. Current USPSTF guidelines recommend a series of two vaccines for the best pneumonia protection.  
-All adults should have a flu vaccine yearly and tetanus vaccine every 10 years. 
-All adults age 48 and older should receive the shingles vaccines (series of two vaccines). -All adults age 38-68 who are overweight should have a diabetes screening test once every three years.  
-Other screening tests & preventive services for persons with diabetes include: an eye exam to screen for diabetic retinopathy, a kidney function test, a foot exam, and stricter control over your cholesterol.  
-Cardiovascular screening for adults with routine risk involves an electrocardiogram (ECG) at intervals determined by the provider.  
-Colorectal cancer screening should be done for adults age 54-65 with no increased risk factors for colorectal cancer. There are a number of acceptable methods of screening for this type of cancer. Each test has its own benefits and drawbacks. Discuss with your provider what is most appropriate for you during your annual wellness visit.  The different tests include: colonoscopy (considered the best screening method), a fecal occult blood test, a fecal DNA test, and sigmoidoscopy. 
-All adults born between [facility] should be screened once for Hepatitis C. 
 -An Abdominal Aortic Aneurysm (AAA) Screening is recommended for men age 73-68 who has ever smoked in their lifetime. Here is a list of your current Health Maintenance items (your personalized list of preventive services) with a due date: 
Health Maintenance Due Topic Date Due  Shingles Vaccine (1 of 2) 09/02/1996 [address] [place] [address] Annual Well Visit  11/29/2019

## 2019-12-05 LAB
ALBUMIN SERPL-MCNC: 4.3 G/DL (ref 3.5–4.8)
ALBUMIN/GLOB SERPL: 2.2 {RATIO} (ref 1.2–2.2)
ALP SERPL-CCNC: 85 IU/L (ref 39–117)
ALT SERPL-CCNC: 25 IU/L (ref 0–44)
AST SERPL-CCNC: 26 IU/L (ref 0–40)
BASOPHILS # BLD AUTO: 0 X10E3/UL (ref 0–0.2)
BASOPHILS NFR BLD AUTO: 1 %
BILIRUB SERPL-MCNC: 0.4 MG/DL (ref 0–1.2)
BUN SERPL-MCNC: 19 MG/DL (ref 8–27)
BUN/CREAT SERPL: 13 (ref 10–24)
CALCIUM SERPL-MCNC: 9.2 MG/DL (ref 8.6–10.2)
CHLORIDE SERPL-SCNC: 101 MMOL/L (ref 96–106)
CHOLEST SERPL-MCNC: 140 MG/DL (ref 100–199)
CO2 SERPL-SCNC: 18 MMOL/L (ref 20–29)
CREAT SERPL-MCNC: 1.47 MG/DL (ref 0.76–1.27)
EOSINOPHIL # BLD AUTO: 0.2 X10E3/UL (ref 0–0.4)
EOSINOPHIL NFR BLD AUTO: 4 %
ERYTHROCYTE [DISTWIDTH] IN BLOOD BY AUTOMATED COUNT: 12.9 % (ref 12.3–15.4)
GLOBULIN SER CALC-MCNC: 2 G/DL (ref 1.5–4.5)
GLUCOSE SERPL-MCNC: 203 MG/DL (ref 65–99)
HCT VFR BLD AUTO: 47.3 % (ref 37.5–51)
HDLC SERPL-MCNC: 48 MG/DL
HGB BLD-MCNC: 15.9 G/DL (ref 13–17.7)
IMM GRANULOCYTES # BLD AUTO: 0 X10E3/UL (ref 0–0.1)
IMM GRANULOCYTES NFR BLD AUTO: 1 %
LDLC SERPL CALC-MCNC: 50 MG/DL (ref 0–99)
LYMPHOCYTES # BLD AUTO: 1.3 X10E3/UL (ref 0.7–3.1)
LYMPHOCYTES NFR BLD AUTO: 25 %
MCH RBC QN AUTO: 31.2 PG (ref 26.6–33)
MCHC RBC AUTO-ENTMCNC: 33.6 G/DL (ref 31.5–35.7)
MCV RBC AUTO: 93 FL (ref 79–97)
MONOCYTES # BLD AUTO: 0.5 X10E3/UL (ref 0.1–0.9)
MONOCYTES NFR BLD AUTO: 11 %
NEUTROPHILS # BLD AUTO: 3 X10E3/UL (ref 1.4–7)
NEUTROPHILS NFR BLD AUTO: 58 %
PLATELET # BLD AUTO: 162 X10E3/UL (ref 150–450)
POTASSIUM SERPL-SCNC: 4.5 MMOL/L (ref 3.5–5.2)
PROT SERPL-MCNC: 6.3 G/DL (ref 6–8.5)
RBC # BLD AUTO: 5.09 X10E6/UL (ref 4.14–5.8)
SODIUM SERPL-SCNC: 135 MMOL/L (ref 134–144)
TRIGL SERPL-MCNC: 210 MG/DL (ref 0–149)
VLDLC SERPL CALC-MCNC: 42 MG/DL (ref 5–40)
WBC # BLD AUTO: 5.1 X10E3/UL (ref 3.4–10.8)

## 2019-12-05 NOTE — PROGRESS NOTES
Tell pt normal cbc  Lytes liver  Cholesterol levels are well controlled -LDL at goal  Kidney function is slightly impaired but has hx ckd3--advise adequate hydration and repeat BMP in 2 months .

## 2019-12-06 DIAGNOSIS — N18.30 CKD (CHRONIC KIDNEY DISEASE) STAGE 3, GFR 30-59 ML/MIN (HCC): Primary | ICD-10-CM

## 2019-12-06 NOTE — PROGRESS NOTES
Called, spoke to pt. Two identifiers confirmed. Notified pt of lab results/recommendations per Dr. Shaye Darnell. Pt verbalized understanding of information discussed w/ no further questions at this time.

## 2020-01-08 NOTE — TELEPHONE ENCOUNTER
----- Message from Trav Jacobsen sent at 1/8/2020 10:21 AM EST -----  Regarding: Dr. Zhen Salcedo Refill  Medication Refill    Caller (if not patient):      Relationship of caller (if not patient):      Best contact number(s): [phone]      Name of medication and dosage if known:  Pen needles , diabetic (BD mano ultra fine pin needles) 90 day supply   Rx needed to reflect that he uses the same needles for the Toujeo and Kyle Barnett      Is patient out of this medication (yes/no): Y      Pharmacy name:[facility]     Pharmacy listed in chart? (yes/no):y  Pharmacy phone number:      Details to clarify the request: Instructions needed to be change to reflect that he uses the same needles with both insulins and the amount he takes both insulin once a day      Channel JOHANA Shah

## 2020-01-09 RX ORDER — PEN NEEDLE, DIABETIC 31 GX3/16"
NEEDLE, DISPOSABLE MISCELLANEOUS
Qty: 200 PEN NEEDLE | Refills: 3 | Status: SHIPPED | OUTPATIENT
Start: 2020-01-09 | End: 2020-05-28

## 2020-01-13 RX ORDER — FOSINOPRIL SODIUM 40 MG/1
TABLET ORAL
Qty: 90 TAB | Refills: 4 | Status: SHIPPED | OUTPATIENT
Start: 2020-01-13 | End: 2021-03-28

## 2020-02-13 RX ORDER — LIRAGLUTIDE 6 MG/ML
INJECTION SUBCUTANEOUS
Qty: 18 ML | Refills: 4 | Status: SHIPPED | OUTPATIENT
Start: 2020-02-13 | End: 2021-03-27

## 2020-02-19 ENCOUNTER — OFFICE VISIT (OUTPATIENT)
Dept: ENDOCRINOLOGY | Age: 75
End: 2020-02-19

## 2020-02-19 VITALS
SYSTOLIC BLOOD PRESSURE: 131 MMHG | WEIGHT: 155.8 LBS | HEART RATE: 74 BPM | DIASTOLIC BLOOD PRESSURE: 72 MMHG | BODY MASS INDEX: 23.61 KG/M2 | HEIGHT: 68 IN

## 2020-02-19 DIAGNOSIS — E11.9 TYPE 2 DIABETES MELLITUS WITHOUT COMPLICATION, WITHOUT LONG-TERM CURRENT USE OF INSULIN (HCC): Primary | ICD-10-CM

## 2020-02-19 LAB — HBA1C MFR BLD HPLC: 6.9 %

## 2020-02-19 NOTE — PROGRESS NOTES
This is a 78-year-old white male with a history of type 2 diabetes mellitus x10 years currently on Toujeo insulin 17 units in the morning and Victoza 1.2 mg daily. He has chronic kidney disease with a recent creatinine of 1.5. He has degenerative disc disease and has received a number of steroid injections into his neck. He checks his blood sugars twice daily. His blood sugar in the morning ranges between 101 120. His post dinner blood sugars range between 150 and 180. He has coffee and a fruit bar or pack of peanut butter crackers for breakfast.  Lunch is a sandwich with chips and diet Pepsi. Dinner can be chicken with half a baked potato and a salad or pork chop or Ursula Dame and a salad. He does not snack at bedtime. He comes in today with his wife who is the  of Mckay Reid who I knew in the past.  He is really without complaints. Examination  Blood pressure 131/72  Pulse 74  Weight 155    Impression type 2 diabetes mellitus with excellent glucose control and an A1c of 6.9%. Plan: I did not change the regimen. He is scheduled to see Dr. Alisson Pereira for routine follow-up and a repeat of his metabolic panel. At his request I will see him back in 6 months. He will need a foot exam at that time. We spent more than 25 mintutes face to face, more than 50% was in counseling regarding diet, exercise and carbohydrate intake.

## 2020-02-24 ENCOUNTER — HOSPITAL ENCOUNTER (OUTPATIENT)
Dept: LAB | Age: 75
Discharge: HOME | End: 2020-02-24
Payer: MEDICARE

## 2020-02-24 PROCEDURE — 36415 COLL VENOUS BLD VENIPUNCTURE: CPT

## 2020-02-24 PROCEDURE — 80048 BASIC METABOLIC PNL TOTAL CA: CPT

## 2020-02-25 LAB
BUN SERPL-MCNC: 16 MG/DL (ref 8–27)
BUN/CREAT SERPL: 10 (ref 10–24)
CALCIUM SERPL-MCNC: 9.1 MG/DL (ref 8.6–10.2)
CHLORIDE SERPL-SCNC: 101 MMOL/L (ref 96–106)
CO2 SERPL-SCNC: 20 MMOL/L (ref 20–29)
CREAT SERPL-MCNC: 1.53 MG/DL (ref 0.76–1.27)
GLUCOSE SERPL-MCNC: 177 MG/DL (ref 65–99)
POTASSIUM SERPL-SCNC: 4.6 MMOL/L (ref 3.5–5.2)
SODIUM SERPL-SCNC: 135 MMOL/L (ref 134–144)

## 2020-02-25 NOTE — PROGRESS NOTES
Tell pt his kidney function is still stage 3 ckd 3 but slightly more impaired than 2 mos ago and from last year. Continue hydration, no advil/alleve.  Advise pt to schedule a f/u appt with renal MD Dr Fela Berry please

## 2020-02-25 NOTE — PROGRESS NOTES
Called, spoke to pt. Two identifiers confirmed. Notified pt of lab results/recommendations per Dr. William Frazier. Pt verbalized understanding of information discussed w/ no further questions at this time.      Records faxed to Dr. Gigi Kramer

## 2020-03-17 RX ORDER — INSULIN GLARGINE 300 U/ML
INJECTION, SOLUTION SUBCUTANEOUS
Qty: 4.5 ML | Refills: 4 | Status: SHIPPED | OUTPATIENT
Start: 2020-03-17 | End: 2021-02-17

## 2020-05-27 ENCOUNTER — VIRTUAL VISIT (OUTPATIENT)
Dept: INTERNAL MEDICINE CLINIC | Age: 75
End: 2020-05-27

## 2020-05-27 DIAGNOSIS — R43.2 LOSS OF TASTE: ICD-10-CM

## 2020-05-27 DIAGNOSIS — E16.2 HYPOGLYCEMIA: Primary | ICD-10-CM

## 2020-05-27 NOTE — PROGRESS NOTES
HISTORY OF PRESENT ILLNESS  Brianda Oliveira is a 69 y.o. male.   HPI   VV via telephone encounter x 15 minutes d/t covid 19 pandemic    fsbs 89 this morning , then took insulin, then in 10 minutes felt nervous and shaky, some mental confusion that improved with food--repeat fsbs around 150  Had some loss of taste that resolved  No fc cough or flu sxs  No high risk covid exposure per pt  Has had low gucose <100 at least once per week since eating out less and eating healthier  Last a1c 6.9  Last OV  f/u HTN DM02 HLD anemia d/t AVM ckd 3 and medicare wellness----------------  [place] EGD /colon this year--barretts without dysplasia and normal colon--Dr Ceballos Memos Dr Cherie Grant last month on toujeo 17 units and victoza 1.2 mg every day-- a1c  7.1     Having neck pain -orthva went to PT then 2 shots into neck and then cauterization of nerves  Next week will have 2 more nerves ablated--pain 8/10 currently  Not on aspirin or nsaids d/u GI bleeding 2 years ago  No cp sob or symptoms of neuropathy    Patient Active Problem List    Diagnosis Date Noted    Type 2 diabetes with nephropathy ([facility] 04/23/2018    GI bleed 07/27/2017    Primary open angle glaucoma 07/26/2016    CKD (chronic kidney disease) stage 3, GFR 30-59 ml/min ([address] 08/10/2010    Essential hypertension, benign 10/26/2009    Type 2 diabetes mellitus without complication, without long-term current use of insulin ([address] 10/26/2009    Pure hypercholesterolemia 10/26/2009    Polycythemia 10/26/2009    Hernadez's esophagus 10/26/2009    BPH (benign prostatic hyperplasia) 10/26/2009    Gout 10/26/2009     Current Outpatient Medications   Medication Sig Dispense Refill    Toujeo SoloStar U-300 Insulin 300 unit/mL (1.5 mL) inpn pen INJECT 20 UNITS UNDER THE SKIN DAILY (REPLACES TRESIBA) 4.5 mL 4    VICTOZA 3-DONELL 0.6 mg/0.1 mL (18 mg/3 mL) pnij INJECT 0.2 ML (1.2 MG) UNDER THE SKIN DAILY 18 mL 4    fosinopril (MONOPRIL) 40 mg tablet TAKE 1 TABLET DAILY 90 Tab 4    Insulin Needles, Disposable, (GAYATRI PEN NEEDLE) 32 gauge x 5/32\" ndle For use with Victoza and Toujeo daily. Two pen needles/day. ICD-10: E11.65 200 Pen Needle 3    glucose blood VI test strips (ONETOUCH VERIO) strip OneTouch Verio strips. Monitoring 3 times daily. Dx E11.9 300 Strip 3    latanoprost (XALATAN) 0.005 % ophthalmic solution INT 1 GTT IN OU QD HS      pravastatin (PRAVACHOL) 20 mg tablet TAKE 1 TABLET DAILY 90 Tab 4    MITIGARE 0.6 mg capsule TAKE 1 CAPSULE DAILY 90 Cap 3    pen needle, diabetic (NOVOTWIST) 32 gauge x 1/5\" ndle For use with tresiba and Victoza - twice daily. 200 Pen Needle 3    magnesium oxide 500 mg tab Take 1 Tab by mouth daily.  cyanocobalamin 1,000 mcg tablet Take 1,000 mcg by mouth daily.  OTHER daily. motilium 10 mg daily (patient states drug not made in [place])       esomeprazole (NEXIUM) 40 mg capsule Take 1 Cap by mouth two (2) times a day. 180 Cap 3     Allergies   Allergen Reactions    Aspirin Other (comments)     GI bleed    Contrast Agent [Iodine] Hives and Itching     IVP dye      Lab Results   Component Value Date/Time    WBC 5.1 12/04/2019 09:11 AM    HGB 15.9 12/04/2019 09:11 AM    Hemoglobin (POC) 8.3 08/03/2017 02:00 PM    HCT 47.3 12/04/2019 09:11 AM    PLATELET 342 78/11/9271 09:11 AM    MCV 93 12/04/2019 09:11 AM     Lab Results   Component Value Date/Time    GFR est non-AA 44 (L) 02/24/2020 11:47 AM    GFR est AA 51 (L) 02/24/2020 11:47 AM    Creatinine 1.53 (H) 02/24/2020 11:47 AM    BUN 16 02/24/2020 11:47 AM    Sodium 135 02/24/2020 11:47 AM    Potassium 4.6 02/24/2020 11:47 AM    Chloride 101 02/24/2020 11:47 AM    CO2 20 02/24/2020 11:47 AM        ROS    Physical Exam    ASSESSMENT and PLAN  1.hypoglycemia   Lower toujeo by 2 units to 15 units every day   Continue victoza   fsbs monitoring   Reviewed s/s of hypoglycemia    2.  Loss of taste   resovled    Doubt covid-19   Reviewed s/s of covid -19 and call if fever or  flu sxs

## 2020-07-18 RX ORDER — COLCHICINE 0.6 MG/1
CAPSULE ORAL
Qty: 90 CAP | Refills: 3 | Status: SHIPPED | OUTPATIENT
Start: 2020-07-18 | End: 2021-06-07

## 2020-07-22 ENCOUNTER — APPOINTMENT (OUTPATIENT)
Dept: GENERAL RADIOLOGY | Age: 75
End: 2020-07-22
Attending: EMERGENCY MEDICINE
Payer: MEDICARE

## 2020-07-22 ENCOUNTER — VIRTUAL VISIT (OUTPATIENT)
Dept: INTERNAL MEDICINE CLINIC | Age: 75
End: 2020-07-22

## 2020-07-22 ENCOUNTER — TELEPHONE (OUTPATIENT)
Dept: INTERNAL MEDICINE CLINIC | Age: 75
End: 2020-07-22

## 2020-07-22 ENCOUNTER — HOSPITAL ENCOUNTER (EMERGENCY)
Age: 75
Discharge: HOME | End: 2020-07-22
Attending: EMERGENCY MEDICINE | Admitting: EMERGENCY MEDICINE
Payer: MEDICARE

## 2020-07-22 VITALS
RESPIRATION RATE: 13 BRPM | HEART RATE: 54 BPM | OXYGEN SATURATION: 100 % | DIASTOLIC BLOOD PRESSURE: 66 MMHG | WEIGHT: 155.65 LBS | SYSTOLIC BLOOD PRESSURE: 144 MMHG | HEIGHT: 68 IN | BODY MASS INDEX: 23.59 KG/M2 | TEMPERATURE: 97.8 F

## 2020-07-22 DIAGNOSIS — R07.89 CHEST TIGHTNESS: Primary | ICD-10-CM

## 2020-07-22 DIAGNOSIS — R91.1 LUNG NODULE: ICD-10-CM

## 2020-07-22 DIAGNOSIS — R07.9 ACUTE CHEST PAIN: Primary | ICD-10-CM

## 2020-07-22 LAB
ALBUMIN SERPL-MCNC: 3.8 G/DL (ref 3.5–5)
ALBUMIN/GLOB SERPL: 0.9 {RATIO} (ref 1.1–2.2)
ALP SERPL-CCNC: 90 U/L (ref 45–117)
ALT SERPL-CCNC: 34 U/L (ref 12–78)
ANION GAP SERPL CALC-SCNC: 6 MMOL/L (ref 5–15)
AST SERPL-CCNC: 19 U/L (ref 15–37)
BASOPHILS # BLD: 0.1 K/UL (ref 0–0.1)
BASOPHILS NFR BLD: 1 % (ref 0–1)
BILIRUB SERPL-MCNC: 0.4 MG/DL (ref 0.2–1)
BUN SERPL-MCNC: 14 MG/DL (ref 6–20)
BUN/CREAT SERPL: 11 (ref 12–20)
CALCIUM SERPL-MCNC: 9.2 MG/DL (ref 8.5–10.1)
CHLORIDE SERPL-SCNC: 107 MMOL/L (ref 97–108)
CO2 SERPL-SCNC: 24 MMOL/L (ref 21–32)
CREAT SERPL-MCNC: 1.29 MG/DL (ref 0.7–1.3)
DIFFERENTIAL METHOD BLD: ABNORMAL
EOSINOPHIL # BLD: 0.2 K/UL (ref 0–0.4)
EOSINOPHIL NFR BLD: 4 % (ref 0–7)
ERYTHROCYTE [DISTWIDTH] IN BLOOD BY AUTOMATED COUNT: 13.9 % (ref 11.5–14.5)
GLOBULIN SER CALC-MCNC: 4.1 G/DL (ref 2–4)
GLUCOSE SERPL-MCNC: 117 MG/DL (ref 65–100)
HCT VFR BLD AUTO: 48 % (ref 36.6–50.3)
HGB BLD-MCNC: 15.9 G/DL (ref 12.1–17)
IMM GRANULOCYTES # BLD AUTO: 0.1 K/UL (ref 0–0.04)
IMM GRANULOCYTES NFR BLD AUTO: 1 % (ref 0–0.5)
LYMPHOCYTES # BLD: 1.5 K/UL (ref 0.8–3.5)
LYMPHOCYTES NFR BLD: 27 % (ref 12–49)
MCH RBC QN AUTO: 30.6 PG (ref 26–34)
MCHC RBC AUTO-ENTMCNC: 33.1 G/DL (ref 30–36.5)
MCV RBC AUTO: 92.5 FL (ref 80–99)
MONOCYTES # BLD: 0.5 K/UL (ref 0–1)
MONOCYTES NFR BLD: 10 % (ref 5–13)
NEUTS SEG # BLD: 3.1 K/UL (ref 1.8–8)
NEUTS SEG NFR BLD: 57 % (ref 32–75)
NRBC # BLD: 0 K/UL (ref 0–0.01)
NRBC BLD-RTO: 0 PER 100 WBC
PLATELET # BLD AUTO: 155 K/UL (ref 150–400)
PMV BLD AUTO: 12.2 FL (ref 8.9–12.9)
POTASSIUM SERPL-SCNC: 4.1 MMOL/L (ref 3.5–5.1)
PROT SERPL-MCNC: 7.9 G/DL (ref 6.4–8.2)
RBC # BLD AUTO: 5.19 M/UL (ref 4.1–5.7)
SODIUM SERPL-SCNC: 137 MMOL/L (ref 136–145)
TROPONIN I SERPL-MCNC: <0.05 NG/ML
TROPONIN I SERPL-MCNC: <0.05 NG/ML
WBC # BLD AUTO: 5.5 K/UL (ref 4.1–11.1)

## 2020-07-22 PROCEDURE — 93005 ELECTROCARDIOGRAM TRACING: CPT

## 2020-07-22 PROCEDURE — 99285 EMERGENCY DEPT VISIT HI MDM: CPT

## 2020-07-22 PROCEDURE — 85025 COMPLETE CBC W/AUTO DIFF WBC: CPT

## 2020-07-22 PROCEDURE — 71046 X-RAY EXAM CHEST 2 VIEWS: CPT

## 2020-07-22 PROCEDURE — 84484 ASSAY OF TROPONIN QUANT: CPT

## 2020-07-22 PROCEDURE — 80053 COMPREHEN METABOLIC PANEL: CPT

## 2020-07-22 PROCEDURE — 36415 COLL VENOUS BLD VENIPUNCTURE: CPT

## 2020-07-22 PROCEDURE — 71045 X-RAY EXAM CHEST 1 VIEW: CPT
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
## 2020-07-22 NOTE — TELEPHONE ENCOUNTER
Ciera Jean MD  You 3 minutes ago (10:40 AM)      V V at 11 please    Message text      VV scheduled for today @ 11 with Dr. Doug Walker. Pt verbalized understanding of information discussed w/ no further questions at this time.

## 2020-07-22 NOTE — TELEPHONE ENCOUNTER
Called, spoke to pt. Two identifiers confirmed. Pt c/o back pain for the last several weeks and intermittent chest pain for the last several days. Pt stated he was lifting boxes over the weekend and thinks maybe he pulled a muscle. Pt stated his chest 'feels tight' and last night was pretty painful/uncomfortable. Pt wondering if he should get an EKG to make sure everything with his heart is ok. Notified pt a message will be sent to Dr. Rachele Boss. Pt verbalized understanding of information discussed w/ no further questions at this time.

## 2020-07-22 NOTE — TELEPHONE ENCOUNTER
#[phone] wife states that pt has been having intermittent chest pain. Pt went to bed last night hot and having chest pain. He then went to sleep for the night. This morning there is no chest pain as of yet. Please call to advise as wife would like him to have an EKG perhaps?

## 2020-07-22 NOTE — PROGRESS NOTES
HISTORY OF PRESENT ILLNESS  Milo Grigsby is a 69 y.o. male. HPI       Yesterday awoke with back pain  Chest hurt too  Last night pain was worse with achiness, kept him up at night  This morning still feels tight    Left side CP-tightness. Lasts for 1 hour last night and about 1 hour   Not worse with activity, cough or deep breaths  Went camping last week with RV with some lifting  An electronic signature was used to authenticate this note.     Last oV  f/u HTN DM02 HLD anemia d/t AVM ckd 3 and medicare wellness----------------  Zara Call EGD /colon this year--barretts without dysplasia and normal colon--Dr Lenetta Berkeley Dr Corinne Parsley last month on toujeo 17 units and victoza 1.2 mg every day-- a1c  7.1     Having neck pain -orthva went to PT then 2 shots into neck and then cauterization of nerves  Next week will have 2 more nerves ablated--pain 8/10 currently  Not on aspirin or nsaids d/u GI bleeding 2 years ago  No cp sob or symptoms of neuropathy    Patient Active Problem List    Diagnosis Date Noted    [facility] 04/23/2018    GI bleed 07/27/2017    Primary open angle glaucoma 07/26/2016    CKD (chronic kidney disease) stage 3, GFR 30-59 ml/min ([facility] [address] 08/10/2010    Essential hypertension, benign 10/26/2009    Type 2 diabetes mellitus without complication, without long-term current use of insulin ([facility] [address] 10/26/2009    Pure hypercholesterolemia 10/26/2009    Polycythemia 10/26/2009    Hernadez's esophagus 10/26/2009    BPH (benign prostatic hyperplasia) 10/26/2009    Gout 10/26/2009     Current Outpatient Medications   Medication Sig Dispense Refill    Mitigare 0.6 mg capsule TAKE 1 CAPSULE DAILY 90 Cap 3    Insulin Needles, Disposable, (Kaylen Pen Needle) 32 gauge x 5/32\" ndle USE FOR TOUJEO DAILY 100 Pen Needle 3    Toujeo SoloStar U-300 Insulin 300 unit/mL (1.5 mL) inpn pen INJECT 20 UNITS UNDER THE SKIN DAILY (REPLACES TRESIBA) 4.5 mL 4    VICTOZA 3-DONELL 0.6 mg/0.1 mL (18 mg/3 mL) pnij INJECT 0.2 ML (1.2 MG) UNDER THE SKIN DAILY 18 mL 4    fosinopril (MONOPRIL) 40 mg tablet TAKE 1 TABLET DAILY 90 Tab 4    glucose blood VI test strips (ONETOUCH VERIO) strip OneTouch Verio strips. Monitoring 3 times daily. Dx E11.9 300 Strip 3    latanoprost (XALATAN) 0.005 % ophthalmic solution INT 1 GTT IN OU QD HS      pravastatin (PRAVACHOL) 20 mg tablet TAKE 1 TABLET DAILY 90 Tab 4    pen needle, diabetic (NOVOTWIST) 32 gauge x 1/5\" ndle For use with tresiba and Victoza - twice daily. 200 Pen Needle 3    magnesium oxide 500 mg tab Take 1 Tab by mouth daily.  cyanocobalamin 1,000 mcg tablet Take 1,000 mcg by mouth daily.  OTHER daily. motilium 10 mg daily (patient states drug not made in [place])       esomeprazole (NEXIUM) 40 mg capsule Take 1 Cap by mouth two (2) times a day.  180 Cap 3     Allergies   Allergen Reactions    Aspirin Other (comments)     GI bleed    Contrast Agent [Iodine] Hives and Itching     IVP dye      Lab Results   Component Value Date/Time    WBC 5.1 12/04/2019 09:11 AM    HGB 15.9 12/04/2019 09:11 AM    Hemoglobin (POC) 8.3 08/03/2017 02:00 PM    HCT 47.3 12/04/2019 09:11 AM    PLATELET 769 12/18/7986 09:11 AM    MCV 93 12/04/2019 09:11 AM     Lab Results   Component Value Date/Time    Cholesterol, total 140 12/04/2019 09:11 AM    Cholesterol (POC) 189 04/16/2013 09:01 AM    HDL Cholesterol 48 12/04/2019 09:11 AM    LDL, calculated 50 12/04/2019 09:11 AM    LDL Cholesterol (POC) 73 04/16/2013 09:01 AM    Triglyceride 210 (H) 12/04/2019 09:11 AM    Triglycerides (POC) 190 04/16/2013 09:01 AM    CHOL/HDL Ratio 3.5 08/10/2010 09:44 AM     Lab Results   Component Value Date/Time    GFR est non-AA 44 (L) 02/24/2020 11:47 AM    GFR est AA 51 (L) 02/24/2020 11:47 AM    Creatinine 1.53 (H) 02/24/2020 11:47 AM    BUN 16 02/24/2020 11:47 AM    Sodium 135 02/24/2020 11:47 AM    Potassium 4.6 02/24/2020 11:47 AM    Chloride 101 02/24/2020 11:47 AM    CO2 20 02/24/2020 11:47 AM        ROS    Physical Exam    ASSESSMENT and PLAN  Diagnoses and all orders for this visit:    1.  Chest tightness    [facility] [address]  Will d/w ER MD

## 2020-07-22 NOTE — DISCHARGE INSTRUCTIONS
Patient Education        Chest Pain: Care Instructions  Your Care Instructions     There are many things that can cause chest pain. Some are not serious and will get better on their own in a few days. But some kinds of chest pain need more testing and treatment. Your doctor may have recommended a follow-up visit in the next 8 to 12 hours. If you are not getting better, you may need more tests or treatment. Even though your doctor has released you, you still need to watch for any problems. The doctor carefully checked you, but sometimes problems can develop later. If you have new symptoms or if your symptoms do not get better, get medical care right away. If you have worse or different chest pain or pressure that lasts more than 5 minutes or you passed out (lost consciousness), [id number] or seek other emergency help right away. A medical visit is only one step in your treatment. Even if you feel better, you still need to do what your doctor recommends, such as going to all suggested follow-up appointments and taking medicines exactly as directed. This will help you recover and help prevent future problems. How can you care for yourself at home? · Rest until you feel better. · Take your medicine exactly as prescribed. Call your doctor if you think you are having a problem with your medicine. · Do not drive after taking a prescription pain medicine. When should you call for help? [id number]:   · You passed out (lost consciousness). · You have severe difficulty breathing. · You have symptoms of a heart attack. These may include:  ? Chest pain or pressure, or a strange feeling in your chest.  ? Sweating. ? Shortness of breath. ? Nausea or vomiting. ? Pain, pressure, or a strange feeling in your back, neck, jaw, or upper belly or in one or both shoulders or arms. ? Lightheadedness or sudden weakness. ? A fast or irregular heartbeat.   After you call 911, the  may tell you to chew 1 adult-strength or 2 to 4 low-dose aspirin. Wait for an ambulance. Do not try to drive yourself. Call your doctor today if:   · You have any trouble breathing. · Your chest pain gets worse. · You are dizzy or lightheaded, or you feel like you may faint. · You are not getting better as expected. · You are having new or different chest pain. Where can you learn more? Go to http://allan-scout.info/  Enter A120 in the search box to learn more about \"Chest Pain: Care Instructions. \"  Current as of: June 26, 2019               Content Version: 12.5  © 5848-0539 [employer]. Care instructions adapted under license by [employer] (which disclaims liability or warranty for this information). If you have questions about a medical condition or this instruction, always ask your healthcare professional. [place] [address] any warranty or liability for your use of this information. Patient Education        Learning About Lung Nodules  What is a lung nodule? A lung nodule is a growth in the lung. A single nodule surrounded by lung tissue is called a solitary pulmonary nodule. A lung nodule might not cause any symptoms. Your doctor may have found one or more nodules on your lung when you were having a chest X-ray or CT scan. Or it may have been found during a lung cancer screening. A lung nodule may be caused by an old infection or cancer. It might also be a noncancerous growth. Lung nodules can cause a screening to give an abnormal result. Most nodules do not cause any harm. But without further tests, your doctor can't tell whether an abnormal finding is cancer, a harmless nodule, or something else. What can you expect when you have a lung nodule? Your doctor will look at several risk factors to see how likely it is that the nodule is cancer. He or she will look at:  · Whether you smoke or have ever smoked. · Your age and your family's medical history.   · Whether you have ever had lung cancer. · The size, density, and other characteristics of the nodule. · Whether the nodule has changed in size. Your doctor may look at past chest X-rays or CT scans, if available, and compare them. Or you may have a series of CT scans to see if the nodule grows over time. What happens next depends on the risk of the nodule being cancer. · If you have no risk factors and the nodule is small, your doctor may advise doing nothing. · If the risk is small, your doctor may schedule follow-up appointments and CT scans later to see if the nodule is growing. · If there's a higher risk of cancer, your doctor may:  ? Do a PET scan, which may help tell if the nodule is cancerous or not. ? Take a sample of tissue from the nodule for testing. This is called a biopsy. ? Remove the nodule with surgery. Follow-up care is a key part of your treatment and safety. Be sure to make and go to all appointments, and call your doctor if you are having problems. It's also a good idea to know your test results and keep a list of the medicines you take. Where can you learn more? Go to http://www.gray.com/  Enter L143 in the search box to learn more about \"Learning About Lung Nodules. \"  Current as of: August 22, 2019               Content Version: 12.5  © 4071-7330 [employer]. Care instructions adapted under license by [employer] (which disclaims liability or warranty for this information). If you have questions about a medical condition or this instruction, always ask your healthcare professional. [place] [address] any warranty or liability for your use of this information.

## 2020-07-22 NOTE — ED NOTES
Bedside and Verbal shift change report given to Reese (oncoming nurse) by Peng Mena (offgoing nurse). Report included the following information SBAR, ED Summary, MAR and Recent Results.

## 2020-07-23 ENCOUNTER — PATIENT OUTREACH (OUTPATIENT)
Dept: CASE MANAGEMENT | Age: 75
End: 2020-07-23

## 2020-07-23 DIAGNOSIS — R91.8 LEFT PULMONARY INFILTRATE ON CXR: ICD-10-CM

## 2020-07-23 DIAGNOSIS — J18.9 COMMUNITY ACQUIRED PNEUMONIA OF LEFT LUNG, UNSPECIFIED PART OF LUNG: Primary | ICD-10-CM

## 2020-07-23 LAB
ATRIAL RATE: 56 BPM
ATRIAL RATE: 56 BPM
CALCULATED P AXIS, ECG09: 52 DEGREES
CALCULATED P AXIS, ECG09: 57 DEGREES
CALCULATED R AXIS, ECG10: 60 DEGREES
CALCULATED R AXIS, ECG10: 65 DEGREES
CALCULATED T AXIS, ECG11: 46 DEGREES
CALCULATED T AXIS, ECG11: 46 DEGREES
DIAGNOSIS, 93000: NORMAL
DIAGNOSIS, 93000: NORMAL
P-R INTERVAL, ECG05: 142 MS
P-R INTERVAL, ECG05: 150 MS
Q-T INTERVAL, ECG07: 408 MS
Q-T INTERVAL, ECG07: 420 MS
QRS DURATION, ECG06: 70 MS
QRS DURATION, ECG06: 70 MS
QTC CALCULATION (BEZET), ECG08: 393 MS
QTC CALCULATION (BEZET), ECG08: 405 MS
VENTRICULAR RATE, ECG03: 56 BPM
VENTRICULAR RATE, ECG03: 56 BPM

## 2020-07-23 RX ORDER — LEVOFLOXACIN 500 MG/1
500 TABLET, FILM COATED ORAL DAILY
Qty: 10 TAB | Refills: 0 | Status: SHIPPED | OUTPATIENT
Start: 2020-07-23 | End: 2020-08-17 | Stop reason: ALTCHOICE

## 2020-07-23 NOTE — PROGRESS NOTES
Patient contacted regarding recent discharge and COVID-19 risk. Discussed COVID-19 related testing which was not done at this time. Test results were not done. Patient informed of results, if available? yes    Care Transition Nurse/ Ambulatory Care Manager contacted the patient by telephone to perform post discharge assessment. Verified name and  with patient as identifiers. Patient has following risk factors of: diabetes and age, [facility] CTN/ACM reviewed discharge instructions, medical action plan and red flags related to discharge diagnosis. Reviewed and educated them on any new and changed medications related to discharge diagnosis. Advised obtaining a 90-day supply of all daily and as-needed medications. Advance Care Planning:   Does patient have an Advance Directive: yes, reviewed and current     Education provided regarding infection prevention, and signs and symptoms of COVID-19 and when to seek medical attention with patient who verbalized understanding. Discussed exposure protocols and quarantine from [facility] you at higher risk for severe illness  and given an opportunity for questions and concerns. The patient agrees to contact the COVID-19 hotline [phone] or PCP office for questions related to their healthcare. CTN/ACM provided contact information for future reference. From CDC: Are you at higher risk for severe illness?  Wash your hands often.  Avoid close contact (6 feet, which is about two arm lengths) with people who are sick.  Put distance between yourself and other people if COVID-19 is spreading in your community.  Clean and disinfect frequently touched surfaces.  Avoid all cruise travel and non-essential air travel.  Call your healthcare professional if you have concerns about COVID-19 and your underlying condition or if you are sick.     For more information on steps you can take to protect yourself, see CDC's How to Protect Yourself      Patient/family/caregiver given information for Get[employer] Loop and agrees to enroll not offered, will do so on follow up call    Plan for follow-up call in 3-5 days based on severity of symptoms and risk factors. Pt reports that he was sent to ED by PCP for chest pain. Pt reports that he has been in contact with PCP and has follow up appts for stress tests and other tests. Pt states that I may send information for COVID19 through [address] [place] [address] [phone]. Pt states that he and his wife have followed COVID19 precautions by limiting movement, wearing a facemask, and social distancing, including with family. Pt states no needs at this time and states that I may call him again. Tomo Clasest message sent.

## 2020-08-12 ENCOUNTER — PATIENT OUTREACH (OUTPATIENT)
Dept: CASE MANAGEMENT | Age: 75
End: 2020-08-12

## 2020-08-12 NOTE — PROGRESS NOTES
Patient resolved from Transition of Care episode on 8/12/2020. ACM/CTN was unsuccessful at contacting this patient today. Patient/family was provided the following resources and education related to COVID-19 during the initial call:                         Signs, symptoms and red flags related to COVID-19            CDC exposure and quarantine guidelines            Con[facility]it exposure contact - [phone]            Contact for their local Department of Health                 Patient has not had any additional ED or hospital visits. No further outreach scheduled with this CTN/ACM. Episode of Care resolved. Patient has this CTN/ACM contact information if future needs arise.

## 2020-08-17 ENCOUNTER — OFFICE VISIT (OUTPATIENT)
Dept: CARDIOLOGY CLINIC | Age: 75
End: 2020-08-17
Payer: MEDICARE

## 2020-08-17 ENCOUNTER — HOSPITAL ENCOUNTER (OUTPATIENT)
Dept: GENERAL RADIOLOGY | Age: 75
Discharge: HOME | End: 2020-08-17
Payer: MEDICARE

## 2020-08-17 VITALS
DIASTOLIC BLOOD PRESSURE: 70 MMHG | WEIGHT: 154 LBS | SYSTOLIC BLOOD PRESSURE: 132 MMHG | BODY MASS INDEX: 23.34 KG/M2 | HEIGHT: 68 IN | RESPIRATION RATE: 18 BRPM | OXYGEN SATURATION: 98 % | HEART RATE: 65 BPM

## 2020-08-17 DIAGNOSIS — R07.2 PRECORDIAL PAIN: Primary | ICD-10-CM

## 2020-08-17 DIAGNOSIS — J18.9 COMMUNITY ACQUIRED PNEUMONIA OF LEFT LUNG, UNSPECIFIED PART OF LUNG: ICD-10-CM

## 2020-08-17 DIAGNOSIS — I10 ESSENTIAL HYPERTENSION, BENIGN: ICD-10-CM

## 2020-08-17 DIAGNOSIS — R91.8 LEFT PULMONARY INFILTRATE ON CXR: ICD-10-CM

## 2020-08-17 DIAGNOSIS — E78.00 PURE HYPERCHOLESTEROLEMIA: ICD-10-CM

## 2020-08-17 DIAGNOSIS — E11.9 TYPE 2 DIABETES MELLITUS WITHOUT COMPLICATION, WITHOUT LONG-TERM CURRENT USE OF INSULIN (HCC): ICD-10-CM

## 2020-08-17 PROCEDURE — 1101F PT FALLS ASSESS-DOCD LE1/YR: CPT | Performed by: INTERNAL MEDICINE

## 2020-08-17 PROCEDURE — 2022F DILAT RTA XM EVC RTNOPTHY: CPT | Performed by: INTERNAL MEDICINE

## 2020-08-17 PROCEDURE — 3044F HG A1C LEVEL LT 7.0%: CPT | Performed by: INTERNAL MEDICINE

## 2020-08-17 PROCEDURE — G8420 CALC BMI NORM PARAMETERS: HCPCS | Performed by: INTERNAL MEDICINE

## 2020-08-17 PROCEDURE — G8427 DOCREV CUR MEDS BY ELIG CLIN: HCPCS | Performed by: INTERNAL MEDICINE

## 2020-08-17 PROCEDURE — 3017F COLORECTAL CA SCREEN DOC REV: CPT | Performed by: INTERNAL MEDICINE

## 2020-08-17 PROCEDURE — 71046 X-RAY EXAM CHEST 2 VIEWS: CPT

## 2020-08-17 PROCEDURE — G8752 SYS BP LESS 140: HCPCS | Performed by: INTERNAL MEDICINE

## 2020-08-17 PROCEDURE — G8536 NO DOC ELDER MAL SCRN: HCPCS | Performed by: INTERNAL MEDICINE

## 2020-08-17 PROCEDURE — 93005 ELECTROCARDIOGRAM TRACING: CPT | Performed by: INTERNAL MEDICINE

## 2020-08-17 PROCEDURE — 99204 OFFICE O/P NEW MOD 45 MIN: CPT | Performed by: INTERNAL MEDICINE

## 2020-08-17 PROCEDURE — G8754 DIAS BP LESS 90: HCPCS | Performed by: INTERNAL MEDICINE

## 2020-08-17 PROCEDURE — 93010 ELECTROCARDIOGRAM REPORT: CPT | Performed by: INTERNAL MEDICINE

## 2020-08-17 PROCEDURE — G8510 SCR DEP NEG, NO PLAN REQD: HCPCS | Performed by: INTERNAL MEDICINE
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
## 2020-08-17 NOTE — PROGRESS NOTES
Chief Complaint   Patient presents with    New Patient     Seen in ED [facility] ER for chest pain on 7/22/20- had done some heavy lifting 2 days prior - only thing noted at ER was a nodule in LLL of lung -pcp started him on atb for 10 days - no cp since - denies any other cardiac sx      1. Have you been to the ER, urgent care clinic since your last visit? Hospitalized since your last visit? No     2. Have you seen or consulted any other health care providers outside of the [address] [place] [address] since your last visit? Include any pap smears or colon screening.   No

## 2020-08-24 ENCOUNTER — VIRTUAL VISIT (OUTPATIENT)
Dept: ENDOCRINOLOGY | Age: 75
End: 2020-08-24
Payer: MEDICARE

## 2020-08-24 DIAGNOSIS — E11.9 TYPE 2 DIABETES MELLITUS WITHOUT COMPLICATION, WITHOUT LONG-TERM CURRENT USE OF INSULIN (HCC): Primary | ICD-10-CM

## 2020-08-24 DIAGNOSIS — E11.21 TYPE 2 DIABETES WITH NEPHROPATHY (HCC): ICD-10-CM

## 2020-08-24 PROCEDURE — 99214 OFFICE O/P EST MOD 30 MIN: CPT | Performed by: INTERNAL MEDICINE

## 2020-08-24 NOTE — PROGRESS NOTES
This is an established visit conducted via telemedicine using [employer]. The patient has been instructed that this meets HIPAA criteria ,that they may receive a bill for these services and acknowledges and agrees to this method of visitation. This is a 78-year-old white male with a history of type 2 diabetes mellitus x10 years currently on Toujeo insulin 17 units in the morning and Victoza 1.2 mg daily. He has chronic kidney disease with a recent creatinine of 1.5. He has degenerative disc disease and has received a number of steroid injections into his neck. He checks his blood sugars twice daily. His blood sugar in the morning ranges between . His post dinner blood sugars range between 150 and 180. He has coffee and a fruit bar or pack of peanut butter crackers for breakfast.  Lunch is a sandwich with chips and diet Pepsi. Dinner can be chicken with half a baked potato and a salad or pork chop or manicotti and a salad. He does not snack at bedtime. Because of COVID-19, he and his wife are not going out to eat as much and so he actually feels like his diet is better than it was before. On the other hand, he is not as physically active as he was before.     He comes in today with his wife who is the  of Nathalia La who I knew in the past.  He has had a few complaints. He is complaining of some neuropathic symptoms in his feet. He describes it as itching and pain. He has some vague right flank pain. He does have a nephrologist that he follows for this. And had an episode of atypical chest pain. There was an x-ray that suggested a possible community-acquired pneumonia but was subsequently decided as tissue from his remote Nissen procedure.     Examination  GENERAL: NCAT, Appears well nourished   EYES: EOMI, non-icteric, no proptosis   Ear/Nose/Throat: NCAT, no visible inflammation or masses   CARDIOVASCULAR: no cyanosis, no visible JVD   RESPIRATORY: comfortable respirations observed, no cyanosis   MUSCULOSKELETAL: Normal ROM of upper extremities observed   SKIN: No edema, rash, or other significant changes observed   NEUROLOGIC:  AAOx3   PSYCHIATRIC: Normal affect, Normal insight and judgement       Impression type 2 diabetes mellitus with very reasonable glucose control on a combination therapy of Toujeo insulin and Victoza 1.2 weeks daily. Plan: I did not change the regimen. I will see him back in 6 months.

## 2020-08-31 DIAGNOSIS — E78.00 PURE HYPERCHOLESTEROLEMIA: ICD-10-CM

## 2020-08-31 DIAGNOSIS — E11.9 TYPE 2 DIABETES MELLITUS WITHOUT COMPLICATION, WITHOUT LONG-TERM CURRENT USE OF INSULIN (HCC): ICD-10-CM

## 2020-08-31 DIAGNOSIS — R07.2 PRECORDIAL PAIN: ICD-10-CM

## 2020-08-31 DIAGNOSIS — I10 ESSENTIAL HYPERTENSION, BENIGN: ICD-10-CM

## 2020-11-28 ENCOUNTER — APPOINTMENT (OUTPATIENT)
Dept: CT IMAGING | Age: 75
End: 2020-11-28
Attending: STUDENT IN AN ORGANIZED HEALTH CARE EDUCATION/TRAINING PROGRAM
Payer: MEDICARE

## 2020-11-28 ENCOUNTER — HOSPITAL ENCOUNTER (EMERGENCY)
Age: 75
Discharge: HOME | End: 2020-11-28
Attending: EMERGENCY MEDICINE
Payer: MEDICARE

## 2020-11-28 VITALS
HEART RATE: 80 BPM | SYSTOLIC BLOOD PRESSURE: 153 MMHG | HEIGHT: 67 IN | TEMPERATURE: 98.6 F | RESPIRATION RATE: 16 BRPM | OXYGEN SATURATION: 100 % | BODY MASS INDEX: 24.46 KG/M2 | WEIGHT: 155.87 LBS | DIASTOLIC BLOOD PRESSURE: 66 MMHG

## 2020-11-28 DIAGNOSIS — H57.11 EYE DISCOMFORT, RIGHT: Primary | ICD-10-CM

## 2020-11-28 PROCEDURE — 70450 CT HEAD/BRAIN W/O DYE: CPT

## 2020-11-28 PROCEDURE — 74011250637 HC RX REV CODE- 250/637: Performed by: EMERGENCY MEDICINE

## 2020-11-28 PROCEDURE — 99284 EMERGENCY DEPT VISIT MOD MDM: CPT

## 2020-11-28 PROCEDURE — 74011000250 HC RX REV CODE- 250: Performed by: EMERGENCY MEDICINE

## 2020-11-28 RX ORDER — CEPHALEXIN 250 MG/1
500 CAPSULE ORAL
Status: DISCONTINUED | OUTPATIENT
Start: 2020-11-28 | End: 2020-11-28

## 2020-11-28 RX ORDER — TETRACAINE HYDROCHLORIDE 5 MG/ML
1 SOLUTION OPHTHALMIC
Status: COMPLETED | OUTPATIENT
Start: 2020-11-28 | End: 2020-11-28

## 2020-11-28 RX ORDER — ERYTHROMYCIN 5 MG/G
OINTMENT OPHTHALMIC
Status: COMPLETED | OUTPATIENT
Start: 2020-11-28 | End: 2020-11-28

## 2020-11-28 RX ADMIN — ERYTHROMYCIN: 5 OINTMENT OPHTHALMIC at 20:45

## 2020-11-28 RX ADMIN — FLUORESCEIN SODIUM 1 STRIP: 0.6 STRIP OPHTHALMIC at 20:16

## 2020-11-28 RX ADMIN — TETRACAINE HYDROCHLORIDE 1 DROP: 5 SOLUTION OPHTHALMIC at 20:16

## 2020-11-29 NOTE — DISCHARGE INSTRUCTIONS
Erythromycin ointment three times a day for 5 days, unless your eye doctor feels differently about this

## 2020-11-29 NOTE — ED NOTES
MD Nabil Cagle reviewed discharge instructions with the patient and spouse. The patient and spouse verbalized understanding. Patient discharged home with stable vitals. Patient out of ED ambulatory with discharge instructions in hand. Opportunity for questions and clarification provided. No further complaints noted at this time.

## 2020-11-30 ENCOUNTER — HOSPITAL ENCOUNTER (EMERGENCY)
Age: 75
Discharge: HOME | End: 2020-11-30
Attending: STUDENT IN AN ORGANIZED HEALTH CARE EDUCATION/TRAINING PROGRAM
Payer: MEDICARE

## 2020-11-30 ENCOUNTER — APPOINTMENT (OUTPATIENT)
Dept: MRI IMAGING | Age: 75
End: 2020-11-30
Attending: STUDENT IN AN ORGANIZED HEALTH CARE EDUCATION/TRAINING PROGRAM
Payer: MEDICARE

## 2020-11-30 VITALS
RESPIRATION RATE: 18 BRPM | WEIGHT: 151.9 LBS | SYSTOLIC BLOOD PRESSURE: 155 MMHG | HEIGHT: 67 IN | TEMPERATURE: 98.9 F | DIASTOLIC BLOOD PRESSURE: 74 MMHG | OXYGEN SATURATION: 98 % | HEART RATE: 74 BPM | BODY MASS INDEX: 23.84 KG/M2

## 2020-11-30 DIAGNOSIS — H02.401 DROOPING EYELID, RIGHT: Primary | ICD-10-CM

## 2020-11-30 DIAGNOSIS — H53.2 DOUBLE VISION: ICD-10-CM

## 2020-11-30 LAB
ALBUMIN SERPL-MCNC: 4 G/DL (ref 3.5–5)
ALBUMIN/GLOB SERPL: 1.1 {RATIO} (ref 1.1–2.2)
ALP SERPL-CCNC: 92 U/L (ref 45–117)
ALT SERPL-CCNC: 36 U/L (ref 12–78)
ANION GAP SERPL CALC-SCNC: 8 MMOL/L (ref 5–15)
AST SERPL-CCNC: 22 U/L (ref 15–37)
BASOPHILS # BLD: 0 K/UL (ref 0–0.1)
BASOPHILS NFR BLD: 1 % (ref 0–1)
BILIRUB SERPL-MCNC: 0.4 MG/DL (ref 0.2–1)
BUN SERPL-MCNC: 22 MG/DL (ref 6–20)
BUN/CREAT SERPL: 13 (ref 12–20)
CALCIUM SERPL-MCNC: 8.9 MG/DL (ref 8.5–10.1)
CHLORIDE SERPL-SCNC: 105 MMOL/L (ref 97–108)
CO2 SERPL-SCNC: 23 MMOL/L (ref 21–32)
COMMENT, HOLDF: NORMAL
CREAT SERPL-MCNC: 1.63 MG/DL (ref 0.7–1.3)
DIFFERENTIAL METHOD BLD: ABNORMAL
EOSINOPHIL # BLD: 0.2 K/UL (ref 0–0.4)
EOSINOPHIL NFR BLD: 3 % (ref 0–7)
ERYTHROCYTE [DISTWIDTH] IN BLOOD BY AUTOMATED COUNT: 14 % (ref 11.5–14.5)
GLOBULIN SER CALC-MCNC: 3.6 G/DL (ref 2–4)
GLUCOSE SERPL-MCNC: 245 MG/DL (ref 65–100)
HCT VFR BLD AUTO: 48.6 % (ref 36.6–50.3)
HGB BLD-MCNC: 16.3 G/DL (ref 12.1–17)
IMM GRANULOCYTES # BLD AUTO: 0 K/UL (ref 0–0.04)
IMM GRANULOCYTES NFR BLD AUTO: 1 % (ref 0–0.5)
LYMPHOCYTES # BLD: 1.2 K/UL (ref 0.8–3.5)
LYMPHOCYTES NFR BLD: 18 % (ref 12–49)
MCH RBC QN AUTO: 30.9 PG (ref 26–34)
MCHC RBC AUTO-ENTMCNC: 33.5 G/DL (ref 30–36.5)
MCV RBC AUTO: 92 FL (ref 80–99)
MONOCYTES # BLD: 0.4 K/UL (ref 0–1)
MONOCYTES NFR BLD: 7 % (ref 5–13)
NEUTS SEG # BLD: 4.7 K/UL (ref 1.8–8)
NEUTS SEG NFR BLD: 70 % (ref 32–75)
NRBC # BLD: 0 K/UL (ref 0–0.01)
NRBC BLD-RTO: 0 PER 100 WBC
PLATELET # BLD AUTO: 147 K/UL (ref 150–400)
PMV BLD AUTO: 13 FL (ref 8.9–12.9)
POTASSIUM SERPL-SCNC: 4.4 MMOL/L (ref 3.5–5.1)
PROT SERPL-MCNC: 7.6 G/DL (ref 6.4–8.2)
RBC # BLD AUTO: 5.28 M/UL (ref 4.1–5.7)
SAMPLES BEING HELD,HOLD: NORMAL
SODIUM SERPL-SCNC: 136 MMOL/L (ref 136–145)
TROPONIN I SERPL-MCNC: <0.05 NG/ML
WBC # BLD AUTO: 6.5 K/UL (ref 4.1–11.1)

## 2020-11-30 PROCEDURE — 85025 COMPLETE CBC W/AUTO DIFF WBC: CPT

## 2020-11-30 PROCEDURE — 99283 EMERGENCY DEPT VISIT LOW MDM: CPT

## 2020-11-30 PROCEDURE — 80053 COMPREHEN METABOLIC PANEL: CPT

## 2020-11-30 PROCEDURE — 93005 ELECTROCARDIOGRAM TRACING: CPT

## 2020-11-30 PROCEDURE — 70551 MRI BRAIN STEM W/O DYE: CPT

## 2020-11-30 PROCEDURE — 70544 MR ANGIOGRAPHY HEAD W/O DYE: CPT

## 2020-11-30 PROCEDURE — 36415 COLL VENOUS BLD VENIPUNCTURE: CPT

## 2020-11-30 PROCEDURE — 84484 ASSAY OF TROPONIN QUANT: CPT

## 2020-11-30 NOTE — ED TRIAGE NOTES
Triage Note: Patient is coming in with right eye pain and eyelid drooping since Monday of last week. Patient was seen at [facility] specialist on Friday and but on antiobiotic. On Saturday patient was seen at [facility] and had CT scan that was normal. Patient was seen at eye doctor again today and was told to come to the ER for MRI of Brain and orbits.

## 2020-12-01 ENCOUNTER — TELEPHONE (OUTPATIENT)
Dept: INTERNAL MEDICINE CLINIC | Age: 75
End: 2020-12-01

## 2020-12-01 DIAGNOSIS — H49.01 3RD NERVE PALSY, COMPLETE, RIGHT: Primary | ICD-10-CM

## 2020-12-01 LAB
ATRIAL RATE: 73 BPM
CALCULATED P AXIS, ECG09: 63 DEGREES
CALCULATED R AXIS, ECG10: 88 DEGREES
CALCULATED T AXIS, ECG11: 61 DEGREES
DIAGNOSIS, 93000: NORMAL
P-R INTERVAL, ECG05: 140 MS
Q-T INTERVAL, ECG07: 376 MS
QRS DURATION, ECG06: 70 MS
QTC CALCULATION (BEZET), ECG08: 414 MS
VENTRICULAR RATE, ECG03: 73 BPM
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
## 2020-12-01 NOTE — DISCHARGE INSTRUCTIONS
Patient Education     Double Vision: Care Instructions  Your Care Instructions  Double vision means seeing two images instead of one. To see normally, your eyes, the muscles that move them, the nerves that send images to your brain, and your brain all have to work together. A problem with any of these parts can cause double vision. Double vision can occur in one or both eyes. It can be horizontal (so the images appear side by side) or vertical (so one image appears above the other). Double vision may be caused by a muscle or nerve problem. Or it may be caused by an eye problem such as a cataract or by a brain problem such as a stroke. When the cause is found, double vision can usually be corrected. To find the cause, you may need tests. These may include blood tests and imaging tests such as MRI. You may need to follow up with an eye doctor or a brain specialist (neurologist) for more testing or treatment. The doctor has checked you carefully, but problems can develop later. If you notice any problems or new symptoms, get medical treatment right away. Follow-up care is a key part of your treatment and safety. Be sure to make and go to all appointments, and call your doctor if you are having problems. It's also a good idea to know your test results and keep a list of the medicines you take. How can you care for yourself at home? · Do not drive or do other things that could be dangerous because of your double vision. · Make your home safe to help prevent injuries. For example, remove throw rugs and electrical cords that could cause falls. Be extra careful when you work with sharp tools or knives. · Ask another adult to stay with you until your vision gets better. When should you call for help? Call 911 anytime you think you may need emergency care. For example, call if:  · You have symptoms of a stroke.  These may include:  ¨ Sudden numbness, tingling, weakness, or loss of movement in your face, arm, or leg, especially on only one side of your body. ¨ Sudden vision changes. ¨ Sudden trouble speaking. ¨ Sudden confusion or trouble understanding simple statements. ¨ Sudden problems with walking or balance. ¨ A sudden, severe headache that is different from past headaches. Call your doctor now or seek immediate medical care if:  · You have new or worse eye pain. · You have new or worse redness in your eye. · Light hurts your eye. Watch closely for changes in your health, and be sure to contact your doctor if:  · You do not get better as expected. Where can you learn more? Go to [employer]  Enter S401 in the search box to learn more about \"Double Vision: Care Instructions. \"   © 9492-9940 [employer]. Care instructions adapted under license by [employer] (which disclaims liability or warranty for this information). This care instruction is for use with your licensed healthcare professional. If you have questions about a medical condition or this instruction, always ask your healthcare professional. [place] [address] any warranty or liability for your use of this information.   Content Version: 88.5.097903; Current as of: August 21, 2015

## 2020-12-01 NOTE — TELEPHONE ENCOUNTER
Spoke with patient using two identifiers. Patient is very concern about his right eye. Patient been to ED twice for CT and MRI Jolly. Patient's eye doctor request a  Neurologist. No appointment at this point . Patient eye is getting worse. Per patient double vision and closed shut. Patient would feel better if he could get Dr. Lety Saavedra. Please call patient.

## 2020-12-01 NOTE — TELEPHONE ENCOUNTER
Caller's first and last name: NA   Reason for call: Issue with his Rt eye, spent hours in the ER, seen eye Dr, who ordered an MRI at [facility] was not done correctly, CT done at [place] [address] very frustrated and would like to talk to Dr. Anna Marie Seals regarding this issue ASAP. Pt will give more details on the call.    Callback required yes/no and why: Y   Best contact number(s): [phone]   Details to clarify the request: NA

## 2020-12-02 NOTE — TELEPHONE ENCOUNTER
----- Message from Pippa Spicer sent at 12/2/2020  3:58 PM EST -----  Regarding: Dr. Nir Campbell first and last name: NA    Reason for call: Issue with his Rt eye, spent hours in the ER, seen eye Dr, who ordered an MRI   at [facility]  which was not done correctly, CT done at [facility],  feeling very   frustrated and would like to talk to Dr. Abimael Boss regarding this issue ASAP. Pt will give more details on the call.      Callback required yes/no and why: Y    Best contact number(s): [phone]     Details to clarify the request: NA    Message from [facility]

## 2020-12-03 ENCOUNTER — OFFICE VISIT (OUTPATIENT)
Dept: NEUROLOGY | Age: 75
End: 2020-12-03
Payer: MEDICARE

## 2020-12-03 VITALS
WEIGHT: 155 LBS | HEIGHT: 67 IN | RESPIRATION RATE: 16 BRPM | HEART RATE: 76 BPM | SYSTOLIC BLOOD PRESSURE: 130 MMHG | BODY MASS INDEX: 24.33 KG/M2 | OXYGEN SATURATION: 98 % | DIASTOLIC BLOOD PRESSURE: 70 MMHG

## 2020-12-03 DIAGNOSIS — G46.3 BRAINSTEM STROKE SYNDROME: Primary | ICD-10-CM

## 2020-12-03 DIAGNOSIS — H49.01 RIGHT OCULOMOTOR NERVE PALSY: ICD-10-CM

## 2020-12-03 DIAGNOSIS — I63.49 CEREBRAL INFARCTION DUE TO EMBOLISM OF OTHER CEREBRAL ARTERY (HCC): ICD-10-CM

## 2020-12-03 PROCEDURE — G8427 DOCREV CUR MEDS BY ELIG CLIN: HCPCS | Performed by: PSYCHIATRY & NEUROLOGY

## 2020-12-03 PROCEDURE — G8752 SYS BP LESS 140: HCPCS | Performed by: PSYCHIATRY & NEUROLOGY

## 2020-12-03 PROCEDURE — 3017F COLORECTAL CA SCREEN DOC REV: CPT | Performed by: PSYCHIATRY & NEUROLOGY

## 2020-12-03 PROCEDURE — G8420 CALC BMI NORM PARAMETERS: HCPCS | Performed by: PSYCHIATRY & NEUROLOGY

## 2020-12-03 PROCEDURE — 1101F PT FALLS ASSESS-DOCD LE1/YR: CPT | Performed by: PSYCHIATRY & NEUROLOGY

## 2020-12-03 PROCEDURE — 99205 OFFICE O/P NEW HI 60 MIN: CPT | Performed by: PSYCHIATRY & NEUROLOGY

## 2020-12-03 PROCEDURE — G8432 DEP SCR NOT DOC, RNG: HCPCS | Performed by: PSYCHIATRY & NEUROLOGY

## 2020-12-03 PROCEDURE — G8536 NO DOC ELDER MAL SCRN: HCPCS | Performed by: PSYCHIATRY & NEUROLOGY

## 2020-12-03 PROCEDURE — G8754 DIAS BP LESS 90: HCPCS | Performed by: PSYCHIATRY & NEUROLOGY

## 2020-12-03 NOTE — LETTER
12/3/20 Patient: Adeline Ramsey YOB: 1945 Date of Visit: 12/3/2020 MD Julita JonesMickey Joseph DAMONshivottoniel [address] [facility] [address] [address] VIA In Basket Carmen Castillo MD 
9 [facility] [address] VIA Facsimile: [phone] Dear MD Carmen Jones MD, Thank you for referring Mr. Betty Ignacio to [address] [place] [address] for evaluation. My notes for this consultation are attached. If you have questions, please do not hesitate to call me. I look forward to following your patient along with you. Sincerely, [address] [place] [address], DO 
 
12/3/2020 Patient:  Adeline Ramsey YOB: 1945 Date of Visit: 12/3/2020 Dear MD Julita JonesMickey Joseph DAMONshivama 150 [facility] [address] [address] VIA In Basket Carmen Castillo MD 
9 [facility] [address] VIA Facsimile: [phone]: I was requested by Denver Early MD to evaluate Mr. Betty Ignacio  for Chief Complaint Patient presents with  Eye Problem Karyna Xiao I am recommending the following:  
 
Diagnoses and all orders for this visit: 1. Brainstem stroke syndrome 
-     ECHO ADULT COMPLETE; Future 2. Right oculomotor nerve palsy 3. 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  
-     ECHO ADULT COMPLETE; Future 
 
 
 
---------------------------------------------------------------------------------------------------------------------- Below is my encounter: Chief Complaint Patient presents with  Eye Problem Referred by: Dr. Xavi Moctezuma HPI Mr. Betty Ignacio is a 28-year-old gentleman with stroke risk factors such as diabetes, hypertension, high cholesterol who is here because a week ago he began to developed right eyelid drooping which the next day became progressively worse.   He saw ophthalmology initially and had only mild symptoms and then return for follow-up and had worsening symptoms. He had double vision now. He was referred to the ER immediately on . MRI brain did not show any acute issue. MRA normal.  He has not improved. No difficulty swallowing talking. No problems with strength. He is not on aspirin because of a history of Hernadez's esophagus and history of GI bleeding provoked from a procedure. His wife is present. Review of Systems Eyes: Positive for double vision. Neurological: Negative for speech change and focal weakness. All other systems reviewed and are negative. Past Medical History:  
Diagnosis Date  Hernadez's esophagus   Chronic kidney disease  Colon polyps  Dr. Fifi Webb  DDD (degenerative disc disease), cervical   
 Diabetes ([facility] [address]  GERD (gastroesophageal reflux disease) Hernadez's esophagus long segment  Gout  Hearing loss  Hypercholesterolemia  Hypertension  Ill-defined condition Hernadez's esophagus  Ill-defined condition Glaucoma, Bell's palsy  Other ill-defined conditions(799.89)   
 gout  Other ill-defined conditions(799.89) lipids  Other ill-defined conditions(799.89) BPH Family History Problem Relation Age of Onset  Cancer Mother  Heart Disease Father   
      age 47.  Diabetes Sister Social History Socioeconomic History  Marital status:  Spouse name: Not on file  Number of children: Not on file  Years of education: Not on file  Highest education level: Not on file Occupational History  Not on file Social Needs  Financial resource strain: Not on file  Food insecurity Worry: Not on file Inability: Not on file  Transportation needs Medical: Not on file Non-medical: Not on file Tobacco Use  Smoking status: Former Smoker Packs/day: 2.00 Years: 20.00 Pack years: 40.00 Last attempt to quit: 1991 Years since quittin.9  Smokeless tobacco: Never Used Substance and Sexual Activity  Alcohol use: Yes Alcohol/week: 11.7 standard drinks Types: 14 Cans of beer per week Frequency: 4 or more times a week Drinks per session: 1 or 2 Comment: 2 beer daily  Drug use: Never  Sexual activity: Not Currently Lifestyle  Physical activity Days per week: Not on file Minutes per session: Not on file  Stress: Not on file Relationships  Social connections Talks on phone: Not on file Gets together: Not on file Attends Alevism service: Not on file Active member of club or organization: Not on file Attends meetings of clubs or organizations: Not on file Relationship status: Not on file  Intimate partner violence Fear of current or ex partner: Not on file Emotionally abused: Not on file Physically abused: Not on file Forced sexual activity: Not on file Other Topics Concern  Not on file Social History Narrative  Not on file Current Outpatient Medications Medication Sig  Insulin Needles, Disposable, (Kaylen Pen Needle) 32 gauge x \" ndle USE FOR TOUJEO DAILY  Toujeo SoloStar U-300 Insulin 300 unit/mL (1.5 mL) inpn pen INJECT 20 UNITS UNDER THE SKIN DAILY (REPLACES TRESIBA) (Patient taking differently: 17 Units by SubCUTAneous route daily.)  VICTOZA 3-DONELL 0.6 mg/0.1 mL (18 mg/3 mL) pnij INJECT 0.2 ML (1.2 MG) UNDER THE SKIN DAILY  fosinopril (MONOPRIL) 40 mg tablet TAKE 1 TABLET DAILY  glucose blood VI test strips (ONETOUCH VERIO) strip OneTouch Verio strips. Monitoring 3 times daily. Dx E11.9  pravastatin (PRAVACHOL) 20 mg tablet TAKE 1 TABLET DAILY  pen needle, diabetic (NOVOTWIST) 32 gauge x \" ndle For use with tresiba and Victoza - twice daily.  magnesium oxide 500 mg tab Take 1 Tab by mouth daily.  cyanocobalamin 1,000 mcg tablet Take 1,000 mcg by mouth daily.  OTHER daily. motilium 10 mg daily (patient states drug not made in [place])  esomeprazole (NEXIUM) 40 mg capsule Take 1 Cap by mouth two (2) times a day.  Mitigare 0.6 mg capsule TAKE 1 CAPSULE DAILY  latanoprost (XALATAN) 0.005 % ophthalmic solution INT 1 GTT IN OU QD HS No current facility-administered medications for this visit. Allergies Allergen Reactions  Aspirin Other (comments) GI bleed  Contrast Agent [Iodine] Hives and Itching IVP dye Neurologic Exam  
 
Mental Status Oriented to person, place, and time. Cranial Nerves Cranial nerves II through XII intact. Right ptosis, right third palsy, pupil reactive Slight flattening of the right nasolabial fold Motor Exam  
 
Strength Strength 5/5 throughout. Sensory Exam  
Light touch normal.  
 
Gait, Coordination, and Reflexes Gait Gait: normal 
 
Coordination Finger to nose coordination: normal 
 
Tremor Resting tremor: absent Physical Exam  
Constitutional: He is oriented to person, place, and time. He appears well-developed and well-nourished. Cardiovascular: Normal rate. Pulmonary/Chest: Effort normal.  
Neurological: He is oriented to person, place, and time. He has normal strength. He has a normal Finger-Nose-Finger Test. Gait normal.  
Skin: Skin is warm and dry. Psychiatric: His behavior is normal.  
Vitals reviewed. Visit Vitals /70 (BP 1 Location: Left arm, BP Patient Position: Sitting) Pulse 76 Resp 16 Ht 5' 7\" (1.702 m) Wt 155 lb (70.3 kg) SpO2 98% BMI 24.28 kg/m² Lab Results Component Value Date/Time WBC 6.5 11/30/2020 07:09 PM  
 HGB 16.3 11/30/2020 07:09 PM  
 Hemoglobin (POC) 8.3 08/03/2017 02:00 PM  
 HCT 48.6 11/30/2020 07:09 PM  
 PLATELET 097 (L) 23/10/9413 07:09 PM  
 MCV 92.0 11/30/2020 07:09 PM  
 
Lab Results Component Value Date/Time Hemoglobin A1c CANCELED 07/09/2019 12:00 AM  
 Hemoglobin A1c 7.5 (H) 11/28/2018 11:44 AM  
 Hemoglobin A1c 7.8 (H) 04/17/2018 11:34 AM  
 Glucose 245 (H) 11/30/2020 07:09 PM  
 Glucose (POC) 124 (H) 08/21/2019 10:40 AM  
 Microalb/Creat ratio (ug/mg creat.) <14.7 07/09/2019 12:00 AM  
 LDL, calculated 50 12/04/2019 09:11 AM  
 Creatinine 1.63 (H) 11/30/2020 07:09 PM  
  
Lab Results Component Value Date/Time Cholesterol, total 140 12/04/2019 09:11 AM  
 Cholesterol (POC) 189 04/16/2013 09:01 AM  
 HDL Cholesterol 48 12/04/2019 09:11 AM  
 LDL, calculated 50 12/04/2019 09:11 AM  
 LDL Cholesterol (POC) 73 04/16/2013 09:01 AM  
 Triglyceride 210 (H) 12/04/2019 09:11 AM  
 Triglycerides (POC) 190 04/16/2013 09:01 AM  
 CHOL/HDL Ratio 3.5 08/10/2010 09:44 AM  
 
Lab Results Component Value Date/Time ALT (SGPT) 36 11/30/2020 07:09 PM  
 Alk. phosphatase 92 11/30/2020 07:09 PM  
 Bilirubin, total 0.4 11/30/2020 07:09 PM  
 Albumin 4.0 11/30/2020 07:09 PM  
 Protein, total 7.6 11/30/2020 07:09 PM  
 INR (POC) 1.2 (H) 09/27/2016 11:11 PM  
 PLATELET 525 (L) 49/82/2946 07:09 PM  
  
 
CT Results (maximum last 3): [place] [facility] [place] 11/28/20 CT HEAD WO CONT Narrative EXAM: CT HEAD WO CONT INDICATION: Double vision and Rt sided HA x several days. COMPARISON: CT 6/7/2017. CONTRAST: None. TECHNIQUE: Unenhanced CT of the head was performed using 5 mm images. Brain and 
bone windows were generated. Coronal and sagittal reformats. CT dose reduction 
was achieved through use of a standardized protocol tailored for this 
examination and automatic exposure control for dose modulation. FINDINGS: 
The ventricles and sulci are normal in size, shape and configuration. . There is 
no significant white matter disease. There is no intracranial hemorrhage, 
extra-axial collection, or mass effect. The basilar cisterns are open.  No CT 
 evidence of acute infarct. Atherosclerotic calcifications affect the carotid 
siphons and vertebrobasilar system. The bone windows demonstrate no abnormalities. The visualized portions of the 
paranasal sinuses and mastoid air cells are clear. Impression IMPRESSION: No acute findings. Results from Hospital Encounter encounter on 06/07/17 CT HEAD WO CONT Narrative EXAM:  CT HEAD WO CONT INDICATION:   acute onset dizziness COMPARISON: 9/27/2016. TECHNIQUE: Unenhanced CT of the head was performed using 5 mm images. Brain and 
bone windows were generated. CT dose reduction was achieved through use of a 
standardized protocol tailored for this examination and automatic exposure 
control for dose modulation. FINDINGS: 
The ventricles and sulci are normal in size, shape and configuration and 
midline. There is no significant white matter disease. There is no intracranial 
hemorrhage, extra-axial collection, mass, mass effect or midline shift. The 
basilar cisterns are open. No acute infarct is identified. The bone windows 
demonstrate no abnormalities. The visualized portions of the paranasal sinuses 
and mastoid air cells are clear. Vascular calcification is noted. Impression IMPRESSION: No acute process or change compared to the prior exam. 
 
  
 
 
MRI Results (maximum last 3): Results from Hospital Encounter encounter on 11/30/20 MRA BRAIN WO CONT Narrative *PRELIMINARY REPORT* MRI examination of the brain without contrast demonstrates no mass lesion. 'S is 
limited by lack of intravenous contrast. The MRA examination demonstrates no 
significant findings. Preliminary report was provided by Dr. Leslie Bay, the on-call radiologist, at 
10:29 PM 
 
Final report to follow. *END PRELIMINARY REPORT* FINAL REPORT: 
 
INDICATION: r 3rd nerve palsy, eval for mass lesion COMPARISON: CT head November 28, 2020 and June 7, 2017 TECHNIQUE: Multiplanar multisequence acquisition without contrast of the brain. 3 D time of flight MRA of the head was performed. FINDINGS:  
 
Ventricles: Midline, no hydrocephalus. Intracranial Hemorrhage: None. Brain Parenchyma/Brainstem: Mild generalized atrophy. Mild patchy chronic white 
matter signal abnormalities in supratentorial brain as well as in the arabella. No 
acute infarction. Basal Cisterns: Normal. 
Flow Voids: Normal. 
Additional Comments: N/A. Posterior Circulation: Patent vertebral and basilar arteries. Normal left 
posterior cerebral artery. Right posterior cerebral artery supplied by a 
posterior communicating artery, a normal variant. Anterior Circulation: No flow limiting stenosis or occlusion. Additional Comments: No evidence of aneurysm or vascular malformation. Impression IMPRESSION: 
1. Mild chronic white matter disease with no acute infarction. No definite 
intracranial mass allowing for lack of IV contrast. 
2. No flow limiting stenosis or arterial occlusion. No evidence of intracranial 
aneurysm. MRI BRAIN WO CONT Narrative *PRELIMINARY REPORT* MRI examination of the brain without contrast demonstrates no mass lesion. 'S is 
limited by lack of intravenous contrast. The MRA examination demonstrates no 
significant findings. Preliminary report was provided by Dr. Alfreda Bueno, the on-call radiologist, at 
10:29 PM 
 
Final report to follow. *END PRELIMINARY REPORT FINAL REPORT: 
 
INDICATION: r 3rd nerve palsy, eval for mass lesion COMPARISON: CT head November 28, 2020 and June 7, 2017 TECHNIQUE: Multiplanar multisequence acquisition without contrast of the brain. 3 D time of flight MRA of the head was performed. FINDINGS:  
 
Ventricles: Midline, no hydrocephalus. Intracranial Hemorrhage: None. Brain Parenchyma/Brainstem: Mild generalized atrophy. Mild patchy chronic white matter signal abnormalities in supratentorial brain as well as in the arabella. No 
acute infarction. Basal Cisterns: Normal. 
Flow Voids: Normal. 
Additional Comments: N/A. Posterior Circulation: Patent vertebral and basilar arteries. Normal left 
posterior cerebral artery. Right posterior cerebral artery supplied by a 
posterior communicating artery, a normal variant. Anterior Circulation: No flow limiting stenosis or occlusion. Additional Comments: No evidence of aneurysm or vascular malformation. Impression IMPRESSION: 
1. Mild chronic white matter disease with no acute infarction. No definite 
intracranial mass allowing for lack of IV contrast. 
2. No flow limiting stenosis or arterial occlusion. No evidence of intracranial 
aneurysm. [place] [facility] [place] MRI CERV SPINE WO CONT Narrative EXAM: MRI CERV SPINE WO CONT INDICATION: Cervicalgia. Cervicalgia COMPARISON: December 10, 2013 TECHNIQUE: MR imaging of the cervical spine was performed using the following 
sequences: sagittal T1, T2, STIR;  axial T2, T1.  
 
CONTRAST:  None. FINDINGS: 
There is no intrinsic cord lesion. There is no evidence for bone marrow 
replacement. There is some reverse of the normal lordosis compatible with muscle 
spasm. No paraspinal mass or fluid collection. Small anterior subluxation C5-6 Those findings appear stable. C2-C3 show some mild right-sided facet hypertrophy no canal stenosis or pressure 
on the cord. C3-C4 show a central disc bulging. There is some mild encroachment on the 
foramina mainly on the left. C4-C5 shows central disc bulging. There is no significant canal or foraminal 
compromise. There is left facet hypertrophy. C5-C6 shows central disc protrusion with minimal flattening of the cord. There 
is right foraminal narrowing. C6-C7 show central canal stenosis with pressure on the cord. Bilateral right 
more than left foraminal narrowing. C7-T1 shows no focal findings. Impression IMPRESSION: Slight progression of disease with prominent central canal stenosis C6-C7 PET Results (maximum last 3): No results found for this or any previous visit. Assessment and Plan Diagnoses and all orders for this visit: 1. Brainstem stroke syndrome 
-     ECHO ADULT COMPLETE; Future 2. Right oculomotor nerve palsy 3. 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  
-     ECHO ADULT COMPLETE; Future 67-year-old gentleman who has a right 3rd nerve palsy with a very slight right facial asymmetry. I suspect he had an acute brainstem infarct on seen on imaging. Most likely this is due to his risk factors specifically diabetes. We talked about his symptoms at length as well as the imaging done. I do not need to send for any additional imaging with contrast.  I will complete an echocardiogram.  He needs to start taking aspirin low-dose daily. I understand he has a GI history so he is going to talk with his GI specialist if he can start doing so. Aggressive control of his risk factors. Difficult to know how much this will improve as there may be some residual deficits left over. He is going to see ophthalmology soon in follow-up. We will let him know the results of the echo once done. Stroke education done in detail. If anything new develops please come to the hospital immediately. Follow-up with primary care. I reviewed and decided to continue the current medications. This clinical note was dictated with an electronic dictation software that can make unintentional errors. If there are any questions, please contact me directly for clarification. Thank you for giving me the opportunity to assist in the care of Mr. Cuate David. If you have questions, please do not hesitate to contact me. Sincerely, [address] [facility], DO Neurologist 
Brain Injury Medicine Diplomate ABPN

## 2020-12-03 NOTE — PROGRESS NOTES
Chief Complaint   Patient presents with    Eye Problem       Referred by: Dr. Ron Desai      HPI    Mr. Wesley Nunez is a 71-year-old gentleman with stroke risk factors such as diabetes, hypertension, high cholesterol who is here because a week ago he began to developed right eyelid drooping which the next day became progressively worse. He saw ophthalmology initially and had only mild symptoms and then return for follow-up and had worsening symptoms. He had double vision now. He was referred to the ER immediately on . MRI brain did not show any acute issue. MRA normal.  He has not improved. No difficulty swallowing talking. No problems with strength. He is not on aspirin because of a history of Hernadez's esophagus and history of GI bleeding provoked from a procedure. His wife is present. Review of Systems   Eyes: Positive for double vision. Neurological: Negative for speech change and focal weakness. All other systems reviewed and are negative. Past Medical History:   Diagnosis Date    Hernadez's esophagus     Chronic kidney disease     Colon polyps     Dr. Ziyad Rascon DDD (degenerative disc disease), cervical     Diabetes ([facility] [address]     GERD (gastroesophageal reflux disease)     Hernadez's esophagus long segment    Gout     Hearing loss     Hypercholesterolemia     Hypertension     Ill-defined condition     Hernadez's esophagus    Ill-defined condition     Glaucoma, Bell's palsy    Other ill-defined conditions(799.89)     gout    Other ill-defined conditions(799.89)     lipids    Other ill-defined conditions(799.89)     BPH     Family History   Problem Relation Age of Onset    Cancer Mother     Heart Disease Father          age 47.       Diabetes Sister      Social History     Socioeconomic History    Marital status:      Spouse name: Not on file    Number of children: Not on file    Years of education: Not on file    Highest education level: Not on file   Occupational History    Not on file   Social Needs    Financial resource strain: Not on file    Food insecurity     Worry: Not on file     Inability: Not on file    Transportation needs     Medical: Not on file     Non-medical: Not on file   Tobacco Use    Smoking status: Former Smoker     Packs/day: 2.00     Years: 20.00     Pack years: 40.00     Last attempt to quit: 1991     Years since quittin.9    Smokeless tobacco: Never Used   Substance and Sexual Activity    Alcohol use:  Yes     Alcohol/week: 11.7 standard drinks     Types: 14 Cans of beer per week     Frequency: 4 or more times a week     Drinks per session: 1 or 2     Comment: 2 beer daily    Drug use: Never    Sexual activity: Not Currently   Lifestyle    Physical activity     Days per week: Not on file     Minutes per session: Not on file    Stress: Not on file   Relationships    Social connections     Talks on phone: Not on file     Gets together: Not on file     Attends Holiness service: Not on file     Active member of club or organization: Not on file     Attends meetings of clubs or organizations: Not on file     Relationship status: Not on file    Intimate partner violence     Fear of current or ex partner: Not on file     Emotionally abused: Not on file     Physically abused: Not on file     Forced sexual activity: Not on file   Other Topics Concern    Not on file   Social History Narrative    Not on file     Current Outpatient Medications   Medication Sig    Insulin Needles, Disposable, (Kaylen Pen Needle) 32 gauge x \" ndle USE FOR TOUJEO DAILY    Toujeo SoloStar U-300 Insulin 300 unit/mL (1.5 mL) inpn pen INJECT 20 UNITS UNDER THE SKIN DAILY (REPLACES TRESIBA) (Patient taking differently: 17 Units by SubCUTAneous route daily.)    VICTOZA 3-DONELL 0.6 mg/0.1 mL (18 mg/3 mL) pnij INJECT 0.2 ML (1.2 MG) UNDER THE SKIN DAILY    fosinopril (MONOPRIL) 40 mg tablet TAKE 1 TABLET DAILY    glucose blood VI test strips (ONETOUCH VERIO) strip OneTouch Verio strips. Monitoring 3 times daily. Dx E11.9    pravastatin (PRAVACHOL) 20 mg tablet TAKE 1 TABLET DAILY    pen needle, diabetic (NOVOTWIST) 32 gauge x 1/5\" ndle For use with tresiba and Victoza - twice daily.  magnesium oxide 500 mg tab Take 1 Tab by mouth daily.  cyanocobalamin 1,000 mcg tablet Take 1,000 mcg by mouth daily.  OTHER daily. motilium 10 mg daily (patient states drug not made in [place])     esomeprazole (NEXIUM) 40 mg capsule Take 1 Cap by mouth two (2) times a day.  Mitigare 0.6 mg capsule TAKE 1 CAPSULE DAILY    latanoprost (XALATAN) 0.005 % ophthalmic solution INT 1 GTT IN OU QD HS     No current facility-administered medications for this visit. Allergies   Allergen Reactions    Aspirin Other (comments)     GI bleed    Contrast Agent [Iodine] Hives and Itching     IVP dye         Neurologic Exam     Mental Status   Oriented to person, place, and time. Cranial Nerves   Cranial nerves II through XII intact. Right ptosis, right third palsy, pupil reactive    Slight flattening of the right nasolabial fold     Motor Exam     Strength   Strength 5/5 throughout. Sensory Exam   Light touch normal.     Gait, Coordination, and Reflexes     Gait  Gait: normal    Coordination   Finger to nose coordination: normal    Tremor   Resting tremor: absent    Physical Exam   Constitutional: He is oriented to person, place, and time. He appears well-developed and well-nourished. Cardiovascular: Normal rate. Pulmonary/Chest: Effort normal.   Neurological: He is oriented to person, place, and time. He has normal strength. He has a normal Finger-Nose-Finger Test. Gait normal.   Skin: Skin is warm and dry. Psychiatric: His behavior is normal.   Vitals reviewed.     Visit Vitals  /70 (BP 1 Location: Left arm, BP Patient Position: Sitting)   Pulse 76   Resp 16   Ht 5' 7\" (1.702 m)   Wt 155 lb (70.3 kg)   SpO2 98%   BMI 24.28 kg/m²       Lab Results   Component Value Date/Time    WBC 6.5 11/30/2020 07:09 PM    HGB 16.3 11/30/2020 07:09 PM    Hemoglobin (POC) 8.3 08/03/2017 02:00 PM    HCT 48.6 11/30/2020 07:09 PM    PLATELET 808 (L) 82/05/4085 07:09 PM    MCV 92.0 11/30/2020 07:09 PM     Lab Results   Component Value Date/Time    Hemoglobin A1c CANCELED 07/09/2019 12:00 AM    Hemoglobin A1c 7.5 (H) 11/28/2018 11:44 AM    Hemoglobin A1c 7.8 (H) 04/17/2018 11:34 AM    Glucose 245 (H) 11/30/2020 07:09 PM    Glucose (POC) 124 (H) 08/21/2019 10:40 AM    Microalb/Creat ratio (ug/mg creat.) <14.7 07/09/2019 12:00 AM    LDL, calculated 50 12/04/2019 09:11 AM    Creatinine 1.63 (H) 11/30/2020 07:09 PM      Lab Results   Component Value Date/Time    Cholesterol, total 140 12/04/2019 09:11 AM    Cholesterol (POC) 189 04/16/2013 09:01 AM    HDL Cholesterol 48 12/04/2019 09:11 AM    LDL, calculated 50 12/04/2019 09:11 AM    LDL Cholesterol (POC) 73 04/16/2013 09:01 AM    Triglyceride 210 (H) 12/04/2019 09:11 AM    Triglycerides (POC) 190 04/16/2013 09:01 AM    CHOL/HDL Ratio 3.5 08/10/2010 09:44 AM     Lab Results   Component Value Date/Time    ALT (SGPT) 36 11/30/2020 07:09 PM    Alk. phosphatase 92 11/30/2020 07:09 PM    Bilirubin, total 0.4 11/30/2020 07:09 PM    Albumin 4.0 11/30/2020 07:09 PM    Protein, total 7.6 11/30/2020 07:09 PM    INR (POC) 1.2 (H) 09/27/2016 11:11 PM    PLATELET 027 (L) 25/32/7795 07:09 PM        CT Results (maximum last 3): Results from [place] encounter on 11/28/20   CT HEAD WO CONT    Narrative EXAM: CT HEAD WO CONT    INDICATION: Double vision and Rt sided HA x several days. COMPARISON: CT 6/7/2017. CONTRAST: None. TECHNIQUE: Unenhanced CT of the head was performed using 5 mm images. Brain and  bone windows were generated. Coronal and sagittal reformats. CT dose reduction  was achieved through use of a standardized protocol tailored for this  examination and automatic exposure control for dose modulation. FINDINGS:  The ventricles and sulci are normal in size, shape and configuration. . There is  no significant white matter disease. There is no intracranial hemorrhage,  extra-axial collection, or mass effect. The basilar cisterns are open. No CT  evidence of acute infarct. Atherosclerotic calcifications affect the carotid  siphons and vertebrobasilar system. The bone windows demonstrate no abnormalities. The visualized portions of the  paranasal sinuses and mastoid air cells are clear. Impression IMPRESSION: No acute findings. Results from [place] encounter on 06/07/17   CT HEAD WO CONT    Narrative EXAM:  CT HEAD WO CONT    INDICATION:   acute onset dizziness    COMPARISON: 9/27/2016. TECHNIQUE: Unenhanced CT of the head was performed using 5 mm images. Brain and  bone windows were generated. CT dose reduction was achieved through use of a  standardized protocol tailored for this examination and automatic exposure  control for dose modulation. FINDINGS:  The ventricles and sulci are normal in size, shape and configuration and  midline. There is no significant white matter disease. There is no intracranial  hemorrhage, extra-axial collection, mass, mass effect or midline shift. The  basilar cisterns are open. No acute infarct is identified. The bone windows  demonstrate no abnormalities. The visualized portions of the paranasal sinuses  and mastoid air cells are clear. Vascular calcification is noted. Impression IMPRESSION: No acute process or change compared to the prior exam.           MRI Results (maximum last 3): Results from [place] encounter on 11/30/20   MRA BRAIN WO CONT    Narrative *PRELIMINARY REPORT*    MRI examination of the brain without contrast demonstrates no mass lesion. 'S is  limited by lack of intravenous contrast. The MRA examination demonstrates no  significant findings.     Preliminary report was provided by Dr. Jes Garcia, the on-call radiologist, at  10:29 PM    Final report to follow. *END PRELIMINARY REPORT*    FINAL REPORT:    INDICATION: r 3rd nerve palsy, eval for mass lesion    COMPARISON: CT head November 28, 2020 and June 7, 2017    TECHNIQUE: Multiplanar multisequence acquisition without contrast of the brain. 3 D time of flight MRA of the head was performed. FINDINGS:     Ventricles: Midline, no hydrocephalus. Intracranial Hemorrhage: None. Brain Parenchyma/Brainstem: Mild generalized atrophy. Mild patchy chronic white  matter signal abnormalities in supratentorial brain as well as in the arabella. No  acute infarction. Basal Cisterns: Normal.  Flow Voids: Normal.  Additional Comments: N/A. Posterior Circulation: Patent vertebral and basilar arteries. Normal left  posterior cerebral artery. Right posterior cerebral artery supplied by a  posterior communicating artery, a normal variant. Anterior Circulation: No flow limiting stenosis or occlusion. Additional Comments: No evidence of aneurysm or vascular malformation. Impression IMPRESSION:  1. Mild chronic white matter disease with no acute infarction. No definite  intracranial mass allowing for lack of IV contrast.  2. No flow limiting stenosis or arterial occlusion. No evidence of intracranial  aneurysm. MRI BRAIN WO CONT    Narrative *PRELIMINARY REPORT*    MRI examination of the brain without contrast demonstrates no mass lesion. 'S is  limited by lack of intravenous contrast. The MRA examination demonstrates no  significant findings. Preliminary report was provided by Dr. Ramirez Baumann, the on-call radiologist, at  10:29 PM    Final report to follow. *END PRELIMINARY REPORT    FINAL REPORT:    INDICATION: r 3rd nerve palsy, eval for mass lesion    COMPARISON: CT head November 28, 2020 and June 7, 2017    TECHNIQUE: Multiplanar multisequence acquisition without contrast of the brain. 3 D time of flight MRA of the head was performed.     FINDINGS: Ventricles: Midline, no hydrocephalus. Intracranial Hemorrhage: None. Brain Parenchyma/Brainstem: Mild generalized atrophy. Mild patchy chronic white  matter signal abnormalities in supratentorial brain as well as in the arabella. No  acute infarction. Basal Cisterns: Normal.  Flow Voids: Normal.  Additional Comments: N/A. Posterior Circulation: Patent vertebral and basilar arteries. Normal left  posterior cerebral artery. Right posterior cerebral artery supplied by a  posterior communicating artery, a normal variant. Anterior Circulation: No flow limiting stenosis or occlusion. Additional Comments: No evidence of aneurysm or vascular malformation. Impression IMPRESSION:  1. Mild chronic white matter disease with no acute infarction. No definite  intracranial mass allowing for lack of IV contrast.  2. No flow limiting stenosis or arterial occlusion. No evidence of intracranial  aneurysm. Results from [place] encounter on 06/25/19   MRI CERV SPINE WO CONT    Narrative EXAM: MRI CERV SPINE WO CONT    INDICATION: Cervicalgia. Cervicalgia    COMPARISON: December 10, 2013    TECHNIQUE: MR imaging of the cervical spine was performed using the following  sequences: sagittal T1, T2, STIR;  axial T2, T1.     CONTRAST:  None. FINDINGS:  There is no intrinsic cord lesion. There is no evidence for bone marrow  replacement. There is some reverse of the normal lordosis compatible with muscle  spasm. No paraspinal mass or fluid collection. Small anterior subluxation C5-6  Those findings appear stable. C2-C3 show some mild right-sided facet hypertrophy no canal stenosis or pressure  on the cord. C3-C4 show a central disc bulging. There is some mild encroachment on the  foramina mainly on the left. C4-C5 shows central disc bulging. There is no significant canal or foraminal  compromise. There is left facet hypertrophy.   C5-C6 shows central disc protrusion with minimal flattening of the cord. There  is right foraminal narrowing. C6-C7 show central canal stenosis with pressure on the cord. Bilateral right  more than left foraminal narrowing. C7-T1 shows no focal findings. Impression IMPRESSION: Slight progression of disease with prominent central canal stenosis  C6-C7             PET Results (maximum last 3): No results found for this or any previous visit. Assessment and Plan   Diagnoses and all orders for this visit:    1. Brainstem stroke syndrome  -     ECHO ADULT COMPLETE; Future    2. Right oculomotor nerve palsy    3. Cerebral infarction due to embolism of other cerebral artery (HCC)   -     ECHO ADULT COMPLETE; Future      81-year-old gentleman who has a right 3rd nerve palsy with a very slight right facial asymmetry. I suspect he had an acute brainstem infarct on seen on imaging. Most likely this is due to his risk factors specifically diabetes. We talked about his symptoms at length as well as the imaging done. I do not need to send for any additional imaging with contrast.  I will complete an echocardiogram.  He needs to start taking aspirin low-dose daily. I understand he has a GI history so he is going to talk with his GI specialist if he can start doing so. Aggressive control of his risk factors. Difficult to know how much this will improve as there may be some residual deficits left over. He is going to see ophthalmology soon in follow-up. We will let him know the results of the echo once done. Stroke education done in detail. If anything new develops please come to the hospital immediately. Follow-up with primary care. I reviewed and decided to continue the current medications. This clinical note was dictated with an electronic dictation software that can make unintentional errors. If there are any questions, please contact me directly for clarification.       A notice of this visit/encounter being completed has been sent electronically to the patient's PCP and/or referring provider.      2 [facility], [facility] [address]  Diplomate ABPN

## 2020-12-04 ENCOUNTER — HOSPITAL ENCOUNTER (OUTPATIENT)
Dept: NON INVASIVE DIAGNOSTICS | Age: 75
Discharge: HOME | End: 2020-12-04
Attending: PSYCHIATRY & NEUROLOGY
Payer: MEDICARE

## 2020-12-04 ENCOUNTER — VIRTUAL VISIT (OUTPATIENT)
Dept: ENDOCRINOLOGY | Age: 75
End: 2020-12-04
Payer: MEDICARE

## 2020-12-04 DIAGNOSIS — I63.49 CEREBRAL INFARCTION DUE TO EMBOLISM OF OTHER CEREBRAL ARTERY (HCC): ICD-10-CM

## 2020-12-04 DIAGNOSIS — G46.3 BRAINSTEM STROKE SYNDROME: ICD-10-CM

## 2020-12-04 DIAGNOSIS — E11.9 TYPE 2 DIABETES MELLITUS WITHOUT COMPLICATION, WITHOUT LONG-TERM CURRENT USE OF INSULIN (HCC): Primary | ICD-10-CM

## 2020-12-04 PROCEDURE — 93306 TTE W/DOPPLER COMPLETE: CPT

## 2020-12-04 PROCEDURE — 99214 OFFICE O/P EST MOD 30 MIN: CPT | Performed by: INTERNAL MEDICINE

## 2020-12-04 RX ORDER — GUAIFENESIN 100 MG/5ML
81 LIQUID (ML) ORAL DAILY
COMMUNITY

## 2020-12-04 NOTE — PROGRESS NOTES
This is an established visit conducted via telemedicine using [employer] (PRIMP). The patient has been instructed that this meets HIPAA criteria ,that they may receive a bill for these services and acknowledges and agrees to this method of visitation.     This is a 78-year-old white male with a history of type 2 diabetes mellitus x10 years currently on Toujeo insulin 17 units in the morning and Victoza 1.2 mg daily. Rodo Faye has chronic kidney disease with a recent creatinine of 1.5.  He has degenerative disc disease and has received a number of steroid injections into his neck.  He checks his blood sugars twice daily.  His blood sugar in the morning ranges between .  His post dinner blood sugars range between 150 and 180.  He has coffee and a fruit bar or pack of peanut butter crackers for breakfast. Ina Rodriguez is a sandwich with chips and diet Pepsi.  Dinner can be chicken with half a baked potato and a salad or pork chop or manicotti and a salad.  He does not snack at bedtime. Because of COVID-19, he and his wife are not going out to eat as much and so he actually feels like his diet is better than it was before. On the other hand, he is not as physically active as he was before.     Current Medications  Toujeo 17 units  Victoza 1.2 mg    He presents today because he developed an acute right 3rd nerve palsy. He was seen by neurology and had an extensive work-up which was noncontributory. He is concerned that the MRI and MRA were without contrast and is thinking about a second opinion at [facility]. He talked to his primary care physician who wondered whether this might be diabetes related and that is the reason for this evaluation today. His blood sugars in the morning are excellent ranging between 90 and 110. His blood sugars after dinner are higher than they were previously. As noted above, his post dinner blood sugars were in the 150-180 range. They are now in the 180-220 range.   There really is not any change in his diet or his physical activity. Examination  GENERAL: NCAT, Appears well nourished   EYES: Positive right ptosis with a slight droop  Ear/Nose/Throat: NCAT, no visible inflammation or masses   CARDIOVASCULAR: no cyanosis, no visible JVD   RESPIRATORY: comfortable respirations observed, no cyanosis   MUSCULOSKELETAL: Normal ROM of upper extremities observed   SKIN: No edema, rash, or other significant changes observed   NEUROLOGIC:  AAOx3   PSYCHIATRIC: Normal affect, Normal insight and judgement       Impression type 2 diabetes mellitus on combination therapy with a very recent right 3rd nerve palsy  Plan: I do not think this is diabetes related but I certainly have no reason to say that. It certainly could be. In any case, we have elected to increase the Victoza to 1.8 mg to see if we can get the post dinner blood sugars down. We will continue the Toujeo at 17 units since his morning blood sugars are actually excellent. Follow-up will be on a previously scheduled visit or as needed. We spent more than 25 mintutes face to face, more than 50% was in counseling regarding diet, exercise and carbohydrate intake.

## 2020-12-07 LAB
ECHO AV AREA PEAK VELOCITY: 2.51 CM2
ECHO AV CUSP MM: 0 CM
ECHO AV PEAK GRADIENT: 3.1 MMHG
ECHO AV PEAK VELOCITY: 88.05 CM/S
ECHO LA AREA 4C: 12.09 CM2
ECHO LA TO AORTIC ROOT RATIO: 0.98
ECHO LA TO AORTIC ROOT RATIO: 0.98
ECHO LA VOL 4C: 21.91 ML (ref 18–58)
ECHO LV INTERNAL DIMENSION DIASTOLIC: 2.85 CM (ref 4.2–5.9)
ECHO LV INTERNAL DIMENSION SYSTOLIC: 1.99 CM
ECHO LV IVSD: 1.06 CM (ref 0.6–1)
ECHO LV MASS 2D: 64.5 G (ref 88–224)
ECHO LV POSTERIOR WALL DIASTOLIC: 0.73 CM (ref 0.6–1)
ECHO LVOT DIAM: 2.02 CM
ECHO LVOT PEAK GRADIENT: 1.9 MMHG
ECHO LVOT PEAK VELOCITY: 68.98 CM/S
ECHO MV A VELOCITY: 85.51 CM/S
ECHO MV E VELOCITY: 69.78 CM/S
ECHO MV E/A RATIO: 0.82
ECHO RV INTERNAL DIMENSION: 2.96 CM
ECHO RV TAPSE: 1.69 CM (ref 1.5–2)
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
## 2020-12-24 RX ORDER — PRAVASTATIN SODIUM 20 MG/1
TABLET ORAL
Qty: 90 TAB | Refills: 3 | Status: SHIPPED | OUTPATIENT
Start: 2020-12-24 | End: 2021-12-03

## 2021-01-12 LAB — CREATININE, EXTERNAL: 1.38

## 2021-01-20 RX ORDER — BLOOD SUGAR DIAGNOSTIC
STRIP MISCELLANEOUS
Qty: 300 STRIP | Refills: 3 | Status: SHIPPED | OUTPATIENT
Start: 2021-01-20 | End: 2022-03-09

## 2021-02-15 ENCOUNTER — VIRTUAL VISIT (OUTPATIENT)
Dept: ENDOCRINOLOGY | Age: 76
End: 2021-02-15
Payer: MEDICARE

## 2021-02-15 DIAGNOSIS — N18.30 STAGE 3 CHRONIC KIDNEY DISEASE, UNSPECIFIED WHETHER STAGE 3A OR 3B CKD (HCC): ICD-10-CM

## 2021-02-15 DIAGNOSIS — E11.9 TYPE 2 DIABETES MELLITUS WITHOUT COMPLICATION, WITHOUT LONG-TERM CURRENT USE OF INSULIN (HCC): Primary | ICD-10-CM

## 2021-02-15 PROCEDURE — 99213 OFFICE O/P EST LOW 20 MIN: CPT | Performed by: INTERNAL MEDICINE

## 2021-02-15 NOTE — PROGRESS NOTES
This is an established visit conducted via telemedicine using [employer].  The patient has been instructed that this meets HIPAA criteria ,that they may receive a bill for these services and acknowledges and agrees to this method of visitation.     This is a 71-year-old white male with a history of type 2 diabetes mellitus x 10 years currently on Toujeo insulin 17 units in the morning and Victoza daily.    He has chronic kidney disease stage III a with a recent creatinine of 1.63.    He has degenerative disc disease and has received a number of steroid injections into his neck.     When I saw him in December he had developed an acute right 3rd nerve palsy. He was seen at [address] [place] [address] and the Bell's palsy was felt to be ischemic in origin and perhaps related to his diabetes but not clearly. Since I saw him in December,  the Bell's palsy has improved significantly but has not completely resolved yet. Current Medications  Toujeo 17 units AM  Victoza 1.8 mg     Regarding his diabetes, he continues to do very well. His blood sugars in the morning are excellent ranging between 90 and 110. His blood sugars after dinner are in the 180-220 range. There really is not any change in his diet or his physical activity. He checks his blood sugars twice daily.  His blood sugar in the morning ranges between .  His post dinner blood sugars range between 150 and 180.  He has coffee and a fruit bar or pack of peanut butter crackers for breakfast. Fox Fat is a sandwich with chips and diet Pepsi.  Dinner can be chicken with half a baked potato and a salad or pork chop or manicotti and a salad.  He does not snack at bedtime.  Because of COVID-19, he and his wife are not going out to eat as much and so he actually feels like his diet is better than it was before.  On the other hand, he is not as physically active as he was before. Examination  GENERAL: NCAT, Appears well nourished   EYES: EOMI, non-icteric, no proptosis .   There is a persistent ptosis of the right eyelid although improving. Ear/Nose/Throat: NCAT, no visible inflammation or masses   CARDIOVASCULAR: no cyanosis, no visible JVD   RESPIRATORY: comfortable respirations observed, no cyanosis   MUSCULOSKELETAL: Normal ROM of upper extremities observed   SKIN: No edema, rash, or other significant changes observed   NEUROLOGIC:  AAOx3   PSYCHIATRIC: Normal affect, Normal insight and judgement    Impression   1. type 2 diabetes mellitus with overall good blood sugar control on Toujeo plus Victoza with an average blood sugar of 150 over the last 90 days. 2.  Chronic kidney disease stage III a  3.  right Bell's palsy  Plan: I did not change the regimen. He did have laboratory work done by his primary care physician and I am awaiting the results.   I will see him back in 3 to 4 months face-to-face.

## 2021-03-27 RX ORDER — LIRAGLUTIDE 6 MG/ML
INJECTION SUBCUTANEOUS
Qty: 18 ML | Refills: 3 | Status: SHIPPED | OUTPATIENT
Start: 2021-03-27 | End: 2021-06-21 | Stop reason: SDUPTHER

## 2021-03-28 RX ORDER — FOSINOPRIL SODIUM 40 MG/1
TABLET ORAL
Qty: 90 TAB | Refills: 3 | Status: SHIPPED | OUTPATIENT
Start: 2021-03-28 | End: 2022-02-14

## 2021-05-26 ENCOUNTER — OFFICE VISIT (OUTPATIENT)
Dept: INTERNAL MEDICINE CLINIC | Age: 76
End: 2021-05-26
Payer: MEDICARE

## 2021-05-26 VITALS
HEART RATE: 66 BPM | OXYGEN SATURATION: 99 % | BODY MASS INDEX: 24.2 KG/M2 | HEIGHT: 67 IN | WEIGHT: 154.2 LBS | DIASTOLIC BLOOD PRESSURE: 62 MMHG | SYSTOLIC BLOOD PRESSURE: 118 MMHG | TEMPERATURE: 97.2 F | RESPIRATION RATE: 16 BRPM

## 2021-05-26 DIAGNOSIS — E11.21 TYPE II DIABETES MELLITUS WITH NEPHROPATHY (HCC): Primary | ICD-10-CM

## 2021-05-26 DIAGNOSIS — N18.30 STAGE 3 CHRONIC KIDNEY DISEASE, UNSPECIFIED WHETHER STAGE 3A OR 3B CKD (HCC): ICD-10-CM

## 2021-05-26 DIAGNOSIS — E78.00 PURE HYPERCHOLESTEROLEMIA: ICD-10-CM

## 2021-05-26 DIAGNOSIS — G51.0 BELL'S PALSY: ICD-10-CM

## 2021-05-26 DIAGNOSIS — R41.3 MEMORY LOSS: ICD-10-CM

## 2021-05-26 DIAGNOSIS — I10 ESSENTIAL HYPERTENSION: ICD-10-CM

## 2021-05-26 DIAGNOSIS — R53.83 FATIGUE, UNSPECIFIED TYPE: ICD-10-CM

## 2021-05-26 LAB
ALBUMIN SERPL-MCNC: 4 G/DL (ref 3.5–5)
ALBUMIN/GLOB SERPL: 1.3 {RATIO} (ref 1.1–2.2)
ALP SERPL-CCNC: 92 U/L (ref 45–117)
ALT SERPL-CCNC: 34 U/L (ref 12–78)
ANION GAP SERPL CALC-SCNC: 8 MMOL/L (ref 5–15)
AST SERPL-CCNC: 23 U/L (ref 15–37)
BILIRUB SERPL-MCNC: 0.4 MG/DL (ref 0.2–1)
BUN SERPL-MCNC: 19 MG/DL (ref 6–20)
BUN/CREAT SERPL: 14 (ref 12–20)
CALCIUM SERPL-MCNC: 9.2 MG/DL (ref 8.5–10.1)
CHLORIDE SERPL-SCNC: 108 MMOL/L (ref 97–108)
CHOLEST SERPL-MCNC: 135 MG/DL
CO2 SERPL-SCNC: 22 MMOL/L (ref 21–32)
CREAT SERPL-MCNC: 1.34 MG/DL (ref 0.7–1.3)
CREAT UR-MCNC: 60.8 MG/DL
EST. AVERAGE GLUCOSE BLD GHB EST-MCNC: 148 MG/DL
GLOBULIN SER CALC-MCNC: 3.2 G/DL (ref 2–4)
GLUCOSE SERPL-MCNC: 112 MG/DL (ref 65–100)
HBA1C MFR BLD: 6.8 % (ref 4–5.6)
HDLC SERPL-MCNC: 52 MG/DL
HDLC SERPL: 2.6 {RATIO} (ref 0–5)
LDLC SERPL CALC-MCNC: 50.8 MG/DL (ref 0–100)
MICROALBUMIN UR-MCNC: 0.56 MG/DL
MICROALBUMIN/CREAT UR-RTO: 9 MG/G (ref 0–30)
POTASSIUM SERPL-SCNC: 4.5 MMOL/L (ref 3.5–5.1)
PROT SERPL-MCNC: 7.2 G/DL (ref 6.4–8.2)
SODIUM SERPL-SCNC: 138 MMOL/L (ref 136–145)
TRIGL SERPL-MCNC: 161 MG/DL (ref ?–150)
UR CULT HOLD, URHOLD: NORMAL
VLDLC SERPL CALC-MCNC: 32.2 MG/DL

## 2021-05-26 PROCEDURE — 99214 OFFICE O/P EST MOD 30 MIN: CPT | Performed by: INTERNAL MEDICINE

## 2021-05-26 PROCEDURE — 1101F PT FALLS ASSESS-DOCD LE1/YR: CPT | Performed by: INTERNAL MEDICINE

## 2021-05-26 PROCEDURE — G8420 CALC BMI NORM PARAMETERS: HCPCS | Performed by: INTERNAL MEDICINE

## 2021-05-26 PROCEDURE — G0463 HOSPITAL OUTPT CLINIC VISIT: HCPCS | Performed by: INTERNAL MEDICINE

## 2021-05-26 PROCEDURE — G8536 NO DOC ELDER MAL SCRN: HCPCS | Performed by: INTERNAL MEDICINE

## 2021-05-26 PROCEDURE — G8427 DOCREV CUR MEDS BY ELIG CLIN: HCPCS | Performed by: INTERNAL MEDICINE

## 2021-05-26 PROCEDURE — 2022F DILAT RTA XM EVC RTNOPTHY: CPT | Performed by: INTERNAL MEDICINE

## 2021-05-26 PROCEDURE — 3017F COLORECTAL CA SCREEN DOC REV: CPT | Performed by: INTERNAL MEDICINE

## 2021-05-26 PROCEDURE — G8752 SYS BP LESS 140: HCPCS | Performed by: INTERNAL MEDICINE

## 2021-05-26 PROCEDURE — G8754 DIAS BP LESS 90: HCPCS | Performed by: INTERNAL MEDICINE

## 2021-05-26 PROCEDURE — G8510 SCR DEP NEG, NO PLAN REQD: HCPCS | Performed by: INTERNAL MEDICINE

## 2021-05-26 PROCEDURE — 3046F HEMOGLOBIN A1C LEVEL >9.0%: CPT | Performed by: INTERNAL MEDICINE

## 2021-05-26 NOTE — PROGRESS NOTES
Identified pt with two pt identifiers(name and ). Reviewed record in preparation for visit and have obtained necessary documentation. Chief Complaint   Patient presents with    Hypertension     f/u    Diabetes        Visit Vitals  BP (!) 147/79 (BP 1 Location: Left upper arm, BP Patient Position: Sitting, BP Cuff Size: Adult)   Pulse 66   Temp 97.2 °F (36.2 °C) (Oral)   Resp 16   Ht 5' 7\" (1.702 m)   Wt 154 lb 3.2 oz (69.9 kg)   SpO2 99%   BMI 24.15 kg/m²       Health Maintenance Due   Topic    Shingrix Vaccine Age 50> (1 of 2)    Foot Exam Q1     MICROALBUMIN Q1     Medicare Yearly Exam     Lipid Screen     A1C test (Diabetic or Prediabetic)        Med Reconciliation: Completed    Coordination of Care Questionnaire:  :   1) Have you been to an emergency room, urgent care, or hospitalized since your last visit? If yes, where when, and reason for visit? No       2. Have seen or consulted any other health care provider since your last visit? If yes, where when, and reason for visit? No       3) Do you have an Advanced Directive/ Living Will in place? No  If yes, do we have a copy on file   If no, would you like information     Patient is accompanied by self I have received verbal consent from Gayla Chisholm to discuss any/all medical information while they are present in the room.

## 2021-05-26 NOTE — PROGRESS NOTES
HISTORY OF PRESENT ILLNESS  Garrison Stahl is a 77 y.o. male.   HPI     f/u HTN DM02 HLD anemia d/t AVM ckd 3   Hd atypical CP last year-stress echo ordered but not done  Developed right ocular 3rd nerve palsy with double vision and droopy eye lid-no cva on MRI/MRA  Got opinion from [facility] and dx Clark Palsy possibly ischemic in origin and possibly related to [id number] almost 100% better  Sees Dr Kyle Stewart for DM-2---controlled fsbs around 100 in AM fasting per pt. 2 hrs after dinner 160-220  On toujeo 17 units and victoza 1.8 mg every day  Had prostate bx in January-not cancer  C/o some short term memory loss over for about 1 year and sometimes loss of direction when driving now    Has fu appt with [facility] Neuro Dr Felipa Baker soon for fu 3d nerve palsy    Last OV   had EGD /colon this year--barretts without dysplasia and normal colon--Dr Lucia Reyes last month on toujeo 17 units and victoza 1.2 mg every day-- a1c  7.1     Having neck pain -orthva went to PT then 2 shots into neck and then cauterization of nerves  Next week will have 2 more nerves ablated--pain 8/10 currently  Not on aspirin or nsaids d/u GI bleeding 2 years ago  No cp sob or symptoms of neuropathy    Patient Active Problem List    Diagnosis Date Noted    Type 2 diabetes with nephropathy ([facility] [address] 04/23/2018    GI bleed 07/27/2017    Primary open angle glaucoma 07/26/2016    CKD (chronic kidney disease) stage 3, GFR 30-59 ml/min ([facility] [address] 08/10/2010    Essential hypertension, benign 10/26/2009    Pure hypercholesterolemia 10/26/2009    Polycythemia 10/26/2009    Hernadez's esophagus 10/26/2009    BPH (benign prostatic hyperplasia) 10/26/2009    Gout 10/26/2009     Current Outpatient Medications   Medication Sig Dispense Refill    Insulin Needles, Disposable, (Kaylen Pen Needle) 32 gauge x 5/32\" ndle USE FOR TOUJEO DAILY 100 Pen Needle 3    fosinopriL (MONOPRIL) 40 mg tablet TAKE 1 TABLET DAILY 90 Tab 3    Victoza 3-Abdiel 0.6 mg/0.1 mL (18 mg/3 mL) pnij INJECT 1.2 MG UNDER THE SKIN DAILY 18 mL 3    Toujeo SoloStar U-300 Insulin 300 unit/mL (1.5 mL) inpn pen INJECT 20 UNITS UNDER THE SKIN DAILY 4.5 mL 4    glucose blood VI test strips (OneTouch Verio test strips) strip USE TO CHECK BLOOD SUGAR THREE TIMES DAILY 300 Strip 3    pravastatin (PRAVACHOL) 20 mg tablet TAKE 1 TABLET DAILY 90 Tab 3    aspirin 81 mg chewable tablet Take 81 mg by mouth daily.  Mitigare 0.6 mg capsule TAKE 1 CAPSULE DAILY 90 Cap 3    latanoprost (XALATAN) 0.005 % ophthalmic solution INT 1 GTT IN OU QD HS      pen needle, diabetic (NOVOTWIST) 32 gauge x 1/5\" ndle For use with tresiba and Victoza - twice daily. 200 Pen Needle 3    magnesium oxide 500 mg tab Take 1 Tab by mouth daily.  cyanocobalamin 1,000 mcg tablet Take 1,000 mcg by mouth daily.  OTHER daily. motilium 10 mg daily (patient states drug not made in [facility])       esomeprazole (NEXIUM) 40 mg capsule Take 1 Cap by mouth two (2) times a day.  180 Cap 3     Allergies   Allergen Reactions    Aspirin Other (comments)     GI bleed    Contrast Agent [Iodine] Hives and Itching     IVP dye      Lab Results   Component Value Date/Time    WBC 6.5 11/30/2020 07:09 PM    HGB 16.3 11/30/2020 07:09 PM    Hemoglobin (POC) 8.3 08/03/2017 02:00 PM    HCT 48.6 11/30/2020 07:09 PM    PLATELET 981 (L) 57/65/9176 07:09 PM    MCV 92.0 11/30/2020 07:09 PM     Lab Results   Component Value Date/Time    Hemoglobin A1c CANCELED 07/09/2019 12:00 AM    Hemoglobin A1c 7.5 (H) 11/28/2018 11:44 AM    Hemoglobin A1c 7.8 (H) 04/17/2018 11:34 AM    Glucose 245 (H) 11/30/2020 07:09 PM    Glucose (POC) 124 (H) 08/21/2019 10:40 AM    Microalb/Creat ratio (ug/mg creat.) <14.7 07/09/2019 12:00 AM    LDL, calculated 50 12/04/2019 09:11 AM    Creatinine 1.63 (H) 11/30/2020 07:09 PM      Lab Results   Component Value Date/Time    Cholesterol, total 140 12/04/2019 09:11 AM    Cholesterol (POC) 189 04/16/2013 09:01 AM    HDL Cholesterol 48 12/04/2019 09:11 AM    LDL, calculated 50 12/04/2019 09:11 AM    LDL Cholesterol (POC) 73 04/16/2013 09:01 AM    Triglyceride 210 (H) 12/04/2019 09:11 AM    Triglycerides (POC) 190 04/16/2013 09:01 AM    CHOL/HDL Ratio 3.5 08/10/2010 09:44 AM     Lab Results   Component Value Date/Time    GFR est non-AA 42 (L) 11/30/2020 07:09 PM    GFR est AA 50 (L) 11/30/2020 07:09 PM    Creatinine 1.63 (H) 11/30/2020 07:09 PM    BUN 22 (H) 11/30/2020 07:09 PM    Sodium 136 11/30/2020 07:09 PM    Potassium 4.4 11/30/2020 07:09 PM    Chloride 105 11/30/2020 07:09 PM    CO2 23 11/30/2020 07:09 PM        ROS    Physical Exam  Vitals and nursing note reviewed. Constitutional:       General: He is not in acute distress. Appearance: He is well-developed. Comments: Appears stated age   HENT:      Head: Normocephalic. Cardiovascular:      Rate and Rhythm: Normal rate and regular rhythm. Heart sounds: Normal heart sounds. Pulmonary:      Effort: Pulmonary effort is normal.      Breath sounds: Normal breath sounds. Abdominal:      Palpations: Abdomen is soft. Neurological:      General: No focal deficit present. Mental Status: He is alert. Psychiatric:         Mood and Affect: Mood normal.         Behavior: Behavior normal.         Thought Content: Thought content normal.         Judgment: Judgment normal.         ASSESSMENT and PLAN  Diagnoses and all orders for this visit:    1. Type II diabetes mellitus with nephropathy (HCC)  -     METABOLIC PANEL, COMPREHENSIVE; Future  -     HEMOGLOBIN A1C WITH EAG; Future  -     MICROALBUMIN, UR, RAND W/ MICROALB/CREAT RATIO; Future   Fu Dr Jessy Brumfield next month   Fasting fsbs at goal  2. Essential hypertension  -     METABOLIC PANEL, COMPREHENSIVE; Future   Good control  3. Pure hypercholesterolemia  -     METABOLIC PANEL, COMPREHENSIVE; Future  -     LIPID PANEL; Future   On statin  4.  Stage 3 chronic kidney disease, unspecified whether stage 3a or 3b CKD ([facility] [address]  - METABOLIC PANEL, COMPREHENSIVE; Future    5. Bell's palsy   On aspirin 81 mg every day   Azeem Lazar Neuro MD -very slight left iupper lid droop   6.  Fatigue, unspecified type  -     REFERRAL TO CARDIOLOGY    7. Memory loss  -     REFERRAL TO NEUROLOGY   [facility] SLUMS 21/30   unable to recall 5 objects, clock drawing   I think this score is falsely low , possible some inattention   May need neuropsych testing      6 months [facility]

## 2021-06-04 ENCOUNTER — TELEPHONE (OUTPATIENT)
Dept: INTERNAL MEDICINE CLINIC | Age: 76
End: 2021-06-04

## 2021-06-04 NOTE — TELEPHONE ENCOUNTER
Spoke with patient using 2 identifiers. Patient was playing with his dog and the dog's tooth punctuated his finger. Per patient he cleaned the site really well. No signs of infection. Patient was advised to update TD vaccine and if he develop signs of infection see MD . Patient verbalized understanding.

## 2021-06-07 RX ORDER — COLCHICINE 0.6 MG/1
CAPSULE ORAL
Qty: 90 CAPSULE | Refills: 3 | Status: SHIPPED | OUTPATIENT
Start: 2021-06-07 | End: 2022-04-18 | Stop reason: SDUPTHER

## 2021-06-21 ENCOUNTER — OFFICE VISIT (OUTPATIENT)
Dept: ENDOCRINOLOGY | Age: 76
End: 2021-06-21
Payer: MEDICARE

## 2021-06-21 VITALS
SYSTOLIC BLOOD PRESSURE: 120 MMHG | HEART RATE: 70 BPM | BODY MASS INDEX: 24.25 KG/M2 | DIASTOLIC BLOOD PRESSURE: 71 MMHG | WEIGHT: 154.8 LBS

## 2021-06-21 DIAGNOSIS — G62.9 NEUROPATHY: ICD-10-CM

## 2021-06-21 DIAGNOSIS — E11.9 TYPE 2 DIABETES MELLITUS WITHOUT COMPLICATION, WITHOUT LONG-TERM CURRENT USE OF INSULIN (HCC): Primary | ICD-10-CM

## 2021-06-21 DIAGNOSIS — N18.30 STAGE 3 CHRONIC KIDNEY DISEASE, UNSPECIFIED WHETHER STAGE 3A OR 3B CKD (HCC): ICD-10-CM

## 2021-06-21 PROCEDURE — G8536 NO DOC ELDER MAL SCRN: HCPCS | Performed by: INTERNAL MEDICINE

## 2021-06-21 PROCEDURE — 2022F DILAT RTA XM EVC RTNOPTHY: CPT | Performed by: INTERNAL MEDICINE

## 2021-06-21 PROCEDURE — G8432 DEP SCR NOT DOC, RNG: HCPCS | Performed by: INTERNAL MEDICINE

## 2021-06-21 PROCEDURE — 3017F COLORECTAL CA SCREEN DOC REV: CPT | Performed by: INTERNAL MEDICINE

## 2021-06-21 PROCEDURE — G8420 CALC BMI NORM PARAMETERS: HCPCS | Performed by: INTERNAL MEDICINE

## 2021-06-21 PROCEDURE — 1101F PT FALLS ASSESS-DOCD LE1/YR: CPT | Performed by: INTERNAL MEDICINE

## 2021-06-21 PROCEDURE — G8752 SYS BP LESS 140: HCPCS | Performed by: INTERNAL MEDICINE

## 2021-06-21 PROCEDURE — 99214 OFFICE O/P EST MOD 30 MIN: CPT | Performed by: INTERNAL MEDICINE

## 2021-06-21 PROCEDURE — 3044F HG A1C LEVEL LT 7.0%: CPT | Performed by: INTERNAL MEDICINE

## 2021-06-21 PROCEDURE — G8754 DIAS BP LESS 90: HCPCS | Performed by: INTERNAL MEDICINE

## 2021-06-21 PROCEDURE — G8427 DOCREV CUR MEDS BY ELIG CLIN: HCPCS | Performed by: INTERNAL MEDICINE

## 2021-06-21 RX ORDER — LIRAGLUTIDE 6 MG/ML
1.8 INJECTION SUBCUTANEOUS DAILY
Qty: 10 ADJUSTABLE DOSE PRE-FILLED PEN SYRINGE | Refills: 3 | Status: SHIPPED | OUTPATIENT
Start: 2021-06-21 | End: 2022-07-29

## 2021-06-21 NOTE — PROGRESS NOTES
This is a 67-year-old white male with a history of type 2 diabetes mellitus x 10 years currently on Toujeo insulin 17 units in the morning and Victoza daily.      He has chronic kidney disease stage III a with a recent creatinine of 1.63.    He has degenerative disc disease and has received a number of steroid injections into his neck.     When I saw him in December he had developed an acute right 3rd nerve palsy. He was seen at [facility] and the Bell's palsy was felt to be ischemic in origin and perhaps related to his diabetes but not clearly. Since I saw him in December,  the Bell's palsy has improved significantly but has not completely resolved yet.        Current Medications  Toujeo 17 units AM  Victoza 1.8 mg     Regarding his diabetes, he continues to do very well. A1c 6.8%. His blood sugars in the morning are excellent ranging between 90 and 110.  His blood sugars after dinner are in the 180-220 range.  There really is not any change in his diet or his physical activity. He checks his blood sugars twice daily.  His blood sugar in the morning ranges between .  His post dinner blood sugars range between 150 and 180.  He has coffee and a fruit bar or pack of peanut butter crackers for breakfast. Laverne Abide is a sandwich with chips and diet Pepsi.  Dinner can be chicken with half a baked potato and a salad or pork chop or manicotti and a salad.  He does not snack at bedtime.  His physical activity has improved and he is back to walking every day and or playing golf. He does tell me that he occasionally has symptoms of very mild hypoglycemia occurring at about 11:00 which is why he eats his lunch at 11:00. None of the episodes are significant.     Examination  Blood pressure 120/71  Pulse 80  Weight 154  HEENT unremarkable  Lungs clear  Heart reveals a regular rate and rhythm  Abdomen soft  Extremities unremarkable  Diabetic foot exam:     Left Foot:   Visual Exam: normal    Pulse DP: 1+ (weak)   Filament test: reduced sensation    Vibratory sensation: diminished      Right Foot:   Visual Exam: normal . There is a very small callous between the 4th and 5th toes   Pulse DP: 1+ (weak)   Filament test: absent sensation    Vibratory sensation: absent     Impression  1. Type 2 diabetes mellitus with excellent glucose control on combination therapy  2. History of Bell's palsy  3. Diabetic peripheral neuropathy    Plan:  1. I have continued the above regimen but have decreased the Toujeo to 14 units because of the very mild symptoms at 11:00 in the morning  2. I advised him to wear looser fitting shoes to resolve the callus. There is no evidence of infection. 3.  I will see him back in 6 months or sooner as needed.

## 2021-06-23 ENCOUNTER — ANCILLARY PROCEDURE (OUTPATIENT)
Dept: CARDIOLOGY CLINIC | Age: 76
End: 2021-06-23
Payer: MEDICARE

## 2021-06-23 VITALS
BODY MASS INDEX: 24.17 KG/M2 | DIASTOLIC BLOOD PRESSURE: 70 MMHG | WEIGHT: 154 LBS | HEIGHT: 67 IN | SYSTOLIC BLOOD PRESSURE: 145 MMHG

## 2021-06-23 DIAGNOSIS — I20.8 OTHER FORMS OF ANGINA PECTORIS (HCC): ICD-10-CM

## 2021-06-23 PROCEDURE — 93351 STRESS TTE COMPLETE: CPT | Performed by: INTERNAL MEDICINE

## 2021-06-24 LAB
STRESS ANGINA INDEX: 0
STRESS BASELINE DIAS BP: 70 MMHG
STRESS BASELINE HR: 76 BPM
STRESS BASELINE SYS BP: 145 MMHG
STRESS ESTIMATED WORKLOAD: 5 METS
STRESS EXERCISE DUR MIN: NORMAL MIN:SEC
STRESS O2 SAT PEAK: 99 %
STRESS O2 SAT REST: 98 %
STRESS PEAK DIAS BP: 80 MMHG
STRESS PEAK SYS BP: 200 MMHG
STRESS PERCENT HR ACHIEVED: 99 %
STRESS POST PEAK HR: 144 BPM
STRESS RATE PRESSURE PRODUCT: NORMAL BPM*MMHG
STRESS SR DUKE TREADMILL SCORE: 3
STRESS ST DEPRESSION: 0 MM
STRESS ST ELEVATION: 0 MM
STRESS STAGE 1 BP: NORMAL MMHG
STRESS STAGE 1 DURATION: NORMAL MIN:SEC
STRESS STAGE 1 HR: 139 BPM
STRESS STAGE 2 DURATION: NORMAL MIN:SEC
STRESS STAGE 2 HR: 144 BPM
STRESS STAGE RECOVERY 1 BP: NORMAL MMHG
STRESS STAGE RECOVERY 1 DURATION: NORMAL MIN:SEC
STRESS STAGE RECOVERY 1 HR: 80 BPM
STRESS TARGET HR: 146 BPM

## 2021-06-29 ENCOUNTER — TELEPHONE (OUTPATIENT)
Dept: CARDIOLOGY CLINIC | Age: 76
End: 2021-06-29

## 2021-06-29 NOTE — PROGRESS NOTES
Ekg and echo portion of the stress test neg for evidence of blood flow issues. He did complain of fatigue and only made it to stage 2, though adequate for the study. I dont see an appt scheduled with Dr Kevin Marie. If having symptoms should schedule an appt to discuss.

## 2021-06-29 NOTE — TELEPHONE ENCOUNTER
Spoke with patient. Verified patient with two patient identifiers. Discussed Stress test results as noted below. Stated symptoms have subsided and will f/u in 6 mos  Will call to make appt. Patient verbalized understanding.

## 2021-06-29 NOTE — TELEPHONE ENCOUNTER
----- Message from Miladis Dale NP sent at 6/29/2021  9:00 AM EDT -----  Ekg and echo portion of the stress test neg for evidence of blood flow issues. He did complain of fatigue and only made it to stage 2, though adequate for the study. I dont see an appt scheduled with Dr Jordan Herrera. If having symptoms should schedule an appt to discuss.

## 2021-10-15 ENCOUNTER — HOSPITAL ENCOUNTER (OUTPATIENT)
Dept: PREADMISSION TESTING | Age: 76
Discharge: HOME | End: 2021-10-15
Payer: MEDICARE

## 2021-10-15 PROCEDURE — U0005 INFEC AGEN DETEC AMPLI PROBE: HCPCS

## 2021-10-16 LAB
SARS-COV-2, XPLCVT: NOT DETECTED
SOURCE, COVRS: NORMAL

## 2021-10-20 ENCOUNTER — ANESTHESIA (OUTPATIENT)
Dept: ENDOSCOPY | Age: 76
End: 2021-10-20
Payer: MEDICARE

## 2021-10-20 ENCOUNTER — HOSPITAL ENCOUNTER (OUTPATIENT)
Age: 76
Setting detail: OUTPATIENT SURGERY
Discharge: HOME | End: 2021-10-20
Attending: INTERNAL MEDICINE | Admitting: INTERNAL MEDICINE
Payer: MEDICARE

## 2021-10-20 ENCOUNTER — ANESTHESIA EVENT (OUTPATIENT)
Dept: ENDOSCOPY | Age: 76
End: 2021-10-20
Payer: MEDICARE

## 2021-10-20 VITALS
DIASTOLIC BLOOD PRESSURE: 95 MMHG | SYSTOLIC BLOOD PRESSURE: 138 MMHG | TEMPERATURE: 96.8 F | OXYGEN SATURATION: 97 % | RESPIRATION RATE: 17 BRPM | HEART RATE: 70 BPM | BODY MASS INDEX: 23.86 KG/M2 | HEIGHT: 67 IN | WEIGHT: 152 LBS

## 2021-10-20 LAB
GLUCOSE BLD STRIP.AUTO-MCNC: 94 MG/DL (ref 65–117)
SERVICE CMNT-IMP: NORMAL

## 2021-10-20 PROCEDURE — 74011250636 HC RX REV CODE- 250/636: Performed by: INTERNAL MEDICINE

## 2021-10-20 PROCEDURE — 82962 GLUCOSE BLOOD TEST: CPT

## 2021-10-20 PROCEDURE — 76060000031 HC ANESTHESIA FIRST 0.5 HR: Performed by: INTERNAL MEDICINE

## 2021-10-20 PROCEDURE — 2709999900 HC NON-CHARGEABLE SUPPLY: Performed by: INTERNAL MEDICINE

## 2021-10-20 PROCEDURE — 77030019988 HC FCPS ENDOSC DISP BSC -B: Performed by: INTERNAL MEDICINE

## 2021-10-20 PROCEDURE — 74011000250 HC RX REV CODE- 250: Performed by: ANESTHESIOLOGY

## 2021-10-20 PROCEDURE — 74011250636 HC RX REV CODE- 250/636: Performed by: ANESTHESIOLOGY

## 2021-10-20 PROCEDURE — 88305 TISSUE EXAM BY PATHOLOGIST: CPT

## 2021-10-20 PROCEDURE — 76040000019: Performed by: INTERNAL MEDICINE

## 2021-10-20 RX ORDER — ATROPINE SULFATE 0.1 MG/ML
0.5 INJECTION INTRAVENOUS
Status: DISCONTINUED | OUTPATIENT
Start: 2021-10-20 | End: 2021-10-20 | Stop reason: HOSPADM

## 2021-10-20 RX ORDER — GLYCOPYRROLATE 0.2 MG/ML
INJECTION INTRAMUSCULAR; INTRAVENOUS AS NEEDED
Status: DISCONTINUED | OUTPATIENT
Start: 2021-10-20 | End: 2021-10-20 | Stop reason: HOSPADM

## 2021-10-20 RX ORDER — MIDAZOLAM HYDROCHLORIDE 1 MG/ML
.25-5 INJECTION, SOLUTION INTRAMUSCULAR; INTRAVENOUS
Status: DISCONTINUED | OUTPATIENT
Start: 2021-10-20 | End: 2021-10-20 | Stop reason: HOSPADM

## 2021-10-20 RX ORDER — FLUMAZENIL 0.1 MG/ML
0.2 INJECTION INTRAVENOUS
Status: DISCONTINUED | OUTPATIENT
Start: 2021-10-20 | End: 2021-10-20 | Stop reason: HOSPADM

## 2021-10-20 RX ORDER — EPINEPHRINE 0.1 MG/ML
1 INJECTION INTRACARDIAC; INTRAVENOUS
Status: DISCONTINUED | OUTPATIENT
Start: 2021-10-20 | End: 2021-10-20 | Stop reason: HOSPADM

## 2021-10-20 RX ORDER — SODIUM CHLORIDE 9 MG/ML
100 INJECTION, SOLUTION INTRAVENOUS CONTINUOUS
Status: DISCONTINUED | OUTPATIENT
Start: 2021-10-20 | End: 2021-10-20 | Stop reason: HOSPADM

## 2021-10-20 RX ORDER — SODIUM CHLORIDE 0.9 % (FLUSH) 0.9 %
5-40 SYRINGE (ML) INJECTION EVERY 8 HOURS
Status: DISCONTINUED | OUTPATIENT
Start: 2021-10-20 | End: 2021-10-20 | Stop reason: HOSPADM

## 2021-10-20 RX ORDER — NALOXONE HYDROCHLORIDE 0.4 MG/ML
0.4 INJECTION, SOLUTION INTRAMUSCULAR; INTRAVENOUS; SUBCUTANEOUS
Status: DISCONTINUED | OUTPATIENT
Start: 2021-10-20 | End: 2021-10-20 | Stop reason: HOSPADM

## 2021-10-20 RX ORDER — PROPOFOL 10 MG/ML
INJECTION, EMULSION INTRAVENOUS AS NEEDED
Status: DISCONTINUED | OUTPATIENT
Start: 2021-10-20 | End: 2021-10-20 | Stop reason: HOSPADM

## 2021-10-20 RX ORDER — SODIUM CHLORIDE 0.9 % (FLUSH) 0.9 %
5-40 SYRINGE (ML) INJECTION AS NEEDED
Status: DISCONTINUED | OUTPATIENT
Start: 2021-10-20 | End: 2021-10-20 | Stop reason: HOSPADM

## 2021-10-20 RX ORDER — DEXTROMETHORPHAN/PSEUDOEPHED 2.5-7.5/.8
1.2 DROPS ORAL
Status: DISCONTINUED | OUTPATIENT
Start: 2021-10-20 | End: 2021-10-20 | Stop reason: HOSPADM

## 2021-10-20 RX ORDER — LIDOCAINE HYDROCHLORIDE 20 MG/ML
INJECTION, SOLUTION EPIDURAL; INFILTRATION; INTRACAUDAL; PERINEURAL AS NEEDED
Status: DISCONTINUED | OUTPATIENT
Start: 2021-10-20 | End: 2021-10-20 | Stop reason: HOSPADM

## 2021-10-20 RX ADMIN — GLYCOPYRROLATE 0.1 MG: 0.2 INJECTION, SOLUTION INTRAMUSCULAR; INTRAVENOUS at 10:04

## 2021-10-20 RX ADMIN — LIDOCAINE HYDROCHLORIDE 80 MG: 20 INJECTION, SOLUTION EPIDURAL; INFILTRATION; INTRACAUDAL; PERINEURAL at 10:04

## 2021-10-20 RX ADMIN — SODIUM CHLORIDE 100 ML/HR: 900 INJECTION, SOLUTION INTRAVENOUS at 09:43

## 2021-10-20 RX ADMIN — PROPOFOL 150 MG: 10 INJECTION, EMULSION INTRAVENOUS at 10:12

## 2021-10-20 NOTE — DISCHARGE INSTRUCTIONS
Therese Stone MD  [facility], [address]  [place], [address] [facility]  [phone]  [employer] [facility]  [id number]  1945    EGD DISCHARGE INSTRUCTIONS    Discomfort:  Sore throat- throat lozenges or warm salt water gargle  redness at IV site- apply warm compress to area; if redness or soreness persist- contact your physician  Gaseous discomfort- walking, belching will help relieve any discomfort  You may not operate a vehicle for 12 hours  You may not engage in an occupation involving machinery or appliances for rest of today  You may not drink alcoholic beverages for at least 12 hours  Avoid making any critical decisions for at least 24 hour  DIET  You may have anything by mouth. You may eat and drink immediately. You may resume your regular diet - however -  remember your colon is empty and a heavy meal will produce gas. Avoid these foods:  vegetables, fried / greasy foods, carbonated drinks      ACTIVITY  You may resume your normal daily activities   Spend the remainder of the day resting -  avoid any strenuous activity. CALL M.D. ANY SIGN OF   Increasing pain, nausea, vomiting  Abdominal distension (swelling)  New increased bleeding (oral or rectal)  Fever (chills)  Pain in chest area  Bloody discharge from nose or mouth  Shortness of breath    Follow-up Instructions:   Call Dr. Therese Stone for any questions or problems. Telephone # [phone]  Dr. Rivera Villa office will notify you of the biopsy results available  Within 7 to 10 days. We will call you or send a letter   Continue same medications. Repeat endoscopy in two years. ENDOSCOPY FINDINGS:   Your endoscopy showed the long segment of ivory's esophagus which was biopsied.       DISCHARGE SUMMARY from Nurse    The following personal items collected during your admission are returned to you:   Dental Appliance: Dental Appliances: None  Vision: Visual Aid: None  Hearing Aid:    Jewelry:    Clothing:    Other Valuables:    Valuables sent to safe:

## 2021-10-20 NOTE — ANESTHESIA POSTPROCEDURE EVALUATION
May 28, 2020      Juan Balderrama  [address]  [place] [address]        Dear Juan,     We have been unsuccessful at reaching you by phone or Mychart.  We received a refill request for your hydrochlorothiazide medication.  This medication has been refilled for 15 days as you are due for an office visit for further refills.  Please call [phone] to schedule an appointment.      Sincerely,        Mauricio Pearce MD           Procedure(s):  ESOPHAGOGASTRODUODENOSCOPY (EGD)  ESOPHAGOGASTRODUODENAL (EGD) BIOPSY. total IV anesthesia    Anesthesia Post Evaluation        Patient location during evaluation: PACU  Note status: Adequate. Level of consciousness: responsive to verbal stimuli and sleepy but conscious  Pain management: satisfactory to patient  Airway patency: patent  Anesthetic complications: no  Cardiovascular status: acceptable  Respiratory status: acceptable  Hydration status: acceptable  Comments: +Post-Anesthesia Evaluation and Assessment    Patient: Tabitha Francisco MRN: [id number]  SSN: [id number]   YOB: 1945  Age: 77 y.o. Sex: male      Cardiovascular Function/Vital Signs    /62   Pulse 63   Temp 36.6 °C (97.9 °F)   Resp 16   Ht 5' 7\" (1.702 m)   Wt 68.9 kg (152 lb)   SpO2 99%   BMI 23.81 kg/m²     Patient is status post Procedure(s):  ESOPHAGOGASTRODUODENOSCOPY (EGD)  ESOPHAGOGASTRODUODENAL (EGD) BIOPSY. Nausea/Vomiting: Controlled. Postoperative hydration reviewed and adequate. Pain:  Pain Scale 1: Numeric (0 - 10) (10/20/21 0933)  Pain Intensity 1: 0 (10/20/21 0933)   Managed. Neurological Status: At baseline. Mental Status and Level of Consciousness: Arousable. Pulmonary Status:   O2 Device: CO2 nasal cannula (10/20/21 1015)   Adequate oxygenation and airway patent. Complications related to anesthesia: None    Post-anesthesia assessment completed. No concerns.     Signed By: Edie Fuentes MD    10/20/2021  Post anesthesia nausea and vomiting:  controlled      INITIAL Post-op Vital signs:   Vitals Value Taken Time   /62 10/20/21 1015   Temp     Pulse 63 10/20/21 1015   Resp 16 10/20/21 1015   SpO2 99 % 10/20/21 1015

## 2021-10-20 NOTE — ROUTINE PROCESS
Sofiya Melissa  1945  [id number]    Situation:  Verbal report received from: Belkis Cortez  Procedure: Procedure(s):  ESOPHAGOGASTRODUODENOSCOPY (EGD)  ESOPHAGOGASTRODUODENAL (EGD) BIOPSY    Background:    Preoperative diagnosis: Hernadez's esophagus with dysplasia [K22.719]  Postoperative diagnosis: Hernadez's esophagus     :  Dr. Lauren Churchill  Assistant(s): Endoscopy Technician-1: Sandy Melara  Endoscopy RN-1: Hannah Bonner    Specimens:   ID Type Source Tests Collected by Time Destination   1 : Riky Apodaca MD 10/20/2021 1005 Pathology   2 : Jose Juan Apodaca MD 10/20/2021 1007 Pathology     H. Pylori  no    Assessment:  Intra-procedure medications     Anesthesia gave intra-procedure sedation and medications, see anesthesia flow sheet yes    Intravenous fluids: NS@ KVO     Vital signs stable     Abdominal assessment: round and soft     Recommendation:  Discharge patient per MD order.   Return to floor  Family or Friend   Permission to share finding with family or friend yes
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
## 2021-10-20 NOTE — PROCEDURES
[facility]                   Endoscopic Gastroduodenoscopy Procedure Note      10/20/2021  Yanely Conception  1945  [id number]    Procedure: Endoscopic Gastroduodenoscopy with biopsy    Indication: Hernadez's esophagus     Pre-operative Diagnosis: see indication above    Post-operative Diagnosis: see findings below    : ELENITA Garcia MD    Referring Provider:  Gabriel Alvarado MD      Anesthesia/Sedation:  MAC anesthesia Propofol    Airway assessment: No airway problems anticipated    Pre-Procedural Exam:      Airway: clear, no airway problems anticipated  Heart: RRR, without gallops or rubs  Lungs: clear bilaterally without wheezes, crackles, or rhonchi  Abdomen: soft, nontender, nondistended, bowel sounds present  Mental Status: awake, alert and oriented to person, place and time       Procedure Details     After infomed consent was obtained for the procedure, with all risks and benefits of procedure explained the patient was taken to the endoscopy suite and placed in the left lateral decubitus position. Following sequential administration of sedation as per above, the endoscope was inserted into the mouth and advanced under direct vision to second portion of the duodenum. A careful inspection was made as the gastroscope was withdrawn, including a retroflexed view of the proximal stomach; findings and interventions are described below. Findings:   Esophagus:Long segment, circumferential Hernadez's from 26 to 38 cm, biopsies done of distal and mid esophagus  Stomach: Retroflexion shows Nissen---looks loose  Duodenum: normal    Therapies:  biopsy of esophagus x 2    Specimens: 1. Distal esophagus biopsies  2 Mid esophagus biopsies           Complications:   None; patient tolerated the procedure well. EBL:  None.             Impression:      -Long segment Hernadez's esophagus  - Loose Nissen fundoplication  -Normal stomach  -Normal duodenum    Recommendations:  -Continue acid suppression. , -Await pathology. , -Follow up EGd in 2 years    Shirin Cruz., MD10/20/2021

## 2021-10-20 NOTE — ANESTHESIA PREPROCEDURE EVALUATION
Anesthetic History   No history of anesthetic complications            Review of Systems / Medical History  Patient summary reviewed, nursing notes reviewed and pertinent labs reviewed    Pulmonary          Smoker      Comments: Former smoker - Quit 1991 - 40 pack years   Neuro/Psych   Within defined limits           Cardiovascular    Hypertension          Hyperlipidemia    Exercise tolerance: >4 METS     GI/Hepatic/Renal     GERD    Renal disease: CRI      Comments: Hx Colon Polyps  Melena  Hernadez's esophagus Endo/Other    Diabetes: well controlled, type 2    Arthritis     Other Findings   Comments: Glaucoma  Hearing loss  DDD  BPH  Hx Polycythemia           Physical Exam    Airway  Mallampati: II  TM Distance: 4 - 6 cm  Neck ROM: normal range of motion   Mouth opening: Normal     Cardiovascular  Regular rate and rhythm,  S1 and S2 normal,  no murmur, click, rub, or gallop             Dental  No notable dental hx       Pulmonary  Breath sounds clear to auscultation               Abdominal  GI exam deferred       Other Findings            Anesthetic Plan    ASA: 3  Anesthesia type: total IV anesthesia          Induction: Intravenous  Anesthetic plan and risks discussed with: Patient

## 2021-10-27 NOTE — ED NOTES
Hpi Title: Evaluation of Skin Lesions [facility]ost[facility] completed.  VSS How Severe Are Your Spot(S)?: mild Have Your Spot(S) Been Treated In The Past?: has not been treated

## 2021-11-30 ENCOUNTER — OFFICE VISIT (OUTPATIENT)
Dept: INTERNAL MEDICINE CLINIC | Age: 76
End: 2021-11-30
Payer: MEDICARE

## 2021-11-30 VITALS
WEIGHT: 154 LBS | HEIGHT: 67 IN | RESPIRATION RATE: 16 BRPM | TEMPERATURE: 97.3 F | OXYGEN SATURATION: 100 % | DIASTOLIC BLOOD PRESSURE: 86 MMHG | SYSTOLIC BLOOD PRESSURE: 124 MMHG | BODY MASS INDEX: 24.17 KG/M2 | HEART RATE: 69 BPM

## 2021-11-30 DIAGNOSIS — N18.30 STAGE 3 CHRONIC KIDNEY DISEASE, UNSPECIFIED WHETHER STAGE 3A OR 3B CKD (HCC): ICD-10-CM

## 2021-11-30 DIAGNOSIS — L65.9 HAIR LOSS: ICD-10-CM

## 2021-11-30 DIAGNOSIS — I10 HYPERTENSION, UNSPECIFIED TYPE: ICD-10-CM

## 2021-11-30 DIAGNOSIS — Z79.4 TYPE 2 DIABETES MELLITUS WITH OTHER SPECIFIED COMPLICATION, WITH LONG-TERM CURRENT USE OF INSULIN (HCC): Primary | ICD-10-CM

## 2021-11-30 DIAGNOSIS — E11.69 TYPE 2 DIABETES MELLITUS WITH OTHER SPECIFIED COMPLICATION, WITH LONG-TERM CURRENT USE OF INSULIN (HCC): Primary | ICD-10-CM

## 2021-11-30 DIAGNOSIS — R41.3 MEMORY LOSS: ICD-10-CM

## 2021-11-30 DIAGNOSIS — R97.20 ELEVATED PSA: ICD-10-CM

## 2021-11-30 DIAGNOSIS — Z00.00 MEDICARE ANNUAL WELLNESS VISIT, SUBSEQUENT: ICD-10-CM

## 2021-11-30 DIAGNOSIS — E78.00 PURE HYPERCHOLESTEROLEMIA: ICD-10-CM

## 2021-11-30 PROCEDURE — G8752 SYS BP LESS 140: HCPCS | Performed by: INTERNAL MEDICINE

## 2021-11-30 PROCEDURE — G8432 DEP SCR NOT DOC, RNG: HCPCS | Performed by: INTERNAL MEDICINE

## 2021-11-30 PROCEDURE — G0439 PPPS, SUBSEQ VISIT: HCPCS | Performed by: INTERNAL MEDICINE

## 2021-11-30 PROCEDURE — G8427 DOCREV CUR MEDS BY ELIG CLIN: HCPCS | Performed by: INTERNAL MEDICINE

## 2021-11-30 PROCEDURE — G8420 CALC BMI NORM PARAMETERS: HCPCS | Performed by: INTERNAL MEDICINE

## 2021-11-30 PROCEDURE — G8754 DIAS BP LESS 90: HCPCS | Performed by: INTERNAL MEDICINE

## 2021-11-30 PROCEDURE — 3017F COLORECTAL CA SCREEN DOC REV: CPT | Performed by: INTERNAL MEDICINE

## 2021-11-30 PROCEDURE — G8536 NO DOC ELDER MAL SCRN: HCPCS | Performed by: INTERNAL MEDICINE

## 2021-11-30 PROCEDURE — 3044F HG A1C LEVEL LT 7.0%: CPT | Performed by: INTERNAL MEDICINE

## 2021-11-30 PROCEDURE — 1101F PT FALLS ASSESS-DOCD LE1/YR: CPT | Performed by: INTERNAL MEDICINE

## 2021-11-30 PROCEDURE — 99213 OFFICE O/P EST LOW 20 MIN: CPT | Performed by: INTERNAL MEDICINE

## 2021-11-30 PROCEDURE — G0463 HOSPITAL OUTPT CLINIC VISIT: HCPCS | Performed by: INTERNAL MEDICINE

## 2021-11-30 PROCEDURE — 2022F DILAT RTA XM EVC RTNOPTHY: CPT | Performed by: INTERNAL MEDICINE
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
## 2021-11-30 NOTE — PATIENT INSTRUCTIONS
Medicare Wellness Visit, Male    The best way to live healthy is to have a lifestyle where you eat a well-balanced diet, exercise regularly, limit alcohol use, and quit all forms of tobacco/nicotine, if applicable. Regular preventive services are another way to keep healthy. Preventive services (vaccines, screening tests, monitoring & exams) can help personalize your care plan, which helps you manage your own care. Screening tests can find health problems at the earliest stages, when they are easiest to treat. [facility] follows the current, evidence-based guidelines published by the [facility] ([facility]) when recommending preventive services for our patients. Because we follow these guidelines, sometimes recommendations change over time as research supports it. (For example, a prostate screening blood test is no longer routinely recommended for men with no symptoms). Of course, you and your doctor may decide to screen more often for some diseases, based on your risk and co-morbidities (chronic disease you are already diagnosed with). Preventive services for you include:  - Medicare offers their members a free annual wellness visit, which is time for you and your primary care provider to discuss and plan for your preventive service needs. Take advantage of this benefit every year!  -All adults over age 72 should receive the recommended pneumonia vaccines. Current USPSTF guidelines recommend a series of two vaccines for the best pneumonia protection.   -All adults should have a flu vaccine yearly and tetanus vaccine every 10 years.  -All adults age 48 and older should receive the shingles vaccines (series of two vaccines).        -All adults age 38-68 who are overweight should have a diabetes screening test once every three years.   -Other screening tests & preventive services for persons with diabetes include: an eye exam to screen for diabetic retinopathy, a kidney function test, a foot exam, and stricter control over your cholesterol.   -Cardiovascular screening for adults with routine risk involves an electrocardiogram (ECG) at intervals determined by the provider.   -Colorectal cancer screening should be done for adults age 54-65 with no increased risk factors for colorectal cancer. There are a number of acceptable methods of screening for this type of cancer. Each test has its own benefits and drawbacks. Discuss with your provider what is most appropriate for you during your annual wellness visit. The different tests include: colonoscopy (considered the best screening method), a fecal occult blood test, a fecal DNA test, and sigmoidoscopy.  -All adults born between [facility] should be screened once for Hepatitis C.  -An Abdominal Aortic Aneurysm (AAA) Screening is recommended for men age 73-68 who has ever smoked in their lifetime.      Here is a list of your current Health Maintenance items (your personalized list of preventive services) with a due date:  Health Maintenance Due   Topic Date Due    Shingles Vaccine (1 of 2) Never done    Hemoglobin A1C    11/26/2021

## 2021-12-01 LAB
ALBUMIN SERPL-MCNC: 4 G/DL (ref 3.5–5)
ALBUMIN/GLOB SERPL: 1.1 {RATIO} (ref 1.1–2.2)
ALP SERPL-CCNC: 98 U/L (ref 45–117)
ALT SERPL-CCNC: 36 U/L (ref 12–78)
ANION GAP SERPL CALC-SCNC: 7 MMOL/L (ref 5–15)
AST SERPL-CCNC: 22 U/L (ref 15–37)
BILIRUB SERPL-MCNC: 0.5 MG/DL (ref 0.2–1)
BUN SERPL-MCNC: 16 MG/DL (ref 6–20)
BUN/CREAT SERPL: 12 (ref 12–20)
CALCIUM SERPL-MCNC: 9.6 MG/DL (ref 8.5–10.1)
CHLORIDE SERPL-SCNC: 107 MMOL/L (ref 97–108)
CO2 SERPL-SCNC: 24 MMOL/L (ref 21–32)
CREAT SERPL-MCNC: 1.39 MG/DL (ref 0.7–1.3)
ERYTHROCYTE [DISTWIDTH] IN BLOOD BY AUTOMATED COUNT: 14.6 % (ref 11.5–14.5)
EST. AVERAGE GLUCOSE BLD GHB EST-MCNC: 151 MG/DL
GLOBULIN SER CALC-MCNC: 3.7 G/DL (ref 2–4)
GLUCOSE SERPL-MCNC: 82 MG/DL (ref 65–100)
HBA1C MFR BLD: 6.9 % (ref 4–5.6)
HCT VFR BLD AUTO: 48.9 % (ref 36.6–50.3)
HGB BLD-MCNC: 15.4 G/DL (ref 12.1–17)
MCH RBC QN AUTO: 29.2 PG (ref 26–34)
MCHC RBC AUTO-ENTMCNC: 31.5 G/DL (ref 30–36.5)
MCV RBC AUTO: 92.6 FL (ref 80–99)
NRBC # BLD: 0 K/UL (ref 0–0.01)
NRBC BLD-RTO: 0 PER 100 WBC
PLATELET # BLD AUTO: 166 K/UL (ref 150–400)
PMV BLD AUTO: 12.8 FL (ref 8.9–12.9)
POTASSIUM SERPL-SCNC: 4.5 MMOL/L (ref 3.5–5.1)
PROT SERPL-MCNC: 7.7 G/DL (ref 6.4–8.2)
RBC # BLD AUTO: 5.28 M/UL (ref 4.1–5.7)
SODIUM SERPL-SCNC: 138 MMOL/L (ref 136–145)
TSH SERPL DL<=0.05 MIU/L-ACNC: 1.54 UIU/ML (ref 0.36–3.74)
WBC # BLD AUTO: 5.8 K/UL (ref 4.1–11.1)

## 2021-12-03 RX ORDER — PRAVASTATIN SODIUM 20 MG/1
TABLET ORAL
Qty: 90 TABLET | Refills: 3 | Status: SHIPPED | OUTPATIENT
Start: 2021-12-03

## 2021-12-17 ENCOUNTER — OFFICE VISIT (OUTPATIENT)
Dept: ENDOCRINOLOGY | Age: 76
End: 2021-12-17
Payer: MEDICARE

## 2021-12-17 VITALS
HEART RATE: 71 BPM | DIASTOLIC BLOOD PRESSURE: 69 MMHG | BODY MASS INDEX: 24.3 KG/M2 | HEIGHT: 67 IN | SYSTOLIC BLOOD PRESSURE: 124 MMHG | WEIGHT: 154.8 LBS

## 2021-12-17 DIAGNOSIS — E11.9 TYPE 2 DIABETES MELLITUS WITHOUT COMPLICATION, WITHOUT LONG-TERM CURRENT USE OF INSULIN (HCC): Primary | ICD-10-CM

## 2021-12-17 PROBLEM — E11.22 TYPE 2 DIABETES MELLITUS WITH CHRONIC KIDNEY DISEASE (HCC): Status: ACTIVE | Noted: 2021-12-17

## 2021-12-17 PROCEDURE — G8536 NO DOC ELDER MAL SCRN: HCPCS | Performed by: INTERNAL MEDICINE

## 2021-12-17 PROCEDURE — 2022F DILAT RTA XM EVC RTNOPTHY: CPT | Performed by: INTERNAL MEDICINE

## 2021-12-17 PROCEDURE — 3044F HG A1C LEVEL LT 7.0%: CPT | Performed by: INTERNAL MEDICINE

## 2021-12-17 PROCEDURE — G8432 DEP SCR NOT DOC, RNG: HCPCS | Performed by: INTERNAL MEDICINE

## 2021-12-17 PROCEDURE — 1101F PT FALLS ASSESS-DOCD LE1/YR: CPT | Performed by: INTERNAL MEDICINE

## 2021-12-17 PROCEDURE — G8754 DIAS BP LESS 90: HCPCS | Performed by: INTERNAL MEDICINE

## 2021-12-17 PROCEDURE — 99214 OFFICE O/P EST MOD 30 MIN: CPT | Performed by: INTERNAL MEDICINE

## 2021-12-17 PROCEDURE — G8752 SYS BP LESS 140: HCPCS | Performed by: INTERNAL MEDICINE

## 2021-12-17 PROCEDURE — 3017F COLORECTAL CA SCREEN DOC REV: CPT | Performed by: INTERNAL MEDICINE

## 2021-12-17 PROCEDURE — G8420 CALC BMI NORM PARAMETERS: HCPCS | Performed by: INTERNAL MEDICINE

## 2021-12-17 PROCEDURE — G8427 DOCREV CUR MEDS BY ELIG CLIN: HCPCS | Performed by: INTERNAL MEDICINE

## 2021-12-17 NOTE — PROGRESS NOTES
This is a 79-year-old white male with a history of type 2 diabetes mellitus x 10 years currently on Toujeo insulin 17 units in the morning and Victoza daily.       He has chronic kidney disease stage III a with a recent creatinine of 1.63.    He has degenerative disc disease and has received a number of steroid injections into his neck.     When I saw him in December he had developed an acute right 3rd nerve palsy. He was seen at [facility] and the Bell's palsy was felt to be ischemic in origin and perhaps related to his diabetes but not clearly.  Since I saw him in December,  the Bell's palsy has improved significantly.      Current Medications  Toujeo 17 units AM  Victoza 1.8 mg     Regarding his diabetes, he continues to do very well. A1c 6.9%. His blood sugars in the morning are excellent ranging between 90 and 110.  His blood sugars after dinner are in the 180-220 range.  There really is not any change in his diet or his physical activity. He checks his blood sugars twice daily.  His blood sugar in the morning ranges between .  His post dinner blood sugars range between 150 and 180.  He has coffee and a fruit bar or pack of peanut butter crackers for breakfast. Miranda Beck is a sandwich with chips and diet Pepsi.  Dinner can be chicken with half a baked potato and a salad or pork chop or manicotti and a salad.  He does not snack at bedtime.  His physical activity has improved and he is back to walking every day and or playing golf. He was seen by podiatry for some neuropathic symptoms. He was given a prescription for Melanex which he said really exacerbated his esophageal reflux and so he stopped taking it.     He does tell me that he occasionally has symptoms of very mild hypoglycemia occurring at about 11:00 which is why he eats his lunch at 11:00. None of the episodes are significant.     Examination  Blood pressure 124/69  Pulse 74  Weight 154  BMI 24.3  HEENT unremarkable  Lungs clear  Heart reveals a regular rate and rhythm  Abdomen benign  Extremities unremarkable  Diabetic foot exam:     Left Foot:   Visual Exam: normal    Pulse DP: 2+ (normal)   Filament test: absent sensation    Vibratory sensation: diminished      Right Foot:   Visual Exam: normal    Pulse DP: 2+ (normal)   Filament test: 0/6   Vibratory sensation: diminished    Impression  1. Type 2 diabetes mellitus with an A1c of 6.9% on combination therapy  2. Diabetic neuropathy  3. Chronic kidney disease stage IIIb    Plan  1. No changes in the regimen were made. 2.  I will see him back in 6 months.

## 2022-02-14 RX ORDER — FOSINOPRIL SODIUM 40 MG/1
TABLET ORAL
Qty: 90 TABLET | Refills: 3 | Status: SHIPPED | OUTPATIENT
Start: 2022-02-14

## 2022-02-23 ENCOUNTER — ANESTHESIA (OUTPATIENT)
Dept: ENDOSCOPY | Age: 77
End: 2022-02-23
Payer: MEDICARE

## 2022-02-23 ENCOUNTER — ANESTHESIA EVENT (OUTPATIENT)
Dept: ENDOSCOPY | Age: 77
End: 2022-02-23
Payer: MEDICARE

## 2022-02-23 ENCOUNTER — HOSPITAL ENCOUNTER (OUTPATIENT)
Age: 77
Setting detail: OUTPATIENT SURGERY
Discharge: HOME | End: 2022-02-23
Attending: INTERNAL MEDICINE | Admitting: INTERNAL MEDICINE
Payer: MEDICARE

## 2022-02-23 VITALS
BODY MASS INDEX: 25.83 KG/M2 | TEMPERATURE: 97.9 F | HEART RATE: 62 BPM | WEIGHT: 155 LBS | OXYGEN SATURATION: 97 % | HEIGHT: 65 IN | SYSTOLIC BLOOD PRESSURE: 112 MMHG | RESPIRATION RATE: 13 BRPM | DIASTOLIC BLOOD PRESSURE: 72 MMHG

## 2022-02-23 LAB
COVID-19 RAPID TEST, COVR: NOT DETECTED
GLUCOSE BLD STRIP.AUTO-MCNC: 122 MG/DL (ref 65–117)
SARS-COV-2, COV2: NORMAL
SERVICE CMNT-IMP: ABNORMAL
SOURCE, COVRS: NORMAL

## 2022-02-23 PROCEDURE — 76060000031 HC ANESTHESIA FIRST 0.5 HR: Performed by: INTERNAL MEDICINE

## 2022-02-23 PROCEDURE — 76040000019: Performed by: INTERNAL MEDICINE

## 2022-02-23 PROCEDURE — 82962 GLUCOSE BLOOD TEST: CPT

## 2022-02-23 PROCEDURE — 2709999900 HC NON-CHARGEABLE SUPPLY: Performed by: INTERNAL MEDICINE

## 2022-02-23 PROCEDURE — 87635 SARS-COV-2 COVID-19 AMP PRB: CPT

## 2022-02-23 PROCEDURE — 74011250636 HC RX REV CODE- 250/636: Performed by: NURSE ANESTHETIST, CERTIFIED REGISTERED

## 2022-02-23 PROCEDURE — 74011000250 HC RX REV CODE- 250: Performed by: NURSE ANESTHETIST, CERTIFIED REGISTERED

## 2022-02-23 PROCEDURE — 74011250636 HC RX REV CODE- 250/636: Performed by: INTERNAL MEDICINE

## 2022-02-23 PROCEDURE — 88305 TISSUE EXAM BY PATHOLOGIST: CPT

## 2022-02-23 PROCEDURE — 77030039825 HC MSK NSL PAP SUPERNO2VA VYRM -B: Performed by: ANESTHESIOLOGY

## 2022-02-23 PROCEDURE — 77030019988 HC FCPS ENDOSC DISP BSC -B: Performed by: INTERNAL MEDICINE

## 2022-02-23 RX ORDER — PROPOFOL 10 MG/ML
INJECTION, EMULSION INTRAVENOUS AS NEEDED
Status: DISCONTINUED | OUTPATIENT
Start: 2022-02-23 | End: 2022-02-23 | Stop reason: HOSPADM

## 2022-02-23 RX ORDER — EPINEPHRINE 0.1 MG/ML
1 INJECTION INTRACARDIAC; INTRAVENOUS
Status: DISCONTINUED | OUTPATIENT
Start: 2022-02-23 | End: 2022-02-23 | Stop reason: HOSPADM

## 2022-02-23 RX ORDER — DEXMEDETOMIDINE HYDROCHLORIDE 100 UG/ML
INJECTION, SOLUTION INTRAVENOUS AS NEEDED
Status: DISCONTINUED | OUTPATIENT
Start: 2022-02-23 | End: 2022-02-23 | Stop reason: HOSPADM

## 2022-02-23 RX ORDER — DEXTROMETHORPHAN/PSEUDOEPHED 2.5-7.5/.8
1.2 DROPS ORAL
Status: DISCONTINUED | OUTPATIENT
Start: 2022-02-23 | End: 2022-02-23 | Stop reason: HOSPADM

## 2022-02-23 RX ORDER — SODIUM CHLORIDE 0.9 % (FLUSH) 0.9 %
5-40 SYRINGE (ML) INJECTION AS NEEDED
Status: DISCONTINUED | OUTPATIENT
Start: 2022-02-23 | End: 2022-02-23 | Stop reason: HOSPADM

## 2022-02-23 RX ORDER — LIDOCAINE HYDROCHLORIDE 20 MG/ML
INJECTION, SOLUTION EPIDURAL; INFILTRATION; INTRACAUDAL; PERINEURAL AS NEEDED
Status: DISCONTINUED | OUTPATIENT
Start: 2022-02-23 | End: 2022-02-23 | Stop reason: HOSPADM

## 2022-02-23 RX ORDER — FLUMAZENIL 0.1 MG/ML
0.2 INJECTION INTRAVENOUS
Status: DISCONTINUED | OUTPATIENT
Start: 2022-02-23 | End: 2022-02-23 | Stop reason: HOSPADM

## 2022-02-23 RX ORDER — SODIUM CHLORIDE 0.9 % (FLUSH) 0.9 %
5-40 SYRINGE (ML) INJECTION EVERY 8 HOURS
Status: DISCONTINUED | OUTPATIENT
Start: 2022-02-23 | End: 2022-02-23 | Stop reason: HOSPADM

## 2022-02-23 RX ORDER — ATROPINE SULFATE 0.1 MG/ML
0.5 INJECTION INTRAVENOUS
Status: DISCONTINUED | OUTPATIENT
Start: 2022-02-23 | End: 2022-02-23 | Stop reason: HOSPADM

## 2022-02-23 RX ORDER — NALOXONE HYDROCHLORIDE 0.4 MG/ML
0.4 INJECTION, SOLUTION INTRAMUSCULAR; INTRAVENOUS; SUBCUTANEOUS
Status: DISCONTINUED | OUTPATIENT
Start: 2022-02-23 | End: 2022-02-23 | Stop reason: HOSPADM

## 2022-02-23 RX ORDER — SODIUM CHLORIDE 9 MG/ML
100 INJECTION, SOLUTION INTRAVENOUS CONTINUOUS
Status: DISCONTINUED | OUTPATIENT
Start: 2022-02-23 | End: 2022-02-23 | Stop reason: HOSPADM

## 2022-02-23 RX ADMIN — PROPOFOL 10 MG: 10 INJECTION, EMULSION INTRAVENOUS at 11:51

## 2022-02-23 RX ADMIN — SODIUM CHLORIDE 100 ML/HR: 9 INJECTION, SOLUTION INTRAVENOUS at 11:22

## 2022-02-23 RX ADMIN — DEXMEDETOMIDINE HYDROCHLORIDE 5 MCG: 100 INJECTION, SOLUTION, CONCENTRATE INTRAVENOUS at 11:54

## 2022-02-23 RX ADMIN — PROPOFOL 40 MG: 10 INJECTION, EMULSION INTRAVENOUS at 11:48

## 2022-02-23 RX ADMIN — PROPOFOL 10 MG: 10 INJECTION, EMULSION INTRAVENOUS at 11:49

## 2022-02-23 RX ADMIN — PROPOFOL 10 MG: 10 INJECTION, EMULSION INTRAVENOUS at 11:54

## 2022-02-23 RX ADMIN — PROPOFOL 50 MG: 10 INJECTION, EMULSION INTRAVENOUS at 11:47

## 2022-02-23 RX ADMIN — PROPOFOL 10 MG: 10 INJECTION, EMULSION INTRAVENOUS at 11:50

## 2022-02-23 RX ADMIN — PROPOFOL 10 MG: 10 INJECTION, EMULSION INTRAVENOUS at 11:52

## 2022-02-23 RX ADMIN — PROPOFOL 10 MG: 10 INJECTION, EMULSION INTRAVENOUS at 11:56

## 2022-02-23 RX ADMIN — DEXMEDETOMIDINE HYDROCHLORIDE 5 MCG: 100 INJECTION, SOLUTION, CONCENTRATE INTRAVENOUS at 11:57

## 2022-02-23 RX ADMIN — LIDOCAINE HYDROCHLORIDE 100 MG: 20 INJECTION, SOLUTION EPIDURAL; INFILTRATION; INTRACAUDAL; PERINEURAL at 11:47

## 2022-02-23 NOTE — ROUTINE PROCESS
Olive Grant  1945  [id number]    Situation:  Verbal report received from: Jasmine Rowe  Procedure: Procedure(s):  ESOPHAGOGASTRODUODENOSCOPY (EGD) Needs Rapid Covid Test  ESOPHAGOGASTRODUODENAL (EGD) BIOPSY    Background:    Preoperative diagnosis: Hernadez's esophagus without dysplasia [K22.70]  Postoperative diagnosis: Barretts Esophagus    :  Dr. Raúl Thompson  Assistant(s): Endoscopy Technician-1: Bess [facility]  Endoscopy RN-1: Susie Peña RN; Conrad Fermin    Specimens:   ID Type Source Tests Collected by Time Destination   1 : Esophagus BX 37-40cm Preservative   Sabine Wang MD 2/23/2022 1203 Pathology   2 : Priscella Khushbooly 34-37cm Shawnee Avila MD 2/23/2022 1204 Pathology   3 : Esophagus BX 28-33 cm Preservative   Sabine Wang MD 2/23/2022 1206 Pathology     H. Pylori  no    Assessment:  Intra-procedure medications     Anesthesia gave intra-procedure sedation and medications, see anesthesia flow sheet yes    Intravenous fluids: NS@ KVO     Vital signs stable     Abdominal assessment: round and soft     Recommendation:  Discharge patient per MD order.   Return to floor  Family or Friend   Permission to share finding with family or friend yes
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
## 2022-02-23 NOTE — ANESTHESIA POSTPROCEDURE EVALUATION
Procedure(s):  ESOPHAGOGASTRODUODENOSCOPY (EGD) Needs Rapid Covid Test  ESOPHAGOGASTRODUODENAL (EGD) BIOPSY. total IV anesthesia, MAC    Anesthesia Post Evaluation        Patient location during evaluation: PACU  Note status: Adequate. Level of consciousness: responsive to verbal stimuli and sleepy but conscious  Pain management: satisfactory to patient  Airway patency: patent  Anesthetic complications: no  Cardiovascular status: acceptable  Respiratory status: acceptable  Hydration status: acceptable  Comments: +Post-Anesthesia Evaluation and Assessment    Patient: Kililan Zhu MRN: [id number]  SSN: [id number]   YOB: 1945  Age: 77 y.o. Sex: male      Cardiovascular Function/Vital Signs    BP (!) 97/59   Pulse (!) 55   Temp 36.7 °C (98 °F)   Resp 16   Ht 5' 5\" (1.651 m)   Wt 70.3 kg (155 lb)   SpO2 98%   BMI 25.79 kg/m²     Patient is status post Procedure(s):  ESOPHAGOGASTRODUODENOSCOPY (EGD) Needs Rapid Covid Test  ESOPHAGOGASTRODUODENAL (EGD) BIOPSY. Nausea/Vomiting: Controlled. Postoperative hydration reviewed and adequate. Pain:  Pain Scale 1: Numeric (0 - 10) (02/23/22 1113)  Pain Intensity 1: 0 (02/23/22 1113)   Managed. Neurological Status: At baseline. Mental Status and Level of Consciousness: Arousable. Pulmonary Status:   O2 Device: Nasal cannula (02/23/22 1207)   Adequate oxygenation and airway patent. Complications related to anesthesia: None    Post-anesthesia assessment completed. No concerns. Signed By: Eb Hunter MD    2/23/2022  Post anesthesia nausea and vomiting:  controlled      INITIAL Post-op Vital signs:   Vitals Value Taken Time   /63 02/23/22 1227   Temp     Pulse 57 02/23/22 1228   Resp 21 02/23/22 1228   SpO2 96 % 02/23/22 1228   Vitals shown include unvalidated device data.

## 2022-02-23 NOTE — PROCEDURES
[facility]                   Endoscopic Gastroduodenoscopy Procedure Note      2/23/2022  Matti Flores  1945  [id number]    Procedure: Endoscopic Gastroduodenoscopy with biopsies    Indication: Hernadez's esophagus with indefinite dysplasia Oct 2021     Pre-operative Diagnosis: see indication above    Post-operative Diagnosis: see findings below    : ELENITA Edouard MD    Referring Provider:  Aidan Hart MD      Anesthesia/Sedation:  MAC anesthesia Propofol    Airway assessment: No airway problems anticipated    Pre-Procedural Exam:      Airway: clear, no airway problems anticipated  Heart: RRR, without gallops or rubs  Lungs: clear bilaterally without wheezes, crackles, or rhonchi  Abdomen: soft, nontender, nondistended, bowel sounds present  Mental Status: awake, alert and oriented to person, place and time       Procedure Details     After infomed consent was obtained for the procedure, with all risks and benefits of procedure explained the patient was taken to the endoscopy suite and placed in the left lateral decubitus position. Following sequential administration of sedation as per above, the endoscope was inserted into the mouth and advanced under direct vision to second portion of the duodenum. A careful inspection was made as the gastroscope was withdrawn, including a retroflexed view of the proximal stomach; findings and interventions are described below. Findings:   Esophagus: Hernadez's mucosa from 28 to 40 cm. Biopsied in three segments---37-40 cm, 34-37 cm, 28-33 cm  Stomach: Loose Nissen best seen on retorflexion  Duodenum: normal    Therapies:  biopsy of esophagus x 3    Specimens: .1Biopsies of esophagus 37-40 cm, 2. Esophageal biopsies 34-37 cm  3 esophageal biopsies 28-33 cm           Complications:   None; patient tolerated the procedure well. EBL:  None.             Impression:      Long segment Hernadez's esophagus    Loose Nissen fundoplication  Normal stomach  Normal duodenum    Recommendations:  -Continue acid suppression. , -Await pathology. , -Repeat EGD 2 years depending on biopsy results    Debbi Fothergill., MD2/23/2022

## 2022-02-23 NOTE — ANESTHESIA PREPROCEDURE EVALUATION
Anesthetic History   No history of anesthetic complications            Review of Systems / Medical History  Patient summary reviewed, nursing notes reviewed and pertinent labs reviewed    Pulmonary          Smoker      Comments: Former smoker - Quit 1991 - 40 pack years   Neuro/Psych   Within defined limits           Cardiovascular    Hypertension          Hyperlipidemia    Exercise tolerance: >4 METS     GI/Hepatic/Renal     GERD    Renal disease: CRI      Comments: Hx Colon Polyps  Hx Melena  Hx Hernadez's esophagus   Endo/Other    Diabetes: well controlled, type 2    Arthritis     Other Findings   Comments: Glaucoma  Hearing loss  DDD  BPH  Hx Polycythemia  Gout           Physical Exam    Airway  Mallampati: II  TM Distance: 4 - 6 cm  Neck ROM: normal range of motion   Mouth opening: Normal     Cardiovascular  Regular rate and rhythm,  S1 and S2 normal,  no murmur, click, rub, or gallop             Dental  No notable dental hx       Pulmonary  Breath sounds clear to auscultation               Abdominal  GI exam deferred       Other Findings            Anesthetic Plan    ASA: 3  Anesthesia type: total IV anesthesia          Induction: Intravenous  Anesthetic plan and risks discussed with: Patient

## 2022-02-23 NOTE — DISCHARGE INSTRUCTIONS
Lilia Atwood MD  [facility], [address]  [place], [address] [facility]  [phone]  [email or web address]  [id number]  1945    EGD DISCHARGE INSTRUCTIONS    Discomfort:  Sore throat- throat lozenges or warm salt water gargle  redness at IV site- apply warm compress to area; if redness or soreness persist- contact your physician  Gaseous discomfort- walking, belching will help relieve any discomfort  You may not operate a vehicle for 12 hours  You may not engage in an occupation involving machinery or appliances for rest of today  You may not drink alcoholic beverages for at least 12 hours  Avoid making any critical decisions for at least 24 hour  DIET  You may have anything by mouth. You may eat and drink immediately. You may resume your regular diet - however -  remember your colon is empty and a heavy meal will produce gas. Avoid these foods:  vegetables, fried / greasy foods, carbonated drinks      ACTIVITY  You may resume your normal daily activities   Spend the remainder of the day resting -  avoid any strenuous activity. CALL M.D. ANY SIGN OF   Increasing pain, nausea, vomiting  Abdominal distension (swelling)  New increased bleeding (oral or rectal)  Fever (chills)  Pain in chest area  Bloody discharge from nose or mouth  Shortness of breath    Follow-up Instructions:   Call Dr. Lilia Atwood for any questions or problems. Telephone # [phone]  Dr. Farnsworth [facility] office will notify you of the biopsy results available  Within 7 to 10 days. We will call you or send a letter   Continue same medications. ENDOSCOPY FINDINGS:   Your endoscopy showed the Hernadez's esophagus which we biopsied and loose Nissen fundoplication.       DISCHARGE SUMMARY from Nurse    The following personal items collected during your admission are returned to you:   Dental Appliance: Dental Appliances: None  Vision: Visual Aid: None  Hearing Aid:    Jewelry:    Clothing:    Other Valuables:    Valuables sent to safe:

## 2022-02-23 NOTE — PERIOP NOTES
Endoscope was pre-cleaned at the bedside immediately following procedure by Emily ROBINS. For medications administered by anesthesia, see anesthesia chart.

## 2022-03-03 ENCOUNTER — PATIENT MESSAGE (OUTPATIENT)
Dept: ENDOCRINOLOGY | Age: 77
End: 2022-03-03

## 2022-03-03 DIAGNOSIS — E11.9 TYPE 2 DIABETES MELLITUS WITHOUT COMPLICATION, WITHOUT LONG-TERM CURRENT USE OF INSULIN (HCC): Primary | ICD-10-CM

## 2022-03-03 RX ORDER — BLOOD-GLUCOSE METER
1 EACH MISCELLANEOUS DAILY
Qty: 1 EACH | Refills: 0 | Status: SHIPPED | OUTPATIENT
Start: 2022-03-03

## 2022-03-03 NOTE — TELEPHONE ENCOUNTER
From: Dane Leiva  To: Roe Holder MD  Sent: 3/3/2022 10:13 AM EST  Subject: Testing equipment     Dr. Everett Fernandez; My One Touch Verio equipment does not seem to be working properly. Could you send a Prescription for a new savanna to [facility] on [address] [place] [address].    Thanks  Magnolia Johnson

## 2022-03-09 ENCOUNTER — TELEPHONE (OUTPATIENT)
Dept: ENDOCRINOLOGY | Age: 77
End: 2022-03-09

## 2022-03-09 RX ORDER — BLOOD SUGAR DIAGNOSTIC
STRIP MISCELLANEOUS
Qty: 300 STRIP | Refills: 3 | Status: SHIPPED | OUTPATIENT
Start: 2022-03-09

## 2022-03-09 NOTE — TELEPHONE ENCOUNTER
Pharmacy called on [email or web address]. Pharmacist stated she needed paperwork filled out for medicare and faxed over. Pharmacist mentioned it was sent over on 3-3 and again on 3-7. .IUX#[phone]. II#[phone]. Ascencion Bearden

## 2022-03-10 NOTE — TELEPHONE ENCOUNTER
Spoke with the pharmacist and informed her that we did not receive the form that they faxed. I gave the pharmacist tour [facility] fax number and she will re fax the form sometime today.

## 2022-03-11 ENCOUNTER — TELEPHONE (OUTPATIENT)
Dept: ENDOCRINOLOGY | Age: 77
End: 2022-03-11

## 2022-03-11 NOTE — TELEPHONE ENCOUNTER
3/11/2022    [facility] called and left a vm at 3:04 pm stating they faxed over a form for the pt testing supplies. They need the form filled out completely and faxed back to them. The pt can not get his testing supplies filled until the form is faxed back and submitted into medicare.  Their fax is [phone] and phone number is [phone]    Thanks,  Nathaly Canales

## 2022-03-19 PROBLEM — E11.21 TYPE 2 DIABETES WITH NEPHROPATHY (HCC): Status: ACTIVE | Noted: 2018-04-23

## 2022-03-19 PROBLEM — E11.22 TYPE 2 DIABETES MELLITUS WITH CHRONIC KIDNEY DISEASE (HCC): Status: ACTIVE | Noted: 2021-12-17

## 2022-03-20 PROBLEM — K92.2 GI BLEED: Status: ACTIVE | Noted: 2017-07-27

## 2022-03-29 RX ORDER — PEN NEEDLE, DIABETIC 31 GX3/16"
NEEDLE, DISPOSABLE MISCELLANEOUS
Qty: 200 PEN NEEDLE | Refills: 3 | Status: SHIPPED | OUTPATIENT
Start: 2022-03-29 | End: 2022-11-01 | Stop reason: SDUPTHER

## 2022-03-29 NOTE — TELEPHONE ENCOUNTER
Requested Prescriptions     Pending Prescriptions Disp Refills    Insulin Needles, Disposable, (Kaylen Pen Needle) 32 gauge x 5/32\" ndle 200 Pen Needle 3     Sig: Inject insulin daily.

## 2022-03-29 NOTE — TELEPHONE ENCOUNTER
Pt called 3/29 @ 3:15pm. Stated that he went to refill his needle rx and was unable to due to it being under dr cooks name. Pharmacy is faxing over a refill request to have dr kaplan name on it. Pt stated he needs this filled asap. Pt# [phone].

## 2022-03-30 ENCOUNTER — TELEPHONE (OUTPATIENT)
Dept: ENDOCRINOLOGY | Age: 77
End: 2022-03-30

## 2022-03-30 DIAGNOSIS — E11.9 TYPE 2 DIABETES MELLITUS WITHOUT COMPLICATION, WITHOUT LONG-TERM CURRENT USE OF INSULIN (HCC): Primary | ICD-10-CM

## 2022-03-30 DIAGNOSIS — E11.9 TYPE 2 DIABETES MELLITUS WITHOUT COMPLICATION, WITHOUT LONG-TERM CURRENT USE OF INSULIN (HCC): ICD-10-CM

## 2022-03-30 NOTE — TELEPHONE ENCOUNTER
Pt wife called 3/30 @ 2:32 pm. His wife stated her  thinks he is having issues with his thyroid and would like to discuss this with him or the nurse and see what may possibly be done. Pt wife # [phone].

## 2022-03-30 NOTE — TELEPHONE ENCOUNTER
Spoke with Mickey Linda Ellington. He stated that for the last few months he has been concerned about possibly having an underactive thyroid. He stated that he is always cold and states that his eyebrows are starting to get thin. He stated that he is having dry skin, falls asleep faster than usual, and having trouble sleeping through the night. His phone number is [phone].

## 2022-04-01 LAB
T3FREE SERPL-MCNC: 2.6 PG/ML (ref 2–4.4)
T4 FREE SERPL-MCNC: 1.15 NG/DL (ref 0.82–1.77)
TSH SERPL DL<=0.005 MIU/L-ACNC: 2.72 UIU/ML (ref 0.45–4.5)

## 2022-04-18 RX ORDER — COLCHICINE 0.6 MG/1
CAPSULE ORAL
Qty: 90 CAPSULE | Refills: 3 | Status: SHIPPED | OUTPATIENT
Start: 2022-04-18 | End: 2022-05-26 | Stop reason: ALTCHOICE

## 2022-05-26 RX ORDER — COLCHICINE 0.6 MG/1
0.6 TABLET ORAL DAILY
Qty: 90 TABLET | Refills: 3 | Status: SHIPPED | OUTPATIENT
Start: 2022-05-26

## 2022-06-01 ENCOUNTER — OFFICE VISIT (OUTPATIENT)
Dept: INTERNAL MEDICINE CLINIC | Age: 77
End: 2022-06-01
Payer: MEDICARE

## 2022-06-01 VITALS
RESPIRATION RATE: 16 BRPM | SYSTOLIC BLOOD PRESSURE: 118 MMHG | HEIGHT: 65 IN | DIASTOLIC BLOOD PRESSURE: 60 MMHG | BODY MASS INDEX: 26.12 KG/M2 | HEART RATE: 70 BPM | WEIGHT: 156.8 LBS | TEMPERATURE: 97.7 F | OXYGEN SATURATION: 98 %

## 2022-06-01 DIAGNOSIS — Z79.4 CONTROLLED TYPE 2 DIABETES MELLITUS WITH DIABETIC NEUROPATHY, WITH LONG-TERM CURRENT USE OF INSULIN (HCC): ICD-10-CM

## 2022-06-01 DIAGNOSIS — N18.31 CHRONIC KIDNEY DISEASE, STAGE 3A (HCC): ICD-10-CM

## 2022-06-01 DIAGNOSIS — R41.3 MEMORY LOSS: ICD-10-CM

## 2022-06-01 DIAGNOSIS — E11.40 CONTROLLED TYPE 2 DIABETES MELLITUS WITH DIABETIC NEUROPATHY, WITH LONG-TERM CURRENT USE OF INSULIN (HCC): ICD-10-CM

## 2022-06-01 DIAGNOSIS — E78.00 PURE HYPERCHOLESTEROLEMIA: Primary | ICD-10-CM

## 2022-06-01 DIAGNOSIS — N18.30 STAGE 3 CHRONIC KIDNEY DISEASE, UNSPECIFIED WHETHER STAGE 3A OR 3B CKD (HCC): ICD-10-CM

## 2022-06-01 DIAGNOSIS — E11.29 TYPE 2 DIABETES MELLITUS WITH OTHER DIABETIC KIDNEY COMPLICATION (HCC): ICD-10-CM

## 2022-06-01 DIAGNOSIS — I10 HYPERTENSION, UNSPECIFIED TYPE: ICD-10-CM

## 2022-06-01 PROCEDURE — G8754 DIAS BP LESS 90: HCPCS | Performed by: INTERNAL MEDICINE

## 2022-06-01 PROCEDURE — G8752 SYS BP LESS 140: HCPCS | Performed by: INTERNAL MEDICINE

## 2022-06-01 PROCEDURE — G8417 CALC BMI ABV UP PARAM F/U: HCPCS | Performed by: INTERNAL MEDICINE

## 2022-06-01 PROCEDURE — 3017F COLORECTAL CA SCREEN DOC REV: CPT | Performed by: INTERNAL MEDICINE

## 2022-06-01 PROCEDURE — 3046F HEMOGLOBIN A1C LEVEL >9.0%: CPT | Performed by: INTERNAL MEDICINE

## 2022-06-01 PROCEDURE — 2022F DILAT RTA XM EVC RTNOPTHY: CPT | Performed by: INTERNAL MEDICINE

## 2022-06-01 PROCEDURE — G8536 NO DOC ELDER MAL SCRN: HCPCS | Performed by: INTERNAL MEDICINE

## 2022-06-01 PROCEDURE — G8510 SCR DEP NEG, NO PLAN REQD: HCPCS | Performed by: INTERNAL MEDICINE

## 2022-06-01 PROCEDURE — 1101F PT FALLS ASSESS-DOCD LE1/YR: CPT | Performed by: INTERNAL MEDICINE

## 2022-06-01 PROCEDURE — G8427 DOCREV CUR MEDS BY ELIG CLIN: HCPCS | Performed by: INTERNAL MEDICINE

## 2022-06-01 PROCEDURE — G0463 HOSPITAL OUTPT CLINIC VISIT: HCPCS | Performed by: INTERNAL MEDICINE

## 2022-06-01 PROCEDURE — 99213 OFFICE O/P EST LOW 20 MIN: CPT | Performed by: INTERNAL MEDICINE

## 2022-06-01 NOTE — PATIENT INSTRUCTIONS
Office Policies    Phone calls/patient messages:            Please allow up to 24 hours for someone in the office to contact you about your call or message. Be mindful your provider may be out of the office or your message may require further review. We encourage you to use [facility] for your messages as this is a faster, more efficient way to communicate with our office                         Medication Refills:            Prescription medications require 48-72 business hours to process. We encourage you to use [facility] for your refills. For controlled medications: Please allow 72 business hours to process. Certain medications may require you to  a written prescription at our office. NO narcotic/controlled medications will be prescribed after 4pm Monday through Friday or on weekends              Form/Paperwork Completion:            Please note a $25 fee may incur for all paperwork for completed by our providers. We ask that you allow 7-10 business days. Pre-payment is due prior to picking up/faxing the completed form. You may also download your forms to [facility] to have your doctor print off.

## 2022-06-01 NOTE — PROGRESS NOTES
HISTORY OF PRESENT ILLNESS  Wendy Vázquez is a 77 y.o. male. HPI   f/u HTN DM02 HLD anemia d/t AVM ckd 3 memory loss   Had recent egd for Barretts-negative for dysplasia  Has fu with Dr Xander Granado this month  Last a1c 6.9 LDL 50  Cr 1.39  fsbs bid--100-130 fasting in AM. At 9 pm around 200 3-4 hrs after dinner  Has fu with Dr Morales See a few months-labs renal function stable but pt states  he was told ckd 4  No cp sob  Plays golf weekly, walks  Working again 1 d ago for [employer]  C/o mild memory loss -short term memory sometimes a slight problem  Last [place] Granado for Dm-2  Last a1c 6.8 LDL 79     Was referred to Neurologist last [address] for memory loss MMS 21/30. Memory loss-no appt was made  Pt feels his memory loss only involes things like forgetting names otherwise no issue at all. Works as  of [employer]     Right 3d nerve palsy-double vision resolved-had eye exam last week  Sees Dr Gayla Elena for elevated PSa with neg bx  Dr Avery Dong for ckd 3-next month  Had egd recently -possible low grade dysplasia so will get repeat EGD in 2 months--Dr Dee Doctor          Patient Active Problem List    Diagnosis Date Noted    Chronic renal disease, stage III 06/01/2022    Controlled type 2 diabetes mellitus with diabetic neuropathy, with long-term current use of insulin ([address] 06/01/2022    Type 2 diabetes mellitus with chronic kidney disease ([address] 12/17/2021    GI bleed 07/27/2017    Primary open angle glaucoma 07/26/2016    CKD (chronic kidney disease) stage 3, GFR 30-59 ml/min ([address] 08/10/2010    Essential hypertension, benign 10/26/2009    Pure hypercholesterolemia 10/26/2009    Polycythemia 10/26/2009    Hernadez's esophagus 10/26/2009    BPH (benign prostatic hyperplasia) 10/26/2009    Gout 10/26/2009     Current Outpatient Medications   Medication Sig Dispense Refill    Insulin Needles, Disposable, (Kaylen Pen Needle) 32 gauge x 5/32\" ndle Inject insulin daily.  200 Pen Needle 3  OneTouch Delica Lancets 30 gauge misc USE LANCET TO CHECK BLOOD SUGAR THREE TIMES DAILY 300 Each 3    glucose blood VI test strips (OneTouch Verio test strips) strip CHECK BLOOD SUGAR THREE TIMES DAILY 300 Strip 3    Blood-Glucose Meter (OneTouch Verio Flex meter) misc 1 Each by Does Not Apply route daily. 1 Each 0    Toujeo SoloStar U-300 Insulin 300 unit/mL (1.5 mL) inpn pen INJECT 20 UNITS UNDER THE SKIN DAILY 4 Adjustable Dose Pre-filled Pen Syringe 4    fosinopriL (MONOPRIL) 40 mg tablet TAKE 1 TABLET DAILY 90 Tablet 3    pravastatin (PRAVACHOL) 20 mg tablet TAKE 1 TABLET DAILY 90 Tablet 3    liraglutide (Victoza 3-Abdiel) 0.6 mg/0.1 mL (18 mg/3 mL) pnij 1.8 mg by SubCUTAneous route daily. 10 Adjustable Dose Pre-filled Pen Syringe 3    aspirin 81 mg chewable tablet Take 81 mg by mouth daily.  latanoprost (XALATAN) 0.005 % ophthalmic solution INT 1 GTT IN OU QD HS      pen needle, diabetic (NOVOTWIST) 32 gauge x 1/5\" ndle For use with tresiba and Victoza - twice daily. 200 Pen Needle 3    magnesium oxide 500 mg tab Take 1 Tab by mouth daily.  OTHER daily. motilium 10 mg daily (patient states drug not made in [place])       esomeprazole (NEXIUM) 40 mg capsule Take 1 Cap by mouth two (2) times a day. 180 Cap 3    colchicine 0.6 mg tablet Take 1 Tablet by mouth daily.  90 Tablet 3     Allergies   Allergen Reactions    Contrast Agent [Iodine] Hives and Itching     IVP dye      Lab Results   Component Value Date/Time    WBC 5.8 11/30/2021 11:24 AM    HGB 15.4 11/30/2021 11:24 AM    Hemoglobin (POC) 8.3 08/03/2017 02:00 PM    HCT 48.9 11/30/2021 11:24 AM    PLATELET 055 12/65/3913 11:24 AM    MCV 92.6 11/30/2021 11:24 AM     Lab Results   Component Value Date/Time    Hemoglobin A1c 6.9 (H) 11/30/2021 11:24 AM    Hemoglobin A1c 6.8 (H) 05/26/2021 09:31 AM    Hemoglobin A1c CANCELED 07/09/2019 12:00 AM    Glucose 82 11/30/2021 11:24 AM    Glucose (POC) 122 (H) 02/23/2022 11:20 AM Microalbumin/Creat ratio (mg/g creat) 9 05/26/2021 09:31 AM    Microalbumin,urine random 0.56 05/26/2021 09:31 AM    LDL, calculated 50.8 05/26/2021 09:31 AM    Creatinine 1.39 (H) 11/30/2021 11:24 AM      Lab Results   Component Value Date/Time    Cholesterol, total 135 05/26/2021 09:31 AM    Cholesterol (POC) 189 04/16/2013 09:01 AM    HDL Cholesterol 52 05/26/2021 09:31 AM    LDL, calculated 50.8 05/26/2021 09:31 AM    LDL Cholesterol (POC) 73 04/16/2013 09:01 AM    Triglyceride 161 (H) 05/26/2021 09:31 AM    Triglycerides (POC) 190 04/16/2013 09:01 AM    CHOL/HDL Ratio 2.6 05/26/2021 09:31 AM     Lab Results   Component Value Date/Time    GFR est non-AA 50 (L) 11/30/2021 11:24 AM    GFR est AA >60 11/30/2021 11:24 AM    Creatinine 1.39 (H) 11/30/2021 11:24 AM    BUN 16 11/30/2021 11:24 AM    Sodium 138 11/30/2021 11:24 AM    Potassium 4.5 11/30/2021 11:24 AM    Chloride 107 11/30/2021 11:24 AM    CO2 24 11/30/2021 11:24 AM        ROS    Physical Exam  Vitals and nursing note reviewed. Constitutional:       General: He is not in acute distress. Appearance: Normal appearance. He is well-developed. Comments: Appears stated age   HENT:      Head: Normocephalic. Cardiovascular:      Rate and Rhythm: Normal rate and regular rhythm. Heart sounds: Normal heart sounds. Pulmonary:      Effort: Pulmonary effort is normal.      Breath sounds: Normal breath sounds. Abdominal:      Palpations: Abdomen is soft. Neurological:      Mental Status: He is alert. ASSESSMENT and PLAN  Diagnoses and all orders for this visit:    1. Pure hypercholesterolemia  -     METABOLIC PANEL, COMPREHENSIVE; Future  -     LIPID PANEL; Future   On statin  2. Controlled type 2 diabetes mellitus with diabetic neuropathy, with long-term current use of insulin (HCC)  -     METABOLIC PANEL, COMPREHENSIVE; Future  -     HEMOGLOBIN A1C WITH EAG;  Future  -     MICROALBUMIN, UR, RAND W/ MICROALB/CREAT RATIO; Future   Fu podiatrist   Fu Dr Jack Hameed  3. Type 2 diabetes mellitus with other diabetic kidney complication ([facility]    4. Chronic kidney disease, stage 3a (HCC)  -     METABOLIC PANEL, COMPREHENSIVE; Future    5. Stage 3 chronic kidney disease, unspecified whether stage 3a or 3b CKD (HCC)  -     METABOLIC PANEL, COMPREHENSIVE; Future   Fu Dr Lalo Caldwell  6. Hypertension, unspecified type  -     METABOLIC PANEL, COMPREHENSIVE; Future   controlled  7. Memory loss  -     REFERRAL TO NEUROLOGY   ? [facility]  8. Hx Barretts esophagus   On PPI daily  Follow-up and Dispositions    · Return in about 6 months (around 12/1/2022) for diabetes with neuropathy ckd 3 htn hld.

## 2022-06-01 NOTE — PROGRESS NOTES
1. \"Have you been to the ER, urgent care clinic since your last visit? Hospitalized since your last visit? \" No  2. \"Have you seen or consulted any other health care providers outside of the [address] [place] [address] since your last visit? \" No      3. For patients aged 39-70: Has the patient had a colonoscopy / FIT/ Cologuard?  Yes, Noted in chart

## 2022-06-02 LAB
ALBUMIN SERPL-MCNC: 4 G/DL (ref 3.5–5)
ALBUMIN/GLOB SERPL: 1.2 {RATIO} (ref 1.1–2.2)
ALP SERPL-CCNC: 87 U/L (ref 45–117)
ALT SERPL-CCNC: 38 U/L (ref 12–78)
ANION GAP SERPL CALC-SCNC: 7 MMOL/L (ref 5–15)
AST SERPL-CCNC: 24 U/L (ref 15–37)
BILIRUB SERPL-MCNC: 0.6 MG/DL (ref 0.2–1)
BUN SERPL-MCNC: 18 MG/DL (ref 6–20)
BUN/CREAT SERPL: 14 (ref 12–20)
CALCIUM SERPL-MCNC: 9.4 MG/DL (ref 8.5–10.1)
CHLORIDE SERPL-SCNC: 105 MMOL/L (ref 97–108)
CHOLEST SERPL-MCNC: 142 MG/DL
CO2 SERPL-SCNC: 26 MMOL/L (ref 21–32)
CREAT SERPL-MCNC: 1.28 MG/DL (ref 0.7–1.3)
CREAT UR-MCNC: 51.2 MG/DL
EST. AVERAGE GLUCOSE BLD GHB EST-MCNC: 174 MG/DL
GLOBULIN SER CALC-MCNC: 3.4 G/DL (ref 2–4)
GLUCOSE SERPL-MCNC: 81 MG/DL (ref 65–100)
HBA1C MFR BLD: 7.7 % (ref 4–5.6)
HDLC SERPL-MCNC: 54 MG/DL
HDLC SERPL: 2.6 {RATIO} (ref 0–5)
LDLC SERPL CALC-MCNC: 50 MG/DL (ref 0–100)
MICROALBUMIN UR-MCNC: 0.55 MG/DL
MICROALBUMIN/CREAT UR-RTO: 11 MG/G (ref 0–30)
POTASSIUM SERPL-SCNC: 4.6 MMOL/L (ref 3.5–5.1)
PROT SERPL-MCNC: 7.4 G/DL (ref 6.4–8.2)
SODIUM SERPL-SCNC: 138 MMOL/L (ref 136–145)
TRIGL SERPL-MCNC: 190 MG/DL (ref ?–150)
VLDLC SERPL CALC-MCNC: 38 MG/DL

## 2022-06-13 ENCOUNTER — OFFICE VISIT (OUTPATIENT)
Dept: ENDOCRINOLOGY | Age: 77
End: 2022-06-13
Payer: MEDICARE

## 2022-06-13 VITALS — DIASTOLIC BLOOD PRESSURE: 74 MMHG | SYSTOLIC BLOOD PRESSURE: 129 MMHG | WEIGHT: 157.4 LBS | BODY MASS INDEX: 26.19 KG/M2

## 2022-06-13 DIAGNOSIS — E11.9 TYPE 2 DIABETES MELLITUS WITHOUT COMPLICATION, WITHOUT LONG-TERM CURRENT USE OF INSULIN (HCC): Primary | ICD-10-CM

## 2022-06-13 DIAGNOSIS — N18.30 STAGE 3 CHRONIC KIDNEY DISEASE, UNSPECIFIED WHETHER STAGE 3A OR 3B CKD (HCC): ICD-10-CM

## 2022-06-13 PROCEDURE — G8752 SYS BP LESS 140: HCPCS | Performed by: INTERNAL MEDICINE

## 2022-06-13 PROCEDURE — 3051F HG A1C>EQUAL 7.0%<8.0%: CPT | Performed by: INTERNAL MEDICINE

## 2022-06-13 PROCEDURE — G8754 DIAS BP LESS 90: HCPCS | Performed by: INTERNAL MEDICINE

## 2022-06-13 PROCEDURE — 2022F DILAT RTA XM EVC RTNOPTHY: CPT | Performed by: INTERNAL MEDICINE

## 2022-06-13 PROCEDURE — 1101F PT FALLS ASSESS-DOCD LE1/YR: CPT | Performed by: INTERNAL MEDICINE

## 2022-06-13 PROCEDURE — G8417 CALC BMI ABV UP PARAM F/U: HCPCS | Performed by: INTERNAL MEDICINE

## 2022-06-13 PROCEDURE — 1123F ACP DISCUSS/DSCN MKR DOCD: CPT | Performed by: INTERNAL MEDICINE

## 2022-06-13 PROCEDURE — 99214 OFFICE O/P EST MOD 30 MIN: CPT | Performed by: INTERNAL MEDICINE

## 2022-06-13 PROCEDURE — 3017F COLORECTAL CA SCREEN DOC REV: CPT | Performed by: INTERNAL MEDICINE

## 2022-06-13 PROCEDURE — G8510 SCR DEP NEG, NO PLAN REQD: HCPCS | Performed by: INTERNAL MEDICINE

## 2022-06-13 PROCEDURE — G8427 DOCREV CUR MEDS BY ELIG CLIN: HCPCS | Performed by: INTERNAL MEDICINE

## 2022-06-13 PROCEDURE — G8536 NO DOC ELDER MAL SCRN: HCPCS | Performed by: INTERNAL MEDICINE

## 2022-06-13 NOTE — PROGRESS NOTES
Rm    Chief Complaint   Patient presents with    Diabetes     f/u        Visit Vitals  /74   Wt 157 lb 6.4 oz (71.4 kg)   BMI 26.19 kg/m²        1. Have you been to the ER, urgent care clinic since your last visit? Hospitalized since your last visit? No    2. Have you seen or consulted any other health care providers outside of the [address] [place] [address] since your last visit? Include any pap smears or colon screening. No     Health Maintenance Due   Topic Date Due    Shingrix Vaccine Age 49> (1 of 2) Never done        3 most recent PHQ Screens 6/13/2022   Little interest or pleasure in doing things Not at all   Feeling down, depressed, irritable, or hopeless Not at all   Total Score PHQ 2 0        Fall Risk Assessment, last 12 mths 6/13/2022   Able to walk? Yes   Fall in past 12 months? 0   Do you feel unsteady?  0   Are you worried about falling 0       Learning Assessment 3/22/2016   PRIMARY LEARNER Patient   HIGHEST LEVEL OF EDUCATION - PRIMARY LEARNER  -   BARRIERS PRIMARY LEARNER NONE   CO-LEARNER CAREGIVER No   PRIMARY LANGUAGE ENGLISH   LEARNER PREFERENCE PRIMARY READING   ANSWERED BY patient   RELATIONSHIP SELF

## 2022-06-13 NOTE — PROGRESS NOTES
This is a 27-year-old white male with a history of type 2 diabetes mellitus x 10 years currently on Toujeo insulin 17 units in the morning and Victoza daily.       He has chronic kidney disease stage III a with a recent creatinine of 1.28.    He has degenerative disc disease and has received a number of steroid injections into his neck. He has received no steroid injections in the last 6 months.     Current Medications  Toujeo 17 units AM  Victoza 1.8 mg     Regarding his diabetes, he had been doing very well. Sofia Brito His last A1c was 6.9%.  A1c today 7.7%. His blood sugars in the morning are excellent ranging between 120-130.  His blood sugars after dinner are in the 180-220 range.  There really is not any change in his diet or his physical activity. He checks his blood sugars twice daily. He has coffee and a fruit bar or pack of peanut butter crackers for breakfast. Franchot Oiler is a sandwich with chips and diet Pepsi.  Dinner can be chicken with half a baked potato and a salad or pork chop or manicotti and a salad.  He does not snack at bedtime.  His physical activity has improved and he is back to walking every day and or playing golf. He was seen by podiatry for some neuropathic symptoms. He was given a prescription for Metanex which he said really exacerbated his esophageal reflux and so he stopped taking it.     He does tell me that he occasionally has symptoms of very mild hypoglycemia occurring at about 11:00 which is why he eats his lunch at 11:00.  None of the episodes are significant.     Examination  Blood pressure 129/74  Pulse 80  Weight 157  BMI 26.2  HEENT unremarkable  Lungs clear  Heart reveals a regular rate and rhythm  Abdomen benign  Extremities unremarkable  Diabetic foot exam:     Left Foot:   Visual Exam: normal    Pulse DP: 2+ (normal)   Filament test: absent sensation    Vibratory sensation: diminished      Right Foot:   Visual Exam: normal    Pulse DP: 2+ (normal)   Filament test: absent sensation Vibratory sensation: diminished    Impression  1. Type 2 diabetes mellitus with deteriorating glucose control on Victoza plus glargine insulin  2. Diabetic peripheral neuropathy    Plan:  1. I have increased the Lantus to 21 units (up 4 units). I have advised him to monitor his blood sugar until he can see his fasting blood sugars down less than 110 and his evening blood sugars less than 150  2. I did advise him to care for his feet. He does see a podiatrist.  He has received treatments in the past for his neuropathic symptoms but without relief. He actually developed some reflux symptoms with the medications.   3.  I will see him back in 6 months or sooner as needed.

## 2022-06-17 ENCOUNTER — OFFICE VISIT (OUTPATIENT)
Dept: URGENT CARE | Age: 77
End: 2022-06-17
Payer: MEDICARE

## 2022-06-17 VITALS
BODY MASS INDEX: 25.79 KG/M2 | DIASTOLIC BLOOD PRESSURE: 81 MMHG | TEMPERATURE: 98.1 F | SYSTOLIC BLOOD PRESSURE: 155 MMHG | HEART RATE: 72 BPM | OXYGEN SATURATION: 98 % | WEIGHT: 155 LBS | RESPIRATION RATE: 16 BRPM

## 2022-06-17 DIAGNOSIS — U07.1 POSITIVE SELF-ADMINISTERED ANTIGEN TEST FOR COVID-19: Primary | ICD-10-CM

## 2022-06-17 PROCEDURE — 1123F ACP DISCUSS/DSCN MKR DOCD: CPT | Performed by: NURSE PRACTITIONER

## 2022-06-17 PROCEDURE — G8536 NO DOC ELDER MAL SCRN: HCPCS | Performed by: NURSE PRACTITIONER

## 2022-06-17 PROCEDURE — 99213 OFFICE O/P EST LOW 20 MIN: CPT | Performed by: NURSE PRACTITIONER

## 2022-06-17 PROCEDURE — G8417 CALC BMI ABV UP PARAM F/U: HCPCS | Performed by: NURSE PRACTITIONER

## 2022-06-17 PROCEDURE — G8432 DEP SCR NOT DOC, RNG: HCPCS | Performed by: NURSE PRACTITIONER

## 2022-06-17 PROCEDURE — G8753 SYS BP > OR = 140: HCPCS | Performed by: NURSE PRACTITIONER

## 2022-06-17 PROCEDURE — G8754 DIAS BP LESS 90: HCPCS | Performed by: NURSE PRACTITIONER

## 2022-06-17 PROCEDURE — 3017F COLORECTAL CA SCREEN DOC REV: CPT | Performed by: NURSE PRACTITIONER

## 2022-06-17 PROCEDURE — G8427 DOCREV CUR MEDS BY ELIG CLIN: HCPCS | Performed by: NURSE PRACTITIONER

## 2022-06-17 PROCEDURE — 1101F PT FALLS ASSESS-DOCD LE1/YR: CPT | Performed by: NURSE PRACTITIONER

## 2022-06-17 NOTE — TELEPHONE ENCOUNTER
Called patient. ID verified with Name and . Spoke with patient in regards to message received. Patient states that, yesterday, he began to present with symptoms of a sore throat and headache. Patient states that he performed a covid home test this morning and received positive results. Patient is requesting antiviral treatment at this time.

## 2022-06-17 NOTE — PROGRESS NOTES
Subjective: (As above and below)     The patient/guardian gave verbal consent to treat. Chief Complaint   Patient presents with    Concern For COVID-19 (Coronavirus)     Pt tested + for COVID 19 this am, wants prescriptions     Pedro Francisco is a 77 y.o. male who presents for evaluation of : covid. Tested positive today at home. Feels a little achy with mild headache otherwise feels well without fever, chills, SOB. PCP has called in paxlovid for treatment. ROS  Review of Systems - negative except as listed above    Reviewed PmHx, RxHx, FmHx, SocHx, AllgHx and updated in chart. Family History   Problem Relation Age of Onset   Rodrigues Cancer Mother         breast     Heart Disease Father          age 47.  Diabetes Sister        Past Medical History:   Diagnosis Date    Hernadez's esophagus     esophageal bleed     Cerebral palsy (HCC)     Chronic kidney disease     Stage IV    Colon polyps     Dr. Clay Resides DDD (degenerative disc disease), cervical     Diabetes ([facility] [address]     GERD (gastroesophageal reflux disease)     Hernadez's esophagus long segment    Gout     Hearing loss     Hypercholesterolemia     Hypertension     Ill-defined condition     Hernadez's esophagus    Ill-defined condition     Glaucoma, Bell's palsy    Other ill-defined conditions(799.89)     gout    Other ill-defined conditions(799.89)     lipids    Other ill-defined conditions(799.89)     BPH      Social History     Socioeconomic History    Marital status:    Tobacco Use    Smoking status: Former Smoker     Packs/day: 2.00     Years: 20.00     Pack years: 40.00     Quit date: 1991     Years since quittin.4    Smokeless tobacco: Never Used   Vaping Use    Vaping Use: Never used   Substance and Sexual Activity    Alcohol use:  Yes     Alcohol/week: 11.7 standard drinks     Types: 14 Cans of beer per week     Comment: 2 beer daily    Drug use: Never    Sexual activity: Not Currently Social Determinants of Health     Financial Resource Strain: Low Risk     Difficulty of Paying Living Expenses: Not hard at all   Food Insecurity: No Food Insecurity    Worried About Running Out of Food in the Last Year: Never true    Aliyah of Food in the Last Year: Never true          Current Outpatient Medications   Medication Sig    nirmatrelvir-ritonavir (PAXLOVID) 150 mg x 2- 100 mg tablet gfr is 55.  colchicine 0.6 mg tablet Take 1 Tablet by mouth daily.  Insulin Needles, Disposable, (Kaylen Pen Needle) 32 gauge x 5/32\" ndle Inject insulin daily.  OneTouch Delica Lancets 30 gauge misc USE LANCET TO CHECK BLOOD SUGAR THREE TIMES DAILY    glucose blood VI test strips (OneTouch Verio test strips) strip CHECK BLOOD SUGAR THREE TIMES DAILY    Blood-Glucose Meter (OneTouch Verio Flex meter) misc 1 Each by Does Not Apply route daily.  Toujeo SoloStar U-300 Insulin 300 unit/mL (1.5 mL) inpn pen INJECT 20 UNITS UNDER THE SKIN DAILY    fosinopriL (MONOPRIL) 40 mg tablet TAKE 1 TABLET DAILY    pravastatin (PRAVACHOL) 20 mg tablet TAKE 1 TABLET DAILY    liraglutide (Victoza 3-Abdiel) 0.6 mg/0.1 mL (18 mg/3 mL) pnij 1.8 mg by SubCUTAneous route daily.  aspirin 81 mg chewable tablet Take 81 mg by mouth daily.  latanoprost (XALATAN) 0.005 % ophthalmic solution INT 1 GTT IN OU QD HS    pen needle, diabetic (NOVOTWIST) 32 gauge x 1/5\" ndle For use with tresiba and Victoza - twice daily.  magnesium oxide 500 mg tab Take 1 Tab by mouth daily.  OTHER daily. motilium 10 mg daily (patient states drug not made in [place])     esomeprazole (NEXIUM) 40 mg capsule Take 1 Cap by mouth two (2) times a day. No current facility-administered medications for this visit.        Objective:     Vitals:    06/17/22 1248   BP: (!) 155/81   Pulse: 72   Resp: 16   Temp: 98.1 °F (36.7 °C)   SpO2: 98%   Weight: 155 lb (70.3 kg)       Physical Exam  General appearance - appears well hydrated and does not appear toxic, no acute distress  Eyes - EOMs intact. Non injected. No scleral icterus   Ears - no external swelling. TMs normal bilat. Nose - nasal congestion. No purulent drainage  Mouth - OP clear without swelling, exudate or lesion. Mucus membranes moist. Uvula midline. Neck/Lymphatics - trachea midline, full AROM, no LAD of neck  Chest - Normal breathing effort no wheeze rales, rhonchi or diminishments bilaterally. Heart - RRR, no murmurs  Skin - no observable rashes or pallor  Neurologic- alert and oriented x 3  Psychiatric- normal mood, behavior and though content. Assessment/ Plan:     1. Positive self-administered antigen test for COVID-19        Home positive covid 19 test  paxlovid sent in by PCP today for treatment. No evidence suggesting complication of illness at this time. Will discharge home with close monitoring and follow up.   Supportive home care for mild symptoms advised- maintain adequate fluid intake, over the counter Tylenol (for fever, aches, pains, chills), deep breathing exercises  Recommendation for self isolation/quarantine based on current CDC guidelines      Follow up:  As needed for any new, worsening or changes         Wilber Mckeon NP
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
## 2022-07-28 DIAGNOSIS — E11.9 TYPE 2 DIABETES MELLITUS WITHOUT COMPLICATION, WITHOUT LONG-TERM CURRENT USE OF INSULIN (HCC): ICD-10-CM

## 2022-07-29 DIAGNOSIS — E11.9 TYPE 2 DIABETES MELLITUS WITHOUT COMPLICATION, WITHOUT LONG-TERM CURRENT USE OF INSULIN (HCC): ICD-10-CM

## 2022-07-29 RX ORDER — LIRAGLUTIDE 6 MG/ML
INJECTION SUBCUTANEOUS
Qty: 27 ML | Refills: 3 | OUTPATIENT
Start: 2022-07-29

## 2022-07-29 RX ORDER — LIRAGLUTIDE 6 MG/ML
INJECTION SUBCUTANEOUS
Qty: 27 ML | Refills: 3 | Status: SHIPPED | OUTPATIENT
Start: 2022-07-29

## 2022-07-29 NOTE — TELEPHONE ENCOUNTER
Requested Prescriptions     Pending Prescriptions Disp Refills    liraglutide (Victoza 3-Abdiel) 0.6 mg/0.1 mL (18 mg/3 mL) pnij 27 mL 3     Sig: INJECT 1.8MG UNDER THE SKIN DAILY

## 2022-07-29 NOTE — TELEPHONE ENCOUNTER
7/29/2022    Pt called and left a vm at 8:55 am stating [employer] will be sending over a refill request for Jakub Wright and he would like the request to be sent in time. He will be leaving to go out of town in two weeks and if the order is not mailed in time before he goes out of town it will be sitting outside his house until he returns. Pt can be reached at [phone].     Thanks,   Miranda Guerrier

## 2022-08-16 ENCOUNTER — OFFICE VISIT (OUTPATIENT)
Dept: URGENT CARE | Age: 77
End: 2022-08-16
Payer: MEDICARE

## 2022-08-16 VITALS
HEIGHT: 65 IN | WEIGHT: 155 LBS | DIASTOLIC BLOOD PRESSURE: 74 MMHG | HEART RATE: 62 BPM | RESPIRATION RATE: 18 BRPM | OXYGEN SATURATION: 99 % | BODY MASS INDEX: 25.83 KG/M2 | TEMPERATURE: 98.2 F | SYSTOLIC BLOOD PRESSURE: 165 MMHG

## 2022-08-16 DIAGNOSIS — R07.81 RIB PAIN ON LEFT SIDE: Primary | ICD-10-CM

## 2022-08-16 PROCEDURE — 3017F COLORECTAL CA SCREEN DOC REV: CPT | Performed by: NURSE PRACTITIONER

## 2022-08-16 PROCEDURE — G8754 DIAS BP LESS 90: HCPCS | Performed by: NURSE PRACTITIONER

## 2022-08-16 PROCEDURE — G8536 NO DOC ELDER MAL SCRN: HCPCS | Performed by: NURSE PRACTITIONER

## 2022-08-16 PROCEDURE — 1123F ACP DISCUSS/DSCN MKR DOCD: CPT | Performed by: NURSE PRACTITIONER

## 2022-08-16 PROCEDURE — G8432 DEP SCR NOT DOC, RNG: HCPCS | Performed by: NURSE PRACTITIONER

## 2022-08-16 PROCEDURE — G8417 CALC BMI ABV UP PARAM F/U: HCPCS | Performed by: NURSE PRACTITIONER

## 2022-08-16 PROCEDURE — G8753 SYS BP > OR = 140: HCPCS | Performed by: NURSE PRACTITIONER

## 2022-08-16 PROCEDURE — G8427 DOCREV CUR MEDS BY ELIG CLIN: HCPCS | Performed by: NURSE PRACTITIONER

## 2022-08-16 PROCEDURE — 99213 OFFICE O/P EST LOW 20 MIN: CPT | Performed by: NURSE PRACTITIONER

## 2022-08-16 PROCEDURE — 1101F PT FALLS ASSESS-DOCD LE1/YR: CPT | Performed by: NURSE PRACTITIONER
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
## 2022-10-13 ENCOUNTER — OFFICE VISIT (OUTPATIENT)
Dept: URGENT CARE | Age: 77
End: 2022-10-13
Payer: MEDICARE

## 2022-10-13 VITALS
DIASTOLIC BLOOD PRESSURE: 81 MMHG | OXYGEN SATURATION: 96 % | SYSTOLIC BLOOD PRESSURE: 136 MMHG | BODY MASS INDEX: 25.63 KG/M2 | HEART RATE: 85 BPM | WEIGHT: 154 LBS | TEMPERATURE: 98.3 F | RESPIRATION RATE: 17 BRPM

## 2022-10-13 DIAGNOSIS — R10.30 LOWER ABDOMINAL PAIN: ICD-10-CM

## 2022-10-13 DIAGNOSIS — R19.7 DIARRHEA OF PRESUMED INFECTIOUS ORIGIN: Primary | ICD-10-CM

## 2022-10-13 PROCEDURE — G8417 CALC BMI ABV UP PARAM F/U: HCPCS | Performed by: FAMILY MEDICINE

## 2022-10-13 PROCEDURE — 99214 OFFICE O/P EST MOD 30 MIN: CPT | Performed by: FAMILY MEDICINE

## 2022-10-13 PROCEDURE — 1101F PT FALLS ASSESS-DOCD LE1/YR: CPT | Performed by: FAMILY MEDICINE

## 2022-10-13 PROCEDURE — G8754 DIAS BP LESS 90: HCPCS | Performed by: FAMILY MEDICINE

## 2022-10-13 PROCEDURE — G8432 DEP SCR NOT DOC, RNG: HCPCS | Performed by: FAMILY MEDICINE

## 2022-10-13 PROCEDURE — G8536 NO DOC ELDER MAL SCRN: HCPCS | Performed by: FAMILY MEDICINE

## 2022-10-13 PROCEDURE — 1123F ACP DISCUSS/DSCN MKR DOCD: CPT | Performed by: FAMILY MEDICINE

## 2022-10-13 PROCEDURE — G8752 SYS BP LESS 140: HCPCS | Performed by: FAMILY MEDICINE

## 2022-10-13 PROCEDURE — G8427 DOCREV CUR MEDS BY ELIG CLIN: HCPCS | Performed by: FAMILY MEDICINE

## 2022-10-13 RX ORDER — CIPROFLOXACIN 250 MG/1
250 TABLET, FILM COATED ORAL EVERY 12 HOURS
Qty: 6 TABLET | Refills: 0 | Status: SHIPPED | OUTPATIENT
Start: 2022-10-13 | End: 2022-10-16

## 2022-10-13 NOTE — PATIENT INSTRUCTIONS
Increase fluids with electrolytes  Give stool sample  Start cipro after sample given  Stop imodium    Follow up with PCP    ER if worse
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
## 2022-10-14 LAB
C DIFF GDH STL QL: NEGATIVE
C DIFF TOX A+B STL QL IA: NEGATIVE
INTERPRETATION: NORMAL

## 2022-10-15 ENCOUNTER — TELEPHONE (OUTPATIENT)
Dept: NEUROLOGY | Age: 77
End: 2022-10-15

## 2022-10-18 LAB
O+P SPEC MICRO: NORMAL
O+P STL CONC: NORMAL
SPECIMEN SOURCE: NORMAL

## 2022-10-19 LAB
CAMPYLOBACTER STL CULT: NORMAL
E COLI SXT STL QL IA: NEGATIVE
SALM + SHIG STL CULT: NORMAL

## 2022-11-01 RX ORDER — PEN NEEDLE, DIABETIC 31 GX3/16"
NEEDLE, DISPOSABLE MISCELLANEOUS
Qty: 200 PEN NEEDLE | Refills: 3 | Status: SHIPPED | OUTPATIENT
Start: 2022-11-01

## 2022-11-01 NOTE — TELEPHONE ENCOUNTER
Requested Prescriptions     Pending Prescriptions Disp Refills    Insulin Needles, Disposable, (Kaylen Pen Needle) 32 gauge x 5/32\" ndle 200 Pen Needle 3     Sig: Inject insulin daily.

## 2022-12-02 RX ORDER — PRAVASTATIN SODIUM 20 MG/1
TABLET ORAL
Qty: 90 TABLET | Refills: 3 | Status: SHIPPED | OUTPATIENT
Start: 2022-12-02

## 2022-12-12 ENCOUNTER — OFFICE VISIT (OUTPATIENT)
Dept: ENDOCRINOLOGY | Age: 77
End: 2022-12-12
Payer: MEDICARE

## 2022-12-12 VITALS
HEIGHT: 65 IN | HEART RATE: 74 BPM | WEIGHT: 155.8 LBS | DIASTOLIC BLOOD PRESSURE: 79 MMHG | SYSTOLIC BLOOD PRESSURE: 134 MMHG | BODY MASS INDEX: 25.96 KG/M2

## 2022-12-12 DIAGNOSIS — E11.9 TYPE 2 DIABETES MELLITUS WITHOUT COMPLICATION, WITHOUT LONG-TERM CURRENT USE OF INSULIN (HCC): Primary | ICD-10-CM

## 2022-12-12 DIAGNOSIS — E11.65 TYPE 2 DIABETES MELLITUS WITH HYPERGLYCEMIA, WITH LONG-TERM CURRENT USE OF INSULIN (HCC): ICD-10-CM

## 2022-12-12 DIAGNOSIS — Z79.4 TYPE 2 DIABETES MELLITUS WITH HYPERGLYCEMIA, WITH LONG-TERM CURRENT USE OF INSULIN (HCC): ICD-10-CM

## 2022-12-12 LAB — HBA1C MFR BLD HPLC: 7.4 %

## 2022-12-12 PROCEDURE — G8752 SYS BP LESS 140: HCPCS | Performed by: INTERNAL MEDICINE

## 2022-12-12 PROCEDURE — G8427 DOCREV CUR MEDS BY ELIG CLIN: HCPCS | Performed by: INTERNAL MEDICINE

## 2022-12-12 PROCEDURE — 3074F SYST BP LT 130 MM HG: CPT | Performed by: INTERNAL MEDICINE

## 2022-12-12 PROCEDURE — 1101F PT FALLS ASSESS-DOCD LE1/YR: CPT | Performed by: INTERNAL MEDICINE

## 2022-12-12 PROCEDURE — 3051F HG A1C>EQUAL 7.0%<8.0%: CPT | Performed by: INTERNAL MEDICINE

## 2022-12-12 PROCEDURE — G8432 DEP SCR NOT DOC, RNG: HCPCS | Performed by: INTERNAL MEDICINE

## 2022-12-12 PROCEDURE — G8754 DIAS BP LESS 90: HCPCS | Performed by: INTERNAL MEDICINE

## 2022-12-12 PROCEDURE — 3078F DIAST BP <80 MM HG: CPT | Performed by: INTERNAL MEDICINE

## 2022-12-12 PROCEDURE — 1123F ACP DISCUSS/DSCN MKR DOCD: CPT | Performed by: INTERNAL MEDICINE

## 2022-12-12 PROCEDURE — 99214 OFFICE O/P EST MOD 30 MIN: CPT | Performed by: INTERNAL MEDICINE

## 2022-12-12 PROCEDURE — G8417 CALC BMI ABV UP PARAM F/U: HCPCS | Performed by: INTERNAL MEDICINE

## 2022-12-12 PROCEDURE — 83036 HEMOGLOBIN GLYCOSYLATED A1C: CPT | Performed by: INTERNAL MEDICINE

## 2022-12-12 PROCEDURE — G8536 NO DOC ELDER MAL SCRN: HCPCS | Performed by: INTERNAL MEDICINE

## 2022-12-12 NOTE — PROGRESS NOTES
This is a 42-year-old white male with a history of type 2 diabetes mellitus x 10 years currently on Toujeo insulin 17 units in the morning and Victoza daily. He has chronic kidney disease stage III a with a recent creatinine of 1.28. He has degenerative disc disease and has received a number of steroid injections into his neck. He has received no steroid injections in the last 6 months. Current Medications  Toujeo 21 units AM  Victoza 1.8 mg     Regarding his diabetes, he had been doing very well. Lani Euceda His last A1c was 7.7%. A1c today 7.4%. His blood sugars in the morning are excellent ranging between 120-130. His blood sugars after dinner are in the 180-220 range. There really is not any change in his diet or his physical activity. He checks his blood sugars twice daily. He has coffee and a fruit bar or pack of peanut butter crackers for breakfast.  Lunch is a sandwich with chips and diet Pepsi. Dinner can be chicken with half a baked potato and a salad or pork chop or manicotti and a salad. He does not snack at bedtime. His physical activity has improved and he is back to walking every day and or playing golf. He was seen by podiatry for some neuropathic symptoms. He was given a prescription for Metanex which he said really exacerbated his esophageal reflux and so he stopped taking it. He does tell me that he occasionally has symptoms of very mild hypoglycemia occurring at about 11:00 which is why he eats his lunch at 11:00. None of the episodes are significant.     Examination  Blood pressure 134/79  Pulse 80 and regular  Weight 155  BMI 25.9  HEENT unremarkable  Lungs clear  Heart reveals a regular rate and rhythm  Abdomen benign  Extremities unremarkable  Diabetic foot exam:     Left Foot:   Visual Exam: normal    Pulse DP: 2+ (normal)   Filament test: absent sensation    Vibratory sensation: absent      Right Foot:   Visual Exam: normal    Pulse DP: 2+ (normal)   Filament test: absent sensation    Vibratory sensation: absent    Impression  1. Type 2 diabetes mellitus with an improved A1c.  2.  Diabetic peripheral neuropathy    Plan:  1. No changes in the regimen were made. Based on his blood sugars he is doing better and I would anticipate his next A1c being better than the one today  2.   I will see him back in 6 months at his request.

## 2023-01-18 ENCOUNTER — OFFICE VISIT (OUTPATIENT)
Dept: INTERNAL MEDICINE CLINIC | Age: 78
End: 2023-01-18
Payer: MEDICARE

## 2023-01-18 VITALS
HEART RATE: 86 BPM | BODY MASS INDEX: 25.96 KG/M2 | DIASTOLIC BLOOD PRESSURE: 70 MMHG | RESPIRATION RATE: 16 BRPM | TEMPERATURE: 97.4 F | OXYGEN SATURATION: 97 % | WEIGHT: 155.8 LBS | SYSTOLIC BLOOD PRESSURE: 110 MMHG | HEIGHT: 65 IN

## 2023-01-18 DIAGNOSIS — N18.30 STAGE 3 CHRONIC KIDNEY DISEASE, UNSPECIFIED WHETHER STAGE 3A OR 3B CKD (HCC): ICD-10-CM

## 2023-01-18 DIAGNOSIS — R41.3 MEMORY LOSS: ICD-10-CM

## 2023-01-18 DIAGNOSIS — E11.65 TYPE 2 DIABETES MELLITUS WITH HYPERGLYCEMIA, WITH LONG-TERM CURRENT USE OF INSULIN (HCC): ICD-10-CM

## 2023-01-18 DIAGNOSIS — Z79.4 CONTROLLED TYPE 2 DIABETES MELLITUS WITH DIABETIC NEUROPATHY, WITH LONG-TERM CURRENT USE OF INSULIN (HCC): ICD-10-CM

## 2023-01-18 DIAGNOSIS — I10 HYPERTENSION, UNSPECIFIED TYPE: Primary | ICD-10-CM

## 2023-01-18 DIAGNOSIS — E11.40 CONTROLLED TYPE 2 DIABETES MELLITUS WITH DIABETIC NEUROPATHY, WITH LONG-TERM CURRENT USE OF INSULIN (HCC): ICD-10-CM

## 2023-01-18 DIAGNOSIS — Z79.4 TYPE 2 DIABETES MELLITUS WITH HYPERGLYCEMIA, WITH LONG-TERM CURRENT USE OF INSULIN (HCC): ICD-10-CM

## 2023-01-18 DIAGNOSIS — E78.5 HYPERLIPIDEMIA, UNSPECIFIED HYPERLIPIDEMIA TYPE: ICD-10-CM

## 2023-01-18 LAB
ANION GAP SERPL CALC-SCNC: 8 MMOL/L (ref 5–15)
BUN SERPL-MCNC: 20 MG/DL (ref 6–20)
BUN/CREAT SERPL: 13 (ref 12–20)
CALCIUM SERPL-MCNC: 9.5 MG/DL (ref 8.5–10.1)
CHLORIDE SERPL-SCNC: 107 MMOL/L (ref 97–108)
CO2 SERPL-SCNC: 23 MMOL/L (ref 21–32)
CREAT SERPL-MCNC: 1.52 MG/DL (ref 0.7–1.3)
ERYTHROCYTE [DISTWIDTH] IN BLOOD BY AUTOMATED COUNT: 14.6 % (ref 11.5–14.5)
GLUCOSE SERPL-MCNC: 102 MG/DL (ref 65–100)
HCT VFR BLD AUTO: 48.9 % (ref 36.6–50.3)
HGB BLD-MCNC: 15.2 G/DL (ref 12.1–17)
MCH RBC QN AUTO: 27.6 PG (ref 26–34)
MCHC RBC AUTO-ENTMCNC: 31.1 G/DL (ref 30–36.5)
MCV RBC AUTO: 88.7 FL (ref 80–99)
NRBC # BLD: 0 K/UL (ref 0–0.01)
NRBC BLD-RTO: 0 PER 100 WBC
PLATELET # BLD AUTO: 167 K/UL (ref 150–400)
PMV BLD AUTO: 13 FL (ref 8.9–12.9)
POTASSIUM SERPL-SCNC: 4.5 MMOL/L (ref 3.5–5.1)
RBC # BLD AUTO: 5.51 M/UL (ref 4.1–5.7)
SODIUM SERPL-SCNC: 138 MMOL/L (ref 136–145)
WBC # BLD AUTO: 6.9 K/UL (ref 4.1–11.1)

## 2023-01-18 PROCEDURE — G8417 CALC BMI ABV UP PARAM F/U: HCPCS | Performed by: INTERNAL MEDICINE

## 2023-01-18 PROCEDURE — G8427 DOCREV CUR MEDS BY ELIG CLIN: HCPCS | Performed by: INTERNAL MEDICINE

## 2023-01-18 PROCEDURE — G8536 NO DOC ELDER MAL SCRN: HCPCS | Performed by: INTERNAL MEDICINE

## 2023-01-18 PROCEDURE — 1101F PT FALLS ASSESS-DOCD LE1/YR: CPT | Performed by: INTERNAL MEDICINE

## 2023-01-18 PROCEDURE — G0439 PPPS, SUBSEQ VISIT: HCPCS | Performed by: INTERNAL MEDICINE

## 2023-01-18 PROCEDURE — G8432 DEP SCR NOT DOC, RNG: HCPCS | Performed by: INTERNAL MEDICINE
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
## 2023-01-18 NOTE — PROGRESS NOTES
1. Have you been to the ER, urgent care clinic since your last visit? Hospitalized since your last visit? No    2. Have you seen or consulted any other health care providers outside of the [address] [place] [address] since your last visit? Include any pap smears or colon screening.          Dr. Blue Reddy
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
## 2023-02-06 ENCOUNTER — OFFICE VISIT (OUTPATIENT)
Dept: NEUROLOGY | Age: 78
End: 2023-02-06
Payer: MEDICARE

## 2023-02-06 VITALS
DIASTOLIC BLOOD PRESSURE: 84 MMHG | SYSTOLIC BLOOD PRESSURE: 136 MMHG | HEART RATE: 82 BPM | OXYGEN SATURATION: 98 % | BODY MASS INDEX: 26.66 KG/M2 | RESPIRATION RATE: 15 BRPM | WEIGHT: 160 LBS | HEIGHT: 65 IN

## 2023-02-06 DIAGNOSIS — R41.3 MEMORY LOSS: Primary | ICD-10-CM

## 2023-02-06 DIAGNOSIS — G62.9 NEUROPATHY: ICD-10-CM

## 2023-02-06 DIAGNOSIS — E11.42 DIABETIC POLYNEUROPATHY ASSOCIATED WITH TYPE 2 DIABETES MELLITUS (HCC): ICD-10-CM

## 2023-02-06 PROCEDURE — G8417 CALC BMI ABV UP PARAM F/U: HCPCS | Performed by: PSYCHIATRY & NEUROLOGY

## 2023-02-06 PROCEDURE — G8510 SCR DEP NEG, NO PLAN REQD: HCPCS | Performed by: PSYCHIATRY & NEUROLOGY

## 2023-02-06 PROCEDURE — 1101F PT FALLS ASSESS-DOCD LE1/YR: CPT | Performed by: PSYCHIATRY & NEUROLOGY

## 2023-02-06 PROCEDURE — G8536 NO DOC ELDER MAL SCRN: HCPCS | Performed by: PSYCHIATRY & NEUROLOGY

## 2023-02-06 PROCEDURE — 1123F ACP DISCUSS/DSCN MKR DOCD: CPT | Performed by: PSYCHIATRY & NEUROLOGY

## 2023-02-06 PROCEDURE — 3075F SYST BP GE 130 - 139MM HG: CPT | Performed by: PSYCHIATRY & NEUROLOGY

## 2023-02-06 PROCEDURE — 3079F DIAST BP 80-89 MM HG: CPT | Performed by: PSYCHIATRY & NEUROLOGY

## 2023-02-06 PROCEDURE — G8427 DOCREV CUR MEDS BY ELIG CLIN: HCPCS | Performed by: PSYCHIATRY & NEUROLOGY

## 2023-02-06 PROCEDURE — 99215 OFFICE O/P EST HI 40 MIN: CPT | Performed by: PSYCHIATRY & NEUROLOGY

## 2023-02-06 NOTE — PROGRESS NOTES
1. Have you been to the ER, urgent care clinic since your last visit? Hospitalized since your last visit? No.    2. Have you seen or consulted any other health care providers outside of the [address] [place] [address] since your last visit? Include any pap smears or colon screening.    No.        Chief Complaint   Patient presents with    New Patient     Short term Memory loss

## 2023-02-06 NOTE — LETTER
2/6/2023    Patient: Marinell Angelucci   YOB: 1945   Date of Visit: 2/6/2023     Pito Nuñez MD  [address]  [address]  [facility] [address]    Dear Pito Nuñez MD,      Thank you for referring Mr. Michelle Freeman to [address] [place] [address] for evaluation. My notes for this consultation are attached. If you have questions, please do not hesitate to call me. I look forward to following your patient along with you.       Sincerely,    Flip Kelly, DO

## 2023-02-13 ENCOUNTER — TELEPHONE (OUTPATIENT)
Dept: ENDOCRINOLOGY | Age: 78
End: 2023-02-13

## 2023-02-13 DIAGNOSIS — E11.9 TYPE 2 DIABETES MELLITUS WITHOUT COMPLICATION, WITHOUT LONG-TERM CURRENT USE OF INSULIN (HCC): ICD-10-CM

## 2023-02-13 RX ORDER — BLOOD-GLUCOSE METER
1 EACH MISCELLANEOUS DAILY
Qty: 1 EACH | Refills: 0 | Status: SHIPPED | OUTPATIENT
Start: 2023-02-13

## 2023-02-13 NOTE — TELEPHONE ENCOUNTER
2/13/2023  9:13 AM        Patient called and stated Saturday his blood sugar testing machine did not work so he took it to the pharmacy they  replaced the battery but it still did not work. Pt stated he need a prescription for the one touch verio flex the machine he already have sent to the [facility] on [facility]. #[phone]     Thanks,  Aziza Pill

## 2023-02-13 NOTE — TELEPHONE ENCOUNTER
2/13/2023  9:13 AM      Patient called and stated Saturday his blood sugar testing machine did not work so he took it to the pharmacy they  replaced the battery but it still did not work. Pt stated he need a prescription for the one touch verio flex the machine he already have sent to the [facility] on [facility]. EG#[phone]    Thanks,  Guillaume Sherwood

## 2023-02-23 ENCOUNTER — APPOINTMENT (OUTPATIENT)
Dept: CT IMAGING | Age: 78
End: 2023-02-23
Attending: EMERGENCY MEDICINE
Payer: MEDICARE

## 2023-02-23 ENCOUNTER — HOSPITAL ENCOUNTER (EMERGENCY)
Age: 78
Discharge: HOME | End: 2023-02-24
Attending: EMERGENCY MEDICINE
Payer: MEDICARE

## 2023-02-23 ENCOUNTER — APPOINTMENT (OUTPATIENT)
Dept: MRI IMAGING | Age: 78
End: 2023-02-23
Attending: EMERGENCY MEDICINE
Payer: MEDICARE

## 2023-02-23 VITALS
TEMPERATURE: 98.1 F | SYSTOLIC BLOOD PRESSURE: 150 MMHG | HEIGHT: 65 IN | HEART RATE: 63 BPM | RESPIRATION RATE: 22 BRPM | WEIGHT: 155 LBS | OXYGEN SATURATION: 97 % | DIASTOLIC BLOOD PRESSURE: 78 MMHG | BODY MASS INDEX: 25.83 KG/M2

## 2023-02-23 DIAGNOSIS — H53.9 TRANSIENT VISION DISTURBANCE: Primary | ICD-10-CM

## 2023-02-23 LAB
ALBUMIN SERPL-MCNC: 3.8 G/DL (ref 3.5–5)
ALBUMIN/GLOB SERPL: 1.2 (ref 1.1–2.2)
ALP SERPL-CCNC: 87 U/L (ref 45–117)
ALT SERPL-CCNC: 44 U/L (ref 12–78)
ANION GAP SERPL CALC-SCNC: 9 MMOL/L (ref 5–15)
APPEARANCE UR: CLEAR
AST SERPL-CCNC: 36 U/L (ref 15–37)
BACTERIA URNS QL MICRO: NEGATIVE /HPF
BASOPHILS # BLD: 0 K/UL (ref 0–0.1)
BASOPHILS NFR BLD: 1 % (ref 0–1)
BILIRUB SERPL-MCNC: 0.3 MG/DL (ref 0.2–1)
BILIRUB UR QL: NEGATIVE
BUN SERPL-MCNC: 17 MG/DL (ref 6–20)
BUN/CREAT SERPL: 11 (ref 12–20)
CALCIUM SERPL-MCNC: 8.8 MG/DL (ref 8.5–10.1)
CHLORIDE SERPL-SCNC: 107 MMOL/L (ref 97–108)
CO2 SERPL-SCNC: 23 MMOL/L (ref 21–32)
COLOR UR: ABNORMAL
CREAT SERPL-MCNC: 1.57 MG/DL (ref 0.7–1.3)
DIFFERENTIAL METHOD BLD: ABNORMAL
EOSINOPHIL # BLD: 0.2 K/UL (ref 0–0.4)
EOSINOPHIL NFR BLD: 3 % (ref 0–7)
EPITH CASTS URNS QL MICRO: ABNORMAL /LPF
ERYTHROCYTE [DISTWIDTH] IN BLOOD BY AUTOMATED COUNT: 15.1 % (ref 11.5–14.5)
GLOBULIN SER CALC-MCNC: 3.2 G/DL (ref 2–4)
GLUCOSE BLD STRIP.AUTO-MCNC: 197 MG/DL (ref 65–117)
GLUCOSE SERPL-MCNC: 204 MG/DL (ref 65–100)
GLUCOSE UR STRIP.AUTO-MCNC: NEGATIVE MG/DL
HCT VFR BLD AUTO: 42.7 % (ref 36.6–50.3)
HGB BLD-MCNC: 13.6 G/DL (ref 12.1–17)
HGB UR QL STRIP: NEGATIVE
HYALINE CASTS URNS QL MICRO: ABNORMAL /LPF (ref 0–2)
IMM GRANULOCYTES # BLD AUTO: 0 K/UL (ref 0–0.04)
IMM GRANULOCYTES NFR BLD AUTO: 1 % (ref 0–0.5)
KETONES UR QL STRIP.AUTO: NEGATIVE MG/DL
LEUKOCYTE ESTERASE UR QL STRIP.AUTO: ABNORMAL
LYMPHOCYTES # BLD: 1.1 K/UL (ref 0.8–3.5)
LYMPHOCYTES NFR BLD: 19 % (ref 12–49)
MCH RBC QN AUTO: 28 PG (ref 26–34)
MCHC RBC AUTO-ENTMCNC: 31.9 G/DL (ref 30–36.5)
MCV RBC AUTO: 87.9 FL (ref 80–99)
MONOCYTES # BLD: 0.5 K/UL (ref 0–1)
MONOCYTES NFR BLD: 9 % (ref 5–13)
NEUTS SEG # BLD: 3.7 K/UL (ref 1.8–8)
NEUTS SEG NFR BLD: 67 % (ref 32–75)
NITRITE UR QL STRIP.AUTO: NEGATIVE
NRBC # BLD: 0 K/UL (ref 0–0.01)
NRBC BLD-RTO: 0 PER 100 WBC
PH UR STRIP: 5.5 (ref 5–8)
PLATELET # BLD AUTO: 148 K/UL (ref 150–400)
PMV BLD AUTO: 12.4 FL (ref 8.9–12.9)
POTASSIUM SERPL-SCNC: 4.1 MMOL/L (ref 3.5–5.1)
PROT SERPL-MCNC: 7 G/DL (ref 6.4–8.2)
PROT UR STRIP-MCNC: NEGATIVE MG/DL
RBC # BLD AUTO: 4.86 M/UL (ref 4.1–5.7)
RBC #/AREA URNS HPF: ABNORMAL /HPF (ref 0–5)
SERVICE CMNT-IMP: ABNORMAL
SODIUM SERPL-SCNC: 139 MMOL/L (ref 136–145)
SP GR UR REFRACTOMETRY: 1.01
UA: UC IF INDICATED,UAUC: ABNORMAL
UROBILINOGEN UR QL STRIP.AUTO: 1 EU/DL (ref 0.2–1)
WBC # BLD AUTO: 5.5 K/UL (ref 4.1–11.1)
WBC URNS QL MICRO: ABNORMAL /HPF (ref 0–4)

## 2023-02-23 PROCEDURE — 99284 EMERGENCY DEPT VISIT MOD MDM: CPT

## 2023-02-23 PROCEDURE — 85025 COMPLETE CBC W/AUTO DIFF WBC: CPT

## 2023-02-23 PROCEDURE — 36415 COLL VENOUS BLD VENIPUNCTURE: CPT

## 2023-02-23 PROCEDURE — 80053 COMPREHEN METABOLIC PANEL: CPT

## 2023-02-23 PROCEDURE — 81001 URINALYSIS AUTO W/SCOPE: CPT

## 2023-02-23 PROCEDURE — 70450 CT HEAD/BRAIN W/O DYE: CPT

## 2023-02-23 PROCEDURE — 70551 MRI BRAIN STEM W/O DYE: CPT

## 2023-02-23 PROCEDURE — 82962 GLUCOSE BLOOD TEST: CPT

## 2023-02-24 ENCOUNTER — TELEPHONE (OUTPATIENT)
Dept: NEUROLOGY | Age: 78
End: 2023-02-24

## 2023-02-24 NOTE — TELEPHONE ENCOUNTER
Patient's wife, Brenton Mesa, called stating that Patient was seen in the hospital and an MRI was done yesterday. It was a [facility] MRI Code Neuro Brain w/o Charly New wants to know if Pt still needs to get the MRI Brain w/o contrast that Dr Brian Reyes ordered that is scheduled for 2/27 or if the MRI the hospital did yesterday is sufficient. Please advise.    [phone]

## 2023-02-24 NOTE — TELEPHONE ENCOUNTER
Returned call to Chel Hancock, on [address], advised I do see her message regarding pt was in ER and had MRI done. Advised it is essentially the same MRI w/o contrast. Advised not sure what the Code Neuro Brain is. Advised I do not think he would need to have the one that  ordered. Pt is scheduled on 2/27/23 for this MRI. Advised Christa to reschedule the MRI until I can find out from [address] [place] [address] if pt needs to repeat the MRI. Drew Lubin is in rounds at the hospital so I'm not sure when I would get back to her, that is why I am advising to just reschedule the MRI. Chel Hancock in agreement and verbalized understanding.
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
## 2023-02-24 NOTE — DISCHARGE INSTRUCTIONS
It was a pleasure taking care of you in our Emergency Department today. We know that when you come to [facility] you are entrusting us with your health, comfort, and safety. Our physicians and nurses honor that trust, and truly appreciate the opportunity to care for you and your loved ones. We also value your feedback. If you receive a survey about your Emergency Department experience today, please fill it out. We care about our patients' feedback, and we listen to what you have to say. Thank you!       Dr. Cindi Bethea MD.
Attending Majo: I performed a history and physical exam of the patient and discussed their management with the TIMMY. I have reviewed the TIMMY note and agree with the documented findings and plan of care, except as noted. This was a shared visit with an TIMMY. I reviewed and verified the documentation and independently performed my own history/exam/medical decision making. My medical decision making and observations are found above. Please refer to any progress notes for updates on clinical course.

## 2023-02-27 NOTE — TELEPHONE ENCOUNTER
You  Flip Kelly DO 3 days ago     DR  Please advise if pt should repeat the MRI, he had one in ER. Thanks. Message  Received: 3 days ago  Deisy Kelly DO sent to Irina Silverman LPN  Caller: Unspecified (3 days ago, 10:45 AM)  No need for an additional mri. thanks             Josh Giles [phone] on PHI, advised that  said no further MRI is needed. Advised to call and cancel other MRI. Advised I will shoot back to him to read from his perspective and send pt a my chart message with his result. Unless we need to call and discuss with him. Christa verbalized understanding.

## 2023-02-28 NOTE — TELEPHONE ENCOUNTER
Message  Received: Today  Gene Kelly DO sent to Amanda Connolly LPN  Caller: Unspecified (4 days ago, 10:45 AM)  Please let the patient know that the MRI show changes associated with aging, but no tumor, stroke and can be discussed further at follow up . Thanks. Called pt,verified pt with two pt identifiers, advised pt that  reviewed his MRI and it shows changes associated with aging but no evidence of tumor or stroke. It can be discussed further at his follow up. Pt verbalized understanding.

## 2023-03-29 RX ORDER — INSULIN GLARGINE 300 U/ML
21 INJECTION, SOLUTION SUBCUTANEOUS DAILY
Qty: 6 ML | Refills: 3 | Status: SHIPPED | OUTPATIENT
Start: 2023-03-29

## 2023-04-05 RX ORDER — FOSINOPRIL SODIUM 40 MG/1
TABLET ORAL
Qty: 90 TABLET | Refills: 3 | Status: SHIPPED
Start: 2023-04-05

## 2023-04-17 ENCOUNTER — TELEPHONE (OUTPATIENT)
Dept: INTERNAL MEDICINE CLINIC | Age: 78
End: 2023-04-17

## 2023-04-17 NOTE — TELEPHONE ENCOUNTER
----- Message from Nikko Casanova sent at 4/17/2023  9:19 AM EDT -----  Subject: Appointment Request    Reason for Call: Established Patient Appointment needed: Routine (Patient   Request) No Script    QUESTIONS    Reason for appointment request? Available appointments did not meet   patient need     Additional Information for Provider? Pt is having bruising. He was in the   ED about a month ago for dizziness and was told he had low platelet count.    Please call pt to schedule.  ---------------------------------------------------------------------------  --------------  Kimmy Gonzalez [facility]  [phone]; OK to leave message on voicemail  ---------------------------------------------------------------------------  --------------  SCRIPT ANSWERS  COVID Screen: Randa Stein

## 2023-04-17 NOTE — TELEPHONE ENCOUNTER
Called patient back to schedule appointment. Did not make contact. Left message for patient to call office.

## 2023-04-26 RX ORDER — COLCHICINE 0.6 MG/1
TABLET ORAL
Qty: 90 TABLET | Refills: 3 | Status: SHIPPED | OUTPATIENT
Start: 2023-04-26

## 2023-05-18 ENCOUNTER — OFFICE VISIT (OUTPATIENT)
Age: 78
End: 2023-05-18
Payer: MEDICARE

## 2023-05-18 VITALS
WEIGHT: 155 LBS | SYSTOLIC BLOOD PRESSURE: 112 MMHG | HEIGHT: 65 IN | RESPIRATION RATE: 16 BRPM | TEMPERATURE: 97.1 F | DIASTOLIC BLOOD PRESSURE: 60 MMHG | OXYGEN SATURATION: 97 % | BODY MASS INDEX: 25.83 KG/M2 | HEART RATE: 62 BPM

## 2023-05-18 DIAGNOSIS — D69.6 THROMBOCYTOPENIA (HCC): Primary | ICD-10-CM

## 2023-05-18 DIAGNOSIS — R53.83 FATIGUE, UNSPECIFIED TYPE: ICD-10-CM

## 2023-05-18 DIAGNOSIS — F41.9 ANXIETY: ICD-10-CM

## 2023-05-18 PROCEDURE — 1123F ACP DISCUSS/DSCN MKR DOCD: CPT | Performed by: INTERNAL MEDICINE

## 2023-05-18 PROCEDURE — G8419 CALC BMI OUT NRM PARAM NOF/U: HCPCS | Performed by: INTERNAL MEDICINE

## 2023-05-18 PROCEDURE — 3078F DIAST BP <80 MM HG: CPT | Performed by: INTERNAL MEDICINE

## 2023-05-18 PROCEDURE — 99214 OFFICE O/P EST MOD 30 MIN: CPT | Performed by: INTERNAL MEDICINE

## 2023-05-18 PROCEDURE — 3074F SYST BP LT 130 MM HG: CPT | Performed by: INTERNAL MEDICINE

## 2023-05-18 PROCEDURE — 1036F TOBACCO NON-USER: CPT | Performed by: INTERNAL MEDICINE

## 2023-05-18 PROCEDURE — G8427 DOCREV CUR MEDS BY ELIG CLIN: HCPCS | Performed by: INTERNAL MEDICINE

## 2023-05-18 RX ORDER — SERTRALINE HYDROCHLORIDE 25 MG/1
25 TABLET, FILM COATED ORAL DAILY
Qty: 90 TABLET | Refills: 1 | Status: SHIPPED | OUTPATIENT
Start: 2023-05-18

## 2023-05-18 SDOH — ECONOMIC STABILITY: INCOME INSECURITY: HOW HARD IS IT FOR YOU TO PAY FOR THE VERY BASICS LIKE FOOD, HOUSING, MEDICAL CARE, AND HEATING?: NOT HARD AT ALL

## 2023-05-18 SDOH — ECONOMIC STABILITY: HOUSING INSECURITY
IN THE LAST 12 MONTHS, WAS THERE A TIME WHEN YOU DID NOT HAVE A STEADY PLACE TO SLEEP OR SLEPT IN A SHELTER (INCLUDING NOW)?: NO

## 2023-05-18 SDOH — ECONOMIC STABILITY: FOOD INSECURITY: WITHIN THE PAST 12 MONTHS, YOU WORRIED THAT YOUR FOOD WOULD RUN OUT BEFORE YOU GOT MONEY TO BUY MORE.: NEVER TRUE

## 2023-05-18 SDOH — ECONOMIC STABILITY: FOOD INSECURITY: WITHIN THE PAST 12 MONTHS, THE FOOD YOU BOUGHT JUST DIDN'T LAST AND YOU DIDN'T HAVE MONEY TO GET MORE.: NEVER TRUE

## 2023-05-18 NOTE — PROGRESS NOTES
1. \"Have you been to the ER, urgent care clinic since your last visit? Hospitalized since your last visit? \" ER 2/23/2023 for dizziness     2. \"Have you seen or consulted any other health care providers outside of the [address] [place] [address] since your last visit? \"        Podiatrist     3. For patients aged 39-70: Has the patient had a colonoscopy / FIT/ Cologuard? No      If the patient is female:    4. For patients aged 41-77: Has the patient had a mammogram within the past 2 years? No      5. For patients aged 21-65: Has the patient had a pap smear?   No

## 2023-05-18 NOTE — PROGRESS NOTES
HISTORY OF PRESENT ILLNESS   Parthenia Cowden   is a 69 y.o.  male. hx HTN DM02  with neuropathy HLD anemia d/t AVM ckd 3 due to HTN , Barretts esophagus memory loss --here with wife    C/o bruising----after minor trauma x months , usally on hands and arms  On aspirin 81 mg qd--hx cerebral microvascular dz    Was dizzy after playing golf and went to ER was told platelets were low  Also some concerns about anxiety-not every day but if something does not go as planned, he gets all worked up. His wife notices it more than he does  Not depressed    C/o fatigue-wife requesting iron levels/ferritin check    Last OV  Renal Dr Claudetta Hum -last cr 1.28  Dm-2-Dr Reilly-recent OV-recent a1c 7.4-no insulin adjustment and on victoza  Dr Sheri Rondon for BPH and elevated PSA s/p neg prostate biopsy-will get MRI in February  Will see neurologist Dr Reema Call next month for memory loss evaluation-MMS last year 21/30. He feels memory/cognition is about the same  Some difficulty remembering phome numbers but not namse or words. Does not get lost dricing.  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     His siser passed from esophageal cancer   Patient Active Problem List    Diagnosis Date Noted    Chronic renal disease, stage III ([facility] [address] 06/01/2022    Controlled type 2 diabetes mellitus with diabetic neuropathy, with long-term current use of insulin ([facility] [address] 06/01/2022    Type 2 diabetes mellitus with chronic kidney disease ([facility] [address] 12/17/2021    GI bleed 07/27/2017    Primary open angle glaucoma 07/26/2016    CKD (chronic kidney disease) stage 3, GFR 30-59 ml/min ([facility]) 08/10/2010    Essential hypertension, benign 10/26/2009    Gout 10/26/2009    Polycythemia 10/26/2009    BPH (benign prostatic hyperplasia) 10/26/2009    Mann's esophagus 10/26/2009    Pure hypercholesterolemia 10/26/2009     Current Outpatient Medications   Medication Sig Dispense Refill    aspirin 81 MG chewable tablet Take 81 mg by mouth

## 2023-05-19 LAB
BASOPHILS # BLD: 0 K/UL (ref 0–0.1)
BASOPHILS NFR BLD: 1 % (ref 0–1)
DIFFERENTIAL METHOD BLD: ABNORMAL
EOSINOPHIL # BLD: 0.2 K/UL (ref 0–0.4)
EOSINOPHIL NFR BLD: 4 % (ref 0–7)
ERYTHROCYTE [DISTWIDTH] IN BLOOD BY AUTOMATED COUNT: 15.7 % (ref 11.5–14.5)
FERRITIN SERPL-MCNC: 8 NG/ML (ref 26–388)
HCT VFR BLD AUTO: 48.5 % (ref 36.6–50.3)
HGB BLD-MCNC: 14.8 G/DL (ref 12.1–17)
IMM GRANULOCYTES # BLD AUTO: 0 K/UL (ref 0–0.04)
IMM GRANULOCYTES NFR BLD AUTO: 0 % (ref 0–0.5)
LYMPHOCYTES # BLD: 1.3 K/UL (ref 0.8–3.5)
LYMPHOCYTES NFR BLD: 24 % (ref 12–49)
MCH RBC QN AUTO: 27 PG (ref 26–34)
MCHC RBC AUTO-ENTMCNC: 30.5 G/DL (ref 30–36.5)
MCV RBC AUTO: 88.5 FL (ref 80–99)
MONOCYTES # BLD: 0.5 K/UL (ref 0–1)
MONOCYTES NFR BLD: 9 % (ref 5–13)
NEUTS SEG # BLD: 3.3 K/UL (ref 1.8–8)
NEUTS SEG NFR BLD: 62 % (ref 32–75)
NRBC # BLD: 0 K/UL (ref 0–0.01)
NRBC BLD-RTO: 0 PER 100 WBC
PLATELET # BLD AUTO: 161 K/UL (ref 150–400)
RBC # BLD AUTO: 5.48 M/UL (ref 4.1–5.7)
TSH SERPL DL<=0.05 MIU/L-ACNC: 2.37 UIU/ML (ref 0.36–3.74)
WBC # BLD AUTO: 5.3 K/UL (ref 4.1–11.1)

## 2023-05-19 RX ORDER — FERROUS SULFATE 325(65) MG
325 TABLET ORAL 2 TIMES DAILY
Qty: 180 TABLET | Refills: 1 | Status: SHIPPED | OUTPATIENT
Start: 2023-05-19

## 2023-07-05 RX ORDER — LIRAGLUTIDE 6 MG/ML
INJECTION SUBCUTANEOUS
Qty: 27 ML | Refills: 3 | Status: SHIPPED | OUTPATIENT
Start: 2023-07-05

## 2023-07-18 ENCOUNTER — OFFICE VISIT (OUTPATIENT)
Age: 78
End: 2023-07-18
Payer: MEDICARE

## 2023-07-18 VITALS
TEMPERATURE: 97.2 F | OXYGEN SATURATION: 97 % | SYSTOLIC BLOOD PRESSURE: 116 MMHG | HEART RATE: 67 BPM | RESPIRATION RATE: 16 BRPM | HEIGHT: 65 IN | DIASTOLIC BLOOD PRESSURE: 82 MMHG | WEIGHT: 152.6 LBS | BODY MASS INDEX: 25.43 KG/M2

## 2023-07-18 DIAGNOSIS — F41.9 ANXIETY: ICD-10-CM

## 2023-07-18 DIAGNOSIS — E61.1 IRON DEFICIENCY: ICD-10-CM

## 2023-07-18 DIAGNOSIS — R79.0 LOW FERRITIN: Primary | ICD-10-CM

## 2023-07-18 PROCEDURE — G8419 CALC BMI OUT NRM PARAM NOF/U: HCPCS | Performed by: INTERNAL MEDICINE

## 2023-07-18 PROCEDURE — 99213 OFFICE O/P EST LOW 20 MIN: CPT | Performed by: INTERNAL MEDICINE

## 2023-07-18 PROCEDURE — 3079F DIAST BP 80-89 MM HG: CPT | Performed by: INTERNAL MEDICINE

## 2023-07-18 PROCEDURE — 3074F SYST BP LT 130 MM HG: CPT | Performed by: INTERNAL MEDICINE

## 2023-07-18 PROCEDURE — 1123F ACP DISCUSS/DSCN MKR DOCD: CPT | Performed by: INTERNAL MEDICINE

## 2023-07-18 PROCEDURE — G8427 DOCREV CUR MEDS BY ELIG CLIN: HCPCS | Performed by: INTERNAL MEDICINE

## 2023-07-18 PROCEDURE — 1036F TOBACCO NON-USER: CPT | Performed by: INTERNAL MEDICINE

## 2023-07-18 RX ORDER — PEN NEEDLE, DIABETIC 32GX 5/32"
NEEDLE, DISPOSABLE MISCELLANEOUS
COMMUNITY
Start: 2023-07-06

## 2023-07-18 NOTE — PROGRESS NOTES
1. \"Have you been to the ER, urgent care clinic since your last visit? Hospitalized since your last visit? \" No    2. \"Have you seen or consulted any other health care providers outside of the [address] [place] [address] since your last visit? \"         Dixie List // podiatrist     3. For patients aged 43-73: Has the patient had a colonoscopy / FIT/ Cologuard? Scheduled 10/2023 megan       If the patient is female:    4. For patients aged 43-66: Has the patient had a mammogram within the past 2 years? No       5. For patients aged 21-65: Has the patient had a pap smear?   No

## 2023-07-19 LAB — FERRITIN SERPL-MCNC: 25 NG/ML (ref 26–388)

## 2023-08-14 ENCOUNTER — OFFICE VISIT (OUTPATIENT)
Age: 78
End: 2023-08-14
Payer: MEDICARE

## 2023-08-14 VITALS
TEMPERATURE: 98 F | HEIGHT: 65 IN | HEART RATE: 72 BPM | RESPIRATION RATE: 18 BRPM | BODY MASS INDEX: 25.49 KG/M2 | OXYGEN SATURATION: 98 % | WEIGHT: 153 LBS | SYSTOLIC BLOOD PRESSURE: 128 MMHG | DIASTOLIC BLOOD PRESSURE: 70 MMHG

## 2023-08-14 DIAGNOSIS — R41.3 MEMORY LOSS: Primary | ICD-10-CM

## 2023-08-14 DIAGNOSIS — F03.A0 MILD DEMENTIA WITHOUT BEHAVIORAL DISTURBANCE, PSYCHOTIC DISTURBANCE, MOOD DISTURBANCE, OR ANXIETY, UNSPECIFIED DEMENTIA TYPE (HCC): ICD-10-CM

## 2023-08-14 PROCEDURE — G8419 CALC BMI OUT NRM PARAM NOF/U: HCPCS | Performed by: PSYCHIATRY & NEUROLOGY

## 2023-08-14 PROCEDURE — 99215 OFFICE O/P EST HI 40 MIN: CPT | Performed by: PSYCHIATRY & NEUROLOGY

## 2023-08-14 PROCEDURE — 1123F ACP DISCUSS/DSCN MKR DOCD: CPT | Performed by: PSYCHIATRY & NEUROLOGY

## 2023-08-14 PROCEDURE — 3074F SYST BP LT 130 MM HG: CPT | Performed by: PSYCHIATRY & NEUROLOGY

## 2023-08-14 PROCEDURE — 1036F TOBACCO NON-USER: CPT | Performed by: PSYCHIATRY & NEUROLOGY

## 2023-08-14 PROCEDURE — G8427 DOCREV CUR MEDS BY ELIG CLIN: HCPCS | Performed by: PSYCHIATRY & NEUROLOGY

## 2023-08-14 PROCEDURE — 3078F DIAST BP <80 MM HG: CPT | Performed by: PSYCHIATRY & NEUROLOGY

## 2023-08-14 RX ORDER — DONEPEZIL HYDROCHLORIDE 5 MG/1
5 TABLET, FILM COATED ORAL NIGHTLY
Qty: 30 TABLET | Refills: 0 | Status: SHIPPED | OUTPATIENT
Start: 2023-08-14

## 2023-08-14 RX ORDER — DONEPEZIL HYDROCHLORIDE 10 MG/1
10 TABLET, FILM COATED ORAL NIGHTLY
Qty: 90 TABLET | Refills: 1 | Status: SHIPPED | OUTPATIENT
Start: 2023-08-14

## 2023-08-14 ASSESSMENT — PATIENT HEALTH QUESTIONNAIRE - PHQ9
2. FEELING DOWN, DEPRESSED OR HOPELESS: 0
SUM OF ALL RESPONSES TO PHQ QUESTIONS 1-9: 0
SUM OF ALL RESPONSES TO PHQ9 QUESTIONS 1 & 2: 0
1. LITTLE INTEREST OR PLEASURE IN DOING THINGS: 0

## 2023-08-14 NOTE — ASSESSMENT & PLAN NOTE
Suspect mild cognitive impairment versus early dementia  Discussed this with the patient and his wife  We have elected to start on Aricept 5 mg daily for 30 days and then increase to 10 mg daily  Common and serious side effects were discussed    Amyloid PET scan has been ordered  Neuropsych testing also has been ordered for more complete and thorough evaluation    Resources were recommended and also noted on AVS

## 2023-08-14 NOTE — PROGRESS NOTES
[address]  In Office FOLLOW-UP VISIT         Alta Doss is a 69 y.o. male who presents today for the following:  Chief Complaint   Patient presents with    Follow-up     4 month follow-up         ASSESSMENT AND PLAN  Patient is known to this practice. This is my first time seeing the patient. Chart and history reviewed in detail at today's office visit. 1. Memory loss  -     [facility] [place], Neuropsychology, [place]  2. Mild dementia without behavioral disturbance, psychotic disturbance, mood disturbance, or anxiety, unspecified dementia type ([address])  Assessment & Plan:   Suspect mild cognitive impairment versus early dementia  Discussed this with the patient and his wife  We have elected to start on Aricept 5 mg daily for 30 days and then increase to 10 mg daily  Common and serious side effects were discussed    Amyloid PET scan has been ordered  Neuropsych testing also has been ordered for more complete and thorough evaluation    Resources were recommended and also noted on AVS  Orders:  -     [facility], [place], Neuropsychology, [address]; [place]  -     donepezil (ARICEPT) 5 MG tablet; Take 1 tablet by mouth nightly, Disp-30 tablet, R-0Normal  -     donepezil (ARICEPT) 10 MG tablet; Take 1 tablet by mouth nightly, Disp-90 tablet, R-1Normal      Patient and/or family was given time to ask questions and voice concerns. I believe all questions concerns were adequately addressed at this  office visit. Patient and/or family also verbalized agreement and understanding of the above-stated plan    Complex neurologic decision making secondary any or all of the following to include unclear etiology, and /or polypharmacy, and/or significant comorbid conditions, and/or use of high-risk medications which complicate the decision making process related to patient's neurologic diagnosis    No follow-ups on file.

## 2023-08-14 NOTE — PATIENT INSTRUCTIONS
As per discussion at this point we are most likely dealing with Alzheimer's dementia. Referral has been made to Dr. Henrik Ibarra for neuropsychiatric evaluation related to cognitive concerns. His  will call you to set up an appointment. However if you have not heard from the office in about a week you can contact their office at [phone]      We will also set you up for an amyloid PET scan to get further data regarding this and we also need this for possible future treatments    In the meantime I have started you on donepezil 5 mg 1 tablet/day you can take it at night or any other time of the day just be consistent with that timing I sent this to your local pharmacy as this is only a 30-day supply  Once you have taken the 5 mg for 30 days increase to 10 mg daily that prescription will be coming from [facility]  The purpose of this medication is not to make anything better but to help keep it from getting worse    I strongly recommend that you contact the local Alzheimer's Society they are wonderful regarding education and support and can help you navigate through this diagnosis over time  [place] chapter phone number: [phone]    I also recommend going to the [employer] but please remember this is a comprehensive website and is written for a worldwide audience so not everything on their will apply to you    I recommend the book for 36-hour day by [employer] and [address]. This is a very comprehensive easy-to-read reference related to Alzheimer's dementia and it is written for the caregiver. It is fully comprehensive everything in this book may not apply to your particular case         Office Policies    Phone calls/patient messages:  Please allow up to 24 hours for someone in the office to contact you about your call or message. Be mindful your provider may be out of the office or your message may require further review.  We encourage you to

## 2023-09-06 ENCOUNTER — HOSPITAL ENCOUNTER (OUTPATIENT)
Facility: HOSPITAL | Age: 78
Discharge: HOME | End: 2023-09-09
Payer: MEDICARE

## 2023-09-06 VITALS — WEIGHT: 155 LBS | BODY MASS INDEX: 25.79 KG/M2

## 2023-09-06 DIAGNOSIS — F03.A0 MILD DEMENTIA WITHOUT BEHAVIORAL DISTURBANCE, PSYCHOTIC DISTURBANCE, MOOD DISTURBANCE, OR ANXIETY, UNSPECIFIED DEMENTIA TYPE (HCC): ICD-10-CM

## 2023-09-06 PROCEDURE — 6360000002 HC RX W HCPCS: Performed by: PSYCHIATRY & NEUROLOGY

## 2023-09-06 PROCEDURE — A9586 FLORBETAPIR F18: HCPCS | Performed by: PSYCHIATRY & NEUROLOGY

## 2023-09-06 PROCEDURE — 78814 PET IMAGE W/CT LMTD: CPT

## 2023-09-06 RX ADMIN — FLORBETAPIR F 18 10 MILLICURIE: 51 INJECTION, SOLUTION INTRAVENOUS at 15:40

## 2023-10-05 RX ORDER — PEN NEEDLE, DIABETIC 32GX 5/32"
NEEDLE, DISPOSABLE MISCELLANEOUS
Qty: 180 EACH | Refills: 3 | Status: SHIPPED | OUTPATIENT
Start: 2023-10-05

## 2023-10-27 ENCOUNTER — ANESTHESIA (OUTPATIENT)
Facility: HOSPITAL | Age: 78
End: 2023-10-27
Payer: MEDICARE

## 2023-10-27 ENCOUNTER — ANESTHESIA EVENT (OUTPATIENT)
Facility: HOSPITAL | Age: 78
End: 2023-10-27
Payer: MEDICARE

## 2023-10-27 ENCOUNTER — HOSPITAL ENCOUNTER (OUTPATIENT)
Facility: HOSPITAL | Age: 78
Setting detail: OUTPATIENT SURGERY
Discharge: HOME | End: 2023-10-27
Attending: INTERNAL MEDICINE | Admitting: INTERNAL MEDICINE
Payer: MEDICARE

## 2023-10-27 VITALS
TEMPERATURE: 97.8 F | HEART RATE: 56 BPM | BODY MASS INDEX: 24.17 KG/M2 | RESPIRATION RATE: 19 BRPM | OXYGEN SATURATION: 98 % | DIASTOLIC BLOOD PRESSURE: 82 MMHG | SYSTOLIC BLOOD PRESSURE: 159 MMHG | HEIGHT: 67 IN | WEIGHT: 154 LBS

## 2023-10-27 LAB
GLUCOSE BLD STRIP.AUTO-MCNC: 104 MG/DL (ref 65–117)
SERVICE CMNT-IMP: NORMAL

## 2023-10-27 PROCEDURE — 6360000002 HC RX W HCPCS: Performed by: REGISTERED NURSE

## 2023-10-27 PROCEDURE — 2580000003 HC RX 258: Performed by: INTERNAL MEDICINE

## 2023-10-27 PROCEDURE — 2500000003 HC RX 250 WO HCPCS: Performed by: REGISTERED NURSE

## 2023-10-27 PROCEDURE — 88305 TISSUE EXAM BY PATHOLOGIST: CPT

## 2023-10-27 PROCEDURE — 3600007502: Performed by: INTERNAL MEDICINE

## 2023-10-27 PROCEDURE — 82962 GLUCOSE BLOOD TEST: CPT

## 2023-10-27 PROCEDURE — 3700000000 HC ANESTHESIA ATTENDED CARE: Performed by: INTERNAL MEDICINE

## 2023-10-27 PROCEDURE — 3700000001 HC ADD 15 MINUTES (ANESTHESIA): Performed by: INTERNAL MEDICINE

## 2023-10-27 PROCEDURE — 3600007512: Performed by: INTERNAL MEDICINE

## 2023-10-27 PROCEDURE — 2709999900 HC NON-CHARGEABLE SUPPLY: Performed by: INTERNAL MEDICINE

## 2023-10-27 PROCEDURE — 88342 IMHCHEM/IMCYTCHM 1ST ANTB: CPT

## 2023-10-27 PROCEDURE — 2720000010 HC SURG SUPPLY STERILE: Performed by: INTERNAL MEDICINE

## 2023-10-27 PROCEDURE — 7100000010 HC PHASE II RECOVERY - FIRST 15 MIN: Performed by: INTERNAL MEDICINE

## 2023-10-27 RX ORDER — EPHEDRINE SULFATE/0.9% NACL/PF 50 MG/5 ML
SYRINGE (ML) INTRAVENOUS PRN
Status: DISCONTINUED | OUTPATIENT
Start: 2023-10-27 | End: 2023-10-27 | Stop reason: SDUPTHER

## 2023-10-27 RX ORDER — SODIUM CHLORIDE 9 MG/ML
25 INJECTION, SOLUTION INTRAVENOUS PRN
Status: DISCONTINUED | OUTPATIENT
Start: 2023-10-27 | End: 2023-10-27 | Stop reason: HOSPADM

## 2023-10-27 RX ORDER — SODIUM CHLORIDE 0.9 % (FLUSH) 0.9 %
5-40 SYRINGE (ML) INJECTION PRN
Status: DISCONTINUED | OUTPATIENT
Start: 2023-10-27 | End: 2023-10-27 | Stop reason: HOSPADM

## 2023-10-27 RX ORDER — SODIUM CHLORIDE 0.9 % (FLUSH) 0.9 %
5-40 SYRINGE (ML) INJECTION EVERY 12 HOURS SCHEDULED
Status: DISCONTINUED | OUTPATIENT
Start: 2023-10-27 | End: 2023-10-27 | Stop reason: HOSPADM

## 2023-10-27 RX ORDER — PHENYLEPHRINE HCL IN 0.9% NACL 0.4MG/10ML
SYRINGE (ML) INTRAVENOUS PRN
Status: DISCONTINUED | OUTPATIENT
Start: 2023-10-27 | End: 2023-10-27 | Stop reason: SDUPTHER

## 2023-10-27 RX ADMIN — Medication 5 MG: at 07:52

## 2023-10-27 RX ADMIN — PROPOFOL 60 MG: 10 INJECTION, EMULSION INTRAVENOUS at 07:41

## 2023-10-27 RX ADMIN — LIDOCAINE HYDROCHLORIDE 80 MG: 20 INJECTION, SOLUTION INFILTRATION; PERINEURAL at 07:41

## 2023-10-27 RX ADMIN — Medication 40 MCG: at 07:49

## 2023-10-27 RX ADMIN — PROPOFOL 210 MG: 10 INJECTION, EMULSION INTRAVENOUS at 08:14

## 2023-10-27 RX ADMIN — SODIUM CHLORIDE 25 ML: 9 INJECTION, SOLUTION INTRAVENOUS at 07:26

## 2023-10-27 ASSESSMENT — PAIN - FUNCTIONAL ASSESSMENT: PAIN_FUNCTIONAL_ASSESSMENT: NONE - DENIES PAIN

## 2023-10-27 NOTE — PROGRESS NOTES
TRANSFER - IN REPORT:    Verbal report received from Levon Bazan on Bashir Arguello  being received from endo   for routine progression of patient care      Report consisted of patient's Situation, Background, Assessment and   Recommendations(SBAR). Information from the following report(s) Nurse Handoff Report was reviewed with the receiving nurse. Opportunity for questions and clarification was provided. Assessment completed upon patient's arrival to unit and care assumed.

## 2023-10-27 NOTE — DISCHARGE INSTRUCTIONS
Aryan Wilder MD  [facility], [address] [facility], [address] [address] Shey Merino  [phone]  [email or web address] Mckenzie Nina  [id number]  1945    EGD DISCHARGE INSTRUCTIONS    Discomfort:  Sore throat- throat lozenges or warm salt water gargle  redness at IV site- apply warm compress to area; if redness or soreness persist- contact your physician  Gaseous discomfort- walking, belching will help relieve any discomfort  You may not operate a vehicle for 12 hours  You may not engage in an occupation involving machinery or appliances for rest of today  You may not drink alcoholic beverages for at least 12 hours  Avoid making any critical decisions for at least 24 hour  DIET  You may have anything by mouth. You may eat and drink immediately. You may resume your regular diet - however -  remember your colon is empty and a heavy meal will produce gas. Avoid these foods:  vegetables, fried / greasy foods, carbonated drinks      ACTIVITY  You may resume your normal daily activities   Spend the remainder of the day resting -  avoid any strenuous activity. CALL M.D. ANY SIGN OF   Increasing pain, nausea, vomiting  Abdominal distension (swelling)  New increased bleeding (oral or rectal)  Fever (chills)  Pain in chest area  Bloody discharge from nose or mouth  Shortness of breath    Follow-up Instructions:   Call Dr. Aryan Wilder for any questions or problems. Telephone # [phone]  Dr. Andrew Saab office will notify you of the biopsy results available  Within 7 to 10 days. We will call you or send a letter   Continue same medications. Repeat endoscopy in two years. ENDOSCOPY FINDINGS:   Your endoscopy showed the Mann's esophagus which was biopsied; mild gastritis; normal duodenum.       DISCHARGE SUMMARY from Nurse    The following personal items collected during your admission are returned to you:   Dental Appliance:    Vision:    Hearing Aid:    Jewelry:    Clothing:    Other Valuables:    Valuables sent to safe: Dose (mL/hr) Propofol : *210 mg (given as needed)     COLON DISCHARGE INSTRUCTIONS  Discomfort:  Redness at IV site- apply warm compress to area; if redness or soreness persist- contact your physician  There may be a slight amount of blood passed from the rectum  Gaseous discomfort- walking, belching will help relieve any discomfort  You may not operate a vehicle for 12 hours  You may not engage in an occupation involving machinery or appliances for rest of today  You may not drink alcoholic beverages for at least 12 hours  Avoid making any critical decisions for at least 24 hour  DIET:   High fiber diet. - however -  remember your colon is empty and a heavy meal will produce gas. Avoid these foods:  vegetables, fried / greasy foods, carbonated drinks for today      ACTIVITY:  You may resume your normal daily activities it is recommended that you spend the remainder of the day resting -  avoid any strenuous activity. CALL M.D. ANY SIGN OF:   Increasing pain, nausea, vomiting  Abdominal distension (swelling)  New increased bleeding (oral or rectal)  Fever (chills)  Pain in chest area  Bloody discharge from nose or mouth  Shortness of breath     COLONOSCOPY FINDINGS:  Your colonoscopy showed: two small polyps which were removed. Follow-up Instructions:   Call Dr. Juvenal Logan if any questions or problems. Telephone # [phone]  Dr. Enrico Hernandez office will notify you of the biopsy results within 7 to 10 days. Should have a repeat colonoscopy in 5 years.    Patient Education on Sedation / Analgesia Administered for Procedure      For 24 hours after general anesthesia or intravenous analgesia / sedation:  Have someone responsible help you with your care  Limit your activities  Do not drive and operate hazardous machinery  Do not make important personal, legal or business decisions  Do not drink alcoholic beverages  If

## 2023-10-27 NOTE — PROGRESS NOTES
ARRIVAL INFORMATION:  Verified patient name and date of birth, scheduled procedure, and informed consent. : Dolores Gil (wife) contact number: [phone]  Physician and staff can share information with the . Belongings with patient include:  Clothing    GI FOCUSED ASSESSMENT:  Neuro: Awake, alert, oriented x4  Respiratory: even and unlabored   GI: soft and non-distended  EKG Rhythm: normal sinus rhythm    Education:Reviewed general discharge instructions and  information. The risks and benefits of the bite block have been explained to patient. Patient verbalizes understanding.

## 2023-10-27 NOTE — OP NOTE
[address]                  Colonoscopy Operative Report    10/27/2023      Pablito Burr  [id number]  1945    Procedure Type:   Colonoscopy with polypectomy (cold snare)     Indications:    Personal history of colon polyps (screening only)     Pre-operative Diagnosis: see indication above    Post-operative Diagnosis:  See findings below    :  Sandra Osullivan MD    Referring Provider: Kulwant Damian MD      Sedation:  MAC anesthesia Propofol    Pre-Procedural Exam:      Airway: clear,  No airway problems anticipated  Heart: RRR, without gallops or rubs  Lungs: clear bilaterally without wheezes, crackles, or rhonchi  Abdomen: soft, nontender, nondistended, bowel sounds present  Mental Status: awake, alert and oriented to person, place and time     Procedure Details:  After informed consent was obtained with all risks and benefits of procedure explained and preoperative exam completed, the patient was taken to the endoscopy suite and placed in the left lateral decubitus position. Upon sequential sedation as per above, a digital rectal exam was performed . The Olympus videocolonoscope  was inserted in the rectum and carefully advanced to the cecum, which was identified by the ileocecal valve and appendiceal orifice. The cecum was identified by the ileocecal valve and appendiceal orifice. The quality of preparation was good. The colonoscope was slowly withdrawn with careful evaluation between folds. Retroflexion in the rectum was completed demonstrating internal hemorrhoids. Findings:   Rectum: Grade 1 internal hemorrhoid(s); Sigmoid: 5 mm sessile polyp, cold snared  Descending Colon: normal  Transverse Colon: normal  Ascending Colon: normal  Cecum: 7 mm sessile polyp base of cecum, cold snared  Terminal Ileum: not intubated      Specimen Removed:   1 cecal polyp  2 sigmoid polyp    Complications: None. EBL:  None.     Impression:    hemorrhoids internal, Moderate in size  Two small polyps removed    Recommendations: --Await pathology. , -Repeat colonoscopy in 5 years. High fiber diet. Resume normal medication(s). Discharge Disposition:  Home in the company of a  when able to ambulate. Adamaris Askew MD    10/27/2023     EMANI Mederos MD  [facility], [address] [place] [address]  [phone]  [email or web address]

## 2023-10-27 NOTE — PROGRESS NOTES
Endoscopy Case End Note:    9840:  Procedure scope (EGD) was pre-cleaned, per protocol, at bedside by [facility], ET.      0815:  Procedure scope (colon) was pre-cleaned, per protocol, at bedside by [facility], [facility] 270:  Report received from anesthesia - [address] [place] [address]. See anesthesia flowsheet for intra-procedure vital signs and events.

## 2023-10-27 NOTE — OP NOTE
[address] [facility]                   Endoscopic Gastroduodenoscopy Procedure Note      10/27/2023  Galileo Mccray  1945  [id number]    Procedure: Endoscopic Gastroduodenoscopy with biopsy    Indication:  Mann's esophagus      Pre-operative Diagnosis: see indication above    Post-operative Diagnosis: see findings below    : EMANI Joiner MD    Referring Provider:  Delon Calhoun MD      Anesthesia/Sedation:  MAC anesthesia Propofol    Airway assessment: No airway problems anticipated    Pre-Procedural Exam:      Airway: clear, no airway problems anticipated  Heart: RRR, without gallops or rubs  Lungs: clear bilaterally without wheezes, crackles, or rhonchi  Abdomen: soft, nontender, nondistended, bowel sounds present  Mental Status: awake, alert and oriented to person, place and time       Procedure Details     After infomed consent was obtained for the procedure, with all risks and benefits of procedure explained the patient was taken to the endoscopy suite and placed in the left lateral decubitus position. Following sequential administration of sedation as per above, the endoscope was inserted into the mouth and advanced under direct vision to second portion of the duodenum. A careful inspection was made as the gastroscope was withdrawn, including a retroflexed view of the proximal stomach; findings and interventions are described below. Findings:    Esophagus: Extensive circumferential Mann's mucosa from 28 to 38 cm. Biopsied in three segments---36-38 cm, 33-35 cm, 28-32 cm Also a 6 mm nodule at 36 cm was biopsied   Stomach: Loose Nissen best seen on retorflexion. No avm seen. Mild antritis---biopsied   Duodenum: normal--biopsied     Therapies:  biopsy of esophagus  biopsy of stomach antrum  biopsy of duodenal second portion    Specimens:  1.  Duodenal biopsies  2 gastric antral biopsies  3 biopsies esophagus from 36 to 38 cm   4 Biopsies of

## 2023-10-27 NOTE — ANESTHESIA PRE PROCEDURE
drinks of alcohol     Types: 2 Cans of beer per week                                Counseling given: Not Answered      Vital Signs (Current): There were no vitals filed for this visit. BP Readings from Last 3 Encounters:   08/14/23 128/70   07/18/23 116/82   06/12/23 134/79       NPO Status:                                                                                 BMI:   Wt Readings from Last 3 Encounters:   09/06/23 70.3 kg (155 lb)   08/14/23 69.4 kg (153 lb)   07/18/23 69.2 kg (152 lb 9.6 oz)     There is no height or weight on file to calculate BMI.    CBC:   Lab Results   Component Value Date/Time    WBC 5.3 05/18/2023 11:25 AM    RBC 5.48 05/18/2023 11:25 AM    HGB 14.8 05/18/2023 11:25 AM    HCT 48.5 05/18/2023 11:25 AM    MCV 88.5 05/18/2023 11:25 AM    RDW 15.7 05/18/2023 11:25 AM     05/18/2023 11:25 AM       CMP:   Lab Results   Component Value Date/Time     02/23/2023 07:40 PM    K 4.1 02/23/2023 07:40 PM     02/23/2023 07:40 PM    CO2 23 02/23/2023 07:40 PM    BUN 17 02/23/2023 07:40 PM    CREATININE 1.57 02/23/2023 07:40 PM    GFRAA >60 06/01/2022 11:16 AM    AGRATIO 1.2 02/23/2023 07:40 PM    GLUCOSE 204 02/23/2023 07:40 PM    PROT 7.0 02/23/2023 07:40 PM    CALCIUM 8.8 02/23/2023 07:40 PM    BILITOT 0.3 02/23/2023 07:40 PM    ALKPHOS 87 02/23/2023 07:40 PM    AST 36 02/23/2023 07:40 PM    ALT 44 02/23/2023 07:40 PM       POC Tests: No results for input(s): \"POCGLU\", \"POCNA\", \"POCK\", \"POCCL\", \"POCBUN\", \"POCHEMO\", \"POCHCT\" in the last 72 hours.     Coags: No results found for: \"PROTIME\", \"INR\", \"APTT\"    HCG (If Applicable): No results found for: \"PREGTESTUR\", \"PREGSERUM\", \"HCG\", \"HCGQUANT\"     ABGs: No results found for: \"PHART\", \"PO2ART\", \"LLY3JAB\", \"SIL8SXU\", \"BEART\", \"Q5AIJKYG\"     Type & Screen (If Applicable):  No results found for: \"LABABO\", \"LABRH\"    Drug/Infectious Status (If Applicable):  Lab Results   Component Value

## 2023-11-13 ENCOUNTER — TELEMEDICINE (OUTPATIENT)
Age: 78
End: 2023-11-13
Payer: MEDICARE

## 2023-11-13 DIAGNOSIS — E78.5 HYPERLIPIDEMIA, UNSPECIFIED HYPERLIPIDEMIA TYPE: Primary | ICD-10-CM

## 2023-11-13 DIAGNOSIS — I10 HYPERTENSION, UNSPECIFIED TYPE: ICD-10-CM

## 2023-11-13 PROCEDURE — 99213 OFFICE O/P EST LOW 20 MIN: CPT | Performed by: INTERNAL MEDICINE

## 2023-11-13 PROCEDURE — 1123F ACP DISCUSS/DSCN MKR DOCD: CPT | Performed by: INTERNAL MEDICINE

## 2023-11-13 PROCEDURE — G8427 DOCREV CUR MEDS BY ELIG CLIN: HCPCS | Performed by: INTERNAL MEDICINE

## 2023-11-13 NOTE — PROGRESS NOTES
1. \"Have you been to the ER, urgent care clinic since your last visit? Hospitalized since your last visit? \" No    2. \"Have you seen or consulted any other health care providers outside of the [address] [place] [address] Avenue since your last visit? \"       [facility] // injection and xray done on right wrist     3. For patients aged 43-73: Has the patient had a colonoscopy / FIT/ Cologuard?  [address] [place] [address]

## 2023-11-13 NOTE — PROGRESS NOTES
HISTORY OF PRESENT ILLNESS   Anthony Banegas   is a 69 y.o.  male. Anthony Banegas, was evaluated through a synchronous (real-time) audio-video encounter. The patient (or guardian if applicable) is aware that this is a billable service, which includes applicable co-pays. This Virtual Visit was conducted with patient's (and/or legal guardian's) consent. Patient identification was verified, and a caregiver was present when appropriate. The patient was located at Home: [address] [place] [address]  [address] [address]  Provider was located at [facility] (Appt Dept): Mary  [address] [facility],  [address] [place] [address]         Total time spent for this encounter: Not billed by time    --Benigno Mohs, MD on 11/13/2023 at 4:11 PM    An electronic signature was used to authenticate this note. hx HTN DM02  with neuropathy HLD anemia d/t AVM ckd 3 due to HTN , Barretts esophagus memory loss --here with wife.     Seeing Dr Isai Patel for rigth wrist pain  Had xray of wrist with calcification of radial artery-suggested to contact PCP  No claudication  No chest pain  Has mild stable CAMPBELL  Saw Neuro at [facility] for 2nd opunion re memory loss -was told not Alz dementia so aricept was stopped  Patient Active Problem List    Diagnosis Date Noted    Mild dementia without behavioral disturbance, psychotic disturbance, mood disturbance, or anxiety ([address]) 08/14/2023    Memory loss 08/14/2023    Chronic renal disease, stage III ([address]) 06/01/2022    Controlled type 2 diabetes mellitus with diabetic neuropathy, with long-term current use of insulin ([address]) 06/01/2022    Type 2 diabetes mellitus with chronic kidney disease ([address]) 12/17/2021    GI bleed 07/27/2017    Primary open angle glaucoma 07/26/2016    CKD (chronic kidney disease) stage 3, GFR 30-59 ml/min ([address]) 08/10/2010    Essential hypertension, benign 10/26/2009    Gout 10/26/2009    Polycythemia 10/26/2009    BPH (benign prostatic hyperplasia) 10/26/2009    Mann's esophagus 10/26/2009

## 2023-11-18 ENCOUNTER — OFFICE VISIT (OUTPATIENT)
Age: 78
End: 2023-11-18

## 2023-11-18 VITALS
HEART RATE: 56 BPM | WEIGHT: 156 LBS | TEMPERATURE: 98.4 F | SYSTOLIC BLOOD PRESSURE: 153 MMHG | OXYGEN SATURATION: 94 % | BODY MASS INDEX: 24.48 KG/M2 | HEIGHT: 67 IN | DIASTOLIC BLOOD PRESSURE: 75 MMHG | RESPIRATION RATE: 18 BRPM

## 2023-11-18 DIAGNOSIS — B33.8 RSV (RESPIRATORY SYNCYTIAL VIRUS INFECTION): ICD-10-CM

## 2023-11-18 DIAGNOSIS — J02.9 SORE THROAT: Primary | ICD-10-CM

## 2023-11-18 LAB
RSV, POC: POSITIVE
VALID INTERNAL CONTROL: YES

## 2023-11-18 NOTE — PROGRESS NOTES
Subjective:       History was provided by the patient. Pablito Burr is a 69 y.o. male who presents for evaluation of symptoms of a URI. Symptoms include post nasal drip and sore throat. Onset of symptoms was 1 day ago, gradually worsening since that time. Associated symptoms include post nasal drip and sore throat. He is drinking plenty of fluids. Evaluation to date: none. Treatment to date: none  Patient's medications, allergies, past medical, surgical, social and family histories were reviewed and updated as appropriate. Review of Systems  Pertinent items are noted in HPI. Objective:      General appearance: alert, appears stated age, and cooperative  Ears: normal TM's and external ear canals both ears  Nose: no discharge, turbinates normal, no sinus tenderness  Throat: normal findings: pink and moist  Lungs: clear to auscultation bilaterally  Heart: S1, S2 normal  Lymph nodes: Cervical adenopathy: nodes normal         Assessment:      viral upper respiratory illness   Results for orders placed or performed in visit on 11/18/23   AMB POC RSV   Result Value Ref Range    VALID INTERNAL CONTROL yes     RSV, POC Positive Negative         Plan:      Discussed dx and tx of URIs  Suggested symptomatic OTC remedies. Nasal saline sprays for congestion. RTC prn. Handout given with care instructions. OTC for symptom management. Increase fluid intake. Follow-up with PCP as needed. Go to ED with development of any acute symptoms. Follow-up     Return if symptoms worsen or fail to improve. Follow-up immediately for any new worsening or changes or if symptoms are not improving over the next 5-7 days.

## 2023-11-18 NOTE — PATIENT INSTRUCTIONS
If symptoms worsens or fail to improve follow-up with PCP or ED. Take OTC Tylenol as needed for fever. Use Chloraseptic spray and Cepacol lozenges for sore throat. Gargle with warm salt water as needed for sore throat  Use OTC Delsym for cough as needed.

## 2023-11-27 ENCOUNTER — ANESTHESIA (OUTPATIENT)
Facility: HOSPITAL | Age: 78
End: 2023-11-27
Payer: MEDICARE

## 2023-11-27 ENCOUNTER — HOSPITAL ENCOUNTER (OUTPATIENT)
Facility: HOSPITAL | Age: 78
Setting detail: OUTPATIENT SURGERY
Discharge: HOME | End: 2023-11-27
Attending: ORTHOPAEDIC SURGERY | Admitting: ORTHOPAEDIC SURGERY
Payer: MEDICARE

## 2023-11-27 ENCOUNTER — ANESTHESIA EVENT (OUTPATIENT)
Facility: HOSPITAL | Age: 78
End: 2023-11-27
Payer: MEDICARE

## 2023-11-27 VITALS
HEIGHT: 67 IN | WEIGHT: 152 LBS | TEMPERATURE: 97.4 F | RESPIRATION RATE: 11 BRPM | DIASTOLIC BLOOD PRESSURE: 89 MMHG | HEART RATE: 64 BPM | OXYGEN SATURATION: 97 % | BODY MASS INDEX: 23.86 KG/M2 | SYSTOLIC BLOOD PRESSURE: 149 MMHG

## 2023-11-27 DIAGNOSIS — G89.18 POST-OP PAIN: Primary | ICD-10-CM

## 2023-11-27 LAB
GLUCOSE BLD STRIP.AUTO-MCNC: 140 MG/DL (ref 65–117)
SERVICE CMNT-IMP: ABNORMAL

## 2023-11-27 PROCEDURE — 7100000000 HC PACU RECOVERY - FIRST 15 MIN: Performed by: ORTHOPAEDIC SURGERY

## 2023-11-27 PROCEDURE — 6360000002 HC RX W HCPCS: Performed by: ANESTHESIOLOGY

## 2023-11-27 PROCEDURE — 7100000010 HC PHASE II RECOVERY - FIRST 15 MIN: Performed by: ORTHOPAEDIC SURGERY

## 2023-11-27 PROCEDURE — 2580000003 HC RX 258: Performed by: ANESTHESIOLOGY

## 2023-11-27 PROCEDURE — 7100000011 HC PHASE II RECOVERY - ADDTL 15 MIN: Performed by: ORTHOPAEDIC SURGERY

## 2023-11-27 PROCEDURE — 64415 NJX AA&/STRD BRCH PLXS IMG: CPT | Performed by: ANESTHESIOLOGY

## 2023-11-27 PROCEDURE — 3700000001 HC ADD 15 MINUTES (ANESTHESIA): Performed by: ORTHOPAEDIC SURGERY

## 2023-11-27 PROCEDURE — 2709999900 HC NON-CHARGEABLE SUPPLY: Performed by: ORTHOPAEDIC SURGERY

## 2023-11-27 PROCEDURE — 3700000000 HC ANESTHESIA ATTENDED CARE: Performed by: ORTHOPAEDIC SURGERY

## 2023-11-27 PROCEDURE — 2500000003 HC RX 250 WO HCPCS

## 2023-11-27 PROCEDURE — 3600000004 HC SURGERY LEVEL 4 BASE: Performed by: ORTHOPAEDIC SURGERY

## 2023-11-27 PROCEDURE — 2580000003 HC RX 258: Performed by: ORTHOPAEDIC SURGERY

## 2023-11-27 PROCEDURE — 6360000002 HC RX W HCPCS: Performed by: ORTHOPAEDIC SURGERY

## 2023-11-27 PROCEDURE — 3600000014 HC SURGERY LEVEL 4 ADDTL 15MIN: Performed by: ORTHOPAEDIC SURGERY

## 2023-11-27 PROCEDURE — 6360000002 HC RX W HCPCS

## 2023-11-27 PROCEDURE — 7100000001 HC PACU RECOVERY - ADDTL 15 MIN: Performed by: ORTHOPAEDIC SURGERY

## 2023-11-27 PROCEDURE — 82962 GLUCOSE BLOOD TEST: CPT

## 2023-11-27 RX ORDER — LIDOCAINE HYDROCHLORIDE 20 MG/ML
INJECTION, SOLUTION EPIDURAL; INFILTRATION; INTRACAUDAL; PERINEURAL PRN
Status: DISCONTINUED | OUTPATIENT
Start: 2023-11-27 | End: 2023-11-27 | Stop reason: SDUPTHER

## 2023-11-27 RX ORDER — MEPERIDINE HYDROCHLORIDE 25 MG/ML
12.5 INJECTION INTRAMUSCULAR; INTRAVENOUS; SUBCUTANEOUS EVERY 5 MIN PRN
Status: DISCONTINUED | OUTPATIENT
Start: 2023-11-27 | End: 2023-11-27 | Stop reason: HOSPADM

## 2023-11-27 RX ORDER — PROCHLORPERAZINE EDISYLATE 5 MG/ML
5 INJECTION INTRAMUSCULAR; INTRAVENOUS
Status: DISCONTINUED | OUTPATIENT
Start: 2023-11-27 | End: 2023-11-27 | Stop reason: HOSPADM

## 2023-11-27 RX ORDER — PROPOFOL 10 MG/ML
INJECTION, EMULSION INTRAVENOUS CONTINUOUS PRN
Status: DISCONTINUED | OUTPATIENT
Start: 2023-11-27 | End: 2023-11-27 | Stop reason: SDUPTHER

## 2023-11-27 RX ORDER — SODIUM CHLORIDE 9 MG/ML
INJECTION, SOLUTION INTRAVENOUS PRN
Status: DISCONTINUED | OUTPATIENT
Start: 2023-11-27 | End: 2023-11-27 | Stop reason: HOSPADM

## 2023-11-27 RX ORDER — PRAVASTATIN SODIUM 20 MG
TABLET ORAL
Qty: 90 TABLET | Refills: 3 | Status: SHIPPED | OUTPATIENT
Start: 2023-11-27

## 2023-11-27 RX ORDER — SODIUM CHLORIDE 0.9 % (FLUSH) 0.9 %
5-40 SYRINGE (ML) INJECTION EVERY 12 HOURS SCHEDULED
Status: DISCONTINUED | OUTPATIENT
Start: 2023-11-27 | End: 2023-11-27 | Stop reason: HOSPADM

## 2023-11-27 RX ORDER — OXYCODONE HYDROCHLORIDE 5 MG/1
5 TABLET ORAL EVERY 4 HOURS PRN
Qty: 40 TABLET | Refills: 0 | Status: SHIPPED | OUTPATIENT
Start: 2023-11-27 | End: 2023-12-04

## 2023-11-27 RX ORDER — CEFAZOLIN SODIUM 1 G/3ML
INJECTION, POWDER, FOR SOLUTION INTRAMUSCULAR; INTRAVENOUS
Status: DISCONTINUED
Start: 2023-11-27 | End: 2023-11-27 | Stop reason: HOSPADM

## 2023-11-27 RX ORDER — SODIUM CHLORIDE 0.9 % (FLUSH) 0.9 %
5-40 SYRINGE (ML) INJECTION PRN
Status: DISCONTINUED | OUTPATIENT
Start: 2023-11-27 | End: 2023-11-27 | Stop reason: HOSPADM

## 2023-11-27 RX ORDER — EPHEDRINE SULFATE/0.9% NACL/PF 50 MG/5 ML
SYRINGE (ML) INTRAVENOUS PRN
Status: DISCONTINUED | OUTPATIENT
Start: 2023-11-27 | End: 2023-11-27 | Stop reason: SDUPTHER

## 2023-11-27 RX ORDER — HYDROMORPHONE HYDROCHLORIDE 1 MG/ML
0.5 INJECTION, SOLUTION INTRAMUSCULAR; INTRAVENOUS; SUBCUTANEOUS EVERY 5 MIN PRN
Status: DISCONTINUED | OUTPATIENT
Start: 2023-11-27 | End: 2023-11-27 | Stop reason: HOSPADM

## 2023-11-27 RX ORDER — FENTANYL CITRATE 50 UG/ML
50 INJECTION, SOLUTION INTRAMUSCULAR; INTRAVENOUS EVERY 5 MIN PRN
Status: DISCONTINUED | OUTPATIENT
Start: 2023-11-27 | End: 2023-11-27 | Stop reason: HOSPADM

## 2023-11-27 RX ORDER — PHENYLEPHRINE HCL IN 0.9% NACL 0.4MG/10ML
SYRINGE (ML) INTRAVENOUS PRN
Status: DISCONTINUED | OUTPATIENT
Start: 2023-11-27 | End: 2023-11-27 | Stop reason: SDUPTHER

## 2023-11-27 RX ORDER — ROPIVACAINE HYDROCHLORIDE 5 MG/ML
INJECTION, SOLUTION EPIDURAL; INFILTRATION; PERINEURAL
Status: COMPLETED | OUTPATIENT
Start: 2023-11-27 | End: 2023-11-27

## 2023-11-27 RX ORDER — ROPIVACAINE HYDROCHLORIDE 5 MG/ML
INJECTION, SOLUTION EPIDURAL; INFILTRATION; PERINEURAL
Status: COMPLETED
Start: 2023-11-27 | End: 2023-11-27

## 2023-11-27 RX ORDER — MIDAZOLAM HYDROCHLORIDE 1 MG/ML
INJECTION INTRAMUSCULAR; INTRAVENOUS
Status: COMPLETED
Start: 2023-11-27 | End: 2023-11-27

## 2023-11-27 RX ORDER — DOCUSATE SODIUM 100 MG/1
100 CAPSULE, LIQUID FILLED ORAL 2 TIMES DAILY
Qty: 60 CAPSULE | Refills: 0 | Status: SHIPPED | OUTPATIENT
Start: 2023-11-27 | End: 2023-12-27

## 2023-11-27 RX ORDER — ONDANSETRON 4 MG/1
4 TABLET, ORALLY DISINTEGRATING ORAL 3 TIMES DAILY PRN
Qty: 21 TABLET | Refills: 0 | Status: SHIPPED | OUTPATIENT
Start: 2023-11-27

## 2023-11-27 RX ORDER — LIDOCAINE HYDROCHLORIDE 10 MG/ML
1 INJECTION, SOLUTION EPIDURAL; INFILTRATION; INTRACAUDAL; PERINEURAL
Status: DISCONTINUED | OUTPATIENT
Start: 2023-11-27 | End: 2023-11-27 | Stop reason: HOSPADM

## 2023-11-27 RX ORDER — ONDANSETRON 2 MG/ML
4 INJECTION INTRAMUSCULAR; INTRAVENOUS
Status: DISCONTINUED | OUTPATIENT
Start: 2023-11-27 | End: 2023-11-27 | Stop reason: HOSPADM

## 2023-11-27 RX ORDER — WATER 10 ML/10ML
INJECTION INTRAMUSCULAR; INTRAVENOUS; SUBCUTANEOUS
Status: DISCONTINUED
Start: 2023-11-27 | End: 2023-11-27 | Stop reason: HOSPADM

## 2023-11-27 RX ORDER — SODIUM CHLORIDE, SODIUM LACTATE, POTASSIUM CHLORIDE, CALCIUM CHLORIDE 600; 310; 30; 20 MG/100ML; MG/100ML; MG/100ML; MG/100ML
INJECTION, SOLUTION INTRAVENOUS CONTINUOUS
Status: DISCONTINUED | OUTPATIENT
Start: 2023-11-27 | End: 2023-11-27 | Stop reason: HOSPADM

## 2023-11-27 RX ORDER — MIDAZOLAM HYDROCHLORIDE 1 MG/ML
INJECTION INTRAMUSCULAR; INTRAVENOUS
Status: COMPLETED | OUTPATIENT
Start: 2023-11-27 | End: 2023-11-27

## 2023-11-27 RX ADMIN — PROPOFOL 100 MCG/KG/MIN: 10 INJECTION, EMULSION INTRAVENOUS at 11:31

## 2023-11-27 RX ADMIN — Medication 10 MG: at 11:48

## 2023-11-27 RX ADMIN — Medication 10 MG: at 12:03

## 2023-11-27 RX ADMIN — LIDOCAINE HYDROCHLORIDE 40 MG: 20 INJECTION, SOLUTION EPIDURAL; INFILTRATION; INTRACAUDAL; PERINEURAL at 11:39

## 2023-11-27 RX ADMIN — SODIUM CHLORIDE, POTASSIUM CHLORIDE, SODIUM LACTATE AND CALCIUM CHLORIDE: 600; 310; 30; 20 INJECTION, SOLUTION INTRAVENOUS at 09:30

## 2023-11-27 RX ADMIN — Medication 10 MG: at 12:15

## 2023-11-27 RX ADMIN — ROPIVACAINE HYDROCHLORIDE 30 ML: 5 INJECTION, SOLUTION EPIDURAL; INFILTRATION; PERINEURAL at 10:41

## 2023-11-27 RX ADMIN — WATER 2000 MG: 1 INJECTION INTRAMUSCULAR; INTRAVENOUS; SUBCUTANEOUS at 11:31

## 2023-11-27 RX ADMIN — Medication 40 MCG: at 12:27

## 2023-11-27 RX ADMIN — MIDAZOLAM HYDROCHLORIDE 2 MG: 1 INJECTION, SOLUTION INTRAMUSCULAR; INTRAVENOUS at 10:36

## 2023-11-27 RX ADMIN — Medication 40 MCG: at 11:57

## 2023-11-27 ASSESSMENT — PAIN DESCRIPTION - DESCRIPTORS: DESCRIPTORS: ACHING

## 2023-11-27 ASSESSMENT — PAIN SCALES - GENERAL
PAINLEVEL_OUTOF10: 0
PAINLEVEL_OUTOF10: 0

## 2023-11-27 ASSESSMENT — PAIN - FUNCTIONAL ASSESSMENT: PAIN_FUNCTIONAL_ASSESSMENT: 0-10

## 2023-11-27 NOTE — DISCHARGE INSTRUCTIONS
to providing all our customers with the highest quality hand surgery and subsequent hand therapy care as possible. We want your recovery to be comfortable. If you have clinical non-emergent questions about your surgery or other hand conditions please call [phone] and ask for my team. Your call will be returned within 24 hours. TAKE NARCOTIC PAIN MEDICATIONS WITH FOOD! For the night of surgery, while block is still in effect, start with 1 pain pill at bedtime    Narcotics tend to be constipating and we recommend taking a stool softener such as Colace or Miralax (follow package instructions). If you were given prescriptions, please review the written information on the prescribed medications. DO NOT DRIVE WHILE TAKING NARCOTIC PAIN MEDICATIONS. CPAP PATIENTS BE SURE TO WEAR MACHINE WHENEVER NAPPING OR SLEEPING DAY/NIGHT OF SURGERY! PATIENT INSTRUCTIONS:    After General Anesthesia or Intravenous Sedation, for 24 hours or while taking prescription Narcotics:  Someone should be with you for the next 24 hours. For your own safety, a responsible adult must drive you home. Limit your activities  Recommended activity: Rest today, up with assistance today. Do not climb stairs or shower unattended for the next 24 hours. Start with a soft bland diet and advance as tolerated (no nausea) to regular diet. If you have a sore throat some things that may help are: fluids, warm salt water gargle, or throat lozenges. If this does not improve after several days please follow up with your family physician. Do not drive and operate hazardous machinery  Do not make important personal or business decisions  Do  not drink alcoholic beverages  If you have not urinated within 8 hours after discharge, please contact your surgeon on call.       Report the following to your surgeon:  Excessive pain, swelling, redness or odor of or around the surgical area  Temperature over 100.5  Nausea and vomiting lasting longer

## 2023-11-27 NOTE — PROGRESS NOTES
Permission received to review discharge instructions and discuss private health information with wife, Luisa Sibley. Patient states family/friend will be with them for 24 hours following procedure. Mistral-Air warming blanket applied at this time. Set to appropriate setting that is comfortable to patient. Will continue to monitor.

## 2023-11-27 NOTE — PERIOP NOTE
Dr. Triny Aguiar performed a RU   Extremity   nerve block with the U/S machine. PT  was on cardiac monitoring, pulse oximetry and 3L NC O2.  Pt. Was given  2 mg of versed l IV for sedation. VSS. Pt.  Slightly drowsy easy to arouse  post block.     T.O. 3653

## 2023-11-27 NOTE — PERIOP NOTE
[facility]  1945  [id number]    Situation:  Verbal report given from: [address] [place] [address] Lakisha Masterson  Procedure: Procedure(s):  RIGHT BASAL JOINT ARTHROPLASTY (MAC/REG)    Background:    Preoperative diagnosis: Primary osteoarthritis of first carpometacarpal joint of right hand [M18.11]  Arthritis of carpometacarpal (CMC) joint of right thumb [M18.11]    Postoperative diagnosis: * No post-op diagnosis entered *    :  Dr. Mitchell Hanley    Assistant(s): Circulator: Jim Ralph RN  Scrub Person First: Seth Hewitt  Physician Assistant: Mortimer Gails, PA-C    Specimens: [unfilled]    Assessment:  Intra-procedure medications         Anesthesia gave intra-procedure sedation and medications, see anesthesia flow sheet     Intravenous fluids: LR@ KVO     Vital signs stable       Recommendation:    Permission to share finding with wife Karen Ireland

## 2023-11-27 NOTE — OP NOTE
thenar fascia was incised and thenar muscle was reflected from the carpometacarpal joint capsule and base of the thumb metacarpal ulnarly. Then a  longitudinal arthrotomy was made, reflecting distally-based capsular flaps and allowing for good exposure of the severely arthritic carpometacarpal joint. The radial artery was protected in the proximal snuffbox. The trapezium was then carefully dissected free of its adhesions. Osteophytes were removed off the trapezium. Care was taken to avoid any damage to the FCR in its tunnel and the APL tendon. Using a McGlamery retractor and a 0.062 k wire the trapezium was removed as a whole. The arthroplasty bed was then carefully inspected and all remaining osteophytes were removed. Fluoroscopic imaging demonstrated removal of all bony tissue. Following this the scaphoid- trapezoid joint was evaluated. Cartilage remained noted in this joint. Attention was then turned towards the suspensionplasty. The APL tendon was isolated and attached to the FCR using a #2 fiberwire in a suspensionplasty fashion. This secured the APL to the FCR nicely. This held the metacarpal up quite well and reduced it well. Of note when these sutures were being tied the thumb was held in distraction and pressure was placed at the base of the thumb metacarpal into the index metacarpal base. The thumb MP joint was evaluated and no hyperextension was noted. Thrombin-soaked Gelfoam was then placed in the arthroplasty cavity. The capsule overlying the cavity was then repaired with 3-0  Ethibond. The tourniquet then was deflated and hemostasis was attained. Copious irrigation was performed. Skin was then closed with a 3-0  Monocryl 1st in an interrupted deep dermal layer and then in a continuous subcuticular layer. A well-padded thumb spica dressing was applied as well as a thumb spica splint.  The patient tolerated the procedure uneventfully and was discharged to the recovery area in good
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
## 2023-11-27 NOTE — ANESTHESIA PROCEDURE NOTES
Peripheral Block    Patient location during procedure: pre-op  Reason for block: post-op pain management, primary anesthetic and at surgeon's request  Start time: 11/27/2023 10:35 AM  End time: 11/27/2023 10:43 AM  Staffing  Performed: anesthesiologist   Anesthesiologist: Gustavo Bo MD  Performed by: Gustavo Bo MD  Authorized by: Gustavo Bo MD    Preanesthetic Checklist  Completed: patient identified, IV checked, site marked, risks and benefits discussed, surgical/procedural consents, equipment checked, pre-op evaluation, timeout performed, anesthesia consent given, oxygen available, monitors applied/VS acknowledged, fire risk safety assessment completed and verbalized and blood product R/B/A discussed and consented  Peripheral Block   Patient position: sitting  Prep: ChloraPrep  Provider prep: mask and sterile gloves  Patient monitoring: cardiac monitor, continuous pulse ox, frequent blood pressure checks, IV access, oxygen and responsive to questions  Block type: Brachial plexus  Supraclavicular  Laterality: right  Injection technique: single-shot  Guidance: ultrasound guided    Needle   Needle type: insulated echogenic nerve stimulator needle   Needle gauge: 22 G  Needle localization: ultrasound guidance  Needle length: 5 cm  Assessment   Injection assessment: negative aspiration for heme, no paresthesia on injection, local visualized surrounding nerve on ultrasound and no intravascular symptoms  Paresthesia pain: none  Slow fractionated injection: yes  Hemodynamics: stable  Real-time US image taken/store: yes  Outcomes: uncomplicated and patient tolerated procedure well    Medications Administered  midazolam (VERSED) injection 2 mg/2mL - IntraVENous   2 mg - 11/27/2023 10:36:00 AM  ropivacaine (NAROPIN) injection 0.5% - Perineural, Hand Right   30 mL - 11/27/2023 10:41:00 AM

## 2023-11-29 ENCOUNTER — TELEPHONE (OUTPATIENT)
Age: 78
End: 2023-11-29

## 2023-11-29 DIAGNOSIS — R05.1 ACUTE COUGH: Primary | ICD-10-CM

## 2023-11-29 RX ORDER — DOXYCYCLINE HYCLATE 100 MG
100 TABLET ORAL 2 TIMES DAILY
Qty: 14 TABLET | Refills: 0 | Status: SHIPPED | OUTPATIENT
Start: 2023-11-29 | End: 2023-12-06

## 2023-11-29 NOTE — TELEPHONE ENCOUNTER
Spoke with patient. Wife, Franklin Izaguirre. States they both had RSV a couple weeks ago. Patient having a rattling dry cough. Sleeping a lot. Not producing any phlegm. Not moving around as much due to hand surgery and cast on it. Wife concerned about cough and progressing into something worse. Please advise.

## 2023-11-29 NOTE — TELEPHONE ENCOUNTER
Wife, Soraya Mullins states that pt just had surgery on his hand. Pt has RSV and is taking a pain medication, oxycodone and Musinex 12 hour. She would like to talk with Dr. Radha Hammer pertaining to pt's cough. Can move a lot and she doesn't want this to go into pneumonia. Coughs a lot during the night. Does Dr. Radha Hammer want a VV?     Please call Britany to discuss all

## 2023-12-11 RX ORDER — BLOOD SUGAR DIAGNOSTIC
STRIP MISCELLANEOUS
Qty: 300 STRIP | Refills: 3 | Status: SHIPPED | OUTPATIENT
Start: 2023-12-11

## 2023-12-13 ENCOUNTER — OFFICE VISIT (OUTPATIENT)
Age: 78
End: 2023-12-13
Payer: MEDICARE

## 2023-12-13 VITALS
HEART RATE: 75 BPM | DIASTOLIC BLOOD PRESSURE: 72 MMHG | SYSTOLIC BLOOD PRESSURE: 110 MMHG | WEIGHT: 152.6 LBS | BODY MASS INDEX: 23.95 KG/M2 | HEIGHT: 67 IN

## 2023-12-13 DIAGNOSIS — Z79.4 TYPE 2 DIABETES MELLITUS WITHOUT COMPLICATION, WITH LONG-TERM CURRENT USE OF INSULIN (HCC): Primary | ICD-10-CM

## 2023-12-13 DIAGNOSIS — E11.9 TYPE 2 DIABETES MELLITUS WITHOUT COMPLICATION, WITH LONG-TERM CURRENT USE OF INSULIN (HCC): Primary | ICD-10-CM

## 2023-12-13 LAB — HBA1C MFR BLD: 7.1 %

## 2023-12-13 PROCEDURE — G8484 FLU IMMUNIZE NO ADMIN: HCPCS | Performed by: INTERNAL MEDICINE

## 2023-12-13 PROCEDURE — G8420 CALC BMI NORM PARAMETERS: HCPCS | Performed by: INTERNAL MEDICINE

## 2023-12-13 PROCEDURE — 99214 OFFICE O/P EST MOD 30 MIN: CPT | Performed by: INTERNAL MEDICINE

## 2023-12-13 PROCEDURE — 3078F DIAST BP <80 MM HG: CPT | Performed by: INTERNAL MEDICINE

## 2023-12-13 PROCEDURE — G8428 CUR MEDS NOT DOCUMENT: HCPCS | Performed by: INTERNAL MEDICINE

## 2023-12-13 PROCEDURE — 1036F TOBACCO NON-USER: CPT | Performed by: INTERNAL MEDICINE

## 2023-12-13 PROCEDURE — 1123F ACP DISCUSS/DSCN MKR DOCD: CPT | Performed by: INTERNAL MEDICINE

## 2023-12-13 PROCEDURE — 3074F SYST BP LT 130 MM HG: CPT | Performed by: INTERNAL MEDICINE

## 2023-12-13 PROCEDURE — 83036 HEMOGLOBIN GLYCOSYLATED A1C: CPT | Performed by: INTERNAL MEDICINE

## 2023-12-13 NOTE — PROGRESS NOTES
This is a 43-year-old white male with a history of type 2 diabetes mellitus x 10 years currently on Toujeo insulin in the morning and Victoza daily. He has chronic kidney disease stage III a with a recent creatinine of 1.28. He has degenerative disc disease and has received a number of steroid injections into his neck. He has received no steroid injections in the last 6 months. Current Medications  Toujeo 21 units AM  Victoza 1.8 mg     Since I saw him last, he apparently went through right wrist surgery in October of this year. He apparently had a carpal bone removed at his proximal thumb. He has had a lot of pain and is in a removable cast.    Regarding his diabetes, he had been doing very well. Jacklyn Hicks His last A1c was 7.0%. A1c today 7.1 %. . His blood sugars in the morning are excellent ranging between . There really is not any change in his diet or his physical activity. He checks his blood sugars twice daily. He has coffee and a fruit bar or pack of peanut butter crackers for breakfast.  Lunch is a sandwich with chips and diet Pepsi. Dinner can be chicken with half a baked potato and a salad or pork chop or manicotti and a salad. He does not snack at bedtime. He was seen by podiatry for some neuropathic symptoms. He was given a prescription for Metanex which he said really exacerbated his esophageal reflux and so he stopped taking it. Examination  Blood pressure 110/72  Pulse 75  Weight 152  BMI 23.9  HEENT unremarkable  Lungs clear  Heart reveals a regular rate and rhythm  Abdomen benign  Extremities unremarkable    Impression  1. Type 2 diabetes mellitus with a very acceptable blood glucose control on Victoza plus toujeo  2. Status post right wrist surgery with a 6-month healing process    Plan[de-identified]  1.  I did not change the regimen. 2.  I did advise him that it is possible although not certain that the [employer] will be either removed from the formulary or go generic.   In any case we

## 2024-01-16 ENCOUNTER — HOSPITAL ENCOUNTER (OUTPATIENT)
Facility: HOSPITAL | Age: 79
Discharge: HOME | End: 2024-01-19
Attending: INTERNAL MEDICINE

## 2024-01-16 DIAGNOSIS — R93.1 ELEVATED CORONARY ARTERY CALCIUM SCORE: Primary | ICD-10-CM

## 2024-01-16 DIAGNOSIS — I10 HYPERTENSION, UNSPECIFIED TYPE: ICD-10-CM

## 2024-01-16 DIAGNOSIS — E78.5 HYPERLIPIDEMIA, UNSPECIFIED HYPERLIPIDEMIA TYPE: ICD-10-CM

## 2024-01-16 PROCEDURE — 75571 CT HRT W/O DYE W/CA TEST: CPT

## 2024-01-17 ENCOUNTER — TELEPHONE (OUTPATIENT)
Age: 79
End: 2024-01-17

## 2024-01-17 NOTE — TELEPHONE ENCOUNTER
Patients wife Britany would like to cancel 2/15/24 apptmt  with . She states that pt is seeing another provider.

## 2024-01-24 ENCOUNTER — TELEPHONE (OUTPATIENT)
Age: 79
End: 2024-01-24

## 2024-01-24 DIAGNOSIS — Z79.4 TYPE 2 DIABETES MELLITUS WITHOUT COMPLICATION, WITH LONG-TERM CURRENT USE OF INSULIN (HCC): Primary | ICD-10-CM

## 2024-01-24 DIAGNOSIS — E11.9 TYPE 2 DIABETES MELLITUS WITHOUT COMPLICATION, WITH LONG-TERM CURRENT USE OF INSULIN (HCC): Primary | ICD-10-CM

## 2024-01-24 RX ORDER — SEMAGLUTIDE 0.68 MG/ML
INJECTION, SOLUTION SUBCUTANEOUS
Qty: 3 ML | Refills: 3 | Status: SHIPPED | OUTPATIENT
Start: 2024-01-24

## 2024-01-24 NOTE — TELEPHONE ENCOUNTER
Spoke to the pt and he stated he was informed by [employer] that Victoza is no longer on the market. He stated he was informed by Dr. Reilly to contact us once he found out this information so that an alternative could be sent into [facility].

## 2024-02-11 PROBLEM — F03.A0 MILD DEMENTIA WITHOUT BEHAVIORAL DISTURBANCE, PSYCHOTIC DISTURBANCE, MOOD DISTURBANCE, OR ANXIETY (HCC): Status: RESOLVED | Noted: 2023-08-14 | Resolved: 2024-02-11

## 2024-02-11 NOTE — PROGRESS NOTES
HISTORY OF PRESENT ILLNESS   Ander Kelsey   is a 78 y.o.  male.  FU HTN DM02  with neuropathy HLD anemia d/t AVM ckd 3 due to HTN , Barretts esophagus memory loss  and medicare wellness-----  URO Dr Shukla-elevated PSA  Nephro Dr Massey assi  Endo Dr Reilly for DM-2 on victoza plus toujeo recnt A1c 7.1  Had right thumb sx last year-just completed PT-still some pain  Had [facility] Neuro for memory loss-not Alz Dementia  CARD-- first appt later this week Dr Garrido for coarnacry calcium -820  Last OV  Seeing Dr Camp for rigth wrist pain  Had xray of wrist with calcification of radial artery-suggested to contact PCP  No claudication  No chest pain  Has mild stable CAMPBELL  Saw Neuro at [facility] for 2nd opunion re memory loss -was told not Alz dementia so aricept was stopped  Patient Active Problem List    Diagnosis Date Noted    Mild dementia without behavioral disturbance, psychotic disturbance, mood disturbance, or anxiety ([facility]) 08/14/2023    Memory loss 08/14/2023    Chronic renal disease, stage III ([facility]) 06/01/2022    Controlled type 2 diabetes mellitus with diabetic neuropathy, with long-term current use of insulin ([facility]) 06/01/2022    Type 2 diabetes mellitus with chronic kidney disease ([facility]) 12/17/2021    GI bleed 07/27/2017    Primary open angle glaucoma 07/26/2016    CKD (chronic kidney disease) stage 3, GFR 30-59 ml/min ([facility]) 08/10/2010    Essential hypertension, benign 10/26/2009    Gout 10/26/2009    Polycythemia 10/26/2009    BPH (benign prostatic hyperplasia) 10/26/2009    Mann's esophagus 10/26/2009    Pure hypercholesterolemia 10/26/2009     Current Outpatient Medications   Medication Sig Dispense Refill    Semaglutide,0.25 or 0.5MG/DOS, (OZEMPIC, 0.25 OR 0.5 MG/DOSE,) 2 MG/3ML SOPN 0.25 mg weekly for 2-4 weeks and then increase to 0.5 mg weekly as tolerated 3 mL 3    blood glucose test strips (ONETOUCH VERIO) strip USE TO CHECK BLOOD SUGAR THREE TIMES DAILY 300 strip 3    pravastatin (PRAVACHOL) 20 MG tablet TAKE 1

## 2024-02-19 ENCOUNTER — OFFICE VISIT (OUTPATIENT)
Age: 79
End: 2024-02-19
Payer: MEDICARE

## 2024-02-19 VITALS
SYSTOLIC BLOOD PRESSURE: 130 MMHG | TEMPERATURE: 97.1 F | BODY MASS INDEX: 24.75 KG/M2 | OXYGEN SATURATION: 97 % | DIASTOLIC BLOOD PRESSURE: 80 MMHG | RESPIRATION RATE: 16 BRPM | WEIGHT: 154 LBS | HEART RATE: 64 BPM | HEIGHT: 66 IN

## 2024-02-19 DIAGNOSIS — I10 HYPERTENSION, UNSPECIFIED TYPE: ICD-10-CM

## 2024-02-19 DIAGNOSIS — E11.40 TYPE 2 DIABETES MELLITUS WITH DIABETIC NEUROPATHY, WITH LONG-TERM CURRENT USE OF INSULIN (HCC): ICD-10-CM

## 2024-02-19 DIAGNOSIS — D69.6 THROMBOCYTOPENIA (HCC): ICD-10-CM

## 2024-02-19 DIAGNOSIS — E78.5 HYPERLIPIDEMIA, UNSPECIFIED HYPERLIPIDEMIA TYPE: ICD-10-CM

## 2024-02-19 DIAGNOSIS — Z79.4 TYPE 2 DIABETES MELLITUS WITH DIABETIC NEUROPATHY, WITH LONG-TERM CURRENT USE OF INSULIN (HCC): ICD-10-CM

## 2024-02-19 DIAGNOSIS — N18.30 STAGE 3 CHRONIC KIDNEY DISEASE, UNSPECIFIED WHETHER STAGE 3A OR 3B CKD (HCC): Primary | ICD-10-CM

## 2024-02-19 DIAGNOSIS — R97.20 ELEVATED PSA: ICD-10-CM

## 2024-02-19 DIAGNOSIS — Z00.00 MEDICARE ANNUAL WELLNESS VISIT, SUBSEQUENT: ICD-10-CM

## 2024-02-19 DIAGNOSIS — R41.3 MEMORY LOSS: ICD-10-CM

## 2024-02-19 DIAGNOSIS — Z86.2 HX OF IRON DEFICIENCY ANEMIA: ICD-10-CM

## 2024-02-19 LAB
ALBUMIN SERPL-MCNC: 3.9 G/DL (ref 3.5–5)
ALBUMIN/GLOB SERPL: 1.1 (ref 1.1–2.2)
ALP SERPL-CCNC: 100 U/L (ref 45–117)
ALT SERPL-CCNC: 40 U/L (ref 12–78)
ANION GAP SERPL CALC-SCNC: 4 MMOL/L (ref 5–15)
AST SERPL-CCNC: 30 U/L (ref 15–37)
BILIRUB SERPL-MCNC: 0.5 MG/DL (ref 0.2–1)
BUN SERPL-MCNC: 21 MG/DL (ref 6–20)
BUN/CREAT SERPL: 15 (ref 12–20)
CALCIUM SERPL-MCNC: 9.8 MG/DL (ref 8.5–10.1)
CHLORIDE SERPL-SCNC: 108 MMOL/L (ref 97–108)
CHOLEST SERPL-MCNC: 151 MG/DL
CO2 SERPL-SCNC: 26 MMOL/L (ref 21–32)
CREAT SERPL-MCNC: 1.4 MG/DL (ref 0.7–1.3)
ERYTHROCYTE [DISTWIDTH] IN BLOOD BY AUTOMATED COUNT: 14.1 % (ref 11.5–14.5)
GLOBULIN SER CALC-MCNC: 3.4 G/DL (ref 2–4)
GLUCOSE SERPL-MCNC: 147 MG/DL (ref 65–100)
HCT VFR BLD AUTO: 50 % (ref 36.6–50.3)
HDLC SERPL-MCNC: 46 MG/DL
HDLC SERPL: 3.3 (ref 0–5)
HGB BLD-MCNC: 16.1 G/DL (ref 12.1–17)
LDLC SERPL CALC-MCNC: 50.8 MG/DL (ref 0–100)
MCH RBC QN AUTO: 31.3 PG (ref 26–34)
MCHC RBC AUTO-ENTMCNC: 32.2 G/DL (ref 30–36.5)
MCV RBC AUTO: 97.1 FL (ref 80–99)
NRBC # BLD: 0 K/UL (ref 0–0.01)
NRBC BLD-RTO: 0 PER 100 WBC
PLATELET # BLD AUTO: 154 K/UL (ref 150–400)
POTASSIUM SERPL-SCNC: 4.3 MMOL/L (ref 3.5–5.1)
PROT SERPL-MCNC: 7.3 G/DL (ref 6.4–8.2)
RBC # BLD AUTO: 5.15 M/UL (ref 4.1–5.7)
SODIUM SERPL-SCNC: 138 MMOL/L (ref 136–145)
TRIGL SERPL-MCNC: 271 MG/DL
TSH SERPL DL<=0.05 MIU/L-ACNC: 2.36 UIU/ML (ref 0.36–3.74)
VLDLC SERPL CALC-MCNC: 54.2 MG/DL
WBC # BLD AUTO: 6.3 K/UL (ref 4.1–11.1)

## 2024-02-19 PROCEDURE — 1123F ACP DISCUSS/DSCN MKR DOCD: CPT | Performed by: INTERNAL MEDICINE

## 2024-02-19 PROCEDURE — G8484 FLU IMMUNIZE NO ADMIN: HCPCS | Performed by: INTERNAL MEDICINE

## 2024-02-19 PROCEDURE — 99214 OFFICE O/P EST MOD 30 MIN: CPT | Performed by: INTERNAL MEDICINE

## 2024-02-19 PROCEDURE — G8427 DOCREV CUR MEDS BY ELIG CLIN: HCPCS | Performed by: INTERNAL MEDICINE

## 2024-02-19 PROCEDURE — G8420 CALC BMI NORM PARAMETERS: HCPCS | Performed by: INTERNAL MEDICINE

## 2024-02-19 PROCEDURE — 3075F SYST BP GE 130 - 139MM HG: CPT | Performed by: INTERNAL MEDICINE

## 2024-02-19 PROCEDURE — 3079F DIAST BP 80-89 MM HG: CPT | Performed by: INTERNAL MEDICINE

## 2024-02-19 PROCEDURE — 1036F TOBACCO NON-USER: CPT | Performed by: INTERNAL MEDICINE

## 2024-02-19 PROCEDURE — G0439 PPPS, SUBSEQ VISIT: HCPCS | Performed by: INTERNAL MEDICINE

## 2024-02-19 ASSESSMENT — PATIENT HEALTH QUESTIONNAIRE - PHQ9
1. LITTLE INTEREST OR PLEASURE IN DOING THINGS: 0
SUM OF ALL RESPONSES TO PHQ9 QUESTIONS 1 & 2: 0
SUM OF ALL RESPONSES TO PHQ QUESTIONS 1-9: 0
2. FEELING DOWN, DEPRESSED OR HOPELESS: 0

## 2024-02-19 ASSESSMENT — LIFESTYLE VARIABLES
HOW OFTEN DO YOU HAVE A DRINK CONTAINING ALCOHOL: 2-3 TIMES A WEEK
HOW MANY STANDARD DRINKS CONTAINING ALCOHOL DO YOU HAVE ON A TYPICAL DAY: 1 OR 2

## 2024-02-19 NOTE — PROGRESS NOTES
\"Have you been to the ER, urgent care clinic since your last visit?  Hospitalized since your last visit?\"    NO    “Have you seen or consulted any other health care providers outside of [facility] since your last visit?”    NO

## 2024-02-19 NOTE — PATIENT INSTRUCTIONS
Wait for an ambulance. Do not try to drive yourself.  Watch closely for changes in your health, and be sure to contact your doctor if you have any problems.  Where can you learn more?  Go to https://[email or web address].net/patientEd and enter F075 to learn more about \"A Healthy Heart: Care Instructions.\"  Current as of: June 25, 2023               Content Version: 13.9  © 6804-5396 [employer].   Care instructions adapted under license by [employer]. If you have questions about a medical condition or this instruction, always ask your healthcare professional. [employer] disclaims any warranty or liability for your use of this information.      Personalized Preventive Plan for Ander Kelsey - 2/19/2024  Medicare offers a range of preventive health benefits. Some of the tests and screenings are paid in full while other may be subject to a deductible, co-insurance, and/or copay.    Some of these benefits include a comprehensive review of your medical history including lifestyle, illnesses that may run in your family, and various assessments and screenings as appropriate.    After reviewing your medical record and screening and assessments performed today your provider may have ordered immunizations, labs, imaging, and/or referrals for you.  A list of these orders (if applicable) as well as your Preventive Care list are included within your After Visit Summary for your review.    Other Preventive Recommendations:    A preventive eye exam performed by an eye specialist is recommended every 1-2 years to screen for glaucoma; cataracts, macular degeneration, and other eye disorders.  A preventive dental visit is recommended every 6 months.  Try to get at least 150 minutes of exercise per week or 10,000 steps per day on a pedometer .  Order or download the FREE \"Exercise & Physical Activity: Your Everyday Guide\" from [employer]. Call 1-286.622.4068 or search [employer]

## 2024-03-14 ENCOUNTER — TELEMEDICINE (OUTPATIENT)
Age: 79
End: 2024-03-14
Payer: MEDICARE

## 2024-03-14 DIAGNOSIS — G47.9 DIFFICULTY SLEEPING: ICD-10-CM

## 2024-03-14 DIAGNOSIS — F41.9 ANXIETY: Primary | ICD-10-CM

## 2024-03-14 PROCEDURE — 1036F TOBACCO NON-USER: CPT | Performed by: INTERNAL MEDICINE

## 2024-03-14 PROCEDURE — G8427 DOCREV CUR MEDS BY ELIG CLIN: HCPCS | Performed by: INTERNAL MEDICINE

## 2024-03-14 PROCEDURE — G8484 FLU IMMUNIZE NO ADMIN: HCPCS | Performed by: INTERNAL MEDICINE

## 2024-03-14 PROCEDURE — 99213 OFFICE O/P EST LOW 20 MIN: CPT | Performed by: INTERNAL MEDICINE

## 2024-03-14 PROCEDURE — G8420 CALC BMI NORM PARAMETERS: HCPCS | Performed by: INTERNAL MEDICINE

## 2024-03-14 PROCEDURE — 1123F ACP DISCUSS/DSCN MKR DOCD: CPT | Performed by: INTERNAL MEDICINE

## 2024-03-14 RX ORDER — TRIAMCINOLONE ACETONIDE 1 MG/G
OINTMENT TOPICAL
COMMUNITY
Start: 2024-02-22

## 2024-03-14 RX ORDER — FERROUS SULFATE 325(65) MG
1 TABLET ORAL 2 TIMES DAILY
COMMUNITY
Start: 2024-01-17

## 2024-03-14 RX ORDER — ESCITALOPRAM OXALATE 5 MG/1
5 TABLET ORAL DAILY
Qty: 90 TABLET | Refills: 1 | Status: SHIPPED | OUTPATIENT
Start: 2024-03-14

## 2024-03-14 NOTE — PROGRESS NOTES
1. \"Have you been to the ER, urgent care clinic since your last visit?  Hospitalized since your last visit?\" No    2. \"Have you seen or consulted any other health care providers outside of the [facility] since your last visit?\"       Dr. Richardson // dermatologist     3. For patients aged 45-75: Has the patient had a colonoscopy / FIT/ Cologuard? 2023 megan       
  Component Value Date/Time    CHOL 151 02/19/2024 11:13 AM    TRIG 271 02/19/2024 11:13 AM    HDL 46 02/19/2024 11:13 AM    LDLCALC 50.8 02/19/2024 11:13 AM    CHOLHDLRATIO 3.3 02/19/2024 11:13 AM     Hemoglobin A1C:   Hemoglobin A1C   Date Value Ref Range Status   06/01/2022 7.7 (H) 4.0 - 5.6 % Final     Comment:     NEW METHOD  PLEASE NOTE NEW REFERENCE RANGE  (NOTE)  HbA1C Interpretive Ranges  <5.7              Normal  5.7 - 6.4         Consider Prediabetes  >6.5              Consider Diabetes       Hemoglobin A1C, POC   Date Value Ref Range Status   12/13/2023 7.1 % Final        Review of Systems     Physical Exam  Constitutional:       Appearance: Normal appearance.   Neurological:      Mental Status: He is alert.          ASSESSMENT and PLAN  There are no diagnoses linked to this encounter.   Ander was seen today for anxiety.    Diagnoses and all orders for this visit:    Anxiety   Start lexapro 5 mg qd  Difficulty sleeping   Consider add on atarax if has trouble sleeping-d/w pt  Other orders  -     escitalopram (LEXAPRO) 5 MG tablet; Take 1 tablet by mouth daily    Fu 3 months as scheduled   Toni Fontanez MD

## 2024-03-28 RX ORDER — INSULIN GLARGINE 300 U/ML
INJECTION, SOLUTION SUBCUTANEOUS
Qty: 4.5 ML | Refills: 4 | Status: SHIPPED | OUTPATIENT
Start: 2024-03-28

## 2024-04-01 RX ORDER — FOSINOPRIL SODIUM 40 MG/1
TABLET ORAL
Qty: 90 TABLET | Refills: 3 | Status: SHIPPED | OUTPATIENT
Start: 2024-04-01

## 2024-04-22 RX ORDER — COLCHICINE 0.6 MG/1
0.6 TABLET ORAL DAILY
Qty: 90 TABLET | Refills: 3 | Status: SHIPPED | OUTPATIENT
Start: 2024-04-22

## 2024-05-18 ENCOUNTER — OFFICE VISIT (OUTPATIENT)
Age: 79
End: 2024-05-18

## 2024-05-18 VITALS
WEIGHT: 155.1 LBS | HEIGHT: 67 IN | BODY MASS INDEX: 24.34 KG/M2 | SYSTOLIC BLOOD PRESSURE: 138 MMHG | OXYGEN SATURATION: 98 % | HEART RATE: 78 BPM | DIASTOLIC BLOOD PRESSURE: 77 MMHG | RESPIRATION RATE: 18 BRPM

## 2024-05-18 DIAGNOSIS — J01.90 ACUTE SINUSITIS, RECURRENCE NOT SPECIFIED, UNSPECIFIED LOCATION: Primary | ICD-10-CM

## 2024-05-18 DIAGNOSIS — H65.93 BILATERAL OTITIS MEDIA WITH EFFUSION: ICD-10-CM

## 2024-05-18 RX ORDER — AMOXICILLIN AND CLAVULANATE POTASSIUM 875; 125 MG/1; MG/1
1 TABLET, FILM COATED ORAL 2 TIMES DAILY
Qty: 14 TABLET | Refills: 0 | Status: SHIPPED | OUTPATIENT
Start: 2024-05-18 | End: 2024-05-25

## 2024-05-18 RX ORDER — FLUTICASONE PROPIONATE 50 MCG
2 SPRAY, SUSPENSION (ML) NASAL DAILY
Qty: 48 G | Refills: 1 | Status: SHIPPED | OUTPATIENT
Start: 2024-05-18

## 2024-05-18 ASSESSMENT — ENCOUNTER SYMPTOMS
ABDOMINAL PAIN: 0
SORE THROAT: 0
CHEST TIGHTNESS: 0
SHORTNESS OF BREATH: 0
RHINORRHEA: 0
COUGH: 1
NAUSEA: 0
VOMITING: 0

## 2024-05-18 NOTE — PROGRESS NOTES
Subjective:      The patient/guardian gave verbal consent to treat.        Chief Complaint   Patient presents with    Congestion    Cough     Dry cough, congestion, headache, runny nose, and difficulty hearing.       Ander Kelsey is a 78 y.o. male presenting to clinic today with symptoms that have been ongoing for 1.5 weeks. Endorses sneezing, head congestion, mild cough, headache, and diminished hearing over baseline. Has been taking ibuprofen and a decongestant with some relief. Denies chest congestion. Denies fevers or chills. No other complaints today.        History provided by:  Patient  History limited by: nothing.   used: No          Review of Systems   Constitutional:  Negative for chills and fever.   HENT:  Positive for congestion, hearing loss and sneezing. Negative for rhinorrhea and sore throat.    Respiratory:  Positive for cough. Negative for chest tightness and shortness of breath.    Cardiovascular:  Negative for chest pain.   Gastrointestinal:  Negative for abdominal pain, nausea and vomiting.   Genitourinary:  Negative for dysuria.   Neurological:  Positive for headaches.       Review of Systems - negative except as listed above    Reviewed PmHx, RxHx, FmHx, SocHx, AllgHx and updated in chart.  Family History   Problem Relation Age of Onset    Alzheimer's Disease Paternal Grandmother     Cancer Mother         breast     Heart Disease Father          age 54.      Diabetes Sister        Past Medical History:   Diagnosis Date    Mann's esophagus     long segment, esophageal bleed    Bell's palsy     BPH (benign prostatic hyperplasia)     Chronic kidney disease     Stage IV    Colon polyps     Dr. Enriquez/U    DDD (degenerative disc disease), cervical     Diabetes (HCC)     Fracture of left leg     lower, casted    GERD (gastroesophageal reflux disease)     Mann's esophagus long segment    Glaucoma     bilateral    Gout     Hearing loss     has bilateral

## 2024-06-19 ENCOUNTER — OFFICE VISIT (OUTPATIENT)
Age: 79
End: 2024-06-19
Payer: MEDICARE

## 2024-06-19 VITALS
DIASTOLIC BLOOD PRESSURE: 72 MMHG | HEIGHT: 66 IN | OXYGEN SATURATION: 98 % | WEIGHT: 152.6 LBS | HEART RATE: 62 BPM | SYSTOLIC BLOOD PRESSURE: 104 MMHG | BODY MASS INDEX: 24.53 KG/M2 | RESPIRATION RATE: 18 BRPM

## 2024-06-19 DIAGNOSIS — E11.9 TYPE 2 DIABETES MELLITUS WITHOUT COMPLICATION, WITH LONG-TERM CURRENT USE OF INSULIN (HCC): ICD-10-CM

## 2024-06-19 DIAGNOSIS — Z79.4 TYPE 2 DIABETES MELLITUS WITHOUT COMPLICATION, WITH LONG-TERM CURRENT USE OF INSULIN (HCC): ICD-10-CM

## 2024-06-19 LAB — HBA1C MFR BLD: 7 %

## 2024-06-19 PROCEDURE — 99214 OFFICE O/P EST MOD 30 MIN: CPT | Performed by: INTERNAL MEDICINE

## 2024-06-19 PROCEDURE — 1123F ACP DISCUSS/DSCN MKR DOCD: CPT | Performed by: INTERNAL MEDICINE

## 2024-06-19 PROCEDURE — G8427 DOCREV CUR MEDS BY ELIG CLIN: HCPCS | Performed by: INTERNAL MEDICINE

## 2024-06-19 PROCEDURE — G8420 CALC BMI NORM PARAMETERS: HCPCS | Performed by: INTERNAL MEDICINE

## 2024-06-19 PROCEDURE — 3078F DIAST BP <80 MM HG: CPT | Performed by: INTERNAL MEDICINE

## 2024-06-19 PROCEDURE — 83036 HEMOGLOBIN GLYCOSYLATED A1C: CPT | Performed by: INTERNAL MEDICINE

## 2024-06-19 PROCEDURE — 3074F SYST BP LT 130 MM HG: CPT | Performed by: INTERNAL MEDICINE

## 2024-06-19 PROCEDURE — 1036F TOBACCO NON-USER: CPT | Performed by: INTERNAL MEDICINE

## 2024-06-19 RX ORDER — SEMAGLUTIDE 0.68 MG/ML
INJECTION, SOLUTION SUBCUTANEOUS
Qty: 3 ML | Refills: 3 | Status: SHIPPED | OUTPATIENT
Start: 2024-06-19

## 2024-06-19 RX ORDER — SEMAGLUTIDE 0.68 MG/ML
INJECTION, SOLUTION SUBCUTANEOUS
Qty: 3 ML | Refills: 3 | Status: CANCELLED | OUTPATIENT
Start: 2024-06-19

## 2024-06-19 ASSESSMENT — PATIENT HEALTH QUESTIONNAIRE - PHQ9
SUM OF ALL RESPONSES TO PHQ QUESTIONS 1-9: 0
1. LITTLE INTEREST OR PLEASURE IN DOING THINGS: NOT AT ALL
SUM OF ALL RESPONSES TO PHQ9 QUESTIONS 1 & 2: 0
2. FEELING DOWN, DEPRESSED OR HOPELESS: NOT AT ALL

## 2024-06-19 NOTE — PROGRESS NOTES
Patient identified with two identification factors, Name and Date of Birth.    Chief Complaint   Patient presents with    Follow-up    Diabetes     Type 2 diabetes mellitus without complication, with long-term current use of insulin        /72 (Site: Right Upper Arm, Position: Sitting, Cuff Size: Medium Adult)   Pulse 62   Resp 18   Ht 1.676 m (5' 6\")   Wt 69.2 kg (152 lb 9.6 oz)   SpO2 98%   BMI 24.63 kg/m²       1. \"Have you been to the ER, urgent care clinic since your last visit?  Hospitalized since your last visit?\" No     2. \"Have you seen or consulted any other health care providers outside of the [facility] since your last visit?\" No

## 2024-06-19 NOTE — PROGRESS NOTES
This is a 78-year-old white male with a history of type 2 diabetes mellitus x 10 years currently on Toujeo insulin in the morning and Ozempic..       He has chronic kidney disease stage III a with a recent creatinine of 1.28.    He has degenerative disc disease and has received a number of steroid injections into his neck.  He has received no steroid injections in the last 6 months.     Current Medications  Toujeo 21 units AM  Ozempic 0.5 weekly     Since I saw him last, he apparently went through right wrist surgery in October of this year.  He apparently had a carpal bone removed at his proximal thumb.  He has had a lot of pain and is in a removable cast.     Regarding his diabetes, he had been doing very well..His last A1c was 7.0%.  A1c today 7.0 %. . His blood sugars in the morning are excellent ranging between . There really is not any change in his diet or his physical activity.He checks his blood sugars twice daily. He has coffee and a fruit bar or pack of peanut butter crackers for breakfast.  Lunch is a sandwich with chips and diet Pepsi.  Dinner can be chicken with half a baked potato and a salad or pork chop or manicotti and a salad.  He does not snack at bedtime.       He was seen by podiatry for some neuropathic symptoms.  He was given a prescription for Metanex which he said really exacerbated his esophageal reflux and so he stopped taking it.  We downloaded his meter.  All of his blood sugars range between 101 120.    Examination  Blood pressure 104/72  Pulse 62  Weight 69 kg  BMI 24.6  HEENT unremarkable  Lungs clear  Heart reveals a regular rate and rhythm  Abdomen benign  Extremities unremarkable  Diabetic foot exam:   Left Foot:   Visual Exam: normal   Pulse DP: 2+ (normal)   Filament test: normal sensation   Vibratory Sensation: normal  Right Foot:   Visual Exam: normal   Pulse DP: 2+ (normal)   Filament test: absent sensation   Vibratory Sensation: absent     Impression  1.  Type 2

## 2024-06-21 ENCOUNTER — HOSPITAL ENCOUNTER (EMERGENCY)
Facility: HOSPITAL | Age: 79
Discharge: HOME | DRG: 394 | End: 2024-06-21
Attending: EMERGENCY MEDICINE
Payer: MEDICARE

## 2024-06-21 ENCOUNTER — APPOINTMENT (OUTPATIENT)
Facility: HOSPITAL | Age: 79
DRG: 394 | End: 2024-06-21
Payer: MEDICARE

## 2024-06-21 VITALS
HEART RATE: 65 BPM | RESPIRATION RATE: 18 BRPM | SYSTOLIC BLOOD PRESSURE: 179 MMHG | BODY MASS INDEX: 23.86 KG/M2 | WEIGHT: 152 LBS | OXYGEN SATURATION: 93 % | TEMPERATURE: 97.7 F | HEIGHT: 67 IN | DIASTOLIC BLOOD PRESSURE: 97 MMHG

## 2024-06-21 DIAGNOSIS — K52.9 GASTROENTERITIS: Primary | ICD-10-CM

## 2024-06-21 LAB
ALBUMIN SERPL-MCNC: 4.1 G/DL (ref 3.5–5)
ALBUMIN/GLOB SERPL: 1 (ref 1.1–2.2)
ALP SERPL-CCNC: 120 U/L (ref 45–117)
ALT SERPL-CCNC: 58 U/L (ref 12–78)
ANION GAP SERPL CALC-SCNC: 10 MMOL/L (ref 5–15)
APPEARANCE UR: CLEAR
AST SERPL-CCNC: 41 U/L (ref 15–37)
BACTERIA URNS QL MICRO: NEGATIVE /HPF
BASOPHILS # BLD: 0.1 K/UL (ref 0–0.1)
BASOPHILS NFR BLD: 0 % (ref 0–1)
BILIRUB SERPL-MCNC: 0.6 MG/DL (ref 0.2–1)
BILIRUB UR QL: NEGATIVE
BUN SERPL-MCNC: 19 MG/DL (ref 6–20)
BUN/CREAT SERPL: 13 (ref 12–20)
CALCIUM SERPL-MCNC: 10.1 MG/DL (ref 8.5–10.1)
CHLORIDE SERPL-SCNC: 105 MMOL/L (ref 97–108)
CO2 SERPL-SCNC: 22 MMOL/L (ref 21–32)
COLOR UR: ABNORMAL
COMMENT:: NORMAL
CREAT SERPL-MCNC: 1.5 MG/DL (ref 0.7–1.3)
DIFFERENTIAL METHOD BLD: ABNORMAL
EOSINOPHIL # BLD: 0.1 K/UL (ref 0–0.4)
EOSINOPHIL NFR BLD: 0 % (ref 0–7)
EPITH CASTS URNS QL MICRO: ABNORMAL /LPF
ERYTHROCYTE [DISTWIDTH] IN BLOOD BY AUTOMATED COUNT: 14 % (ref 11.5–14.5)
GLOBULIN SER CALC-MCNC: 4 G/DL (ref 2–4)
GLUCOSE BLD STRIP.AUTO-MCNC: 161 MG/DL (ref 65–117)
GLUCOSE SERPL-MCNC: 310 MG/DL (ref 65–100)
GLUCOSE UR STRIP.AUTO-MCNC: >1000 MG/DL
HCT VFR BLD AUTO: 50.9 % (ref 36.6–50.3)
HGB BLD-MCNC: 17.3 G/DL (ref 12.1–17)
HGB UR QL STRIP: NEGATIVE
HYALINE CASTS URNS QL MICRO: ABNORMAL /LPF (ref 0–2)
IMM GRANULOCYTES # BLD AUTO: 0.1 K/UL (ref 0–0.04)
IMM GRANULOCYTES NFR BLD AUTO: 1 % (ref 0–0.5)
KETONES UR QL STRIP.AUTO: NEGATIVE MG/DL
LACTATE BLD-SCNC: 1.74 MMOL/L (ref 0.4–2)
LACTATE SERPL-SCNC: 2.3 MMOL/L (ref 0.4–2)
LEUKOCYTE ESTERASE UR QL STRIP.AUTO: ABNORMAL
LIPASE SERPL-CCNC: 46 U/L (ref 13–75)
LYMPHOCYTES # BLD: 0.7 K/UL (ref 0.8–3.5)
LYMPHOCYTES NFR BLD: 5 % (ref 12–49)
MCH RBC QN AUTO: 30.7 PG (ref 26–34)
MCHC RBC AUTO-ENTMCNC: 34 G/DL (ref 30–36.5)
MCV RBC AUTO: 90.4 FL (ref 80–99)
MONOCYTES # BLD: 0.5 K/UL (ref 0–1)
MONOCYTES NFR BLD: 3 % (ref 5–13)
NEUTS SEG # BLD: 12.6 K/UL (ref 1.8–8)
NEUTS SEG NFR BLD: 90 % (ref 32–75)
NITRITE UR QL STRIP.AUTO: NEGATIVE
NRBC # BLD: 0 K/UL (ref 0–0.01)
NRBC BLD-RTO: 0 PER 100 WBC
PH UR STRIP: 6 (ref 5–8)
PLATELET # BLD AUTO: 144 K/UL (ref 150–400)
PMV BLD AUTO: 12.6 FL (ref 8.9–12.9)
POTASSIUM SERPL-SCNC: 4 MMOL/L (ref 3.5–5.1)
PROT SERPL-MCNC: 8.1 G/DL (ref 6.4–8.2)
PROT UR STRIP-MCNC: NEGATIVE MG/DL
RBC # BLD AUTO: 5.63 M/UL (ref 4.1–5.7)
RBC #/AREA URNS HPF: ABNORMAL /HPF (ref 0–5)
SERVICE CMNT-IMP: ABNORMAL
SODIUM SERPL-SCNC: 137 MMOL/L (ref 136–145)
SP GR UR REFRACTOMETRY: 1.01
SPECIMEN HOLD: NORMAL
URINE CULTURE IF INDICATED: ABNORMAL
UROBILINOGEN UR QL STRIP.AUTO: 1 EU/DL (ref 0.2–1)
WBC # BLD AUTO: 14 K/UL (ref 4.1–11.1)
WBC URNS QL MICRO: ABNORMAL /HPF (ref 0–4)

## 2024-06-21 PROCEDURE — 85025 COMPLETE CBC W/AUTO DIFF WBC: CPT

## 2024-06-21 PROCEDURE — 81001 URINALYSIS AUTO W/SCOPE: CPT

## 2024-06-21 PROCEDURE — 6370000000 HC RX 637 (ALT 250 FOR IP): Performed by: EMERGENCY MEDICINE

## 2024-06-21 PROCEDURE — 96374 THER/PROPH/DIAG INJ IV PUSH: CPT

## 2024-06-21 PROCEDURE — 83605 ASSAY OF LACTIC ACID: CPT

## 2024-06-21 PROCEDURE — 36415 COLL VENOUS BLD VENIPUNCTURE: CPT

## 2024-06-21 PROCEDURE — 2580000003 HC RX 258: Performed by: EMERGENCY MEDICINE

## 2024-06-21 PROCEDURE — 99284 EMERGENCY DEPT VISIT MOD MDM: CPT

## 2024-06-21 PROCEDURE — 6360000002 HC RX W HCPCS: Performed by: EMERGENCY MEDICINE

## 2024-06-21 PROCEDURE — 74176 CT ABD & PELVIS W/O CONTRAST: CPT

## 2024-06-21 PROCEDURE — 83690 ASSAY OF LIPASE: CPT

## 2024-06-21 PROCEDURE — 82962 GLUCOSE BLOOD TEST: CPT

## 2024-06-21 PROCEDURE — 80053 COMPREHEN METABOLIC PANEL: CPT

## 2024-06-21 RX ORDER — DICYCLOMINE HYDROCHLORIDE 10 MG/1
10 CAPSULE ORAL ONCE
Status: COMPLETED | OUTPATIENT
Start: 2024-06-21 | End: 2024-06-21

## 2024-06-21 RX ORDER — MORPHINE SULFATE 4 MG/ML
4 INJECTION, SOLUTION INTRAMUSCULAR; INTRAVENOUS
Status: COMPLETED | OUTPATIENT
Start: 2024-06-21 | End: 2024-06-21

## 2024-06-21 RX ORDER — 0.9 % SODIUM CHLORIDE 0.9 %
1000 INTRAVENOUS SOLUTION INTRAVENOUS
Status: COMPLETED | OUTPATIENT
Start: 2024-06-21 | End: 2024-06-21

## 2024-06-21 RX ORDER — AMOXICILLIN AND CLAVULANATE POTASSIUM 875; 125 MG/1; MG/1
1 TABLET, FILM COATED ORAL 2 TIMES DAILY
Qty: 14 TABLET | Refills: 0 | Status: ON HOLD | OUTPATIENT
Start: 2024-06-21 | End: 2024-06-28 | Stop reason: HOSPADM

## 2024-06-21 RX ORDER — ONDANSETRON 4 MG/1
4 TABLET, ORALLY DISINTEGRATING ORAL ONCE
Status: COMPLETED | OUTPATIENT
Start: 2024-06-21 | End: 2024-06-21

## 2024-06-21 RX ADMIN — DICYCLOMINE HYDROCHLORIDE 10 MG: 10 CAPSULE ORAL at 13:52

## 2024-06-21 RX ADMIN — MORPHINE SULFATE 4 MG: 4 INJECTION INTRAVENOUS at 11:30

## 2024-06-21 RX ADMIN — ONDANSETRON 4 MG: 4 TABLET, ORALLY DISINTEGRATING ORAL at 11:24

## 2024-06-21 RX ADMIN — SODIUM CHLORIDE 1000 ML: 9 INJECTION, SOLUTION INTRAVENOUS at 13:23

## 2024-06-21 RX ADMIN — SODIUM CHLORIDE 1000 ML: 9 INJECTION, SOLUTION INTRAVENOUS at 11:30

## 2024-06-21 ASSESSMENT — PAIN - FUNCTIONAL ASSESSMENT: PAIN_FUNCTIONAL_ASSESSMENT: 0-10

## 2024-06-21 NOTE — ED NOTES
Discharge medications reviewed with the patient and spouse and appropriate educational materials and side effects teaching were provided.

## 2024-06-21 NOTE — ED PROVIDER NOTES
[facility] EMERGENCY DEPT  EMERGENCY DEPARTMENT HISTORY AND PHYSICAL EXAM      Date: 6/21/2024  Patient Name: Ander Kelsey  MRN: [id number]  Birthdate 1945  Date of evaluation: 6/21/2024  Provider: Heraclio Gutierrez MD   Note Started: 11:18 AM EDT 6/21/24    HISTORY OF PRESENT ILLNESS     Chief Complaint   Patient presents with    Abdominal Pain    Diarrhea    Nausea     Arrives with wife from home for abdominal pain described as \"gas pains\" and noticed a \"large drop of blood\" and diarrhea.       History Provided By: Patient    HPI: Ander Kelsey is a 78 y.o. male with known medical history significant for colon polyps, Mann's esophagus as well as a Nissen fundoplication presents with acute onset of excruciating lower abdominal pain.  Started with profuse diarrhea has now progressed to blood.  Patient's not on a blood thinning medication he describes a crampy feeling that is constant without exacerbating or relieving factors.  He has not treated it with anything.  He specifically denies fever, chills, nausea, vomiting, chest pain, shortness of breath, rash, headache.  He said the symptoms have been going on for about 2 hours now    PAST MEDICAL HISTORY   Past Medical History:  Past Medical History:   Diagnosis Date    Mann's esophagus 1990    long segment, esophageal bleed    Bell's palsy     BPH (benign prostatic hyperplasia)     Chronic kidney disease     Stage IV    Colon polyps 2007    Dr. Enriquez/U    DDD (degenerative disc disease), cervical     Diabetes (HCC)     Fracture of left leg     lower, casted    GERD (gastroesophageal reflux disease)     Mann's esophagus long segment    Glaucoma     bilateral    Gout     Hearing loss     has bilateral hearing aids    Hypercholesterolemia     Hyperlipidemia     Hypertension     Relative polycythemia        Past Surgical History:  Past Surgical History:   Procedure Laterality Date    CATARACT EXTRACTION W/ INTRAOCULAR LENS IMPLANT Bilateral     COLONOSCOPY N/A  meet Critical Care Time, 0 minutes    ED IMPRESSION     1. Gastroenteritis          DISPOSITION/PLAN   DISPOSITION Decision To Discharge 06/21/2024 01:34:28 PM    Discharge Note: The patient is stable for discharge home. The signs, symptoms, diagnosis, and discharge instructions have been discussed, understanding conveyed, and agreed upon. The patient is to follow up as recommended or return to ER should their symptoms worsen.      PATIENT REFERRED TO:  Toni Fontanez MD  [address]  [place] [address] [phone]    Call in 2 days  As needed, If symptoms worsen    [facility] EMERGENCY DEPT  [address] [facility] [id number] [phone]    If symptoms worsen        DISCHARGE MEDICATIONS:     Medication List        START taking these medications      amoxicillin-clavulanate 875-125 MG per tablet  Commonly known as: AUGMENTIN  Take 1 tablet by mouth 2 times daily for 7 days            CONTINUE taking these medications      BD Pen Needle Iveth 2nd Gen 32G X 4 MM Misc  Generic drug: Insulin Pen Needle  USE TO INJECT INSULIN DAILY            ASK your doctor about these medications      aspirin 81 MG chewable tablet     colchicine 0.6 MG tablet  Commonly known as: COLCRYS  TAKE 1 TABLET DAILY     escitalopram 5 MG tablet  Commonly known as: LEXAPRO  Take 1 tablet by mouth daily     esomeprazole 40 MG delayed release capsule  Commonly known as: NEXIUM     FeroSul 325 (65 Fe) MG tablet  Generic drug: ferrous sulfate     fluticasone 50 MCG/ACT nasal spray  Commonly known as: FLONASE  2 sprays by Each Nostril route daily     fosinopril 40 MG tablet  Commonly known as: MONOPRIL  TAKE 1 TABLET DAILY     latanoprost 0.005 % ophthalmic solution  Commonly known as: XALATAN     OneTouch Verio strip  Generic drug: blood glucose test strips  USE TO CHECK BLOOD SUGAR THREE TIMES DAILY     Ozempic (0.25 or 0.5 MG/DOSE) 2 MG/3ML Sopn  Generic drug: Semaglutide(0.25 or 0.5MG/DOS)  0.25 mg weekly

## 2024-06-23 ENCOUNTER — APPOINTMENT (OUTPATIENT)
Facility: HOSPITAL | Age: 79
DRG: 394 | End: 2024-06-23
Payer: MEDICARE

## 2024-06-23 ENCOUNTER — HOSPITAL ENCOUNTER (INPATIENT)
Facility: HOSPITAL | Age: 79
LOS: 5 days | Discharge: HOME | DRG: 394 | End: 2024-06-28
Attending: EMERGENCY MEDICINE | Admitting: STUDENT IN AN ORGANIZED HEALTH CARE EDUCATION/TRAINING PROGRAM
Payer: MEDICARE

## 2024-06-23 DIAGNOSIS — R10.84 ABDOMINAL PAIN, ACUTE, GENERALIZED: Primary | ICD-10-CM

## 2024-06-23 PROBLEM — K52.9 COLITIS: Status: ACTIVE | Noted: 2024-06-23

## 2024-06-23 LAB
ALBUMIN SERPL-MCNC: 3.2 G/DL (ref 3.5–5)
ALBUMIN/GLOB SERPL: 0.8 (ref 1.1–2.2)
ALP SERPL-CCNC: 88 U/L (ref 45–117)
ALT SERPL-CCNC: 48 U/L (ref 12–78)
ANION GAP SERPL CALC-SCNC: 12 MMOL/L (ref 5–15)
AST SERPL-CCNC: 49 U/L (ref 15–37)
BASOPHILS # BLD: 0 K/UL (ref 0–0.1)
BASOPHILS NFR BLD: 0 % (ref 0–1)
BILIRUB SERPL-MCNC: 0.6 MG/DL (ref 0.2–1)
BUN SERPL-MCNC: 20 MG/DL (ref 6–20)
BUN/CREAT SERPL: 14 (ref 12–20)
CALCIUM SERPL-MCNC: 9.2 MG/DL (ref 8.5–10.1)
CHLORIDE SERPL-SCNC: 103 MMOL/L (ref 97–108)
CO2 SERPL-SCNC: 19 MMOL/L (ref 21–32)
CREAT SERPL-MCNC: 1.47 MG/DL (ref 0.7–1.3)
DIFFERENTIAL METHOD BLD: ABNORMAL
EOSINOPHIL # BLD: 0.1 K/UL (ref 0–0.4)
EOSINOPHIL NFR BLD: 1 % (ref 0–7)
ERYTHROCYTE [DISTWIDTH] IN BLOOD BY AUTOMATED COUNT: 14.1 % (ref 11.5–14.5)
GLOBULIN SER CALC-MCNC: 3.8 G/DL (ref 2–4)
GLUCOSE SERPL-MCNC: 314 MG/DL (ref 65–100)
HCT VFR BLD AUTO: 46.4 % (ref 36.6–50.3)
HGB BLD-MCNC: 15.8 G/DL (ref 12.1–17)
IMM GRANULOCYTES # BLD AUTO: 0.1 K/UL (ref 0–0.04)
IMM GRANULOCYTES NFR BLD AUTO: 1 % (ref 0–0.5)
LYMPHOCYTES # BLD: 1.2 K/UL (ref 0.8–3.5)
LYMPHOCYTES NFR BLD: 12 % (ref 12–49)
MCH RBC QN AUTO: 31.5 PG (ref 26–34)
MCHC RBC AUTO-ENTMCNC: 34.1 G/DL (ref 30–36.5)
MCV RBC AUTO: 92.4 FL (ref 80–99)
MONOCYTES # BLD: 0.7 K/UL (ref 0–1)
MONOCYTES NFR BLD: 7 % (ref 5–13)
NEUTS SEG # BLD: 7.7 K/UL (ref 1.8–8)
NEUTS SEG NFR BLD: 79 % (ref 32–75)
NRBC # BLD: 0 K/UL (ref 0–0.01)
NRBC BLD-RTO: 0 PER 100 WBC
PLATELET # BLD AUTO: 113 K/UL (ref 150–400)
PMV BLD AUTO: 12.4 FL (ref 8.9–12.9)
POTASSIUM SERPL-SCNC: 3.8 MMOL/L (ref 3.5–5.1)
PROT SERPL-MCNC: 7 G/DL (ref 6.4–8.2)
RBC # BLD AUTO: 5.02 M/UL (ref 4.1–5.7)
SODIUM SERPL-SCNC: 134 MMOL/L (ref 136–145)
WBC # BLD AUTO: 9.8 K/UL (ref 4.1–11.1)

## 2024-06-23 PROCEDURE — 85025 COMPLETE CBC W/AUTO DIFF WBC: CPT

## 2024-06-23 PROCEDURE — 2580000003 HC RX 258: Performed by: EMERGENCY MEDICINE

## 2024-06-23 PROCEDURE — 80053 COMPREHEN METABOLIC PANEL: CPT

## 2024-06-23 PROCEDURE — 74018 RADEX ABDOMEN 1 VIEW: CPT

## 2024-06-23 PROCEDURE — 36415 COLL VENOUS BLD VENIPUNCTURE: CPT

## 2024-06-23 PROCEDURE — 99285 EMERGENCY DEPT VISIT HI MDM: CPT

## 2024-06-23 PROCEDURE — 6360000002 HC RX W HCPCS: Performed by: EMERGENCY MEDICINE

## 2024-06-23 PROCEDURE — 96375 TX/PRO/DX INJ NEW DRUG ADDON: CPT

## 2024-06-23 PROCEDURE — 1100000000 HC RM PRIVATE

## 2024-06-23 PROCEDURE — 2500000003 HC RX 250 WO HCPCS: Performed by: EMERGENCY MEDICINE

## 2024-06-23 PROCEDURE — 96374 THER/PROPH/DIAG INJ IV PUSH: CPT

## 2024-06-23 RX ORDER — 0.9 % SODIUM CHLORIDE 0.9 %
1000 INTRAVENOUS SOLUTION INTRAVENOUS
Status: COMPLETED | OUTPATIENT
Start: 2024-06-23 | End: 2024-06-23

## 2024-06-23 RX ORDER — HYDROMORPHONE HYDROCHLORIDE 1 MG/ML
1 INJECTION, SOLUTION INTRAMUSCULAR; INTRAVENOUS; SUBCUTANEOUS
Status: DISCONTINUED | OUTPATIENT
Start: 2024-06-23 | End: 2024-06-23

## 2024-06-23 RX ORDER — DICYCLOMINE HYDROCHLORIDE 10 MG/1
10 CAPSULE ORAL 4 TIMES DAILY PRN
Status: DISCONTINUED | OUTPATIENT
Start: 2024-06-23 | End: 2024-06-28 | Stop reason: HOSPADM

## 2024-06-23 RX ORDER — ACETAMINOPHEN 650 MG/1
650 SUPPOSITORY RECTAL EVERY 6 HOURS PRN
Status: DISCONTINUED | OUTPATIENT
Start: 2024-06-23 | End: 2024-06-28 | Stop reason: HOSPADM

## 2024-06-23 RX ORDER — OXYCODONE HYDROCHLORIDE 5 MG/1
10 TABLET ORAL EVERY 4 HOURS PRN
Status: DISCONTINUED | OUTPATIENT
Start: 2024-06-23 | End: 2024-06-28 | Stop reason: HOSPADM

## 2024-06-23 RX ORDER — KETOROLAC TROMETHAMINE 30 MG/ML
15 INJECTION, SOLUTION INTRAMUSCULAR; INTRAVENOUS EVERY 6 HOURS PRN
Status: DISCONTINUED | OUTPATIENT
Start: 2024-06-23 | End: 2024-06-28 | Stop reason: HOSPADM

## 2024-06-23 RX ORDER — INSULIN LISPRO 100 [IU]/ML
0-8 INJECTION, SOLUTION INTRAVENOUS; SUBCUTANEOUS
Status: DISCONTINUED | OUTPATIENT
Start: 2024-06-24 | End: 2024-06-28 | Stop reason: HOSPADM

## 2024-06-23 RX ORDER — SODIUM CHLORIDE 9 MG/ML
INJECTION, SOLUTION INTRAVENOUS PRN
Status: DISCONTINUED | OUTPATIENT
Start: 2024-06-23 | End: 2024-06-28 | Stop reason: HOSPADM

## 2024-06-23 RX ORDER — POLYETHYLENE GLYCOL 3350 17 G/17G
17 POWDER, FOR SOLUTION ORAL 2 TIMES DAILY
Status: DISCONTINUED | OUTPATIENT
Start: 2024-06-23 | End: 2024-06-28 | Stop reason: HOSPADM

## 2024-06-23 RX ORDER — DIPHENHYDRAMINE HCL 25 MG
50 CAPSULE ORAL ONCE
Status: COMPLETED | OUTPATIENT
Start: 2024-06-23 | End: 2024-06-24

## 2024-06-23 RX ORDER — ONDANSETRON 4 MG/1
4 TABLET, ORALLY DISINTEGRATING ORAL EVERY 8 HOURS PRN
Status: DISCONTINUED | OUTPATIENT
Start: 2024-06-23 | End: 2024-06-28 | Stop reason: HOSPADM

## 2024-06-23 RX ORDER — SODIUM CHLORIDE, SODIUM LACTATE, POTASSIUM CHLORIDE, CALCIUM CHLORIDE 600; 310; 30; 20 MG/100ML; MG/100ML; MG/100ML; MG/100ML
INJECTION, SOLUTION INTRAVENOUS CONTINUOUS
Status: ACTIVE | OUTPATIENT
Start: 2024-06-23 | End: 2024-06-24

## 2024-06-23 RX ORDER — HYDROMORPHONE HYDROCHLORIDE 1 MG/ML
1 INJECTION, SOLUTION INTRAMUSCULAR; INTRAVENOUS; SUBCUTANEOUS
Status: COMPLETED | OUTPATIENT
Start: 2024-06-23 | End: 2024-06-23

## 2024-06-23 RX ORDER — PRAVASTATIN SODIUM 10 MG
20 TABLET ORAL DAILY
Status: DISCONTINUED | OUTPATIENT
Start: 2024-06-24 | End: 2024-06-28 | Stop reason: HOSPADM

## 2024-06-23 RX ORDER — LISINOPRIL 40 MG/1
40 TABLET ORAL DAILY
Status: DISCONTINUED | OUTPATIENT
Start: 2024-06-24 | End: 2024-06-28 | Stop reason: HOSPADM

## 2024-06-23 RX ORDER — LATANOPROST 50 UG/ML
1 SOLUTION/ DROPS OPHTHALMIC DAILY
Status: DISCONTINUED | OUTPATIENT
Start: 2024-06-24 | End: 2024-06-28 | Stop reason: HOSPADM

## 2024-06-23 RX ORDER — ONDANSETRON 2 MG/ML
4 INJECTION INTRAMUSCULAR; INTRAVENOUS ONCE
Status: COMPLETED | OUTPATIENT
Start: 2024-06-23 | End: 2024-06-23

## 2024-06-23 RX ORDER — SODIUM CHLORIDE 0.9 % (FLUSH) 0.9 %
5-40 SYRINGE (ML) INJECTION EVERY 12 HOURS SCHEDULED
Status: DISCONTINUED | OUTPATIENT
Start: 2024-06-23 | End: 2024-06-28 | Stop reason: HOSPADM

## 2024-06-23 RX ORDER — HYDROMORPHONE HYDROCHLORIDE 1 MG/ML
1 INJECTION, SOLUTION INTRAMUSCULAR; INTRAVENOUS; SUBCUTANEOUS ONCE
Status: COMPLETED | OUTPATIENT
Start: 2024-06-23 | End: 2024-06-24

## 2024-06-23 RX ORDER — POLYETHYLENE GLYCOL 3350 17 G/17G
17 POWDER, FOR SOLUTION ORAL DAILY PRN
Status: DISCONTINUED | OUTPATIENT
Start: 2024-06-23 | End: 2024-06-28 | Stop reason: HOSPADM

## 2024-06-23 RX ORDER — GLUCAGON 1 MG/ML
1 KIT INJECTION PRN
Status: DISCONTINUED | OUTPATIENT
Start: 2024-06-23 | End: 2024-06-28 | Stop reason: HOSPADM

## 2024-06-23 RX ORDER — PANTOPRAZOLE SODIUM 40 MG/1
40 TABLET, DELAYED RELEASE ORAL
Status: DISCONTINUED | OUTPATIENT
Start: 2024-06-24 | End: 2024-06-28 | Stop reason: HOSPADM

## 2024-06-23 RX ORDER — DEXTROSE MONOHYDRATE 100 MG/ML
INJECTION, SOLUTION INTRAVENOUS CONTINUOUS PRN
Status: DISCONTINUED | OUTPATIENT
Start: 2024-06-23 | End: 2024-06-28 | Stop reason: HOSPADM

## 2024-06-23 RX ORDER — INSULIN GLARGINE 100 [IU]/ML
17 INJECTION, SOLUTION SUBCUTANEOUS NIGHTLY
Status: DISCONTINUED | OUTPATIENT
Start: 2024-06-23 | End: 2024-06-28 | Stop reason: HOSPADM

## 2024-06-23 RX ORDER — HYDRALAZINE HYDROCHLORIDE 20 MG/ML
5 INJECTION INTRAMUSCULAR; INTRAVENOUS EVERY 4 HOURS PRN
Status: DISCONTINUED | OUTPATIENT
Start: 2024-06-23 | End: 2024-06-28 | Stop reason: HOSPADM

## 2024-06-23 RX ORDER — ONDANSETRON 2 MG/ML
4 INJECTION INTRAMUSCULAR; INTRAVENOUS EVERY 6 HOURS PRN
Status: DISCONTINUED | OUTPATIENT
Start: 2024-06-23 | End: 2024-06-28 | Stop reason: HOSPADM

## 2024-06-23 RX ORDER — SODIUM CHLORIDE 0.9 % (FLUSH) 0.9 %
5-40 SYRINGE (ML) INJECTION PRN
Status: DISCONTINUED | OUTPATIENT
Start: 2024-06-23 | End: 2024-06-28 | Stop reason: HOSPADM

## 2024-06-23 RX ORDER — HYDROMORPHONE HYDROCHLORIDE 1 MG/ML
1 INJECTION, SOLUTION INTRAMUSCULAR; INTRAVENOUS; SUBCUTANEOUS EVERY 4 HOURS PRN
Status: DISCONTINUED | OUTPATIENT
Start: 2024-06-23 | End: 2024-06-28 | Stop reason: HOSPADM

## 2024-06-23 RX ORDER — OXYCODONE HYDROCHLORIDE 5 MG/1
5 TABLET ORAL EVERY 4 HOURS PRN
Status: DISCONTINUED | OUTPATIENT
Start: 2024-06-23 | End: 2024-06-28 | Stop reason: HOSPADM

## 2024-06-23 RX ORDER — ASPIRIN 81 MG/1
81 TABLET, CHEWABLE ORAL DAILY
Status: DISCONTINUED | OUTPATIENT
Start: 2024-06-24 | End: 2024-06-28 | Stop reason: HOSPADM

## 2024-06-23 RX ORDER — ACETAMINOPHEN 325 MG/1
650 TABLET ORAL EVERY 6 HOURS PRN
Status: DISCONTINUED | OUTPATIENT
Start: 2024-06-23 | End: 2024-06-28 | Stop reason: HOSPADM

## 2024-06-23 RX ORDER — LANOLIN ALCOHOL/MO/W.PET/CERES
3 CREAM (GRAM) TOPICAL NIGHTLY PRN
Status: DISCONTINUED | OUTPATIENT
Start: 2024-06-23 | End: 2024-06-28 | Stop reason: HOSPADM

## 2024-06-23 RX ORDER — ONDANSETRON 2 MG/ML
4 INJECTION INTRAMUSCULAR; INTRAVENOUS EVERY 4 HOURS PRN
Status: DISCONTINUED | OUTPATIENT
Start: 2024-06-23 | End: 2024-06-23

## 2024-06-23 RX ORDER — INSULIN LISPRO 100 [IU]/ML
0-4 INJECTION, SOLUTION INTRAVENOUS; SUBCUTANEOUS NIGHTLY
Status: DISCONTINUED | OUTPATIENT
Start: 2024-06-23 | End: 2024-06-28 | Stop reason: HOSPADM

## 2024-06-23 RX ADMIN — HYDROMORPHONE HYDROCHLORIDE 1 MG: 1 INJECTION, SOLUTION INTRAMUSCULAR; INTRAVENOUS; SUBCUTANEOUS at 21:12

## 2024-06-23 RX ADMIN — ONDANSETRON 4 MG: 2 INJECTION INTRAMUSCULAR; INTRAVENOUS at 21:14

## 2024-06-23 RX ADMIN — SODIUM CHLORIDE 1000 ML: 9 INJECTION, SOLUTION INTRAVENOUS at 21:18

## 2024-06-23 ASSESSMENT — PAIN DESCRIPTION - LOCATION
LOCATION: ABDOMEN

## 2024-06-23 ASSESSMENT — PAIN SCALES - GENERAL
PAINLEVEL_OUTOF10: 9
PAINLEVEL_OUTOF10: 9
PAINLEVEL_OUTOF10: 4

## 2024-06-23 ASSESSMENT — PAIN DESCRIPTION - DESCRIPTORS
DESCRIPTORS: SORE
DESCRIPTORS: SORE
DESCRIPTORS: SORE;DISCOMFORT

## 2024-06-23 ASSESSMENT — PAIN DESCRIPTION - ORIENTATION
ORIENTATION: LEFT;LOWER;UPPER
ORIENTATION: LEFT;LOWER;UPPER
ORIENTATION: LEFT;UPPER;LOWER

## 2024-06-23 ASSESSMENT — LIFESTYLE VARIABLES
HOW MANY STANDARD DRINKS CONTAINING ALCOHOL DO YOU HAVE ON A TYPICAL DAY: 1 OR 2
HOW OFTEN DO YOU HAVE A DRINK CONTAINING ALCOHOL: MONTHLY OR LESS

## 2024-06-24 ENCOUNTER — APPOINTMENT (OUTPATIENT)
Facility: HOSPITAL | Age: 79
DRG: 394 | End: 2024-06-24
Payer: MEDICARE

## 2024-06-24 LAB
ANION GAP SERPL CALC-SCNC: 7 MMOL/L (ref 5–15)
BASOPHILS # BLD: 0 K/UL (ref 0–0.1)
BASOPHILS # BLD: NORMAL K/UL (ref 0–0.1)
BASOPHILS NFR BLD: 0 % (ref 0–1)
BASOPHILS NFR BLD: NORMAL % (ref 0–1)
BUN SERPL-MCNC: 16 MG/DL (ref 6–20)
BUN/CREAT SERPL: 14 (ref 12–20)
CALCIUM SERPL-MCNC: 8.8 MG/DL (ref 8.5–10.1)
CHLORIDE SERPL-SCNC: 106 MMOL/L (ref 97–108)
CO2 SERPL-SCNC: 23 MMOL/L (ref 21–32)
CREAT SERPL-MCNC: 1.11 MG/DL (ref 0.7–1.3)
DIFFERENTIAL METHOD BLD: ABNORMAL
DIFFERENTIAL METHOD BLD: NORMAL
EOSINOPHIL # BLD: 0 K/UL (ref 0–0.4)
EOSINOPHIL # BLD: NORMAL K/UL (ref 0–0.4)
EOSINOPHIL NFR BLD: 0 % (ref 0–7)
EOSINOPHIL NFR BLD: NORMAL % (ref 0–7)
ERYTHROCYTE [DISTWIDTH] IN BLOOD BY AUTOMATED COUNT: 13.8 % (ref 11.5–14.5)
ERYTHROCYTE [DISTWIDTH] IN BLOOD BY AUTOMATED COUNT: NORMAL % (ref 11.5–14.5)
GLUCOSE BLD STRIP.AUTO-MCNC: 157 MG/DL (ref 65–117)
GLUCOSE BLD STRIP.AUTO-MCNC: 177 MG/DL (ref 65–117)
GLUCOSE BLD STRIP.AUTO-MCNC: 200 MG/DL (ref 65–117)
GLUCOSE BLD STRIP.AUTO-MCNC: 239 MG/DL (ref 65–117)
GLUCOSE BLD STRIP.AUTO-MCNC: 353 MG/DL (ref 65–117)
GLUCOSE SERPL-MCNC: 161 MG/DL (ref 65–100)
HCT VFR BLD AUTO: 45 % (ref 36.6–50.3)
HCT VFR BLD AUTO: NORMAL % (ref 36.6–50.3)
HGB BLD-MCNC: 15 G/DL (ref 12.1–17)
HGB BLD-MCNC: NORMAL G/DL (ref 12.1–17)
IMM GRANULOCYTES # BLD AUTO: 0.1 K/UL (ref 0–0.04)
IMM GRANULOCYTES # BLD AUTO: NORMAL K/UL (ref 0–0.04)
IMM GRANULOCYTES NFR BLD AUTO: 1 % (ref 0–0.5)
IMM GRANULOCYTES NFR BLD AUTO: NORMAL % (ref 0–0.5)
LYMPHOCYTES # BLD: 0.6 K/UL (ref 0.8–3.5)
LYMPHOCYTES # BLD: NORMAL K/UL (ref 0.8–3.5)
LYMPHOCYTES NFR BLD: 5 % (ref 12–49)
LYMPHOCYTES NFR BLD: NORMAL % (ref 12–49)
MCH RBC QN AUTO: 31.2 PG (ref 26–34)
MCH RBC QN AUTO: NORMAL PG (ref 26–34)
MCHC RBC AUTO-ENTMCNC: 33.3 G/DL (ref 30–36.5)
MCHC RBC AUTO-ENTMCNC: NORMAL G/DL (ref 30–36.5)
MCV RBC AUTO: 93.6 FL (ref 80–99)
MCV RBC AUTO: NORMAL FL (ref 80–99)
MONOCYTES # BLD: 0.3 K/UL (ref 0–1)
MONOCYTES # BLD: NORMAL K/UL (ref 0–1)
MONOCYTES NFR BLD: 3 % (ref 5–13)
MONOCYTES NFR BLD: NORMAL % (ref 5–13)
NEUTS SEG # BLD: 10.6 K/UL (ref 1.8–8)
NEUTS SEG # BLD: NORMAL K/UL (ref 1.8–8)
NEUTS SEG NFR BLD: 91 % (ref 32–75)
NEUTS SEG NFR BLD: NORMAL % (ref 32–75)
NRBC # BLD: 0 K/UL (ref 0–0.01)
NRBC # BLD: NORMAL K/UL (ref 0–0.01)
NRBC BLD-RTO: 0 PER 100 WBC
NRBC BLD-RTO: NORMAL PER 100 WBC
PLATELET # BLD AUTO: 124 K/UL (ref 150–400)
PLATELET # BLD AUTO: NORMAL K/UL (ref 150–400)
PMV BLD AUTO: 12.6 FL (ref 8.9–12.9)
PMV BLD AUTO: NORMAL FL (ref 8.9–12.9)
POTASSIUM SERPL-SCNC: 4.3 MMOL/L (ref 3.5–5.1)
RBC # BLD AUTO: 4.81 M/UL (ref 4.1–5.7)
RBC # BLD AUTO: NORMAL M/UL (ref 4.1–5.7)
RBC MORPH BLD: ABNORMAL
RBC MORPH BLD: NORMAL
RBC MORPH BLD: NORMAL
SERVICE CMNT-IMP: ABNORMAL
SODIUM SERPL-SCNC: 136 MMOL/L (ref 136–145)
WBC # BLD AUTO: 11.6 K/UL (ref 4.1–11.1)
WBC # BLD AUTO: NORMAL K/UL (ref 4.1–11.1)

## 2024-06-24 PROCEDURE — 97165 OT EVAL LOW COMPLEX 30 MIN: CPT | Performed by: OCCUPATIONAL THERAPIST

## 2024-06-24 PROCEDURE — 82962 GLUCOSE BLOOD TEST: CPT

## 2024-06-24 PROCEDURE — 97163 PT EVAL HIGH COMPLEX 45 MIN: CPT

## 2024-06-24 PROCEDURE — 6370000000 HC RX 637 (ALT 250 FOR IP): Performed by: INTERNAL MEDICINE

## 2024-06-24 PROCEDURE — 2580000003 HC RX 258: Performed by: STUDENT IN AN ORGANIZED HEALTH CARE EDUCATION/TRAINING PROGRAM

## 2024-06-24 PROCEDURE — 80048 BASIC METABOLIC PNL TOTAL CA: CPT

## 2024-06-24 PROCEDURE — 6360000004 HC RX CONTRAST MEDICATION: Performed by: INTERNAL MEDICINE

## 2024-06-24 PROCEDURE — 74177 CT ABD & PELVIS W/CONTRAST: CPT

## 2024-06-24 PROCEDURE — 6370000000 HC RX 637 (ALT 250 FOR IP): Performed by: STUDENT IN AN ORGANIZED HEALTH CARE EDUCATION/TRAINING PROGRAM

## 2024-06-24 PROCEDURE — 6360000002 HC RX W HCPCS: Performed by: STUDENT IN AN ORGANIZED HEALTH CARE EDUCATION/TRAINING PROGRAM

## 2024-06-24 PROCEDURE — 85025 COMPLETE CBC W/AUTO DIFF WBC: CPT

## 2024-06-24 PROCEDURE — 2060000000 HC ICU INTERMEDIATE R&B

## 2024-06-24 PROCEDURE — 97535 SELF CARE MNGMENT TRAINING: CPT | Performed by: OCCUPATIONAL THERAPIST

## 2024-06-24 PROCEDURE — 97530 THERAPEUTIC ACTIVITIES: CPT

## 2024-06-24 PROCEDURE — 36415 COLL VENOUS BLD VENIPUNCTURE: CPT

## 2024-06-24 RX ORDER — DIPHENHYDRAMINE HCL 25 MG
50 CAPSULE ORAL ONCE
Status: COMPLETED | OUTPATIENT
Start: 2024-06-24 | End: 2024-06-24

## 2024-06-24 RX ORDER — INSULIN LISPRO 100 [IU]/ML
4 INJECTION, SOLUTION INTRAVENOUS; SUBCUTANEOUS ONCE
Status: COMPLETED | OUTPATIENT
Start: 2024-06-24 | End: 2024-06-24

## 2024-06-24 RX ADMIN — SODIUM CHLORIDE, POTASSIUM CHLORIDE, SODIUM LACTATE AND CALCIUM CHLORIDE: 600; 310; 30; 20 INJECTION, SOLUTION INTRAVENOUS at 00:29

## 2024-06-24 RX ADMIN — INSULIN LISPRO 2 UNITS: 100 INJECTION, SOLUTION INTRAVENOUS; SUBCUTANEOUS at 17:19

## 2024-06-24 RX ADMIN — OXYCODONE HYDROCHLORIDE 5 MG: 5 TABLET ORAL at 14:47

## 2024-06-24 RX ADMIN — SODIUM CHLORIDE, PRESERVATIVE FREE 10 ML: 5 INJECTION INTRAVENOUS at 21:04

## 2024-06-24 RX ADMIN — INSULIN GLARGINE 17 UNITS: 100 INJECTION, SOLUTION SUBCUTANEOUS at 00:16

## 2024-06-24 RX ADMIN — PANTOPRAZOLE SODIUM 40 MG: 40 TABLET, DELAYED RELEASE ORAL at 06:46

## 2024-06-24 RX ADMIN — PANTOPRAZOLE SODIUM 40 MG: 40 TABLET, DELAYED RELEASE ORAL at 14:47

## 2024-06-24 RX ADMIN — HYDROMORPHONE HYDROCHLORIDE 1 MG: 1 INJECTION, SOLUTION INTRAMUSCULAR; INTRAVENOUS; SUBCUTANEOUS at 00:10

## 2024-06-24 RX ADMIN — PREDNISONE 50 MG: 20 TABLET ORAL at 11:44

## 2024-06-24 RX ADMIN — PREDNISONE 50 MG: 20 TABLET ORAL at 00:14

## 2024-06-24 RX ADMIN — SODIUM CHLORIDE, PRESERVATIVE FREE 10 ML: 5 INJECTION INTRAVENOUS at 09:03

## 2024-06-24 RX ADMIN — INSULIN GLARGINE 17 UNITS: 100 INJECTION, SOLUTION SUBCUTANEOUS at 21:01

## 2024-06-24 RX ADMIN — LISINOPRIL 40 MG: 40 TABLET ORAL at 09:07

## 2024-06-24 RX ADMIN — IOPAMIDOL 100 ML: 755 INJECTION, SOLUTION INTRAVENOUS at 13:42

## 2024-06-24 RX ADMIN — PIPERACILLIN AND TAZOBACTAM 4500 MG: 4; .5 INJECTION, POWDER, LYOPHILIZED, FOR SOLUTION INTRAVENOUS; PARENTERAL at 00:20

## 2024-06-24 RX ADMIN — DIPHENHYDRAMINE HYDROCHLORIDE 50 MG: 25 CAPSULE ORAL at 00:13

## 2024-06-24 RX ADMIN — POLYETHYLENE GLYCOL 3350 17 G: 17 POWDER, FOR SOLUTION ORAL at 09:07

## 2024-06-24 RX ADMIN — PIPERACILLIN AND TAZOBACTAM 3375 MG: 3; .375 INJECTION, POWDER, LYOPHILIZED, FOR SOLUTION INTRAVENOUS at 15:29

## 2024-06-24 RX ADMIN — PRAVASTATIN SODIUM 20 MG: 10 TABLET ORAL at 09:07

## 2024-06-24 RX ADMIN — POLYETHYLENE GLYCOL 3350 17 G: 17 POWDER, FOR SOLUTION ORAL at 21:03

## 2024-06-24 RX ADMIN — DIPHENHYDRAMINE HYDROCHLORIDE 50 MG: 25 CAPSULE ORAL at 11:44

## 2024-06-24 RX ADMIN — ASPIRIN 81 MG: 81 TABLET, CHEWABLE ORAL at 09:07

## 2024-06-24 RX ADMIN — SODIUM CHLORIDE, PRESERVATIVE FREE 10 ML: 5 INJECTION INTRAVENOUS at 00:30

## 2024-06-24 RX ADMIN — PREDNISONE 50 MG: 20 TABLET ORAL at 05:01

## 2024-06-24 RX ADMIN — PIPERACILLIN AND TAZOBACTAM 3375 MG: 3; .375 INJECTION, POWDER, LYOPHILIZED, FOR SOLUTION INTRAVENOUS at 09:13

## 2024-06-24 RX ADMIN — LATANOPROST 1 DROP: 50 SOLUTION OPHTHALMIC at 09:19

## 2024-06-24 RX ADMIN — POLYETHYLENE GLYCOL 3350 17 G: 17 POWDER, FOR SOLUTION ORAL at 01:46

## 2024-06-24 RX ADMIN — INSULIN LISPRO 4 UNITS: 100 INJECTION, SOLUTION INTRAVENOUS; SUBCUTANEOUS at 21:02

## 2024-06-24 ASSESSMENT — PAIN DESCRIPTION - LOCATION
LOCATION: ABDOMEN
LOCATION: ABDOMEN

## 2024-06-24 ASSESSMENT — PAIN SCALES - GENERAL
PAINLEVEL_OUTOF10: 5
PAINLEVEL_OUTOF10: 5
PAINLEVEL_OUTOF10: 6

## 2024-06-24 ASSESSMENT — PAIN DESCRIPTION - ORIENTATION: ORIENTATION: LEFT;LOWER;RIGHT

## 2024-06-24 ASSESSMENT — ENCOUNTER SYMPTOMS: ABDOMINAL PAIN: 1

## 2024-06-24 ASSESSMENT — PAIN DESCRIPTION - DESCRIPTORS: DESCRIPTORS: SORE

## 2024-06-24 NOTE — PROGRESS NOTES
Attempted to see pt for OT services.  Pt had doctor at bedside meeting with pt.  Will defer but continue to follow.

## 2024-06-24 NOTE — ED NOTES
TRANSFER - OUT REPORT:    Verbal report given to ANATOLIY Broussard on Ander Kelsey  being transferred to 2155 for routine progression of patient care       Report consisted of patient's Situation, Background, Assessment and   Recommendations(SBAR).     Information from the following report(s) Nurse Handoff Report, ED Encounter Summary, ED SBAR, Adult Overview, Intake/Output, and Recent Results was reviewed with the receiving nurse.    [place] Assessment:    Presents to emergency department  because of falls (Syncope, seizure, or loss of consciousness): No  Age > 70: Yes  Altered Mental Status, Intoxication with alcohol or substance confusion (Disorientation, impaired judgment, poor safety awaremess, or inability to follow instructions): No  Impaired Mobility: Ambulates or transfers with assistive devices or assistance; Unable to ambulate or transer.: No  Nursing Judgement: Yes          Lines:   Peripheral IV 06/23/24 Right Antecubital (Active)        Opportunity for questions and clarification was provided.      Patient transported with:  Monitor and Registered Nurse

## 2024-06-24 NOTE — ED PROVIDER NOTES
(has no administration in time range)     Or   HYDROmorphone HCl PF (DILAUDID) injection 1 mg (has no administration in time range)   dicyclomine (BENTYL) capsule 10 mg (has no administration in time range)   ketorolac (TORADOL) injection 15 mg (has no administration in time range)   aspirin chewable tablet 81 mg (has no administration in time range)   pantoprazole (PROTONIX) tablet 40 mg (has no administration in time range)   polyethylene glycol (GLYCOLAX) packet 17 g (has no administration in time range)   latanoprost (XALATAN) 0.005 % ophthalmic solution 1 drop (has no administration in time range)   pravastatin (PRAVACHOL) tablet 20 mg (has no administration in time range)   insulin glargine (LANTUS) injection vial 17 Units (has no administration in time range)   insulin lispro (HUMALOG,ADMELOG) injection vial 0-8 Units (has no administration in time range)   insulin lispro (HUMALOG,ADMELOG) injection vial 0-4 Units (has no administration in time range)   glucose chewable tablet 16 g (has no administration in time range)   dextrose bolus 10% 125 mL (has no administration in time range)     Or   dextrose bolus 10% 250 mL (has no administration in time range)   glucagon injection 1 mg (has no administration in time range)   dextrose 10 % infusion (has no administration in time range)   sodium chloride flush 0.9 % injection 5-40 mL (has no administration in time range)   sodium chloride flush 0.9 % injection 5-40 mL (has no administration in time range)   0.9 % sodium chloride infusion (has no administration in time range)   ondansetron (ZOFRAN-ODT) disintegrating tablet 4 mg (has no administration in time range)     Or   ondansetron (ZOFRAN) injection 4 mg (has no administration in time range)   polyethylene glycol (GLYCOLAX) packet 17 g (has no administration in time range)   acetaminophen (TYLENOL) tablet 650 mg (has no administration in time range)     Or   acetaminophen (TYLENOL) suppository 650 mg (has no
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
## 2024-06-24 NOTE — PLAN OF CARE
PHYSICAL THERAPY EVALUATION/DISCHARGE    Patient: Ander Kelsey (78 y.o. male)  Date: 6/24/2024  Primary Diagnosis: Colitis [K52.9]  Abdominal pain, acute, generalized [R10.84]       Precautions: Up Ad Trini, Up as Tolerated                      ASSESSMENT AND RECOMMENDATIONS:  Based on the objective data below, the patient demonstrates independent skill with bed mobility, transfers and ambulation with intact sitting and standing balance. He was active and independent without need for AD prior to this admission. Sensation is impaired in bilateral feet due to diabetic neuropathy, but balance reactions remain intact. Recommend the patient be up ad trini in the room if not connected to monitor or IV pole.  Encourage the patient to be out of bed for all meals. No further PT needs expected post discharge. Will sign off.    Functional Outcome Measure:  The patient scored 24/24 on the [facility] outcome measure.      Further skilled acute physical therapy is not indicated at this time.       PLAN :  Recommendation for discharge: (in order for the patient to meet his/her long term goals): No skilled physical therapy    Other factors to consider for discharge: no additional factors    IF patient discharges home will need the following DME: none       SUBJECTIVE:   Patient stated “ I feel for being in bed the last 4 days. ”    OBJECTIVE DATA SUMMARY:     Past Medical History:   Diagnosis Date    Mann's esophagus 1990    long segment, esophageal bleed    Bell's palsy     BPH (benign prostatic hyperplasia)     Chronic kidney disease     Stage IV    Colon polyps 2007    Dr. Enriquez[facility]    DDD (degenerative disc disease), cervical     Diabetes (HCC)     Fracture of left leg     lower, casted    GERD (gastroesophageal reflux disease)     Mann's esophagus long segment    Glaucoma     bilateral    Gout     Hearing loss     has bilateral hearing aids    Hypercholesterolemia     Hyperlipidemia     Hypertension     Relative polycythemia

## 2024-06-24 NOTE — PROGRESS NOTES
0212: Received verbal report from ANATOLIY Roberts.    0300: Transported patient to [facility] 2155 via stretcher with portable cardiac monitor. Patient is alert, oriented x4, with clear speech. Able to follow commands. Denies pain at the moment. Able to move his upper and lower extremities. Currently on sinus rhythm, elevated blood pressure, afebrile. SpO2 93-95% on room air. Lung sounds clear bilaterally. Abdomen is flat, tender and hypoactive on all quadrants. Patient is continent and using urinal. PIV on right AC gauge 20 has good blood return, IVF LR running at 100ml/hour. Skin is intact.    0330: Blood drawn for CBC, BMP with reflex to Mg.    0700: Bedside and Verbal shift change report given to ANATOLIY Drake (oncoming nurse) by ANATOLIY Pickens (offgoing nurse). Report included the following information Nurse Handoff Report.

## 2024-06-24 NOTE — PROGRESS NOTES
Comprehensive Nutrition Assessment    Type and Reason for Visit:  Initial, Positive Nutrition Screen    Nutrition Recommendations/Plan:   Clear liquid diet for now per GI  Please document % meals consumed in flowsheet I/O's under intake      Malnutrition Assessment:  Malnutrition Status:  At risk for malnutrition (Comment) (06/24/24 1602)    Context:  Acute Illness     Findings of the 6 clinical characteristics of malnutrition:  Energy Intake:  Mild decrease in energy intake (Comment)  Weight Loss:  Greater than 2% over 1 week     Body Fat Loss:  No significant body fat loss     Muscle Mass Loss:  No significant muscle mass loss    Fluid Accumulation:  No significant fluid accumulation     Strength:  Not Performed    Nutrition Assessment:     Chart reviewed for MST. Pt medically noted for colitis, ileus, LGIB, Mann's esophagus, IDDM, CKD3, HLD, HTN, hx of Whipple. Pt reports no issues with appetite or intake prior to 6/21. Today he had abdominal pain again after his clear liquid lunch. He lost 5# (3%) over 3-4 days which is significant for the time frame. Will continue monitoring.     Patient Vitals for the past 120 hrs:   PO Meals Eaten (%)   06/24/24 0900 76 - 100%     Wt Readings from Last 5 Encounters:   06/23/24 67 kg (147 lb 11.3 oz)   06/21/24 68.9 kg (152 lb)   06/19/24 69.2 kg (152 lb 9.6 oz)   05/18/24 70.4 kg (155 lb 1.6 oz)   02/19/24 69.9 kg (154 lb)   ]    Nutrition Related Findings:    Labs: -157-161.   Meds: lantus, humalog, protonix, zosyn, glycolax, LR, oxycodone.   BM 6/21.   Wound Type: None       Current Nutrition Intake & Therapies:    Average Meal Intake: 51-75%, %  Average Supplements Intake: None Ordered  ADULT DIET; Clear Liquid; 4 carb choices (60 gm/meal)    Anthropometric Measures:  Height: 170.2 cm (5' 7\")  Ideal Body Weight (IBW): 148 lbs (67 kg)       Current Body Weight: 67 kg (147 lb 11.3 oz), 99.8 % IBW. Weight Source: Standing Scale  Current BMI (kg/m2): 23.1         Weight Adjustment For: No Adjustment                 BMI Categories: Normal Weight (BMI 22.0 to 24.9) age over 65    Estimated Daily Nutrient Needs:  Energy Requirements Based On: Formula  Weight Used for Energy Requirements: Current  Energy (kcal/day): 1760 kcals (BMR x 1.3AF)  Weight Used for Protein Requirements: Current  Protein (g/day): 67-80g (1.0-1.2g/kg)  Method Used for Fluid Requirements: 1 ml/kcal  Fluid (ml/day): 1760mL    Nutrition Diagnosis:   Inadequate protein-energy intake related to altered GI function as evidenced by NPO or clear liquid status due to medical condition    Nutrition Interventions:   Food and/or Nutrient Delivery: Continue Current Diet  Nutrition Education/Counseling: No recommendation at this time  Coordination of Nutrition Care: Continue to monitor while inpatient       Goals:     Goals: PO intake 50% or greater, PO intake 75% or greater, by next RD assessment       Nutrition Monitoring and Evaluation:   Behavioral-Environmental Outcomes: None Identified  Food/Nutrient Intake Outcomes: Diet Advancement/Tolerance, Food and Nutrient Intake, Supplement Intake  Physical Signs/Symptoms Outcomes: Biochemical Data, GI Status, Nutrition Focused Physical Findings, Weight    Discharge Planning:    Too soon to determine     Laxmi Green RD  Contact: [phone]

## 2024-06-24 NOTE — PROGRESS NOTES
OCCUPATIONAL THERAPY EVALUATION/DISCHARGE  Patient: Ander Kelsey (78 y.o. male)  Date: 6/24/2024  Primary Diagnosis: Colitis [K52.9]  Abdominal pain, acute, generalized [R10.84]         Precautions: Up Ad Trini, Up as Tolerated                  ASSESSMENT :  Based on the objective data below, the patient presents with overall independent mobility and ADLS.  SBA without wearing shoes for balance that improved to independence with wearing shoes.  Pt has baseline neuropathy and with shoes pt has better stability.  Further OT services aren't needed at this time and pt is in agreement.     Functional Outcome Measure:  The patient scored 100/100 on the barthel outcome measure which is indicative of no deficits with mobility and ADLS.      Further skilled acute occupational therapy is not indicated at this time.     PLAN :  Recommend with staff: mobilize    Recommendation for discharge: (in order for the patient to meet his/her long term goals): No skilled occupational therapy    Other factors to consider for discharge: no additional factors    IF patient discharges home will need the following DME: none     SUBJECTIVE:   Patient stated, “I still have some abdominal pain but no more diarrhea.”    OBJECTIVE DATA SUMMARY:     Past Medical History:   Diagnosis Date    Mann's esophagus 1990    long segment, esophageal bleed    Bell's palsy     BPH (benign prostatic hyperplasia)     Chronic kidney disease     Stage IV    Colon polyps 2007    Dr. Enriquez/[facility]    DDD (degenerative disc disease), cervical     Diabetes (HCC)     Fracture of left leg     lower, casted    GERD (gastroesophageal reflux disease)     Mann's esophagus long segment    Glaucoma     bilateral    Gout     Hearing loss     has bilateral hearing aids    Hypercholesterolemia     Hyperlipidemia     Hypertension     Relative polycythemia      Past Surgical History:   Procedure Laterality Date    CATARACT EXTRACTION W/ INTRAOCULAR LENS IMPLANT Bilateral      chair and to bathroom to maintain independence.  Educated on the benefits of wearing his shoes to improve balance and due to neuropathy.  Doffed socks seated and donned shoes seated without assist.     Barthel Index:    Barthel Index   Feeding: Independent, Able to apply any necessary device. Feeds in reasonable time  Bathing: Performs without assistance  Grooming: Washes face, vila hair, brushes teeth, shaves (manages plug if electric razor)  Dressing: Independent, Ties shoes, fasteners, applies braces  Bowel Control: No accidents. Able to use enema or suppository if needed  Bladder Control: No accidents. Able to care for collecting device, if used  Toilet Transfers: Independent with toilet or bedpan. Handles clothes, wipes, flushes or cleans rodas  Chair/Bed Trannsfers: Independent, including locks of wheelchair and lifting footrests  Ambulation: Independent for 50 yards. May use assistive devices, except for rolling walker  Stairs: Independent. May use assistive devices  Total Barthel Index Score: 100            The Barthel ADL Index: Guidelines  1. The index should be used as a record of what a patient does, not as a record of what a patient could do.  2. The main aim is to establish degree of independence from any help, physical or verbal, however minor and for whatever reason.  3. The need for supervision renders the patient not independent.  4. A patient's performance should be established using the best available evidence. Asking the patient, friends/relatives and nurses are the usual sources, but direct observation and common sense are also important. However direct testing is not needed.  5. Usually the patient's performance over the preceding 24-48 hours is important, but occasionally longer periods will be relevant.  6. Middle categories imply that the patient supplies over 50 per cent of the effort.  7. Use of aids to be independent is allowed.    Score Interpretation (from Sinoff 1997)

## 2024-06-24 NOTE — CONSULTS
GI CONSULTATION NOTE  Thanh Edouard PA-C  [phone] NP in-hospital cell phone M-F until 4:30  After 5pm or on weekends, please call  for physician on call    NAME: Ander Kelsey   :  1945   MRN:  [id number]   Attending: Tiffanie  Primary GI: Ingris  Date/Time:  2024 11:33 AM  Assessment:   AP w/ bloody diarrhea 4 days ago  Illeus  VSS  CMP unremarkable, Cr 1.11, trending down  WBC 14 POA, now 11.6  HgB 15, Plt 124  CT w/o 24:   No definite acute abnormality.  Suspected evidence of prior hiatal hernia repair, with residual/recurrent moderate sized hiatal hernia. Lower esophageal wall thickening and fluid/debris, correlate for esophagitis, dysmotility, and/or reflux.  Prostatomegaly.  KUB   Multiple air-fluid levels in bowel concerning for ileus. No pneumoperitoneum or other acute findings.    Scope Hx  CLN 10/2023 - hemorrhoids internal, Moderate in size  Two small polyps removed  EGD 10/2023 - Extensive Mann's mucosa 28 to 38 cm  Esophageal nodule at 36 cm. Mild antritis. Loose Nissen fundoplication. Normal duodenum  Plan:   Continue to follow stool studies, colitis and ileus can be manifestation of severe C diff and stool should be tested for this even if it does not meet hospital testing criteria.   No plans for repeat CT given AP seems improved from yesterday. However low threshold to repeat.   Continue PPI QD given Barretts and likely esophagitis on CT  Agree w/ BID miralax and encourage OOB ambulation.   CLD for now.  Avoid narcotics.   Rest of care per primary team.  We will continue to follow.     Plan discussed with Dr. Winn  Subjective:     HISTORY OF PRESENT ILLNESS:     Ander Kelsey is an 78 y.o.  male w/ hx of CKD4, DM2, GERD w/ barretts, BPH, HLD, HT, who we are asked to see for complaint of AP and hematochezia. Pt reports multiple loose Bms on Friday and significant AP, which improved over weekend but then had rebound constipation and then 1 very

## 2024-06-24 NOTE — PROGRESS NOTES
PT eval completed. No PT needs identified. Patient is safe to be up ad duran if not hooked up to monitor or IV pole. PT will sign off.    Afshin Saldana, PT

## 2024-06-25 ENCOUNTER — TELEPHONE (OUTPATIENT)
Age: 79
End: 2024-06-25

## 2024-06-25 LAB
ALBUMIN SERPL-MCNC: 2.8 G/DL (ref 3.5–5)
ALBUMIN/GLOB SERPL: 0.8 (ref 1.1–2.2)
ALP SERPL-CCNC: 73 U/L (ref 45–117)
ALT SERPL-CCNC: 37 U/L (ref 12–78)
ANION GAP SERPL CALC-SCNC: 7 MMOL/L (ref 5–15)
AST SERPL-CCNC: 19 U/L (ref 15–37)
BASOPHILS # BLD: 0 K/UL (ref 0–0.1)
BASOPHILS NFR BLD: 0 % (ref 0–1)
BILIRUB SERPL-MCNC: 0.6 MG/DL (ref 0.2–1)
BUN SERPL-MCNC: 18 MG/DL (ref 6–20)
BUN/CREAT SERPL: 16 (ref 12–20)
CALCIUM SERPL-MCNC: 8.8 MG/DL (ref 8.5–10.1)
CHLORIDE SERPL-SCNC: 107 MMOL/L (ref 97–108)
CO2 SERPL-SCNC: 24 MMOL/L (ref 21–32)
CREAT SERPL-MCNC: 1.12 MG/DL (ref 0.7–1.3)
DIFFERENTIAL METHOD BLD: ABNORMAL
EOSINOPHIL # BLD: 0 K/UL (ref 0–0.4)
EOSINOPHIL NFR BLD: 0 % (ref 0–7)
ERYTHROCYTE [DISTWIDTH] IN BLOOD BY AUTOMATED COUNT: 13.8 % (ref 11.5–14.5)
GLOBULIN SER CALC-MCNC: 3.6 G/DL (ref 2–4)
GLUCOSE BLD STRIP.AUTO-MCNC: 121 MG/DL (ref 65–117)
GLUCOSE BLD STRIP.AUTO-MCNC: 158 MG/DL (ref 65–117)
GLUCOSE BLD STRIP.AUTO-MCNC: 175 MG/DL (ref 65–117)
GLUCOSE BLD STRIP.AUTO-MCNC: 99 MG/DL (ref 65–117)
GLUCOSE SERPL-MCNC: 104 MG/DL (ref 65–100)
HCT VFR BLD AUTO: 41.2 % (ref 36.6–50.3)
HGB BLD-MCNC: 13.7 G/DL (ref 12.1–17)
IMM GRANULOCYTES # BLD AUTO: 0.2 K/UL (ref 0–0.04)
IMM GRANULOCYTES NFR BLD AUTO: 2 % (ref 0–0.5)
LYMPHOCYTES # BLD: 0.7 K/UL (ref 0.8–3.5)
LYMPHOCYTES NFR BLD: 7 % (ref 12–49)
MCH RBC QN AUTO: 31 PG (ref 26–34)
MCHC RBC AUTO-ENTMCNC: 33.3 G/DL (ref 30–36.5)
MCV RBC AUTO: 93.2 FL (ref 80–99)
MONOCYTES # BLD: 0.6 K/UL (ref 0–1)
MONOCYTES NFR BLD: 6 % (ref 5–13)
NEUTS SEG # BLD: 8.9 K/UL (ref 1.8–8)
NEUTS SEG NFR BLD: 85 % (ref 32–75)
NRBC # BLD: 0 K/UL (ref 0–0.01)
NRBC BLD-RTO: 0 PER 100 WBC
PLATELET # BLD AUTO: 124 K/UL (ref 150–400)
PMV BLD AUTO: 13 FL (ref 8.9–12.9)
POTASSIUM SERPL-SCNC: 4 MMOL/L (ref 3.5–5.1)
PROCALCITONIN SERPL-MCNC: 0.97 NG/ML
PROT SERPL-MCNC: 6.4 G/DL (ref 6.4–8.2)
RBC # BLD AUTO: 4.42 M/UL (ref 4.1–5.7)
SERVICE CMNT-IMP: ABNORMAL
SERVICE CMNT-IMP: NORMAL
SODIUM SERPL-SCNC: 138 MMOL/L (ref 136–145)
WBC # BLD AUTO: 10.5 K/UL (ref 4.1–11.1)

## 2024-06-25 PROCEDURE — 2580000003 HC RX 258: Performed by: STUDENT IN AN ORGANIZED HEALTH CARE EDUCATION/TRAINING PROGRAM

## 2024-06-25 PROCEDURE — 82962 GLUCOSE BLOOD TEST: CPT

## 2024-06-25 PROCEDURE — 2580000003 HC RX 258: Performed by: INTERNAL MEDICINE

## 2024-06-25 PROCEDURE — 6370000000 HC RX 637 (ALT 250 FOR IP)

## 2024-06-25 PROCEDURE — 6370000000 HC RX 637 (ALT 250 FOR IP): Performed by: STUDENT IN AN ORGANIZED HEALTH CARE EDUCATION/TRAINING PROGRAM

## 2024-06-25 PROCEDURE — 36415 COLL VENOUS BLD VENIPUNCTURE: CPT

## 2024-06-25 PROCEDURE — 84145 PROCALCITONIN (PCT): CPT

## 2024-06-25 PROCEDURE — 85025 COMPLETE CBC W/AUTO DIFF WBC: CPT

## 2024-06-25 PROCEDURE — 80053 COMPREHEN METABOLIC PANEL: CPT

## 2024-06-25 PROCEDURE — 6360000002 HC RX W HCPCS: Performed by: INTERNAL MEDICINE

## 2024-06-25 PROCEDURE — 2060000000 HC ICU INTERMEDIATE R&B

## 2024-06-25 RX ORDER — BISACODYL 5 MG/1
10 TABLET, DELAYED RELEASE ORAL ONCE
Status: COMPLETED | OUTPATIENT
Start: 2024-06-26 | End: 2024-06-26

## 2024-06-25 RX ORDER — BISACODYL 10 MG
10 SUPPOSITORY, RECTAL RECTAL DAILY
Status: ACTIVE | OUTPATIENT
Start: 2024-06-25 | End: 2024-06-26

## 2024-06-25 RX ORDER — METRONIDAZOLE 500 MG/100ML
500 INJECTION, SOLUTION INTRAVENOUS EVERY 12 HOURS
Status: DISCONTINUED | OUTPATIENT
Start: 2024-06-25 | End: 2024-06-28 | Stop reason: HOSPADM

## 2024-06-25 RX ORDER — BISACODYL 5 MG/1
10 TABLET, DELAYED RELEASE ORAL ONCE
Status: COMPLETED | OUTPATIENT
Start: 2024-06-25 | End: 2024-06-25

## 2024-06-25 RX ADMIN — LISINOPRIL 40 MG: 40 TABLET ORAL at 09:32

## 2024-06-25 RX ADMIN — ASPIRIN 81 MG: 81 TABLET, CHEWABLE ORAL at 09:32

## 2024-06-25 RX ADMIN — SODIUM CHLORIDE 1000 MG: 900 INJECTION INTRAVENOUS at 17:59

## 2024-06-25 RX ADMIN — LATANOPROST 1 DROP: 50 SOLUTION OPHTHALMIC at 09:37

## 2024-06-25 RX ADMIN — PRAVASTATIN SODIUM 20 MG: 10 TABLET ORAL at 09:32

## 2024-06-25 RX ADMIN — PANTOPRAZOLE SODIUM 40 MG: 40 TABLET, DELAYED RELEASE ORAL at 06:11

## 2024-06-25 RX ADMIN — PANTOPRAZOLE SODIUM 40 MG: 40 TABLET, DELAYED RELEASE ORAL at 16:34

## 2024-06-25 RX ADMIN — BISACODYL 10 MG: 5 TABLET, COATED ORAL at 13:42

## 2024-06-25 RX ADMIN — SODIUM CHLORIDE, PRESERVATIVE FREE 10 ML: 5 INJECTION INTRAVENOUS at 09:31

## 2024-06-25 RX ADMIN — POLYETHYLENE GLYCOL 3350 17 G: 17 POWDER, FOR SOLUTION ORAL at 09:32

## 2024-06-25 RX ADMIN — SODIUM CHLORIDE, PRESERVATIVE FREE 10 ML: 5 INJECTION INTRAVENOUS at 20:58

## 2024-06-25 RX ADMIN — METRONIDAZOLE 500 MG: 500 INJECTION, SOLUTION INTRAVENOUS at 17:01

## 2024-06-25 RX ADMIN — POLYETHYLENE GLYCOL 3350 17 G: 17 POWDER, FOR SOLUTION ORAL at 20:55

## 2024-06-25 RX ADMIN — INSULIN GLARGINE 17 UNITS: 100 INJECTION, SOLUTION SUBCUTANEOUS at 20:54

## 2024-06-25 NOTE — PROGRESS NOTES
End of Shift Note    Bedside shift change report given to RN (oncoming nurse) by Jessie Andrade RN (offgoing nurse).  Report included the following information SBAR, Kardex, MAR, and Recent Results    Shift worked:  7p-7a     Shift summary and any significant changes:     Pt slept most of the night. AM labs done. Pt did not have BM during the night.      Concerns for physician to address:       Zone phone for oncoming shift:              Jessie Andrade RN

## 2024-06-25 NOTE — CARE COORDINATION
Care Management Initial Assessment       RUR:14%  Readmission? No  1st IM letter given? Yes - 6/23    1st  letter given: No     06/25/24 1215   Service Assessment   Patient Orientation Alert and Oriented   Cognition Alert   History Provided By Patient   Primary Caregiver Self   Support Systems Spouse/Significant Other   Patient's Healthcare Decision Maker is: Legal Next of Kin   PCP Verified by CM Yes   Last Visit to PCP Within last 3 months   Prior Functional Level Independent in ADLs/IADLs   Current Functional Level Independent in ADLs/IADLs   Can patient return to prior living arrangement Yes   Ability to make needs known: Good   Family able to assist with home care needs: Yes   Financial Resources Medicare   Social/Functional History   Lives With Spouse   ADL Assistance Independent   Homemaking Assistance Independent   Ambulation Assistance Independent   Transfer Assistance Independent   Active  Yes   Mode of Transportation Car   Occupation Retired  (volunteer work 1 day a week)   Services At/After Discharge   Transition of Care Consult (CM Consult) N/A    Resource Information Provided? 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  CM will follow for any DC needs.     English Yenny OCPE CM   [phone]

## 2024-06-25 NOTE — PROGRESS NOTES
GI PROGRESS NOTE  Thanh Edouard PA-C  [phone] NP in-hospital cell phone M-F until 4:30  After 5pm or on weekends, please call  for physician on call    NAME:Ander Kelsey :1945 MRN:[id number]   ATTG: [unfilled]   PCP: Toni Fontanez MD  Date/Time:  2024 1:11 PM     Primary GI: Dr. Amado    Reason for following: Illeus    Assessment:   AP w/ bloody diarrhea 4 days ago  Illeus  VSS  CMP unremarkable, Cr 1.11, trending down  WBC 14 POA, now 10.5  HgB 15, Plt 124  CT w/o 24:   No definite acute abnormality.  Suspected evidence of prior hiatal hernia repair, with residual/recurrent moderate sized hiatal hernia. Lower esophageal wall thickening and fluid/debris, correlate for esophagitis, dysmotility, and/or reflux.  Prostatomegaly.  KUB   Multiple air-fluid levels in bowel concerning for ileus. No pneumoperitoneum or other acute findings.     Scope Hx  CLN 10/2023 - hemorrhoids internal, Moderate in size  Two small polyps removed  EGD 10/2023 - Extensive Mann's mucosa 28 to 38 cm  Esophageal nodule at 36 cm. Mild antritis. Loose Nissen fundoplication. Normal duodenum  Plan:   Continue to follow stool studies. Although lower concern for c diff at this point.   ?If CT shows true \"new\" colitis given previous CT was w/o contrast.   Will increase bowel regiment. BID miralax and BID bisacodyl both PO and suppository.  Consider ischemic etiology given overall presentation. But clinically doing well w/ minimal AP and resolving white count. Once bowel habits normalize, can likely be discharged on 7 day course of fluoroquinolone and flagyl.  Continue PPI QD given Barretts and likely esophagitis on CT   encourage OOB ambulation.   CLD for now.  Avoid narcotics.   Rest of care per primary team.  We will continue to follow    Plan discussed with Dr. Winn  Subjective:   Discussed with RN events overnight.    Review of Systems:  Symptom Y/N Comments  Symptom Y/N Comments   Fever/Chills N   Chest

## 2024-06-25 NOTE — CARE COORDINATION
Advance Care Planning     General Advance Care Planning (ACP) Conversation    Date of Conversation: 6/25/2024  Conducted with: Patient with Decision Making Capacity  Other persons present: None    Healthcare Decision Maker:   Primary Decision Maker: Britany Kelsey - Spouse - [phone]  Click here to complete Healthcare Decision Makers including selection of the Healthcare Decision Maker Relationship (ie \"Primary\").       Content/Action Overview:  Has ACP document(s) on file - reflects the patient's care preferences  Reviewed DNR/DNI and patient elects Full Code (Attempt Resuscitation)        Length of Voluntary ACP Conversation in minutes:  <16 minutes (Non-Billable)    ENGLISH H HOSAY

## 2024-06-25 NOTE — PROGRESS NOTES
Hospitalist Progress Note    NAME:   Ander Kelsey   : 1945   MRN: [id number]     Date: 2024    Patient PCP: Toni Fontanez MD    Hospital Problem list:     Left colitis on CT scan today POA  Severe crampy abdominal pain POA  Bloody stools times several days POA now resolved  Known Mann's esophagus  KUB reviewed-no pneumoperitoneum, clear SBO  Air-fluid levels noted concerning for developing ileus  Ct abdomen/pelvis IMPRESSION:  New diffuse left colitis.  Enteric bacterial panel, C. difficile testing IF STOOL  Empiric Zosyn, will stop for now  Colitis, rectal bleeding, severe abdominal pain   ? Ischemic   ? Infectious colitis or C difficile   ? EHEC with the bleeding and lower platelets  Oxycodone/Dilaudid as needed pain  Trial dicyclomine  Hold toradol  Formally substitution twice daily Protonix  Gastroenterology consulted, greatly appreciate their expertise  Clear liquid diet     Essential hypertension  Hyperlipidemia  Continue PTA aspirin, pravastatin, fosinopril  Hydralazine as needed hypertensive urgency/emergency     Glaucoma  Continue PTA latanoprost eyedrops     Diabetes mellitus type 2 on chronic insulin  Continue PTA glargine  Corrective coverage insulin  Accu-Cheks  Diabetic diet  Hypoglycemia protocol in place     CKD 3, at baseline  Trend renal function  Renally dose medications and avoid nephrotoxic agents    Code Status: Full  DVT Prophylaxis: SCDs  GI Prophylaxis: Protonix  Baseline: Independent     Medical Decision Making:   I personally reviewed labs: Yes, as listed below  I personally reviewed imaging: KUB  Toxic drug monitoring: Zosyn  Discussed case with: patient, NS    History, assessment and plan for 2024:     Estimated discharge date:     Needs to be done before discharge:  Evaluation and resolution of the colitis    Reason for physician visit:    \"My stomach is feeling better\".  Discussed with RN events overnight.   Severe periumbilical pain Friday  + BM with  wheezing or rales.  No accessory muscle use  CV:  Regular  rhythm,  No edema  GI:  Soft, Non distended, Non tender.  +Bowel sounds  Neurologic:  Alert and oriented X 3, normal speech,   Psych:   Good insight. Not anxious nor agitated  Skin:  No rashes.  No jaundice    Reviewed most current lab test results and cultures  YES  Reviewed most current radiology test results   YES  Review and summation of old records today    NO  Reviewed patient's current orders and MAR    YES  PMH/SH reviewed - no change compared to H&P    ________________________________________________________________________        Comments   >50% of visit spent in counseling and coordination of care     ________________________________________________________________________  Justin Moreno Jr, MD     Procedures: see electronic medical records for all procedures/Xrays and details which were not copied into this note but were reviewed prior to creation of Plan.      LABS:  I reviewed today's most current labs and imaging studies.  Pertinent labs include:  Recent Labs     06/23/24 2017 06/24/24  0330 06/24/24  0522   WBC 9.8 PLEASE DISREGARD RESULTS 11.6*   HGB 15.8 PLEASE DISREGARD RESULTS 15.0   HCT 46.4 PLEASE DISREGARD RESULTS 45.0   * PLEASE DISREGARD RESULTS 124*     Recent Labs     06/23/24 2017 06/24/24  0522   * 136   K 3.8 4.3    106   CO2 19* 23   GLUCOSE 314* 161*   BUN 20 16   CREATININE 1.47* 1.11   CALCIUM 9.2 8.8   BILITOT 0.6  --    AST 49*  --    ALT 48  --        Signed: Justin Moreno Jr, MD

## 2024-06-25 NOTE — TELEPHONE ENCOUNTER
Pt's wife LVM stating that the pt is currently in [facility] hospital due to a bowl blockage and is concerned if Ozempic was the cause of this. Pt's wife can be reached at [phone].     LVM

## 2024-06-25 NOTE — PROGRESS NOTES
End of Shift Note    Bedside shift change report given to ANATOLIY Roman (oncoming nurse) by Teodora Dent RN (offgoing nurse).  Report included the following information SBAR, Kardex, and MAR    Shift worked:  Days     Shift summary and any significant changes:    Stool sample still pending, pt not able to produce stool for sample. Its going on 3 days of pt not passing bowels. Perfect Served MD Moreno, No answer was given    1x 10 mg PO Dulcolax ordered and scheduled 10 mg suppository added     Rocephin & flagyl IV antibiotics ordered    Pt ambulated in the hallway with wife   Concerns for physician to address:  -     Zone phone for oncoming shift:   -       Length of Stay:  Expected LOS: 4  Actual LOS: 2      Teodora Dent, RN

## 2024-06-25 NOTE — PLAN OF CARE
Problem: Discharge Planning  Goal: Discharge to home or other facility with appropriate resources  Outcome: Progressing     Problem: Safety - Adult  Goal: Free from fall injury  Outcome: Progressing     Problem: Pain  Goal: Verbalizes/displays adequate comfort level or baseline comfort level  Outcome: Progressing     Problem: Nutrition Deficit:  Goal: Optimize nutritional status  Outcome: Progressing     Problem: Cardiovascular - Adult  Goal: Maintains optimal cardiac output and hemodynamic stability  Outcome: Progressing  Goal: Absence of cardiac dysrhythmias or at baseline  Outcome: Progressing     Problem: Musculoskeletal - Adult  Goal: Return mobility to safest level of function  Outcome: Progressing  Goal: Maintain proper alignment of affected body part  Outcome: Progressing  Goal: Return ADL status to a safe level of function  Outcome: Progressing     Problem: Gastrointestinal - Adult  Goal: Minimal or absence of nausea and vomiting  Outcome: Progressing  Goal: Maintains or returns to baseline bowel function  Outcome: Progressing  Goal: Maintains adequate nutritional intake  Outcome: Progressing  Goal: Establish and maintain optimal ostomy function  Outcome: Progressing     Problem: Infection - Adult  Goal: Absence of infection at discharge  Outcome: Progressing  Goal: Absence of infection during hospitalization  Outcome: Progressing  Goal: Absence of fever/infection during anticipated neutropenic period  Outcome: Progressing

## 2024-06-26 LAB
ALBUMIN SERPL-MCNC: 2.6 G/DL (ref 3.5–5)
ALBUMIN/GLOB SERPL: 0.8 (ref 1.1–2.2)
ALP SERPL-CCNC: 68 U/L (ref 45–117)
ALT SERPL-CCNC: 39 U/L (ref 12–78)
ANION GAP SERPL CALC-SCNC: 4 MMOL/L (ref 5–15)
AST SERPL-CCNC: 26 U/L (ref 15–37)
BASOPHILS # BLD: 0 K/UL (ref 0–0.1)
BASOPHILS NFR BLD: 0 % (ref 0–1)
BILIRUB SERPL-MCNC: 0.5 MG/DL (ref 0.2–1)
BUN SERPL-MCNC: 20 MG/DL (ref 6–20)
BUN/CREAT SERPL: 19 (ref 12–20)
CALCIUM SERPL-MCNC: 8.3 MG/DL (ref 8.5–10.1)
CHLORIDE SERPL-SCNC: 108 MMOL/L (ref 97–108)
CO2 SERPL-SCNC: 24 MMOL/L (ref 21–32)
CREAT SERPL-MCNC: 1.04 MG/DL (ref 0.7–1.3)
DIFFERENTIAL METHOD BLD: ABNORMAL
EOSINOPHIL # BLD: 0.1 K/UL (ref 0–0.4)
EOSINOPHIL NFR BLD: 1 % (ref 0–7)
ERYTHROCYTE [DISTWIDTH] IN BLOOD BY AUTOMATED COUNT: 13.9 % (ref 11.5–14.5)
GLOBULIN SER CALC-MCNC: 3.2 G/DL (ref 2–4)
GLUCOSE BLD STRIP.AUTO-MCNC: 137 MG/DL (ref 65–117)
GLUCOSE BLD STRIP.AUTO-MCNC: 161 MG/DL (ref 65–117)
GLUCOSE BLD STRIP.AUTO-MCNC: 246 MG/DL (ref 65–117)
GLUCOSE BLD STRIP.AUTO-MCNC: 81 MG/DL (ref 65–117)
GLUCOSE SERPL-MCNC: 75 MG/DL (ref 65–100)
HCT VFR BLD AUTO: 42 % (ref 36.6–50.3)
HGB BLD-MCNC: 13.8 G/DL (ref 12.1–17)
IMM GRANULOCYTES # BLD AUTO: 0.1 K/UL (ref 0–0.04)
IMM GRANULOCYTES NFR BLD AUTO: 2 % (ref 0–0.5)
LYMPHOCYTES # BLD: 1.5 K/UL (ref 0.8–3.5)
LYMPHOCYTES NFR BLD: 20 % (ref 12–49)
MCH RBC QN AUTO: 30.7 PG (ref 26–34)
MCHC RBC AUTO-ENTMCNC: 32.9 G/DL (ref 30–36.5)
MCV RBC AUTO: 93.5 FL (ref 80–99)
MONOCYTES # BLD: 0.6 K/UL (ref 0–1)
MONOCYTES NFR BLD: 8 % (ref 5–13)
NEUTS SEG # BLD: 5.4 K/UL (ref 1.8–8)
NEUTS SEG NFR BLD: 69 % (ref 32–75)
NRBC # BLD: 0 K/UL (ref 0–0.01)
NRBC BLD-RTO: 0 PER 100 WBC
PLATELET # BLD AUTO: 128 K/UL (ref 150–400)
PMV BLD AUTO: 12.2 FL (ref 8.9–12.9)
POTASSIUM SERPL-SCNC: 3.4 MMOL/L (ref 3.5–5.1)
PROCALCITONIN SERPL-MCNC: 0.43 NG/ML
PROT SERPL-MCNC: 5.8 G/DL (ref 6.4–8.2)
RBC # BLD AUTO: 4.49 M/UL (ref 4.1–5.7)
SERVICE CMNT-IMP: ABNORMAL
SERVICE CMNT-IMP: NORMAL
SODIUM SERPL-SCNC: 136 MMOL/L (ref 136–145)
WBC # BLD AUTO: 7.7 K/UL (ref 4.1–11.1)

## 2024-06-26 PROCEDURE — 6360000002 HC RX W HCPCS: Performed by: INTERNAL MEDICINE

## 2024-06-26 PROCEDURE — 84145 PROCALCITONIN (PCT): CPT

## 2024-06-26 PROCEDURE — 6370000000 HC RX 637 (ALT 250 FOR IP): Performed by: STUDENT IN AN ORGANIZED HEALTH CARE EDUCATION/TRAINING PROGRAM

## 2024-06-26 PROCEDURE — 80053 COMPREHEN METABOLIC PANEL: CPT

## 2024-06-26 PROCEDURE — 82962 GLUCOSE BLOOD TEST: CPT

## 2024-06-26 PROCEDURE — 6360000002 HC RX W HCPCS: Performed by: STUDENT IN AN ORGANIZED HEALTH CARE EDUCATION/TRAINING PROGRAM

## 2024-06-26 PROCEDURE — 2060000000 HC ICU INTERMEDIATE R&B

## 2024-06-26 PROCEDURE — 6370000000 HC RX 637 (ALT 250 FOR IP): Performed by: INTERNAL MEDICINE

## 2024-06-26 PROCEDURE — 85025 COMPLETE CBC W/AUTO DIFF WBC: CPT

## 2024-06-26 PROCEDURE — 2580000003 HC RX 258: Performed by: INTERNAL MEDICINE

## 2024-06-26 PROCEDURE — 6370000000 HC RX 637 (ALT 250 FOR IP)

## 2024-06-26 PROCEDURE — 2580000003 HC RX 258: Performed by: STUDENT IN AN ORGANIZED HEALTH CARE EDUCATION/TRAINING PROGRAM

## 2024-06-26 PROCEDURE — 36415 COLL VENOUS BLD VENIPUNCTURE: CPT

## 2024-06-26 RX ORDER — POTASSIUM CHLORIDE 20 MEQ/1
40 TABLET, EXTENDED RELEASE ORAL ONCE
Status: COMPLETED | OUTPATIENT
Start: 2024-06-26 | End: 2024-06-26

## 2024-06-26 RX ADMIN — DICYCLOMINE HYDROCHLORIDE 10 MG: 10 CAPSULE ORAL at 16:04

## 2024-06-26 RX ADMIN — BISACODYL 10 MG: 5 TABLET, COATED ORAL at 05:17

## 2024-06-26 RX ADMIN — METRONIDAZOLE 500 MG: 500 INJECTION, SOLUTION INTRAVENOUS at 17:23

## 2024-06-26 RX ADMIN — PANTOPRAZOLE SODIUM 40 MG: 40 TABLET, DELAYED RELEASE ORAL at 16:04

## 2024-06-26 RX ADMIN — SODIUM CHLORIDE 1000 MG: 900 INJECTION INTRAVENOUS at 16:14

## 2024-06-26 RX ADMIN — SODIUM CHLORIDE: 9 INJECTION, SOLUTION INTRAVENOUS at 03:46

## 2024-06-26 RX ADMIN — POLYETHYLENE GLYCOL 3350 17 G: 17 POWDER, FOR SOLUTION ORAL at 21:18

## 2024-06-26 RX ADMIN — POLYETHYLENE GLYCOL 3350 17 G: 17 POWDER, FOR SOLUTION ORAL at 08:22

## 2024-06-26 RX ADMIN — INSULIN GLARGINE 17 UNITS: 100 INJECTION, SOLUTION SUBCUTANEOUS at 21:18

## 2024-06-26 RX ADMIN — LATANOPROST 1 DROP: 50 SOLUTION OPHTHALMIC at 08:24

## 2024-06-26 RX ADMIN — PANTOPRAZOLE SODIUM 40 MG: 40 TABLET, DELAYED RELEASE ORAL at 05:17

## 2024-06-26 RX ADMIN — LISINOPRIL 40 MG: 40 TABLET ORAL at 08:23

## 2024-06-26 RX ADMIN — ASPIRIN 81 MG: 81 TABLET, CHEWABLE ORAL at 08:23

## 2024-06-26 RX ADMIN — METRONIDAZOLE 500 MG: 500 INJECTION, SOLUTION INTRAVENOUS at 03:47

## 2024-06-26 RX ADMIN — POTASSIUM CHLORIDE 40 MEQ: 1500 TABLET, EXTENDED RELEASE ORAL at 11:09

## 2024-06-26 RX ADMIN — SODIUM CHLORIDE, PRESERVATIVE FREE 10 ML: 5 INJECTION INTRAVENOUS at 08:24

## 2024-06-26 RX ADMIN — SODIUM CHLORIDE, PRESERVATIVE FREE 10 ML: 5 INJECTION INTRAVENOUS at 21:19

## 2024-06-26 RX ADMIN — PRAVASTATIN SODIUM 20 MG: 10 TABLET ORAL at 08:23

## 2024-06-26 RX ADMIN — KETOROLAC TROMETHAMINE 15 MG: 30 INJECTION, SOLUTION INTRAMUSCULAR at 16:06

## 2024-06-26 ASSESSMENT — PAIN SCALES - GENERAL
PAINLEVEL_OUTOF10: 0
PAINLEVEL_OUTOF10: 6
PAINLEVEL_OUTOF10: 0
PAINLEVEL_OUTOF10: 3
PAINLEVEL_OUTOF10: 0
PAINLEVEL_OUTOF10: 6

## 2024-06-26 ASSESSMENT — PAIN DESCRIPTION - PAIN TYPE
TYPE: ACUTE PAIN

## 2024-06-26 ASSESSMENT — PAIN DESCRIPTION - DESCRIPTORS
DESCRIPTORS: ACHING
DESCRIPTORS: CRAMPING
DESCRIPTORS: ACHING

## 2024-06-26 ASSESSMENT — PAIN DESCRIPTION - ORIENTATION
ORIENTATION: ANTERIOR

## 2024-06-26 ASSESSMENT — PAIN DESCRIPTION - LOCATION
LOCATION: ABDOMEN

## 2024-06-26 NOTE — PROGRESS NOTES
GI PROGRESS NOTE  Thanh Edouard PA-C  [phone] NP in-hospital cell phone M-F until 4:30  After 5pm or on weekends, please call  for physician on call    NAME:Ander Kelsey :1945 MRN:[id number]   ATTG: [unfilled]   PCP: Toni Fontanez MD  Date/Time:  2024 1:31 PM     Primary GI: Dr. Amado    Reason for following: Illeus    Assessment:   AP w/ bloody diarrhea 4 days ago -resolved  Illeus - resolved  VSS  CMP unremarkable, Cr 1.11, trending down  WBC 14 POA, normalized now.  HgB 15, Plt 124  CT w/o 24:   No definite acute abnormality.  Suspected evidence of prior hiatal hernia repair, with residual/recurrent moderate sized hiatal hernia. Lower esophageal wall thickening and fluid/debris, correlate for esophagitis, dysmotility, and/or reflux.  Prostatomegaly.  KUB   Multiple air-fluid levels in bowel concerning for ileus. No pneumoperitoneum or other acute findings.  CT AP w/   New diffuse left colitis.     Scope Hx  CLN 10/2023 - hemorrhoids internal, Moderate in size  Two small polyps removed  EGD 10/2023 - Extensive Mann's mucosa 28 to 38 cm  Esophageal nodule at 36 cm. Mild antritis. Loose Nissen fundoplication. Normal duodenum  Plan:   Okay to DC stool studies, solid/loose stool only after significant miralax/bisacodyl and no ileus on last CT. NO rectal bleeding.  ?If CT shows true \"new\" colitis given previous CT was w/o contrast.   Continue BID miralax  Full liquid diet now. Can advance this evening.  Consider ischemic etiology given overall presentation. But clinically doing well w/ minimal AP and resolving white count.   Once bowel habits normalize, can likely be discharged on 7 day course of fluoroquinolone and flagyl.  Continue PPI QD given Barretts and likely esophagitis on CT   encourage OOB ambulation.   Avoid narcotics.   Rest of care per primary team.  Nothing further to add from a GI standpoint.  GI signing-off.  Please call with any questions.  Thank you for

## 2024-06-26 NOTE — PLAN OF CARE
Problem: Discharge Planning  Goal: Discharge to home or other facility with appropriate resources  6/26/2024 0743 by Cece Krishna RN  Outcome: Progressing  6/25/2024 2135 by Jessie Andrade RN  Outcome: Progressing     Problem: Safety - Adult  Goal: Free from fall injury  6/26/2024 0743 by Cece Krishna RN  Outcome: Progressing  6/25/2024 2135 by Jessie Andrade RN  Outcome: Progressing     Problem: Pain  Goal: Verbalizes/displays adequate comfort level or baseline comfort level  6/26/2024 0743 by Cece Krishna RN  Outcome: Progressing  6/25/2024 2135 by Jessie Andrade RN  Outcome: Progressing     Problem: Nutrition Deficit:  Goal: Optimize nutritional status  6/25/2024 2135 by Jessie Andrade RN  Outcome: Progressing     Problem: Cardiovascular - Adult  Goal: Maintains optimal cardiac output and hemodynamic stability  6/25/2024 2135 by Jessie Andrade RN  Outcome: Progressing  Goal: Absence of cardiac dysrhythmias or at baseline  6/25/2024 2135 by Jessie Andrade RN  Outcome: Progressing     Problem: Musculoskeletal - Adult  Goal: Return mobility to safest level of function  6/25/2024 2135 by Jessie Andrade RN  Outcome: Progressing  Goal: Maintain proper alignment of affected body part  6/25/2024 2135 by Jessie Andrade RN  Outcome: Progressing  Goal: Return ADL status to a safe level of function  6/25/2024 2135 by Jessie Andrade RN  Outcome: Progressing     Problem: Gastrointestinal - Adult  Goal: Minimal or absence of nausea and vomiting  6/25/2024 2135 by Jessie Andrade RN  Outcome: Progressing  Goal: Maintains or returns to baseline bowel function  6/25/2024 2135 by Jessie Andrade RN  Outcome: Progressing  Goal: Maintains adequate nutritional intake  6/25/2024 2135 by Jessie Andrade RN  Outcome: Progressing  Goal: Establish and maintain optimal ostomy function  6/25/2024 2135 by Jessie Andrade RN  Outcome: Progressing     Problem: Infection - Adult  Goal: Absence of infection at discharge  6/25/2024 2135 by

## 2024-06-26 NOTE — PLAN OF CARE
Problem: Discharge Planning  Goal: Discharge to home or other facility with appropriate resources  Outcome: Progressing     Problem: Safety - Adult  Goal: Free from fall injury  Outcome: Progressing     Problem: Pain  Goal: Verbalizes/displays adequate comfort level or baseline comfort level  Outcome: Progressing     Problem: Nutrition Deficit:  Goal: Optimize nutritional status  Outcome: Progressing     Problem: Cardiovascular - Adult  Goal: Maintains optimal cardiac output and hemodynamic stability  Outcome: Progressing  Goal: Absence of cardiac dysrhythmias or at baseline  Outcome: Progressing     Problem: Musculoskeletal - Adult  Goal: Return mobility to safest level of function  Outcome: Progressing  Goal: Maintain proper alignment of affected body part  Outcome: Progressing  Goal: Return ADL status to a safe level of function  Outcome: Progressing     Problem: Gastrointestinal - Adult  Goal: Minimal or absence of nausea and vomiting  Outcome: Progressing  Goal: Maintains or returns to baseline bowel function  Outcome: Progressing  Goal: Maintains adequate nutritional intake  Outcome: Progressing  Goal: Establish and maintain optimal ostomy function  Outcome: Progressing     Problem: Infection - Adult  Goal: Absence of infection at discharge  Outcome: Progressing  Goal: Absence of infection during hospitalization  Outcome: Progressing  Goal: Absence of fever/infection during anticipated neutropenic period  Outcome: Progressing     Problem: Chronic Conditions and Co-morbidities  Goal: Patient's chronic conditions and co-morbidity symptoms are monitored and maintained or improved  Outcome: Progressing

## 2024-06-26 NOTE — PROGRESS NOTES
Hospitalist Progress Note    NAME:   Ander Kelsey   : 1945   MRN: [id number]     Date: 2024    Patient PCP: Toni Fontanez MD    Hospital Problem list:     Left colitis suspected ischemic POA  Severe crampy abdominal pain POA now resolving  Bloody stools times several days POA now resolved  Known Mann's esophagus  4 days severe crampy abdominal pain, bloody Bms PTA  KUB no pneumoperitoneum, no clear SBO  Air-fluid levels noted concerning for developing ileus  Ct abdomen/pelvis IMPRESSION:  New diffuse left colitis  Likely present on non contrasted scan in ED as well  No BMs since admit to collect stool sample  Colitis, rectal bleeding, severe abdominal pain   ? Ischemic = discussed with GI at length, most likely   ? Infectious colitis or C difficile    Did receive antibiotics in outpatient setting  Oxycodone/Dilaudid as needed pain  Trial dicyclomine  Empiric ceftriaxone and flagyl for ischemic colitis  Bowel regimen as not moved bowels since admit  Discharge when cleared by GI     Essential hypertension  Hyperlipidemia  Continue PTA aspirin, pravastatin, fosinopril  Hydralazine as needed hypertensive urgency/emergency     Glaucoma  Continue PTA latanoprost eyedrops     Diabetes mellitus type 2 on chronic insulin  Continue PTA glargine  Corrective coverage insulin  Accu-Cheks  Diabetic diet  Hypoglycemia protocol in place     CKD 3, at baseline  Trend renal function  Renally dose medications and avoid nephrotoxic agents    Code Status: Full  DVT Prophylaxis: SCDs  GI Prophylaxis: Protonix  Baseline: Independent     Medical Decision Making:   I personally reviewed labs: Yes, as listed below  I personally reviewed imaging: KUB  Toxic drug monitoring: Zosyn  Discussed case with: patient, NS    History, assessment and plan for 2024:     Estimated discharge date:     Needs to be done before discharge:  Evaluation and resolution of the colitis    Reason for physician visit:    \"My stomach is  overall better\".  Discussed with RN events overnight.   Severe periumbilical pain Friday  + BM with blood  Improved a bit over the weekend  Pain acutely worsened day of admission severe cramping, prompted the ER visit  Pain is now much improved, no further bowel movements or bleeding since admission  Last colonoscopy October 2023 with polyps in the sigmoid and cecal area  Discussed with GI at length today, suspected to have ischemic colitis more likely  No HA, SOB, cough, CP, abdominal pain, N/V, diarrhea    Medical Decision Making:   I personally reviewed labs: CBC, CMP  I personally reviewed imaging:  I personally reviewed EKG:  Toxic drug monitoring:   Discussed case with: Patient, nursing staff    Total NON critical care TIME:  36  Minutes    Total CRITICAL CARE TIME Spent:   Minutes non procedure based    Objective:     VITALS:   Last 24hrs VS reviewed since prior progress note. Most recent are:  Patient Vitals for the past 24 hrs:   BP Temp Temp src Pulse Resp SpO2   06/25/24 2235 (!) 148/81 98.1 °F (36.7 °C) Oral 68 20 98 %   06/25/24 1915 (!) 156/76 98.2 °F (36.8 °C) Oral 69 18 97 %   06/25/24 1635 (!) 150/72 98.5 °F (36.9 °C) Oral 70 18 96 %   06/25/24 1039 123/77 98.1 °F (36.7 °C) Oral 72 20 97 %   06/25/24 0932 132/64 -- -- -- -- --   06/25/24 0735 132/64 98 °F (36.7 °C) Oral 70 18 98 %   06/25/24 0246 121/71 97.7 °F (36.5 °C) Oral 79 16 97 %           Intake/Output Summary (Last 24 hours) at 6/25/2024 2335  Last data filed at 6/25/2024 1705  Gross per 24 hour   Intake 1010 ml   Output --   Net 1010 ml          I had a face to face encounter and independently examined this patient on 6/25/2024, as outlined below:    PHYSICAL EXAM:  General: Alert, cooperative  EENT:  Anicteric sclerae.  Resp:  CTA bilaterally, no wheezing or rales.  No accessory muscle use  CV:  Regular  rhythm,  No edema  GI:  Soft, Non distended, Non tender.  +Bowel sounds  Neurologic:  Alert and oriented X 3, normal speech,   Psych:

## 2024-06-26 NOTE — PROGRESS NOTES
Hospitalist Progress Note    NAME:   Ander Kelsey   : 1945   MRN: [id number]     Date/Time: 2024 9:26 AM  Patient PCP: Toni Fontanez MD    Estimated discharge date:   Barriers: Advance diet      Assessment / Plan:  Left colitis suspected ischemic POA  Severe crampy abdominal pain POA now resolving  Bloody stools times several days POA now resolved  Known Mann's esophagus  4 days severe crampy abdominal pain, bloody Bms PTA  KUB no pneumoperitoneum, no clear SBO  Air-fluid levels noted concerning for developing ileus  Ct abdomen/pelvis IMPRESSION:  New diffuse left colitis  Likely present on non contrasted scan in ED as well  No BMs since admit to collect stool sample  Colitis, rectal bleeding, severe abdominal pain              ? Ischemic = discussed with GI at length, most likely              ? Infectious colitis or C difficile                          Did receive antibiotics in outpatient setting  Oxycodone/Dilaudid as needed pain  Trial dicyclomine  Empiric ceftriaxone and flagyl for ischemic colitis  Bowel regimen as not moved bowels since admit  Discharge when cleared by GI  : Patient has been tolerating clear liquid diet.  He had a bowel movement without any profuse amount of bleeding.  GI has started patient on full liquid diet.  GI has already signed off.  Likely discharge patient tomorrow.     Hypertension  Hyperlipidemia  Continue PTA aspirin, pravastatin, fosinopril  Hydralazine as needed hypertensive urgency/emergency     Glaucoma  Continue PTA latanoprost eyedrops     Diabetes mellitus type 2 on chronic insulin  Continue PTA glargine  Corrective coverage insulin  Accu-Cheks  Diabetic diet  Hypoglycemia protocol in place     CKD 3, at baseline  Hypokalemia  Trend renal function  Renally dose medications and avoid nephrotoxic agents  : Potassium of 3.4 and will give 40 KCl.    Thrombocytopenia  : Platelet of 128 which is actually trending up and will monitor CBC for  NO  Reviewed patient's current orders and MAR    YES  PMH/SH reviewed - no change compared to H&P    Procedures: see electronic medical records for all procedures/Xrays and details which were not copied into this note but were reviewed prior to creation of Plan.      LABS:  I reviewed today's most current labs and imaging studies.  Pertinent labs include:  Recent Labs     06/24/24 0522 06/25/24 0309 06/26/24  0245   WBC 11.6* 10.5 7.7   HGB 15.0 13.7 13.8   HCT 45.0 41.2 42.0   * 124* 128*     Recent Labs     06/23/24 2017 06/24/24 0522 06/25/24 0309 06/26/24  0245   * 136 138 136   K 3.8 4.3 4.0 3.4*    106 107 108   CO2 19* 23 24 24   GLUCOSE 314* 161* 104* 75   BUN 20 16 18 20   CREATININE 1.47* 1.11 1.12 1.04   CALCIUM 9.2 8.8 8.8 8.3*   BILITOT 0.6  --  0.6 0.5   AST 49*  --  19 26   ALT 48  --  37 39       Signed: Izabela Casey MD

## 2024-06-27 LAB
ANION GAP SERPL CALC-SCNC: 7 MMOL/L (ref 5–15)
BUN SERPL-MCNC: 19 MG/DL (ref 6–20)
BUN/CREAT SERPL: 18 (ref 12–20)
CALCIUM SERPL-MCNC: 8.1 MG/DL (ref 8.5–10.1)
CHLORIDE SERPL-SCNC: 110 MMOL/L (ref 97–108)
CO2 SERPL-SCNC: 20 MMOL/L (ref 21–32)
CREAT SERPL-MCNC: 1.07 MG/DL (ref 0.7–1.3)
ERYTHROCYTE [DISTWIDTH] IN BLOOD BY AUTOMATED COUNT: 13.7 % (ref 11.5–14.5)
GLUCOSE BLD STRIP.AUTO-MCNC: 115 MG/DL (ref 65–117)
GLUCOSE BLD STRIP.AUTO-MCNC: 119 MG/DL (ref 65–117)
GLUCOSE BLD STRIP.AUTO-MCNC: 153 MG/DL (ref 65–117)
GLUCOSE BLD STRIP.AUTO-MCNC: 79 MG/DL (ref 65–117)
GLUCOSE SERPL-MCNC: 69 MG/DL (ref 65–100)
HCT VFR BLD AUTO: 40.6 % (ref 36.6–50.3)
HGB BLD-MCNC: 13.6 G/DL (ref 12.1–17)
MAGNESIUM SERPL-MCNC: 2 MG/DL (ref 1.6–2.4)
MCH RBC QN AUTO: 30.8 PG (ref 26–34)
MCHC RBC AUTO-ENTMCNC: 33.5 G/DL (ref 30–36.5)
MCV RBC AUTO: 92.1 FL (ref 80–99)
NRBC # BLD: 0 K/UL (ref 0–0.01)
NRBC BLD-RTO: 0 PER 100 WBC
PHOSPHATE SERPL-MCNC: 3.2 MG/DL (ref 2.6–4.7)
PLATELET # BLD AUTO: 129 K/UL (ref 150–400)
PMV BLD AUTO: 12.2 FL (ref 8.9–12.9)
POTASSIUM SERPL-SCNC: 3.8 MMOL/L (ref 3.5–5.1)
RBC # BLD AUTO: 4.41 M/UL (ref 4.1–5.7)
SERVICE CMNT-IMP: ABNORMAL
SERVICE CMNT-IMP: ABNORMAL
SERVICE CMNT-IMP: NORMAL
SERVICE CMNT-IMP: NORMAL
SODIUM SERPL-SCNC: 137 MMOL/L (ref 136–145)
WBC # BLD AUTO: 6.8 K/UL (ref 4.1–11.1)

## 2024-06-27 PROCEDURE — 2580000003 HC RX 258: Performed by: INTERNAL MEDICINE

## 2024-06-27 PROCEDURE — 80048 BASIC METABOLIC PNL TOTAL CA: CPT

## 2024-06-27 PROCEDURE — 2580000003 HC RX 258: Performed by: STUDENT IN AN ORGANIZED HEALTH CARE EDUCATION/TRAINING PROGRAM

## 2024-06-27 PROCEDURE — 84100 ASSAY OF PHOSPHORUS: CPT

## 2024-06-27 PROCEDURE — 6360000002 HC RX W HCPCS: Performed by: INTERNAL MEDICINE

## 2024-06-27 PROCEDURE — 36415 COLL VENOUS BLD VENIPUNCTURE: CPT

## 2024-06-27 PROCEDURE — 83735 ASSAY OF MAGNESIUM: CPT

## 2024-06-27 PROCEDURE — 85027 COMPLETE CBC AUTOMATED: CPT

## 2024-06-27 PROCEDURE — 2060000000 HC ICU INTERMEDIATE R&B

## 2024-06-27 PROCEDURE — 82962 GLUCOSE BLOOD TEST: CPT

## 2024-06-27 PROCEDURE — 6370000000 HC RX 637 (ALT 250 FOR IP): Performed by: STUDENT IN AN ORGANIZED HEALTH CARE EDUCATION/TRAINING PROGRAM

## 2024-06-27 RX ADMIN — LATANOPROST 1 DROP: 50 SOLUTION OPHTHALMIC at 08:56

## 2024-06-27 RX ADMIN — POLYETHYLENE GLYCOL 3350 17 G: 17 POWDER, FOR SOLUTION ORAL at 08:54

## 2024-06-27 RX ADMIN — LISINOPRIL 40 MG: 40 TABLET ORAL at 08:54

## 2024-06-27 RX ADMIN — PANTOPRAZOLE SODIUM 40 MG: 40 TABLET, DELAYED RELEASE ORAL at 05:40

## 2024-06-27 RX ADMIN — PRAVASTATIN SODIUM 20 MG: 10 TABLET ORAL at 08:54

## 2024-06-27 RX ADMIN — SODIUM CHLORIDE: 9 INJECTION, SOLUTION INTRAVENOUS at 17:06

## 2024-06-27 RX ADMIN — PANTOPRAZOLE SODIUM 40 MG: 40 TABLET, DELAYED RELEASE ORAL at 17:04

## 2024-06-27 RX ADMIN — SODIUM CHLORIDE: 9 INJECTION, SOLUTION INTRAVENOUS at 04:15

## 2024-06-27 RX ADMIN — SODIUM CHLORIDE, PRESERVATIVE FREE 10 ML: 5 INJECTION INTRAVENOUS at 08:56

## 2024-06-27 RX ADMIN — POLYETHYLENE GLYCOL 3350 17 G: 17 POWDER, FOR SOLUTION ORAL at 22:11

## 2024-06-27 RX ADMIN — INSULIN GLARGINE 17 UNITS: 100 INJECTION, SOLUTION SUBCUTANEOUS at 22:10

## 2024-06-27 RX ADMIN — ASPIRIN 81 MG: 81 TABLET, CHEWABLE ORAL at 08:53

## 2024-06-27 RX ADMIN — SODIUM CHLORIDE 1000 MG: 900 INJECTION INTRAVENOUS at 17:07

## 2024-06-27 RX ADMIN — METRONIDAZOLE 500 MG: 500 INJECTION, SOLUTION INTRAVENOUS at 17:58

## 2024-06-27 RX ADMIN — METRONIDAZOLE 500 MG: 500 INJECTION, SOLUTION INTRAVENOUS at 04:16

## 2024-06-27 RX ADMIN — SODIUM CHLORIDE, PRESERVATIVE FREE 10 ML: 5 INJECTION INTRAVENOUS at 22:12

## 2024-06-27 NOTE — PROGRESS NOTES
Spiritual Care Assessment/Progress Note  [facility]    Name: Ander Kelsey MRN: [id number]    Age: 78 y.o.     Sex: male   Language: English     Date: 6/27/2024            Total Time Calculated: 5 min              Spiritual Assessment begun in [facility] CARDIOPULMONARY CARE  Service Provided For: Patient not available  Referral/Consult From: Rounding  Encounter Overview/Reason: Attempted Encounter    Spiritual beliefs:      [] Involved in a mere tradition/spiritual practice:      [] Supported by a mere community:      [] Claims no spiritual orientation:      [] Seeking spiritual identity:           [] Adheres to an individual form of spirituality:      [x] Not able to assess:                Identified resources for coping and support system:   Support System: Unknown       [] Prayer                  [] Devotional reading               [] Music                  [] Guided Imagery     [] Pet visits                                        [] Other: (COMMENT)     Specific area/focus of visit   Encounter:    Crisis:    Spiritual/Emotional needs:    Ritual, Rites and Sacraments:    Grief, Loss, and Adjustments:    Ethics/Mediation:    Behavioral Health:    Palliative Care:    Advance Care Planning:      Plan/Referrals: Other (Comment) (Contact Spiritual Care for further consults.)    Narrative:  visited pt on rounds in [facility].  Patient was not present during the visit, but was getting a procedure done.   advised nurse of  availability.  Please contact Spiritual Care for any further referrals.    Remy Little, PhD,  Intern  Spiritual Health Services  Paging Service: [phone] (WINIFRED)

## 2024-06-27 NOTE — PLAN OF CARE
Problem: Discharge Planning  Goal: Discharge to home or other facility with appropriate resources  Outcome: Progressing     Problem: Safety - Adult  Goal: Free from fall injury  Outcome: Progressing     Problem: Pain  Goal: Verbalizes/displays adequate comfort level or baseline comfort level  Outcome: Progressing  Flowsheets (Taken 6/26/2024 1300 by Diane Lyn RN)  Verbalizes/displays adequate comfort level or baseline comfort level:   Encourage patient to monitor pain and request assistance   Assess pain using appropriate pain scale     Problem: Nutrition Deficit:  Goal: Optimize nutritional status  Outcome: Progressing     Problem: Cardiovascular - Adult  Goal: Maintains optimal cardiac output and hemodynamic stability  Outcome: Progressing  Goal: Absence of cardiac dysrhythmias or at baseline  Outcome: Progressing     Problem: Musculoskeletal - Adult  Goal: Return mobility to safest level of function  Outcome: Progressing  Goal: Maintain proper alignment of affected body part  Outcome: Progressing  Goal: Return ADL status to a safe level of function  Outcome: Progressing     Problem: Gastrointestinal - Adult  Goal: Minimal or absence of nausea and vomiting  Outcome: Progressing  Goal: Maintains or returns to baseline bowel function  Outcome: Progressing  Goal: Maintains adequate nutritional intake  Outcome: Progressing  Goal: Establish and maintain optimal ostomy function  Outcome: Progressing     Problem: Infection - Adult  Goal: Absence of infection at discharge  Outcome: Progressing  Goal: Absence of infection during hospitalization  Outcome: Progressing  Goal: Absence of fever/infection during anticipated neutropenic period  Outcome: Progressing     Problem: Chronic Conditions and Co-morbidities  Goal: Patient's chronic conditions and co-morbidity symptoms are monitored and maintained or improved  Outcome: Progressing

## 2024-06-27 NOTE — PROGRESS NOTES
Comprehensive Nutrition Assessment    Type and Reason for Visit:  Reassess    Nutrition Recommendations/Plan:   Continue current diet  Please document % meals and supplements consumed in flowsheet I/O's under intake      Malnutrition Assessment:  Malnutrition Status:  At risk for malnutrition (Comment) (06/24/24 1602)    Context:  Acute Illness     Findings of the 6 clinical characteristics of malnutrition:  Energy Intake:  Mild decrease in energy intake (Comment)  Weight Loss:  Greater than 2% over 1 week     Body Fat Loss:  No significant body fat loss     Muscle Mass Loss:  No significant muscle mass loss    Fluid Accumulation:  No significant fluid accumulation     Strength:  Not Performed    Nutrition Assessment:     Chart reviewed and case discussed during IDR. Pt seen awake in bed this morning. His diet was advanced last night per GI. Great PO intake documented by nursing. Pt reports good tolerance of his meals thus far and is eager to go home. He reports some continuous abdominal soreness, but it is not exacerbated by eating or moving his bowels. He reports a small BM this morning, somewhat formed, somewhat loose. Pt declined supplements at this time. Will continue monitoring.     Patient Vitals for the past 120 hrs:   PO Meals Eaten (%)   06/27/24 0854 76 - 100%   06/26/24 1839 76 - 100%   06/26/24 1230 51 - 75%   06/25/24 1705 76 - 100%   06/25/24 0931 76 - 100%   06/24/24 1202 76 - 100%   06/24/24 0900 76 - 100%     Wt Readings from Last 5 Encounters:   06/23/24 67 kg (147 lb 11.3 oz)   06/21/24 68.9 kg (152 lb)   06/19/24 69.2 kg (152 lb 9.6 oz)   05/18/24 70.4 kg (155 lb 1.6 oz)   02/19/24 69.9 kg (154 lb)   ]    Nutrition Related Findings:    Labs: BG 31--137.   Meds: rocephin, lantus, humalog, flagyl, protonix, glycolax.   BM 6/27.   Wound Type: None       Current Nutrition Intake & Therapies:    Average Meal Intake: 51-75%, %  Average Supplements Intake: None Ordered  ADULT DIET;

## 2024-06-27 NOTE — PROGRESS NOTES
Hospitalist Progress Note    NAME:   Ander Kelsey   : 1945   MRN: [id number]     Date/Time: 2024 9:18 AM  Patient PCP: Toni Fontanez MD    Estimated discharge date:   Barriers: Advance diet, regular BMs      Assessment / Plan:  Left colitis suspected ischemic POA  Severe crampy abdominal pain POA now resolving  Bloody stools times several days POA now resolved  Known Mann's esophagus  4 days severe crampy abdominal pain, bloody Bms PTA  KUB no pneumoperitoneum, no clear SBO  Air-fluid levels noted concerning for developing ileus  Ct abdomen/pelvis IMPRESSION:  New diffuse left colitis  Likely present on non contrasted scan in ED as well  No BMs since admit to collect stool sample  Colitis, rectal bleeding, severe abdominal pain              ? Ischemic = discussed with GI at length, most likely              ? Infectious colitis or C difficile                          Did receive antibiotics in outpatient setting  Oxycodone/Dilaudid as needed pain  Trial dicyclomine  Empiric ceftriaxone and flagyl for ischemic colitis  Bowel regimen as not moved bowels since admit  Discharge when cleared by GI  : Patient has been tolerating clear liquid diet.  He had a bowel movement without any profuse amount of bleeding.  GI has started patient on full liquid diet.  GI has already signed off.  Likely discharge patient tomorrow.  : Patient started on regular diet this morning.  He had a small amount of loose bowel movement.  He reports mild abdominal discomfort after eating food.  Will give him 1 more day to see how he does with regular food and also watch his bowel movements.     Hypertension  Hyperlipidemia  Continue PTA aspirin, pravastatin, fosinopril  Hydralazine as needed hypertensive urgency/emergency     Glaucoma  Continue PTA latanoprost eyedrops     Diabetes mellitus type 2 on chronic insulin  Continue PTA glargine  Corrective coverage insulin  Accu-Cheks  Diabetic diet  Hypoglycemia  protocol in place     CKD 3, at baseline  Hypokalemia  Trend renal function  Renally dose medications and avoid nephrotoxic agents  6/26: Potassium of 3.4 and will give 40 KCl.    Thrombocytopenia  6/26: Platelet of 128 which is actually trending up and will monitor CBC for now.  6/27: Platelets up at 129.        Medical Decision Making:   I personally reviewed labs: CBC, BMP, magnesium, phosphorus  I personally reviewed imaging:  I personally reviewed EKG:  Toxic drug monitoring:   Discussed case with: Patient, RN        Code Status: Full code  DVT Prophylaxis: SCDs  GI Prophylaxis: Protonix    Subjective:     Chief Complaint / Reason for Physician Visit  \" Follow-up for left ischemic colitis, hematochezia, HTN, HLD, diabetes mellitus on insulin, CKD 3A, glaucoma.\".  Discussed with RN events overnight.       Objective:     VITALS:   Last 24hrs VS reviewed since prior progress note. Most recent are:  Patient Vitals for the past 24 hrs:   BP Temp Temp src Pulse Resp SpO2   06/27/24 0854 (!) 143/78 -- -- -- -- --   06/27/24 0745 (!) 143/78 98.2 °F (36.8 °C) Oral 66 16 95 %   06/27/24 0237 133/66 98 °F (36.7 °C) Oral 66 18 96 %   06/26/24 2347 (!) 153/77 98 °F (36.7 °C) Oral 66 20 97 %   06/26/24 1940 123/69 98.4 °F (36.9 °C) Oral 65 16 98 %   06/26/24 1839 130/78 98.3 °F (36.8 °C) Oral 66 18 --   06/26/24 1545 -- -- -- 63 -- --   06/26/24 1532 (!) 165/84 -- -- 64 -- --   06/26/24 1530 (!) 176/86 98.2 °F (36.8 °C) Oral 66 18 --   06/26/24 1515 -- -- -- 65 -- --   06/26/24 1445 -- -- -- 68 -- --   06/26/24 1430 -- -- -- 67 -- --   06/26/24 1315 -- -- -- 66 -- --   06/26/24 1300 -- -- -- 82 -- --   06/26/24 1245 -- -- -- 64 -- --   06/26/24 1230 -- -- -- 69 -- --   06/26/24 1215 -- -- -- 61 -- --   06/26/24 1200 -- -- -- 68 -- --   06/26/24 1145 -- -- -- 66 -- --   06/26/24 1130 (!) 142/78 98 °F (36.7 °C) Oral 65 18 97 %         Intake/Output Summary (Last 24 hours) at 6/27/2024 0918  Last data filed at 6/27/2024

## 2024-06-27 NOTE — PLAN OF CARE
Problem: Discharge Planning  Goal: Discharge to home or other facility with appropriate resources  6/27/2024 0831 by Cece Krishna, RN  Outcome: Progressing  6/26/2024 2203 by Jessie Andrade RN  Outcome: Progressing     Problem: Safety - Adult  Goal: Free from fall injury  6/27/2024 0831 by Cece Krishna RN  Outcome: Progressing  6/26/2024 2203 by Jessie Andrade, RN  Outcome: Progressing     Problem: Gastrointestinal - Adult  Goal: Minimal or absence of nausea and vomiting  6/26/2024 2203 by Jessie Andrade, RN  Outcome: Progressing

## 2024-06-28 ENCOUNTER — TELEPHONE (OUTPATIENT)
Age: 79
End: 2024-06-28

## 2024-06-28 VITALS
SYSTOLIC BLOOD PRESSURE: 158 MMHG | HEIGHT: 67 IN | HEART RATE: 70 BPM | DIASTOLIC BLOOD PRESSURE: 80 MMHG | OXYGEN SATURATION: 96 % | RESPIRATION RATE: 14 BRPM | TEMPERATURE: 98 F | WEIGHT: 147.71 LBS | BODY MASS INDEX: 23.18 KG/M2

## 2024-06-28 LAB
GLUCOSE BLD STRIP.AUTO-MCNC: 89 MG/DL (ref 65–117)
GLUCOSE BLD STRIP.AUTO-MCNC: 99 MG/DL (ref 65–117)
SERVICE CMNT-IMP: NORMAL
SERVICE CMNT-IMP: NORMAL

## 2024-06-28 PROCEDURE — 6360000002 HC RX W HCPCS: Performed by: INTERNAL MEDICINE

## 2024-06-28 PROCEDURE — 2580000003 HC RX 258: Performed by: STUDENT IN AN ORGANIZED HEALTH CARE EDUCATION/TRAINING PROGRAM

## 2024-06-28 PROCEDURE — 6370000000 HC RX 637 (ALT 250 FOR IP): Performed by: STUDENT IN AN ORGANIZED HEALTH CARE EDUCATION/TRAINING PROGRAM

## 2024-06-28 PROCEDURE — 82962 GLUCOSE BLOOD TEST: CPT

## 2024-06-28 RX ORDER — OXYCODONE HYDROCHLORIDE 5 MG/1
5 TABLET ORAL EVERY 8 HOURS PRN
Qty: 3 TABLET | Refills: 0 | Status: SHIPPED | OUTPATIENT
Start: 2024-06-28 | End: 2024-06-30

## 2024-06-28 RX ORDER — PANTOPRAZOLE SODIUM 40 MG/1
40 TABLET, DELAYED RELEASE ORAL
Qty: 30 TABLET | Refills: 0 | Status: SHIPPED | OUTPATIENT
Start: 2024-06-28

## 2024-06-28 RX ORDER — METRONIDAZOLE 500 MG/1
500 TABLET ORAL 3 TIMES DAILY
Qty: 3 TABLET | Refills: 0 | Status: SHIPPED | OUTPATIENT
Start: 2024-06-28 | End: 2024-06-29

## 2024-06-28 RX ORDER — ONDANSETRON 4 MG/1
4 TABLET, ORALLY DISINTEGRATING ORAL EVERY 8 HOURS PRN
Qty: 21 TABLET | Refills: 0 | Status: SHIPPED | OUTPATIENT
Start: 2024-06-28

## 2024-06-28 RX ORDER — CEFDINIR 300 MG/1
300 CAPSULE ORAL 2 TIMES DAILY
Qty: 2 CAPSULE | Refills: 0 | Status: SHIPPED | OUTPATIENT
Start: 2024-06-28 | End: 2024-06-29

## 2024-06-28 RX ORDER — DICYCLOMINE HYDROCHLORIDE 10 MG/1
10 CAPSULE ORAL 4 TIMES DAILY PRN
Qty: 120 CAPSULE | Refills: 0 | Status: SHIPPED | OUTPATIENT
Start: 2024-06-28

## 2024-06-28 RX ADMIN — POLYETHYLENE GLYCOL 3350 17 G: 17 POWDER, FOR SOLUTION ORAL at 08:39

## 2024-06-28 RX ADMIN — PRAVASTATIN SODIUM 20 MG: 10 TABLET ORAL at 08:39

## 2024-06-28 RX ADMIN — LATANOPROST 1 DROP: 50 SOLUTION OPHTHALMIC at 08:40

## 2024-06-28 RX ADMIN — PANTOPRAZOLE SODIUM 40 MG: 40 TABLET, DELAYED RELEASE ORAL at 06:37

## 2024-06-28 RX ADMIN — LISINOPRIL 40 MG: 40 TABLET ORAL at 08:39

## 2024-06-28 RX ADMIN — SODIUM CHLORIDE: 9 INJECTION, SOLUTION INTRAVENOUS at 04:10

## 2024-06-28 RX ADMIN — ASPIRIN 81 MG: 81 TABLET, CHEWABLE ORAL at 08:39

## 2024-06-28 RX ADMIN — METRONIDAZOLE 500 MG: 500 INJECTION, SOLUTION INTRAVENOUS at 04:11

## 2024-06-28 ASSESSMENT — PAIN SCALES - GENERAL: PAINLEVEL_OUTOF10: 0

## 2024-06-28 NOTE — CARE COORDINATION
06/28/24 1351   Services At/After Discharge   Transition of Care Consult (CM Consult) N/A   Services At/After Discharge None   Mode of Transport at Discharge Other (see comment)   Confirm Follow Up Transport Family   Condition of Participation: Discharge Planning   The Plan for Transition of Care is related to the following treatment goals: PCP and specialist     No further needs from CM  Patient discharged  Family to transport    English Yenny COPE    [id number]

## 2024-06-28 NOTE — PLAN OF CARE
anticipated neutropenic period  6/28/2024 1302 by Richar Pablo, RN  Outcome: Adequate for Discharge  6/28/2024 0334 by Alexandra Wright RN  Outcome: Progressing     Problem: Chronic Conditions and Co-morbidities  Goal: Patient's chronic conditions and co-morbidity symptoms are monitored and maintained or improved  6/28/2024 1302 by Richar Pablo, RN  Outcome: Adequate for Discharge  6/28/2024 0334 by Alexandra Wright RN  Outcome: Progressing  Flowsheets (Taken 6/27/2024 2000)  Care Plan - Patient's Chronic Conditions and Co-Morbidity Symptoms are Monitored and Maintained or Improved:   Monitor and assess patient's chronic conditions and comorbid symptoms for stability, deterioration, or improvement   Collaborate with multidisciplinary team to address chronic and comorbid conditions and prevent exacerbation or deterioration   Update acute care plan with appropriate goals if chronic or comorbid symptoms are exacerbated and prevent overall improvement and discharge

## 2024-06-28 NOTE — CARE COORDINATION
Transition of Care Plan:    RUR: 13%  Prior Level of Functioning: independant  Disposition: home with spouse  If SNF or IPR: Date FOC offered:   Date FOC received:   Accepting facility:   Date authorization started with reference number:   Date authorization received and expires:   Follow up appointments: PCP and specialist  DME needed: none noted  Transportation at discharge: family  IM/IMM Medicare/ letter given: sign at DC  Is patient a [place] and connected with [facility]?    If yes, was [facility] transfer form completed and [facility] notified?   Caregiver Contact:     Britany Kelsey (Spouse)  [phone] (Mobile)       Discharge Caregiver contacted prior to discharge?   Care Conference needed?   Barriers to discharge: clinical improvement    English Yenny COPE HM [phone]

## 2024-06-28 NOTE — PLAN OF CARE
Problem: Discharge Planning  Goal: Discharge to home or other facility with appropriate resources  Outcome: Progressing  Flowsheets (Taken 6/27/2024 2000)  Discharge to home or other facility with appropriate resources:   Identify barriers to discharge with patient and caregiver   Arrange for needed discharge resources and transportation as appropriate   Identify discharge learning needs (meds, wound care, etc)     Problem: Safety - Adult  Goal: Free from fall injury  Outcome: Progressing     Problem: Pain  Goal: Verbalizes/displays adequate comfort level or baseline comfort level  Outcome: Progressing     Problem: Nutrition Deficit:  Goal: Optimize nutritional status  Outcome: Progressing  Flowsheets (Taken 6/27/2024 1337 by Laxmi Green RD)  Nutrient intake appropriate for improving, restoring, or maintaining nutritional needs:   Assess nutritional status and recommend course of action   Monitor oral intake, labs, and treatment plans   Recommend appropriate diets, oral nutritional supplements, and vitamin/mineral supplements     Problem: Cardiovascular - Adult  Goal: Maintains optimal cardiac output and hemodynamic stability  Outcome: Progressing  Goal: Absence of cardiac dysrhythmias or at baseline  Outcome: Progressing     Problem: Musculoskeletal - Adult  Goal: Return mobility to safest level of function  Outcome: Progressing  Goal: Maintain proper alignment of affected body part  Outcome: Progressing  Goal: Return ADL status to a safe level of function  Outcome: Progressing     Problem: Gastrointestinal - Adult  Goal: Minimal or absence of nausea and vomiting  Outcome: Progressing  Goal: Maintains or returns to baseline bowel function  Outcome: Progressing  Goal: Maintains adequate nutritional intake  Outcome: Progressing  Goal: Establish and maintain optimal ostomy function  Outcome: Progressing     Problem: Infection - Adult  Goal: Absence of infection at discharge  Outcome: Progressing  Goal: Absence of

## 2024-06-28 NOTE — DISCHARGE SUMMARY
60                                  Discharge Summary    Name: Ander Kelsey  [id number]  YOB: 1945 (Age: 78 y.o.)   Date of Admission: 6/23/2024  Date of Discharge: 6/28/2024  Attending Physician: Izabela Casey MD    Discharge Diagnosis:   Left colitis suspected ischemic POA  Severe crampy abdominal pain POA now resolving  Bloody stools times several days POA now resolved  Known Mann's esophagus  4 days severe crampy abdominal pain, bloody Bms PTA  Hypertension  Hyperlipidemia  Glaucoma  Diabetes mellitus type 2 on chronic insulin  CKD 3, at baseline  Hypokalemia  Thrombocytopenia        Consultations:  IP CONSULT TO HOSPITALIST  IP CONSULT TO GI      Brief Admission History/Reason for Admission Per Ashutosh Davis, DO: Ander Kelsey is a 78 y.o.  male with PMHx as listed below presenting to the emergency department for evaluation of persistent abdominal pain present since Friday morning waxes and wanes in nature with crampy/colicky quality.  Was evaluated 6/21 underwent noncontrasted CT (iodinated contrast allergy) without clear acute process-esophageal thickening noted in setting of known Mann's esophagus.  Diagnosed with presumptive gastroenteritis and started on Augmentin.  Symptoms initially improved, but rebounded earlier today also with interval development of bloody bowel movements with appearance ranging from maroon-bright red appearance.  Denies fever/chills/shakes.  Has significant abdominal tenderness with positive peritoneal signs.  Follows with Dr. Amado in gastroenterology with most recent EGD/colonoscopy formed 10/2023 (see below).     In the ED, patient afebrile and hemodynamically stable (hypertensive 180/80s), saturating upper 90s on room air.  Upright KUB demonstrates multiple air-fluid levels concerning for ileus without pneumoperitoneum or other acute finding appreciated.  Labs demonstrate: Unremarkable CBC with exception of thrombocytopenia to 113.  Sodium  Results:  Recent Labs     06/27/24  0242      K 3.8   *   CO2 20*   BUN 19   CREATININE 1.07   GLUCOSE 69   CALCIUM 8.1*   PHOS 3.2   MG 2.0     Recent Labs     06/27/24  0242   HGB 13.6   HCT 40.6   WBC 6.8   *       Discharge Medications:     Medication List        START taking these medications      cefdinir 300 MG capsule  Commonly known as: OMNICEF  Take 1 capsule by mouth 2 times daily for 1 day     dicyclomine 10 MG capsule  Commonly known as: BENTYL  Take 1 capsule by mouth 4 times daily as needed (abdmonial cramps)     metroNIDAZOLE 500 MG tablet  Commonly known as: FLAGYL  Take 1 tablet by mouth 3 times daily for 1 day     ondansetron 4 MG disintegrating tablet  Commonly known as: ZOFRAN-ODT  Take 1 tablet by mouth every 8 hours as needed for Nausea or Vomiting     oxyCODONE 5 MG immediate release tablet  Commonly known as: ROXICODONE  Take 1 tablet by mouth every 8 hours as needed for Pain for up to 3 doses. Max Daily Amount: 15 mg     pantoprazole 40 MG tablet  Commonly known as: PROTONIX  Take 1 tablet by mouth 2 times daily (before meals)            CONTINUE taking these medications      aspirin 81 MG chewable tablet     BD Pen Needle Iveth 2nd Gen 32G X 4 MM Misc  Generic drug: Insulin Pen Needle  USE TO INJECT INSULIN DAILY     FeroSul 325 (65 Fe) MG tablet  Generic drug: ferrous sulfate     fosinopril 40 MG tablet  Commonly known as: MONOPRIL  TAKE 1 TABLET DAILY     latanoprost 0.005 % ophthalmic solution  Commonly known as: XALATAN     OneTouch Verio strip  Generic drug: blood glucose test strips  USE TO CHECK BLOOD SUGAR THREE TIMES DAILY     Ozempic (0.25 or 0.5 MG/DOSE) 2 MG/3ML Sopn  Generic drug: Semaglutide(0.25 or 0.5MG/DOS)  0.25 mg weekly for 2-4 weeks and then increase to 0.5 mg weekly as tolerated     pravastatin 20 MG tablet  Commonly known as: PRAVACHOL  TAKE 1 TABLET DAILY     Kael SoloStar 300 UNIT/ML concentrated injection pen  Generic drug: insulin glargine (1

## 2024-06-28 NOTE — PLAN OF CARE
Problem: Discharge Planning  Goal: Discharge to home or other facility with appropriate resources  6/28/2024 0806 by Richar Pablo, RN  Outcome: Progressing  6/28/2024 0334 by Alexandra Wright RN  Outcome: Progressing  Flowsheets (Taken 6/27/2024 2000)  Discharge to home or other facility with appropriate resources:   Identify barriers to discharge with patient and caregiver   Arrange for needed discharge resources and transportation as appropriate   Identify discharge learning needs (meds, wound care, etc)     Problem: Safety - Adult  Goal: Free from fall injury  6/28/2024 0806 by Richar Pablo RN  Outcome: Progressing  6/28/2024 0334 by Alexandra Wright RN  Outcome: Progressing     Problem: Pain  Goal: Verbalizes/displays adequate comfort level or baseline comfort level  6/28/2024 0334 by Alexandra Wright RN  Outcome: Progressing     Problem: Nutrition Deficit:  Goal: Optimize nutritional status  6/28/2024 0334 by Alexandra Wright RN  Outcome: Progressing  Flowsheets (Taken 6/27/2024 1337 by Laxmi Green RD)  Nutrient intake appropriate for improving, restoring, or maintaining nutritional needs:   Assess nutritional status and recommend course of action   Monitor oral intake, labs, and treatment plans   Recommend appropriate diets, oral nutritional supplements, and vitamin/mineral supplements     Problem: Cardiovascular - Adult  Goal: Maintains optimal cardiac output and hemodynamic stability  6/28/2024 0334 by Alexandra Wright RN  Outcome: Progressing  Goal: Absence of cardiac dysrhythmias or at baseline  6/28/2024 0334 by Alexandra Wright RN  Outcome: Progressing     Problem: Musculoskeletal - Adult  Goal: Return mobility to safest level of function  6/28/2024 0334 by Alexandra Wright RN  Outcome: Progressing  Goal: Maintain proper alignment of affected body part  6/28/2024 0334 by Alexandra Wright RN  Outcome: Progressing  Goal: Return ADL status to a safe level of function  6/28/2024 0334 by Kyle  ANATOLIY Morris  Outcome: Progressing     Problem: Gastrointestinal - Adult  Goal: Minimal or absence of nausea and vomiting  6/28/2024 0334 by Alexandra Wright RN  Outcome: Progressing  Goal: Maintains or returns to baseline bowel function  6/28/2024 0334 by Alexandra Wright RN  Outcome: Progressing  Goal: Maintains adequate nutritional intake  6/28/2024 0334 by Alexandra Wright RN  Outcome: Progressing  Goal: Establish and maintain optimal ostomy function  6/28/2024 0334 by Alexandra Wright RN  Outcome: Progressing     Problem: Infection - Adult  Goal: Absence of infection at discharge  6/28/2024 0334 by Alexandra Wright RN  Outcome: Progressing  Goal: Absence of infection during hospitalization  6/28/2024 0334 by Alexandra Wright RN  Outcome: Progressing  Goal: Absence of fever/infection during anticipated neutropenic period  6/28/2024 0334 by Alexandra Wright RN  Outcome: Progressing     Problem: Chronic Conditions and Co-morbidities  Goal: Patient's chronic conditions and co-morbidity symptoms are monitored and maintained or improved  6/28/2024 0334 by Alexandra Wright RN  Outcome: Progressing  Flowsheets (Taken 6/27/2024 2000)  Care Plan - Patient's Chronic Conditions and Co-Morbidity Symptoms are Monitored and Maintained or Improved:   Monitor and assess patient's chronic conditions and comorbid symptoms for stability, deterioration, or improvement   Collaborate with multidisciplinary team to address chronic and comorbid conditions and prevent exacerbation or deterioration   Update acute care plan with appropriate goals if chronic or comorbid symptoms are exacerbated and prevent overall improvement and discharge

## 2024-06-28 NOTE — TELEPHONE ENCOUNTER
Hospital Follow up appt requested.    DETAILS     admitted: 6/23/2024 - present (5 days).  facility: [facility].  dx: colitis.  caller: english at [facility]     No available appts with pcp for this psr to schedule in recommended time frame     Please call pt directly to schedule as soon as able.          Appointment history:  Last seen at [facility]:   3/14/2024

## 2024-06-28 NOTE — PROGRESS NOTES
Attempted to schedule PCP hospital follow up. Sent PCP office a message, awaiting return call from PCP office with appt information. [facility] placed [facility] information AVS for patient resource.  Pending patient discharge. Lauryn Hammer, Care Management Assistant

## 2024-06-30 SDOH — ECONOMIC STABILITY: INCOME INSECURITY: HOW HARD IS IT FOR YOU TO PAY FOR THE VERY BASICS LIKE FOOD, HOUSING, MEDICAL CARE, AND HEATING?: NOT HARD AT ALL

## 2024-06-30 SDOH — ECONOMIC STABILITY: TRANSPORTATION INSECURITY
IN THE PAST 12 MONTHS, HAS LACK OF TRANSPORTATION KEPT YOU FROM MEETINGS, WORK, OR FROM GETTING THINGS NEEDED FOR DAILY LIVING?: NO

## 2024-06-30 SDOH — ECONOMIC STABILITY: FOOD INSECURITY: WITHIN THE PAST 12 MONTHS, YOU WORRIED THAT YOUR FOOD WOULD RUN OUT BEFORE YOU GOT MONEY TO BUY MORE.: NEVER TRUE

## 2024-06-30 SDOH — ECONOMIC STABILITY: FOOD INSECURITY: WITHIN THE PAST 12 MONTHS, THE FOOD YOU BOUGHT JUST DIDN'T LAST AND YOU DIDN'T HAVE MONEY TO GET MORE.: NEVER TRUE

## 2024-07-01 ENCOUNTER — TELEPHONE (OUTPATIENT)
Age: 79
End: 2024-07-01

## 2024-07-01 NOTE — TELEPHONE ENCOUNTER
Care Transitions Initial Follow Up Call    Outreach made within 2 business days of discharge: Yes    Patient: Ander Kelsey Patient : 1945   MRN: [id number]  Reason for Admission: There are no discharge diagnoses documented for the most recent discharge.  Discharge Date: 24       Spoke with: pt    Discharge department/facility: [facility] [place]    Scheduled appointment with PCP within 7-14 days    Follow Up  Future Appointments   Date Time Provider Department Center   7/3/2024 12:00 PM Toni Fontanez MD [facility] BS AMB   2024 10:30 AM Toni Fontanez MD [facility] BS AMB   2024 10:50 AM Afshin Reilly MD [facility] BS AMB       Sima Juarez

## 2024-07-01 NOTE — TELEPHONE ENCOUNTER
Care Transitions Initial Follow Up Call    Outreach made within 2 business days of discharge: No    Patient: Ander Kelsey Patient : 1945   MRN: [id number]  Reason for Admission: There are no discharge diagnoses documented for the most recent discharge.  Discharge Date: 24       Spoke with: Ander    Discharge department/facility: [facility]     TCM Interactive Patient Contact:  Was patient able to fill all prescriptions: Yes  Was patient instructed to bring all medications to the follow-up visit: Yes  Is patient taking all medications as directed in the discharge summary? Yes  Does patient understand their discharge instructions: Yes  Does patient have questions or concerns that need addressed prior to 7-14 day follow up office visit: no    Scheduled appointment with PCP within 7-14 days    Follow Up  Future Appointments   Date Time Provider Department Center   7/3/2024 12:00 PM Toni Fontanez MD [facility] BS AMB   2024 10:30 AM Toni Fontanez MD [facility] BS AMB   2024 10:50 AM Afshin Reilly MD [facility] BS AMB       Violet Pugh LPN

## 2024-07-01 NOTE — TELEPHONE ENCOUNTER
Attempted to reach patient. Left message on vm to return call        Care Transitions Initial Follow Up Call    Outreach made within 2 business days of discharge: No    Patient: Ander Kelsey Patient : 1945   MRN: [id number]  Reason for Admission: There are no discharge diagnoses documented for the most recent discharge.  Discharge Date: 24

## 2024-07-03 ENCOUNTER — OFFICE VISIT (OUTPATIENT)
Age: 79
End: 2024-07-03

## 2024-07-03 VITALS
SYSTOLIC BLOOD PRESSURE: 120 MMHG | BODY MASS INDEX: 23.35 KG/M2 | WEIGHT: 148.8 LBS | HEART RATE: 62 BPM | HEIGHT: 67 IN | OXYGEN SATURATION: 99 % | RESPIRATION RATE: 14 BRPM | DIASTOLIC BLOOD PRESSURE: 70 MMHG | TEMPERATURE: 97.1 F

## 2024-07-03 DIAGNOSIS — K55.9 ISCHEMIC COLITIS (HCC): Primary | ICD-10-CM

## 2024-07-03 DIAGNOSIS — D69.6 THROMBOCYTOPENIA (HCC): ICD-10-CM

## 2024-07-03 LAB
ERYTHROCYTE [DISTWIDTH] IN BLOOD BY AUTOMATED COUNT: 14.3 % (ref 11.5–14.5)
HCT VFR BLD AUTO: 48.9 % (ref 36.6–50.3)
HGB BLD-MCNC: 15.7 G/DL (ref 12.1–17)
MCH RBC QN AUTO: 30.9 PG (ref 26–34)
MCHC RBC AUTO-ENTMCNC: 32.1 G/DL (ref 30–36.5)
MCV RBC AUTO: 96.3 FL (ref 80–99)
NRBC # BLD: 0 K/UL (ref 0–0.01)
NRBC BLD-RTO: 0 PER 100 WBC
PLATELET # BLD AUTO: 243 K/UL (ref 150–400)
PMV BLD AUTO: 12.6 FL (ref 8.9–12.9)
RBC # BLD AUTO: 5.08 M/UL (ref 4.1–5.7)
WBC # BLD AUTO: 10.1 K/UL (ref 4.1–11.1)

## 2024-07-03 NOTE — PROGRESS NOTES
Chief Complaint   Patient presents with    Follow-Up from Hospital     Patient admitted to [facility] on 6/23/24 for abdominal pain.            \"Have you been to the ER, urgent care clinic since your last visit?  Hospitalized since your last visit?\"    YES - When: approximately 1  weeks ago.  Where and Why: [facility] for abdominal pain.    “Have you seen or consulted any other health care providers outside of [facility] since your last visit?”    NO            Click Here for Release of Records Request     
CHOL 151 02/19/2024 11:13 AM    TRIG 271 02/19/2024 11:13 AM    HDL 46 02/19/2024 11:13 AM    CHOLHDLRATIO 3.3 02/19/2024 11:13 AM     Hemoglobin A1C:   Hemoglobin A1C   Date Value Ref Range Status   06/01/2022 7.7 (H) 4.0 - 5.6 % Final     Comment:     NEW METHOD  PLEASE NOTE NEW REFERENCE RANGE  (NOTE)  HbA1C Interpretive Ranges  <5.7              Normal  5.7 - 6.4         Consider Prediabetes  >6.5              Consider Diabetes       Hemoglobin A1C, POC   Date Value Ref Range Status   06/19/2024 7.0 % Final        Review of Systems     Physical Exam  Constitutional:       Appearance: Normal appearance.   HENT:      Head: Normocephalic and atraumatic.      Right Ear: Tympanic membrane normal.      Left Ear: Tympanic membrane normal.      Nose: Nose normal.      Mouth/Throat:      Mouth: Mucous membranes are moist.   Eyes:      Pupils: Pupils are equal, round, and reactive to light.   Cardiovascular:      Rate and Rhythm: Normal rate and regular rhythm.      Pulses: Normal pulses.      Heart sounds: Normal heart sounds.   Pulmonary:      Effort: Pulmonary effort is normal.   Abdominal:      General: Abdomen is flat. There is no distension.      Palpations: Abdomen is soft. There is no mass.      Tenderness: There is no abdominal tenderness. There is no guarding or rebound.   Musculoskeletal:         General: Normal range of motion.      Cervical back: Normal range of motion and neck supple.   Skin:     General: Skin is warm.   Neurological:      General: No focal deficit present.      Mental Status: He is alert.   Psychiatric:         Mood and Affect: Mood normal.         Thought Content: Thought content normal.         Judgment: Judgment normal.          ASSESSMENT and PLAN  There are no diagnoses linked to this encounter.   Ander was seen today for follow-up from hospital.    Diagnoses and all orders for this visit:    Ischemic colitis (HCC)  -     CBC; Future  -     CBC  ` Clinical improvement   Tolerating

## 2024-08-21 NOTE — PROGRESS NOTES
HISTORY OF PRESENT ILLNESS   Ander Kelsey   is a 78 y.o.  male.  hx HTN DM02  with neuropathy HLD anemia d/t AVM ckd 3 due to HTN , Barretts esophagus memory loss hx ischemic colitis  URO Dr Shukla-elevated PSA  Nephro Dr Massey assepi Reilly for DM-2      Saw Neuro Dr KAREN Byrne-testing including amyloid PET r/o Alz Disease.  Anxiety by neuropsych testing--lexapro prescribed earlier this year but pt did not take it but I s taking it now and feels calmer    Colitis symptoms--resolved off ozempic  Has fu Dr Reilly next week -on toujeo 25 units qd      Broke right index finger last week opening door at a bank--went to Er--splint placed  . Sees ortho hand today.  Had prior right carpal tunnel last year      Last OV  Here for RAMYA-admitteed for abdominal pain and rectal bleeding due to ischemic colitis. Here with wife  Managed conservatively  Had recent egd/colon last Oct so not repeated  Has fu GI next week  No recurrent bleeding and no significant pain  Some constipation  On soft diet-tolearting  Just feels weak but improving  Weight down about 5 lbs  His ozempic was stopped by Dr Reilly-recent A1c around 7  Remains on same dose of insulin     Wbc and Hb ok in hospital  Plt slightly low     Hydrated in hospital  Patient Active Problem List    Diagnosis Date Noted    Colitis 06/23/2024    Memory loss 08/14/2023    Chronic renal disease, stage III ([facility]) 06/01/2022    Controlled type 2 diabetes mellitus with diabetic neuropathy, with long-term current use of insulin ([facility]) 06/01/2022    Type 2 diabetes mellitus with chronic kidney disease ([facility]) 12/17/2021    GI bleed 07/27/2017    Primary open angle glaucoma 07/26/2016    CKD (chronic kidney disease) stage 3, GFR 30-59 ml/min ([facility]) 08/10/2010    Essential hypertension, benign 10/26/2009    Gout 10/26/2009    Polycythemia 10/26/2009    BPH (benign prostatic hyperplasia) 10/26/2009    Mann's esophagus 10/26/2009    Pure hypercholesterolemia 10/26/2009     Current

## 2024-08-22 ENCOUNTER — OFFICE VISIT (OUTPATIENT)
Age: 79
End: 2024-08-22
Payer: MEDICARE

## 2024-08-22 VITALS
RESPIRATION RATE: 14 BRPM | BODY MASS INDEX: 24.17 KG/M2 | WEIGHT: 154 LBS | OXYGEN SATURATION: 98 % | HEART RATE: 59 BPM | DIASTOLIC BLOOD PRESSURE: 70 MMHG | SYSTOLIC BLOOD PRESSURE: 120 MMHG | HEIGHT: 67 IN | TEMPERATURE: 97.1 F

## 2024-08-22 DIAGNOSIS — S62.600D CLOSED NONDISPLACED FRACTURE OF PHALANX OF RIGHT INDEX FINGER WITH ROUTINE HEALING, UNSPECIFIED PHALANX, SUBSEQUENT ENCOUNTER: ICD-10-CM

## 2024-08-22 DIAGNOSIS — E11.42 TYPE 2 DIABETES MELLITUS WITH DIABETIC POLYNEUROPATHY, WITHOUT LONG-TERM CURRENT USE OF INSULIN (HCC): ICD-10-CM

## 2024-08-22 DIAGNOSIS — N18.2 CKD (CHRONIC KIDNEY DISEASE) STAGE 2, GFR 60-89 ML/MIN: ICD-10-CM

## 2024-08-22 DIAGNOSIS — F41.9 ANXIETY: Primary | ICD-10-CM

## 2024-08-22 DIAGNOSIS — I10 HYPERTENSION, UNSPECIFIED TYPE: ICD-10-CM

## 2024-08-22 PROCEDURE — 3074F SYST BP LT 130 MM HG: CPT | Performed by: INTERNAL MEDICINE

## 2024-08-22 PROCEDURE — 99213 OFFICE O/P EST LOW 20 MIN: CPT | Performed by: INTERNAL MEDICINE

## 2024-08-22 PROCEDURE — 1036F TOBACCO NON-USER: CPT | Performed by: INTERNAL MEDICINE

## 2024-08-22 PROCEDURE — G8420 CALC BMI NORM PARAMETERS: HCPCS | Performed by: INTERNAL MEDICINE

## 2024-08-22 PROCEDURE — 3078F DIAST BP <80 MM HG: CPT | Performed by: INTERNAL MEDICINE

## 2024-08-22 PROCEDURE — 1123F ACP DISCUSS/DSCN MKR DOCD: CPT | Performed by: INTERNAL MEDICINE

## 2024-08-22 PROCEDURE — G8427 DOCREV CUR MEDS BY ELIG CLIN: HCPCS | Performed by: INTERNAL MEDICINE

## 2024-08-22 RX ORDER — ESCITALOPRAM OXALATE 5 MG/1
5 TABLET ORAL DAILY
Qty: 90 TABLET | Refills: 1
Start: 2024-08-22

## 2024-08-22 NOTE — PROGRESS NOTES
Chief Complaint   Patient presents with    Anxiety     6 month follow up          \"Have you been to the ER, urgent care clinic since your last visit?  Hospitalized since your last visit?\"    YES - When: approximately 1  weeks ago.  Where and Why: [facility] for fractured index finger.    “Have you seen or consulted any other health care providers outside of [facility] since your last visit?”    NO            Click Here for Release of Records Request

## 2024-09-20 ENCOUNTER — PATIENT MESSAGE (OUTPATIENT)
Age: 79
End: 2024-09-20

## 2024-09-20 DIAGNOSIS — E11.9 TYPE 2 DIABETES MELLITUS WITHOUT COMPLICATION, WITH LONG-TERM CURRENT USE OF INSULIN (HCC): Primary | ICD-10-CM

## 2024-09-20 DIAGNOSIS — Z79.4 TYPE 2 DIABETES MELLITUS WITHOUT COMPLICATION, WITH LONG-TERM CURRENT USE OF INSULIN (HCC): Primary | ICD-10-CM

## 2024-09-23 NOTE — ANESTHESIA POSTPROCEDURE EVALUATION
Department of Anesthesiology  Postprocedure Note    Patient: Mariza Jorgensen  MRN: [id number]  YOB: 1945  Date of evaluation: 10/27/2023      Procedure Summary     Date: 10/27/23 Room / Location: [facility] ENDO 01 / [facility] ENDOSCOPY    Anesthesia Start: 0737 Anesthesia Stop: 0818    Procedures:       COLONOSCOPY WITH BIOPSY AND POLYPECTOMY, EDG WITH BIOPSY      EGD ESOPHAGOGASTRODUODENOSCOPY Diagnosis:       Iron deficiency      Mann's esophagus without dysplasia      Personal history of colonic polyps      Gastric AVM      (Iron deficiency [E61.1])      (Mann's esophagus without dysplasia [K22.70])      (Personal history of colonic polyps [Z86.010])      (Gastric AVM [K31.819])    Surgeons: Jeanette Palacios MD Responsible Provider: Iggy Artis MD    Anesthesia Type: MAC ASA Status: 2          Anesthesia Type: MAC    Swapna Phase I: Swapna Score: 10    Swapna Phase II: Swapna Score: 10      Anesthesia Post Evaluation    Patient location during evaluation: PACU  Patient participation: complete - patient participated  Level of consciousness: sleepy but conscious and responsive to verbal stimuli  Pain score: 1  Airway patency: patent  Nausea & Vomiting: no vomiting and no nausea  Complications: no  Cardiovascular status: blood pressure returned to baseline and hemodynamically stable  Respiratory status: acceptable  Hydration status: stable  Multimodal analgesia pain management approach  Pain management: adequate
20

## 2024-10-11 NOTE — ANESTHESIA POSTPROCEDURE EVALUATION
[facility] EMERGENCY DEPARTMENT  [address]  [facility] [address]  Phone: [phone]    Marlon Mccarthy was seen and treated in our emergency department on 10/11/2024.  He may return to work on 10/13/2024.         Thank you for choosing [facility].    Rolo Cifuentes MD       Department of Anesthesiology  Postprocedure Note    Patient: Alta Doss  MRN: [id number]  YOB: 1945  Date of evaluation: 11/27/2023      Procedure Summary     Date: 11/27/23 Room / Location: [facility] ASU B3 / [facility] AMBULATORY OR    Anesthesia Start: 1126 Anesthesia Stop: 1246    Procedure: RIGHT BASAL JOINT ARTHROPLASTY (MAC/REG) (Right: Hand) Diagnosis:       Primary osteoarthritis of first carpometacarpal joint of right hand      Arthritis of carpometacarpal [place]) joint of right thumb      (Primary osteoarthritis of first carpometacarpal joint of right hand [M18.11])      (Arthritis of carpometacarpal (CMC) joint of right thumb [M18.11])    Surgeons: Papito Rosales MD Responsible Provider: Long Maldonado MD    Anesthesia Type: MAC, Regional ASA Status: 3          Anesthesia Type: MAC, Regional    Swapna Phase I: Swapna Score: 9    Swapna Phase II:        Anesthesia Post Evaluation    Patient location during evaluation: PACU  Patient participation: complete - patient participated  Level of consciousness: awake and alert  Pain score: 0  Airway patency: patent  Nausea & Vomiting: no nausea and no vomiting  Complications: no  Cardiovascular status: hemodynamically stable  Respiratory status: acceptable  Hydration status: euvolemic  Comments: Pt has Supraclavicular block; sling postop until block resolves. There was medical reason for not using a multimodal analgesia pain management approach. Pain management: satisfactory to patient

## 2024-11-12 DIAGNOSIS — F41.9 ANXIETY: ICD-10-CM

## 2024-11-12 RX ORDER — ESCITALOPRAM OXALATE 5 MG/1
5 TABLET ORAL DAILY
Qty: 90 TABLET | Refills: 3 | Status: SHIPPED | OUTPATIENT
Start: 2024-11-12

## 2024-12-16 ENCOUNTER — OFFICE VISIT (OUTPATIENT)
Age: 79
End: 2024-12-16
Payer: MEDICARE

## 2024-12-16 VITALS
BODY MASS INDEX: 24.01 KG/M2 | HEART RATE: 60 BPM | HEIGHT: 67 IN | WEIGHT: 153 LBS | DIASTOLIC BLOOD PRESSURE: 72 MMHG | SYSTOLIC BLOOD PRESSURE: 142 MMHG

## 2024-12-16 DIAGNOSIS — E11.9 TYPE 2 DIABETES MELLITUS WITHOUT COMPLICATION, WITH LONG-TERM CURRENT USE OF INSULIN (HCC): ICD-10-CM

## 2024-12-16 DIAGNOSIS — Z79.4 TYPE 2 DIABETES MELLITUS WITHOUT COMPLICATION, WITH LONG-TERM CURRENT USE OF INSULIN (HCC): Primary | ICD-10-CM

## 2024-12-16 DIAGNOSIS — Z79.4 TYPE 2 DIABETES MELLITUS WITHOUT COMPLICATION, WITH LONG-TERM CURRENT USE OF INSULIN (HCC): ICD-10-CM

## 2024-12-16 DIAGNOSIS — E11.9 TYPE 2 DIABETES MELLITUS WITHOUT COMPLICATION, WITH LONG-TERM CURRENT USE OF INSULIN (HCC): Primary | ICD-10-CM

## 2024-12-16 LAB — HBA1C MFR BLD: 7.5 %

## 2024-12-16 PROCEDURE — 3078F DIAST BP <80 MM HG: CPT | Performed by: INTERNAL MEDICINE

## 2024-12-16 PROCEDURE — 1123F ACP DISCUSS/DSCN MKR DOCD: CPT | Performed by: INTERNAL MEDICINE

## 2024-12-16 PROCEDURE — 1036F TOBACCO NON-USER: CPT | Performed by: INTERNAL MEDICINE

## 2024-12-16 PROCEDURE — G8484 FLU IMMUNIZE NO ADMIN: HCPCS | Performed by: INTERNAL MEDICINE

## 2024-12-16 PROCEDURE — 3077F SYST BP >= 140 MM HG: CPT | Performed by: INTERNAL MEDICINE

## 2024-12-16 PROCEDURE — 83036 HEMOGLOBIN GLYCOSYLATED A1C: CPT | Performed by: INTERNAL MEDICINE

## 2024-12-16 PROCEDURE — PBSHW AMB POC HEMOGLOBIN A1C: Performed by: INTERNAL MEDICINE

## 2024-12-16 PROCEDURE — G8420 CALC BMI NORM PARAMETERS: HCPCS | Performed by: INTERNAL MEDICINE

## 2024-12-16 PROCEDURE — 99214 OFFICE O/P EST MOD 30 MIN: CPT | Performed by: INTERNAL MEDICINE

## 2024-12-16 PROCEDURE — 1126F AMNT PAIN NOTED NONE PRSNT: CPT | Performed by: INTERNAL MEDICINE

## 2024-12-16 PROCEDURE — G8428 CUR MEDS NOT DOCUMENT: HCPCS | Performed by: INTERNAL MEDICINE

## 2024-12-16 RX ORDER — ACYCLOVIR 400 MG/1
1 TABLET ORAL
Qty: 9 EACH | Refills: 4 | Status: SHIPPED | OUTPATIENT
Start: 2024-12-16 | End: 2024-12-16 | Stop reason: SDUPTHER

## 2024-12-16 NOTE — PROGRESS NOTES
minutes.    Examination  Blood pressure 142/72  Pulse 60  Weight 69 kg  BMI 23.9  HEENT unremarkable  Lungs clear  Heart reveals a regular rate and rhythm  Abdomen benign  Extremities unremarkable  Diabetic foot exam:   Left Foot:   Visual Exam: normal   Pulse DP: 2+ (normal)   Filament test: absent sensation   Vibratory Sensation: diminished  Right Foot:   Visual Exam: normal   Pulse DP: 2+ (normal)   Filament test: absent sensation   Vibratory Sensation: diminished     Impression  1.  Type 2 diabetes mellitus currently on toujeo as his only antihyperglycemic  2.  History of severe abdominal pain thought related to Ozempic  3.  Degenerative joint disease  4. DPN    Plan:  1.  We have continued the Toujeo at 25 units  2.  We have elected to try a Dexcom G7 to monitor his blood sugar.  I am anticipating seeing elevated blood sugars in the evening contributing to the higher A1c.  Given his morning blood sugars, I am reluctant to increase the toujeo further  3.  I will see him back in 4 months

## 2024-12-16 NOTE — TELEPHONE ENCOUNTER
Pt sent "[employer]t message stating his dexcom sensors should go to [facility]. It was previously sent to [facility].     Requested Prescriptions     Pending Prescriptions Disp Refills    Continuous Glucose Sensor (DEXCOM G7 SENSOR) MISC 9 each 4     Si each by Does not apply route every 10 days

## 2024-12-17 RX ORDER — ACYCLOVIR 400 MG/1
1 TABLET ORAL
Qty: 9 EACH | Refills: 4 | Status: SHIPPED | OUTPATIENT
Start: 2024-12-17

## 2024-12-29 ENCOUNTER — HOSPITAL ENCOUNTER (INPATIENT)
Facility: HOSPITAL | Age: 79
LOS: 2 days | Discharge: HOME | DRG: 322 | End: 2024-12-31
Attending: EMERGENCY MEDICINE | Admitting: INTERNAL MEDICINE
Payer: MEDICARE

## 2024-12-29 DIAGNOSIS — I21.11 ST ELEVATION MYOCARDIAL INFARCTION INVOLVING RIGHT CORONARY ARTERY (HCC): ICD-10-CM

## 2024-12-29 DIAGNOSIS — I24.9 ACUTE CORONARY SYNDROME (HCC): ICD-10-CM

## 2024-12-29 DIAGNOSIS — I21.3 STEMI (ST ELEVATION MYOCARDIAL INFARCTION) (HCC): ICD-10-CM

## 2024-12-29 DIAGNOSIS — I21.3 ST ELEVATION MYOCARDIAL INFARCTION (STEMI), UNSPECIFIED ARTERY (HCC): Primary | ICD-10-CM

## 2024-12-29 LAB
ACT BLD: 176 SECS (ref 79–138)
ACT BLD: 233 SECS (ref 79–138)
ACT BLD: 325 SECS (ref 79–138)
ALBUMIN SERPL-MCNC: 3.9 G/DL (ref 3.5–5)
ALBUMIN/GLOB SERPL: 1.1 (ref 1.1–2.2)
ALP SERPL-CCNC: 90 U/L (ref 45–117)
ALT SERPL-CCNC: 26 U/L (ref 12–78)
ANION GAP SERPL CALC-SCNC: 5 MMOL/L (ref 2–12)
AST SERPL-CCNC: 23 U/L (ref 15–37)
BASOPHILS # BLD: 0.1 K/UL (ref 0–0.1)
BASOPHILS NFR BLD: 1 % (ref 0–1)
BILIRUB SERPL-MCNC: 0.5 MG/DL (ref 0.2–1)
BUN SERPL-MCNC: 14 MG/DL (ref 6–20)
BUN/CREAT SERPL: 11 (ref 12–20)
CALCIUM SERPL-MCNC: 9.6 MG/DL (ref 8.5–10.1)
CHLORIDE SERPL-SCNC: 109 MMOL/L (ref 97–108)
CO2 SERPL-SCNC: 22 MMOL/L (ref 21–32)
CREAT SERPL-MCNC: 1.22 MG/DL (ref 0.7–1.3)
DIFFERENTIAL METHOD BLD: NORMAL
ECHO BSA: 1.81 M2
EKG ATRIAL RATE: 50 BPM
EKG ATRIAL RATE: 53 BPM
EKG ATRIAL RATE: 54 BPM
EKG DIAGNOSIS: NORMAL
EKG P AXIS: 62 DEGREES
EKG P AXIS: 64 DEGREES
EKG P AXIS: 75 DEGREES
EKG P-R INTERVAL: 142 MS
EKG P-R INTERVAL: 156 MS
EKG P-R INTERVAL: 204 MS
EKG Q-T INTERVAL: 434 MS
EKG Q-T INTERVAL: 434 MS
EKG Q-T INTERVAL: 468 MS
EKG QRS DURATION: 74 MS
EKG QRS DURATION: 78 MS
EKG QRS DURATION: 82 MS
EKG QTC CALCULATION (BAZETT): 407 MS
EKG QTC CALCULATION (BAZETT): 411 MS
EKG QTC CALCULATION (BAZETT): 426 MS
EKG R AXIS: 102 DEGREES
EKG R AXIS: 72 DEGREES
EKG R AXIS: 97 DEGREES
EKG T AXIS: 104 DEGREES
EKG T AXIS: 61 DEGREES
EKG T AXIS: 87 DEGREES
EKG VENTRICULAR RATE: 50 BPM
EKG VENTRICULAR RATE: 53 BPM
EKG VENTRICULAR RATE: 54 BPM
EOSINOPHIL # BLD: 0.4 K/UL (ref 0–0.4)
EOSINOPHIL NFR BLD: 5 % (ref 0–7)
ERYTHROCYTE [DISTWIDTH] IN BLOOD BY AUTOMATED COUNT: 14.1 % (ref 11.5–14.5)
GLOBULIN SER CALC-MCNC: 3.7 G/DL (ref 2–4)
GLUCOSE BLD STRIP.AUTO-MCNC: 162 MG/DL (ref 65–117)
GLUCOSE BLD STRIP.AUTO-MCNC: 172 MG/DL (ref 65–117)
GLUCOSE BLD STRIP.AUTO-MCNC: 173 MG/DL (ref 65–117)
GLUCOSE BLD STRIP.AUTO-MCNC: 194 MG/DL (ref 65–117)
GLUCOSE SERPL-MCNC: 206 MG/DL (ref 65–100)
HCT VFR BLD AUTO: 47.4 % (ref 36.6–50.3)
HGB BLD-MCNC: 16.1 G/DL (ref 12.1–17)
IMM GRANULOCYTES # BLD AUTO: 0 K/UL (ref 0–0.04)
IMM GRANULOCYTES NFR BLD AUTO: 0 % (ref 0–0.5)
LYMPHOCYTES # BLD: 1.4 K/UL (ref 0.8–3.5)
LYMPHOCYTES NFR BLD: 20 % (ref 12–49)
MCH RBC QN AUTO: 31.4 PG (ref 26–34)
MCHC RBC AUTO-ENTMCNC: 34 G/DL (ref 30–36.5)
MCV RBC AUTO: 92.4 FL (ref 80–99)
MONOCYTES # BLD: 0.6 K/UL (ref 0–1)
MONOCYTES NFR BLD: 8 % (ref 5–13)
NEUTS SEG # BLD: 4.5 K/UL (ref 1.8–8)
NEUTS SEG NFR BLD: 66 % (ref 32–75)
NRBC # BLD: 0 K/UL (ref 0–0.01)
NRBC BLD-RTO: 0 PER 100 WBC
PLATELET # BLD AUTO: 153 K/UL (ref 150–400)
PMV BLD AUTO: 12.6 FL (ref 8.9–12.9)
POTASSIUM SERPL-SCNC: 4.2 MMOL/L (ref 3.5–5.1)
PROT SERPL-MCNC: 7.6 G/DL (ref 6.4–8.2)
RBC # BLD AUTO: 5.13 M/UL (ref 4.1–5.7)
RBC MORPH BLD: NORMAL
SERVICE CMNT-IMP: ABNORMAL
SODIUM SERPL-SCNC: 136 MMOL/L (ref 136–145)
TROPONIN I SERPL HS-MCNC: 113 NG/L (ref 0–76)
WBC # BLD AUTO: 7 K/UL (ref 4.1–11.1)

## 2024-12-29 PROCEDURE — 85347 COAGULATION TIME ACTIVATED: CPT

## 2024-12-29 PROCEDURE — 84484 ASSAY OF TROPONIN QUANT: CPT

## 2024-12-29 PROCEDURE — 6370000000 HC RX 637 (ALT 250 FOR IP): Performed by: INTERNAL MEDICINE

## 2024-12-29 PROCEDURE — C1874 STENT, COATED/COV W/DEL SYS: HCPCS | Performed by: INTERNAL MEDICINE

## 2024-12-29 PROCEDURE — C1713 ANCHOR/SCREW BN/BN,TIS/BN: HCPCS | Performed by: INTERNAL MEDICINE

## 2024-12-29 PROCEDURE — C9606 PERC D-E COR REVASC W AMI S: HCPCS | Performed by: INTERNAL MEDICINE

## 2024-12-29 PROCEDURE — 2060000000 HC ICU INTERMEDIATE R&B

## 2024-12-29 PROCEDURE — 027136Z DILATION OF CORONARY ARTERY, TWO ARTERIES WITH THREE DRUG-ELUTING INTRALUMINAL DEVICES, PERCUTANEOUS APPROACH: ICD-10-PCS | Performed by: INTERNAL MEDICINE

## 2024-12-29 PROCEDURE — 96374 THER/PROPH/DIAG INJ IV PUSH: CPT

## 2024-12-29 PROCEDURE — 6360000002 HC RX W HCPCS: Performed by: INTERNAL MEDICINE

## 2024-12-29 PROCEDURE — 93458 L HRT ARTERY/VENTRICLE ANGIO: CPT | Performed by: INTERNAL MEDICINE

## 2024-12-29 PROCEDURE — B2151ZZ FLUOROSCOPY OF LEFT HEART USING LOW OSMOLAR CONTRAST: ICD-10-PCS | Performed by: INTERNAL MEDICINE

## 2024-12-29 PROCEDURE — 93005 ELECTROCARDIOGRAM TRACING: CPT | Performed by: EMERGENCY MEDICINE

## 2024-12-29 PROCEDURE — 82962 GLUCOSE BLOOD TEST: CPT

## 2024-12-29 PROCEDURE — 6360000002 HC RX W HCPCS: Performed by: EMERGENCY MEDICINE

## 2024-12-29 PROCEDURE — 6360000004 HC RX CONTRAST MEDICATION: Performed by: INTERNAL MEDICINE

## 2024-12-29 PROCEDURE — 2500000003 HC RX 250 WO HCPCS: Performed by: EMERGENCY MEDICINE

## 2024-12-29 PROCEDURE — 99152 MOD SED SAME PHYS/QHP 5/>YRS: CPT | Performed by: INTERNAL MEDICINE

## 2024-12-29 PROCEDURE — 93005 ELECTROCARDIOGRAM TRACING: CPT | Performed by: INTERNAL MEDICINE

## 2024-12-29 PROCEDURE — 99153 MOD SED SAME PHYS/QHP EA: CPT | Performed by: INTERNAL MEDICINE

## 2024-12-29 PROCEDURE — 6370000000 HC RX 637 (ALT 250 FOR IP): Performed by: EMERGENCY MEDICINE

## 2024-12-29 PROCEDURE — 85025 COMPLETE CBC W/AUTO DIFF WBC: CPT

## 2024-12-29 PROCEDURE — 6370000000 HC RX 637 (ALT 250 FOR IP): Performed by: NURSE PRACTITIONER

## 2024-12-29 PROCEDURE — B2111ZZ FLUOROSCOPY OF MULTIPLE CORONARY ARTERIES USING LOW OSMOLAR CONTRAST: ICD-10-PCS | Performed by: INTERNAL MEDICINE

## 2024-12-29 PROCEDURE — 6370000000 HC RX 637 (ALT 250 FOR IP): Performed by: HOSPITALIST

## 2024-12-29 PROCEDURE — 99285 EMERGENCY DEPT VISIT HI MDM: CPT

## 2024-12-29 PROCEDURE — C1725 CATH, TRANSLUMIN NON-LASER: HCPCS | Performed by: INTERNAL MEDICINE

## 2024-12-29 PROCEDURE — 2500000003 HC RX 250 WO HCPCS: Performed by: INTERNAL MEDICINE

## 2024-12-29 PROCEDURE — C1894 INTRO/SHEATH, NON-LASER: HCPCS | Performed by: INTERNAL MEDICINE

## 2024-12-29 PROCEDURE — C1887 CATHETER, GUIDING: HCPCS | Performed by: INTERNAL MEDICINE

## 2024-12-29 PROCEDURE — 2500000003 HC RX 250 WO HCPCS: Performed by: STUDENT IN AN ORGANIZED HEALTH CARE EDUCATION/TRAINING PROGRAM

## 2024-12-29 PROCEDURE — 4A023N7 MEASUREMENT OF CARDIAC SAMPLING AND PRESSURE, LEFT HEART, PERCUTANEOUS APPROACH: ICD-10-PCS | Performed by: INTERNAL MEDICINE

## 2024-12-29 PROCEDURE — 2709999900 HC NON-CHARGEABLE SUPPLY: Performed by: INTERNAL MEDICINE

## 2024-12-29 PROCEDURE — C1769 GUIDE WIRE: HCPCS | Performed by: INTERNAL MEDICINE

## 2024-12-29 PROCEDURE — 96375 TX/PRO/DX INJ NEW DRUG ADDON: CPT

## 2024-12-29 PROCEDURE — 36415 COLL VENOUS BLD VENIPUNCTURE: CPT

## 2024-12-29 PROCEDURE — 80053 COMPREHEN METABOLIC PANEL: CPT

## 2024-12-29 DEVICE — STENT ONYXNG30034UX ONYX 3.00X34RX
Type: IMPLANTABLE DEVICE | Status: FUNCTIONAL
Brand: ONYX FRONTIER™

## 2024-12-29 DEVICE — STENT ONYXNG25012UX ONYX 2.50X12RX
Type: IMPLANTABLE DEVICE | Status: FUNCTIONAL
Brand: ONYX FRONTIER™

## 2024-12-29 DEVICE — STENT ONYXNG35038UX ONYX 3.50X38RX
Type: IMPLANTABLE DEVICE | Status: FUNCTIONAL
Brand: ONYX FRONTIER™

## 2024-12-29 RX ORDER — PRAVASTATIN SODIUM 10 MG
20 TABLET ORAL DAILY
Status: DISCONTINUED | OUTPATIENT
Start: 2024-12-29 | End: 2024-12-29

## 2024-12-29 RX ORDER — VERAPAMIL HYDROCHLORIDE 2.5 MG/ML
INJECTION, SOLUTION INTRAVENOUS PRN
Status: DISCONTINUED | OUTPATIENT
Start: 2024-12-29 | End: 2024-12-29 | Stop reason: HOSPADM

## 2024-12-29 RX ORDER — SODIUM CHLORIDE 0.9 % (FLUSH) 0.9 %
5-40 SYRINGE (ML) INJECTION EVERY 12 HOURS SCHEDULED
Status: DISCONTINUED | OUTPATIENT
Start: 2024-12-29 | End: 2024-12-30 | Stop reason: SDUPTHER

## 2024-12-29 RX ORDER — ONDANSETRON 2 MG/ML
4 INJECTION INTRAMUSCULAR; INTRAVENOUS EVERY 6 HOURS PRN
Status: DISCONTINUED | OUTPATIENT
Start: 2024-12-29 | End: 2024-12-31 | Stop reason: HOSPADM

## 2024-12-29 RX ORDER — MORPHINE SULFATE 2 MG/ML
INJECTION, SOLUTION INTRAMUSCULAR; INTRAVENOUS DAILY PRN
Status: COMPLETED | OUTPATIENT
Start: 2024-12-29 | End: 2024-12-29

## 2024-12-29 RX ORDER — LATANOPROST 50 UG/ML
1 SOLUTION/ DROPS OPHTHALMIC DAILY
Status: DISCONTINUED | OUTPATIENT
Start: 2024-12-29 | End: 2024-12-31 | Stop reason: HOSPADM

## 2024-12-29 RX ORDER — GLUCAGON 1 MG/ML
1 KIT INJECTION PRN
Status: DISCONTINUED | OUTPATIENT
Start: 2024-12-29 | End: 2024-12-31 | Stop reason: HOSPADM

## 2024-12-29 RX ORDER — SODIUM CHLORIDE 0.9 % (FLUSH) 0.9 %
5-40 SYRINGE (ML) INJECTION PRN
Status: DISCONTINUED | OUTPATIENT
Start: 2024-12-29 | End: 2024-12-30 | Stop reason: SDUPTHER

## 2024-12-29 RX ORDER — POLYETHYLENE GLYCOL 3350 17 G/17G
17 POWDER, FOR SOLUTION ORAL DAILY PRN
Status: DISCONTINUED | OUTPATIENT
Start: 2024-12-29 | End: 2024-12-31 | Stop reason: HOSPADM

## 2024-12-29 RX ORDER — PRAVASTATIN SODIUM 10 MG
20 TABLET ORAL NIGHTLY
Status: DISCONTINUED | OUTPATIENT
Start: 2024-12-29 | End: 2024-12-31 | Stop reason: HOSPADM

## 2024-12-29 RX ORDER — ACETAMINOPHEN 650 MG/1
650 SUPPOSITORY RECTAL EVERY 6 HOURS PRN
Status: DISCONTINUED | OUTPATIENT
Start: 2024-12-29 | End: 2024-12-31 | Stop reason: HOSPADM

## 2024-12-29 RX ORDER — SODIUM CHLORIDE 9 MG/ML
INJECTION, SOLUTION INTRAVENOUS PRN
Status: DISCONTINUED | OUTPATIENT
Start: 2024-12-29 | End: 2024-12-30

## 2024-12-29 RX ORDER — HEPARIN SODIUM 1000 [USP'U]/ML
INJECTION, SOLUTION INTRAVENOUS; SUBCUTANEOUS PRN
Status: DISCONTINUED | OUTPATIENT
Start: 2024-12-29 | End: 2024-12-29 | Stop reason: HOSPADM

## 2024-12-29 RX ORDER — ESCITALOPRAM OXALATE 10 MG/1
5 TABLET ORAL DAILY
Status: DISCONTINUED | OUTPATIENT
Start: 2024-12-29 | End: 2024-12-31 | Stop reason: HOSPADM

## 2024-12-29 RX ORDER — ESOMEPRAZOLE MAGNESIUM 20 MG/1
20 GRANULE, DELAYED RELEASE ORAL 2 TIMES DAILY
COMMUNITY

## 2024-12-29 RX ORDER — PANTOPRAZOLE SODIUM 40 MG/1
40 TABLET, DELAYED RELEASE ORAL DAILY
Status: DISCONTINUED | OUTPATIENT
Start: 2024-12-29 | End: 2024-12-31 | Stop reason: HOSPADM

## 2024-12-29 RX ORDER — FENTANYL CITRATE 50 UG/ML
INJECTION, SOLUTION INTRAMUSCULAR; INTRAVENOUS PRN
Status: DISCONTINUED | OUTPATIENT
Start: 2024-12-29 | End: 2024-12-29 | Stop reason: HOSPADM

## 2024-12-29 RX ORDER — ACETAMINOPHEN 325 MG/1
650 TABLET ORAL EVERY 6 HOURS PRN
Status: DISCONTINUED | OUTPATIENT
Start: 2024-12-29 | End: 2024-12-31 | Stop reason: HOSPADM

## 2024-12-29 RX ORDER — LIDOCAINE HYDROCHLORIDE 10 MG/ML
INJECTION, SOLUTION INFILTRATION; PERINEURAL PRN
Status: DISCONTINUED | OUTPATIENT
Start: 2024-12-29 | End: 2024-12-29 | Stop reason: HOSPADM

## 2024-12-29 RX ORDER — EPTIFIBATIDE 0.75 MG/ML
INJECTION, SOLUTION INTRAVENOUS CONTINUOUS PRN
Status: COMPLETED | OUTPATIENT
Start: 2024-12-29 | End: 2024-12-29

## 2024-12-29 RX ORDER — ASPIRIN 81 MG/1
81 TABLET ORAL DAILY
COMMUNITY

## 2024-12-29 RX ORDER — SODIUM CHLORIDE 0.9 % (FLUSH) 0.9 %
5-40 SYRINGE (ML) INJECTION EVERY 12 HOURS SCHEDULED
Status: DISCONTINUED | OUTPATIENT
Start: 2024-12-29 | End: 2024-12-31 | Stop reason: HOSPADM

## 2024-12-29 RX ORDER — HEPARIN SODIUM 1000 [USP'U]/ML
INJECTION, SOLUTION INTRAVENOUS; SUBCUTANEOUS DAILY PRN
Status: COMPLETED | OUTPATIENT
Start: 2024-12-29 | End: 2024-12-29

## 2024-12-29 RX ORDER — SODIUM CHLORIDE 0.9 % (FLUSH) 0.9 %
5-40 SYRINGE (ML) INJECTION PRN
Status: DISCONTINUED | OUTPATIENT
Start: 2024-12-29 | End: 2024-12-30

## 2024-12-29 RX ORDER — MORPHINE SULFATE 2 MG/ML
INJECTION, SOLUTION INTRAMUSCULAR; INTRAVENOUS
Status: DISPENSED
Start: 2024-12-29 | End: 2024-12-29

## 2024-12-29 RX ORDER — MAGNESIUM HYDROXIDE/ALUMINUM HYDROXICE/SIMETHICONE 120; 1200; 1200 MG/30ML; MG/30ML; MG/30ML
30 SUSPENSION ORAL EVERY 6 HOURS PRN
Status: DISCONTINUED | OUTPATIENT
Start: 2024-12-29 | End: 2024-12-31 | Stop reason: HOSPADM

## 2024-12-29 RX ORDER — NITROGLYCERIN 0.4 MG/1
0.4 TABLET SUBLINGUAL EVERY 5 MIN PRN
Status: DISCONTINUED | OUTPATIENT
Start: 2024-12-29 | End: 2024-12-31 | Stop reason: HOSPADM

## 2024-12-29 RX ORDER — HEPARIN SODIUM 1000 [USP'U]/ML
60 INJECTION, SOLUTION INTRAVENOUS; SUBCUTANEOUS ONCE
Status: DISCONTINUED | OUTPATIENT
Start: 2024-12-29 | End: 2024-12-29

## 2024-12-29 RX ORDER — SODIUM CHLORIDE 9 MG/ML
INJECTION, SOLUTION INTRAVENOUS PRN
Status: DISCONTINUED | OUTPATIENT
Start: 2024-12-29 | End: 2024-12-31 | Stop reason: HOSPADM

## 2024-12-29 RX ORDER — IOPAMIDOL 755 MG/ML
INJECTION, SOLUTION INTRAVASCULAR PRN
Status: DISCONTINUED | OUTPATIENT
Start: 2024-12-29 | End: 2024-12-29 | Stop reason: HOSPADM

## 2024-12-29 RX ORDER — ONDANSETRON 2 MG/ML
4 INJECTION INTRAMUSCULAR; INTRAVENOUS EVERY 6 HOURS PRN
Status: DISCONTINUED | OUTPATIENT
Start: 2024-12-29 | End: 2024-12-29

## 2024-12-29 RX ORDER — NITROGLYCERIN 0.4 MG/1
TABLET SUBLINGUAL DAILY PRN
Status: COMPLETED | OUTPATIENT
Start: 2024-12-29 | End: 2024-12-29

## 2024-12-29 RX ORDER — DEXTROSE MONOHYDRATE 100 MG/ML
INJECTION, SOLUTION INTRAVENOUS CONTINUOUS PRN
Status: DISCONTINUED | OUTPATIENT
Start: 2024-12-29 | End: 2024-12-31 | Stop reason: HOSPADM

## 2024-12-29 RX ORDER — COLCHICINE 0.6 MG/1
0.6 TABLET ORAL 2 TIMES DAILY
Status: DISCONTINUED | OUTPATIENT
Start: 2024-12-29 | End: 2024-12-31 | Stop reason: HOSPADM

## 2024-12-29 RX ORDER — ASPIRIN 81 MG/1
TABLET, CHEWABLE ORAL DAILY PRN
Status: COMPLETED | OUTPATIENT
Start: 2024-12-29 | End: 2024-12-29

## 2024-12-29 RX ORDER — MORPHINE SULFATE 2 MG/ML
2 INJECTION, SOLUTION INTRAMUSCULAR; INTRAVENOUS
Status: ACTIVE | OUTPATIENT
Start: 2024-12-29 | End: 2024-12-29

## 2024-12-29 RX ORDER — LISINOPRIL 10 MG/1
10 TABLET ORAL DAILY
Status: DISCONTINUED | OUTPATIENT
Start: 2024-12-29 | End: 2024-12-31 | Stop reason: HOSPADM

## 2024-12-29 RX ORDER — ACETAMINOPHEN 650 MG/1
650 SUPPOSITORY RECTAL EVERY 6 HOURS PRN
Status: DISCONTINUED | OUTPATIENT
Start: 2024-12-29 | End: 2024-12-29

## 2024-12-29 RX ORDER — ACETAMINOPHEN 325 MG/1
650 TABLET ORAL EVERY 6 HOURS PRN
Status: DISCONTINUED | OUTPATIENT
Start: 2024-12-29 | End: 2024-12-29

## 2024-12-29 RX ORDER — ONDANSETRON 4 MG/1
4 TABLET, ORALLY DISINTEGRATING ORAL EVERY 8 HOURS PRN
Status: DISCONTINUED | OUTPATIENT
Start: 2024-12-29 | End: 2024-12-31 | Stop reason: HOSPADM

## 2024-12-29 RX ORDER — MORPHINE SULFATE 2 MG/ML
2 INJECTION, SOLUTION INTRAMUSCULAR; INTRAVENOUS
Status: DISPENSED | OUTPATIENT
Start: 2024-12-29 | End: 2024-12-29

## 2024-12-29 RX ORDER — INSULIN LISPRO 100 [IU]/ML
0-4 INJECTION, SOLUTION INTRAVENOUS; SUBCUTANEOUS
Status: DISCONTINUED | OUTPATIENT
Start: 2024-12-29 | End: 2024-12-31 | Stop reason: HOSPADM

## 2024-12-29 RX ORDER — ACETAMINOPHEN 325 MG/1
650 TABLET ORAL EVERY 4 HOURS PRN
Status: DISCONTINUED | OUTPATIENT
Start: 2024-12-29 | End: 2024-12-29

## 2024-12-29 RX ORDER — ASPIRIN 81 MG/1
324 TABLET, CHEWABLE ORAL ONCE
Status: DISCONTINUED | OUTPATIENT
Start: 2024-12-29 | End: 2024-12-31 | Stop reason: HOSPADM

## 2024-12-29 RX ORDER — EPTIFIBATIDE 0.75 MG/ML
2 INJECTION, SOLUTION INTRAVENOUS CONTINUOUS
Status: ACTIVE | OUTPATIENT
Start: 2024-12-29 | End: 2024-12-29

## 2024-12-29 RX ORDER — ASPIRIN 81 MG/1
81 TABLET ORAL DAILY
Status: DISCONTINUED | OUTPATIENT
Start: 2024-12-29 | End: 2024-12-31 | Stop reason: HOSPADM

## 2024-12-29 RX ORDER — ENOXAPARIN SODIUM 100 MG/ML
40 INJECTION SUBCUTANEOUS DAILY
Status: DISCONTINUED | OUTPATIENT
Start: 2024-12-29 | End: 2024-12-31 | Stop reason: HOSPADM

## 2024-12-29 RX ADMIN — HEPARIN SODIUM 4200 UNITS: 1000 INJECTION, SOLUTION INTRAVENOUS; SUBCUTANEOUS at 04:14

## 2024-12-29 RX ADMIN — LISINOPRIL 10 MG: 10 TABLET ORAL at 12:17

## 2024-12-29 RX ADMIN — SODIUM CHLORIDE, PRESERVATIVE FREE 10 ML: 5 INJECTION INTRAVENOUS at 19:31

## 2024-12-29 RX ADMIN — ONDANSETRON 4 MG: 2 INJECTION INTRAMUSCULAR; INTRAVENOUS at 19:27

## 2024-12-29 RX ADMIN — MORPHINE SULFATE 2 MG: 2 INJECTION, SOLUTION INTRAMUSCULAR; INTRAVENOUS at 04:26

## 2024-12-29 RX ADMIN — ASPIRIN 324 MG: 81 TABLET, CHEWABLE ORAL at 04:08

## 2024-12-29 RX ADMIN — ACETAMINOPHEN 650 MG: 325 TABLET ORAL at 16:13

## 2024-12-29 RX ADMIN — TICAGRELOR 90 MG: 90 TABLET ORAL at 21:43

## 2024-12-29 RX ADMIN — NITROGLYCERIN 0.4 MG: 0.4 TABLET SUBLINGUAL at 04:39

## 2024-12-29 RX ADMIN — NITROGLYCERIN 0.4 MG: 0.4 TABLET SUBLINGUAL at 04:10

## 2024-12-29 RX ADMIN — ALUMINUM HYDROXIDE, MAGNESIUM HYDROXIDE, AND SIMETHICONE 30 ML: 200; 200; 20 SUSPENSION ORAL at 14:37

## 2024-12-29 RX ADMIN — ESCITALOPRAM OXALATE 5 MG: 10 TABLET ORAL at 12:17

## 2024-12-29 RX ADMIN — MORPHINE SULFATE 2 MG: 2 INJECTION, SOLUTION INTRAMUSCULAR; INTRAVENOUS at 04:44

## 2024-12-29 RX ADMIN — SODIUM CHLORIDE, PRESERVATIVE FREE 10 ML: 5 INJECTION INTRAVENOUS at 19:28

## 2024-12-29 RX ADMIN — PANTOPRAZOLE SODIUM 40 MG: 40 TABLET, DELAYED RELEASE ORAL at 11:30

## 2024-12-29 RX ADMIN — COLCHICINE 0.6 MG: 0.6 TABLET, FILM COATED ORAL at 21:42

## 2024-12-29 RX ADMIN — ONDANSETRON 4 MG: 2 INJECTION INTRAMUSCULAR; INTRAVENOUS at 14:25

## 2024-12-29 RX ADMIN — Medication 3 MG: at 23:05

## 2024-12-29 RX ADMIN — SODIUM CHLORIDE, PRESERVATIVE FREE 10 ML: 5 INJECTION INTRAVENOUS at 19:29

## 2024-12-29 RX ADMIN — PRAVASTATIN SODIUM 20 MG: 10 TABLET ORAL at 21:42

## 2024-12-29 RX ADMIN — NITROGLYCERIN 0.4 MG: 0.4 TABLET SUBLINGUAL at 04:17

## 2024-12-29 ASSESSMENT — PAIN DESCRIPTION - DESCRIPTORS
DESCRIPTORS: ACHING

## 2024-12-29 ASSESSMENT — PAIN SCALES - GENERAL
PAINLEVEL_OUTOF10: 3
PAINLEVEL_OUTOF10: 1
PAINLEVEL_OUTOF10: 4
PAINLEVEL_OUTOF10: 3
PAINLEVEL_OUTOF10: 3
PAINLEVEL_OUTOF10: 0

## 2024-12-29 ASSESSMENT — PAIN DESCRIPTION - LOCATION
LOCATION: CHEST

## 2024-12-29 ASSESSMENT — PAIN DESCRIPTION - PAIN TYPE: TYPE: ACUTE PAIN

## 2024-12-29 NOTE — ED NOTES
Lauryn RN to bring pt to room. Upon entering room, pt diaphoretic, pale, and having increased CP. MD Georges at bedside

## 2024-12-29 NOTE — FLOWSHEET NOTE
12/29/24 0636   Nursing Delirium Screening Scale (Nu-DESC)   Disorientation 0   Innappropriate Behavior 0   Innappropriate Communication 0   Illusions/Hallucinations 0   Psychomotor Retardation 0   Nu-DESC Score (calculated) 0

## 2024-12-29 NOTE — H&P
tired lately.  He did not have any palpitation, syncope, nausea or vomiting.  He was brought to the ED for further evaluation.    In the ED, patient was found to have inferior wall MI.  He was taken to Cath Lab emergently and got 2 stents.  Patient has been bradycardic with heart rate between 48-57.  Blood pressure has been on the higher side and last 1 was 161/93.  Rest of his vital signs are stable.    Initial EKG showed sinus bradycardia 54 bpm, QTc of 411, right axis deviation.  The subsequent EKG showed inferior wall MI.    Lab work showed BUN/creatinine of 14/1.2 and EGFR of 61 consistent with CKD 3.  Hyperglycemia 206.  Rest of the CBC, BMP and LFT within normal limits.  Troponin of 113.    The patient was treated as inferior wall MI and was taken to cardiac cath and got 2 drug-eluting stents.  Admitted to hospitalist service.  We were asked to admit for work up and evaluation of the above problems.     Past Medical History:   Diagnosis Date    Mann's esophagus 1990    long segment, esophageal bleed    Bell's palsy     BPH (benign prostatic hyperplasia)     Chronic kidney disease     Stage IV    Colon polyps 2007    Dr. Enriquez/[facility]    DDD (degenerative disc disease), cervical     Diabetes (HCC)     Fracture of left leg     lower, casted    GERD (gastroesophageal reflux disease)     Mann's esophagus long segment    Glaucoma     bilateral    Gout     Hearing loss     has bilateral hearing aids    Hypercholesterolemia     Hyperlipidemia     Hypertension     Relative polycythemia         Past Surgical History:   Procedure Laterality Date    CATARACT EXTRACTION W/ INTRAOCULAR LENS IMPLANT Bilateral     COLONOSCOPY N/A 8/21/2019    COLONOSCOPY performed by Pasquale Amado Jr., MD at [facility] ENDOSCOPY    COLONOSCOPY N/A 10/27/2023    COLONOSCOPY WITH BIOPSY AND POLYPECTOMY, EDG WITH BIOPSY performed by Pasquale Amado Jr., MD at [facility] ENDOSCOPY    COLONOSCOPY FLX DX W/COLLJ SPEC WHEN PFRMD

## 2024-12-29 NOTE — CONSULTS
[facility]  [address], [place] [id number] [phone]  [email or web address]       CARDIOLOGY CONSULTATION       Date of  Admission: 12/29/2024  4:03 AM     Admission type:Emergency      Attending Provider: No att. providers found  Cardiology Provider:     CC/REASON FOR CONSULT: inferior STEMI     Subjective:     Ander Kelsey is a 79 y.o. male admitted for STEMI (ST elevation myocardial infarction) ([facility]) [I21.3]  ST elevation myocardial infarction (STEMI), unspecified artery ([facility]) [I21.3].Patient presented with c/o left sided and substernal chest pressure and pain that started last night. Got worse over few hrs and presented to ER. Associated with diffuse sweating. Per wife he has been feeling tired and weak lately, however denied any other cardiac symptoms. Episode started after walk last evening. No previous cardiac history. Had stress test many years ago and it was normal. He denies claudication, dyspnea, irregular heart beat, near-syncope, orthopnea, palpitations, paroxysmal nocturnal dyspnea, and syncope.     Patient Active Problem List    Diagnosis Date Noted    ST elevation myocardial infarction involving right coronary artery ([facility]) 12/29/2024    Colitis 06/23/2024    Memory loss 08/14/2023    Chronic renal disease, stage III ([facility]) 06/01/2022    Controlled type 2 diabetes mellitus with diabetic neuropathy, with long-term current use of insulin ([facility]) 06/01/2022    Type 2 diabetes mellitus with chronic kidney disease ([facility]) 12/17/2021    GI bleed 07/27/2017    Primary open angle glaucoma 07/26/2016    CKD (chronic kidney disease) stage 3, GFR 30-59 ml/min ([facility]) 08/10/2010    Essential hypertension, benign 10/26/2009    Gout 10/26/2009    Polycythemia 10/26/2009    BPH (benign prostatic hyperplasia) 10/26/2009    Mann's esophagus 10/26/2009    Pure hypercholesterolemia 10/26/2009      Toni Fontanez MD  Past Medical History:   Diagnosis Date    Mann's

## 2024-12-30 ENCOUNTER — APPOINTMENT (OUTPATIENT)
Facility: HOSPITAL | Age: 79
DRG: 322 | End: 2024-12-30
Attending: INTERNAL MEDICINE
Payer: MEDICARE

## 2024-12-30 DIAGNOSIS — I21.3 ST ELEVATION MYOCARDIAL INFARCTION (STEMI), UNSPECIFIED ARTERY (HCC): Primary | ICD-10-CM

## 2024-12-30 LAB
ALBUMIN SERPL-MCNC: 3.3 G/DL (ref 3.5–5)
ALBUMIN/GLOB SERPL: 1 (ref 1.1–2.2)
ALP SERPL-CCNC: 80 U/L (ref 45–117)
ALT SERPL-CCNC: 35 U/L (ref 12–78)
ANION GAP SERPL CALC-SCNC: 6 MMOL/L (ref 2–12)
AST SERPL-CCNC: 105 U/L (ref 15–37)
BASOPHILS # BLD: 0 K/UL (ref 0–0.1)
BASOPHILS NFR BLD: 0 % (ref 0–1)
BILIRUB SERPL-MCNC: 0.8 MG/DL (ref 0.2–1)
BUN SERPL-MCNC: 15 MG/DL (ref 6–20)
BUN/CREAT SERPL: 14 (ref 12–20)
CALCIUM SERPL-MCNC: 8.8 MG/DL (ref 8.5–10.1)
CHLORIDE SERPL-SCNC: 108 MMOL/L (ref 97–108)
CO2 SERPL-SCNC: 21 MMOL/L (ref 21–32)
CREAT SERPL-MCNC: 1.09 MG/DL (ref 0.7–1.3)
DIFFERENTIAL METHOD BLD: ABNORMAL
EOSINOPHIL # BLD: 0 K/UL (ref 0–0.4)
EOSINOPHIL NFR BLD: 0 % (ref 0–7)
ERYTHROCYTE [DISTWIDTH] IN BLOOD BY AUTOMATED COUNT: 14.6 % (ref 11.5–14.5)
EST. AVERAGE GLUCOSE BLD GHB EST-MCNC: 163 MG/DL
GLOBULIN SER CALC-MCNC: 3.4 G/DL (ref 2–4)
GLUCOSE BLD STRIP.AUTO-MCNC: 177 MG/DL (ref 65–117)
GLUCOSE BLD STRIP.AUTO-MCNC: 178 MG/DL (ref 65–117)
GLUCOSE BLD STRIP.AUTO-MCNC: 189 MG/DL (ref 65–117)
GLUCOSE BLD STRIP.AUTO-MCNC: 199 MG/DL (ref 65–117)
GLUCOSE SERPL-MCNC: 181 MG/DL (ref 65–100)
HBA1C MFR BLD: 7.3 % (ref 4–5.6)
HCT VFR BLD AUTO: 41.2 % (ref 36.6–50.3)
HGB BLD-MCNC: 14.2 G/DL (ref 12.1–17)
IMM GRANULOCYTES # BLD AUTO: 0.1 K/UL (ref 0–0.04)
IMM GRANULOCYTES NFR BLD AUTO: 1 % (ref 0–0.5)
LYMPHOCYTES # BLD: 0.9 K/UL (ref 0.8–3.5)
LYMPHOCYTES NFR BLD: 9 % (ref 12–49)
MAGNESIUM SERPL-MCNC: 2.2 MG/DL (ref 1.6–2.4)
MCH RBC QN AUTO: 31 PG (ref 26–34)
MCHC RBC AUTO-ENTMCNC: 34.5 G/DL (ref 30–36.5)
MCV RBC AUTO: 90 FL (ref 80–99)
MONOCYTES # BLD: 0.9 K/UL (ref 0–1)
MONOCYTES NFR BLD: 9 % (ref 5–13)
NEUTS SEG # BLD: 8.8 K/UL (ref 1.8–8)
NEUTS SEG NFR BLD: 81 % (ref 32–75)
NRBC # BLD: 0 K/UL (ref 0–0.01)
NRBC BLD-RTO: 0 PER 100 WBC
PHOSPHATE SERPL-MCNC: 3.2 MG/DL (ref 2.6–4.7)
PLATELET # BLD AUTO: 155 K/UL (ref 150–400)
PMV BLD AUTO: 12.6 FL (ref 8.9–12.9)
POTASSIUM SERPL-SCNC: 4.4 MMOL/L (ref 3.5–5.1)
PROT SERPL-MCNC: 6.7 G/DL (ref 6.4–8.2)
RBC # BLD AUTO: 4.58 M/UL (ref 4.1–5.7)
SERVICE CMNT-IMP: ABNORMAL
SODIUM SERPL-SCNC: 135 MMOL/L (ref 136–145)
WBC # BLD AUTO: 10.7 K/UL (ref 4.1–11.1)

## 2024-12-30 PROCEDURE — C8929 TTE W OR WO FOL WCON,DOPPLER: HCPCS

## 2024-12-30 PROCEDURE — 6370000000 HC RX 637 (ALT 250 FOR IP): Performed by: NURSE PRACTITIONER

## 2024-12-30 PROCEDURE — 97161 PT EVAL LOW COMPLEX 20 MIN: CPT

## 2024-12-30 PROCEDURE — 6370000000 HC RX 637 (ALT 250 FOR IP): Performed by: STUDENT IN AN ORGANIZED HEALTH CARE EDUCATION/TRAINING PROGRAM

## 2024-12-30 PROCEDURE — 83036 HEMOGLOBIN GLYCOSYLATED A1C: CPT

## 2024-12-30 PROCEDURE — 36415 COLL VENOUS BLD VENIPUNCTURE: CPT

## 2024-12-30 PROCEDURE — 6370000000 HC RX 637 (ALT 250 FOR IP): Performed by: INTERNAL MEDICINE

## 2024-12-30 PROCEDURE — 83735 ASSAY OF MAGNESIUM: CPT

## 2024-12-30 PROCEDURE — 97535 SELF CARE MNGMENT TRAINING: CPT

## 2024-12-30 PROCEDURE — 84100 ASSAY OF PHOSPHORUS: CPT

## 2024-12-30 PROCEDURE — 97165 OT EVAL LOW COMPLEX 30 MIN: CPT

## 2024-12-30 PROCEDURE — 2060000000 HC ICU INTERMEDIATE R&B

## 2024-12-30 PROCEDURE — 2580000003 HC RX 258: Performed by: INTERNAL MEDICINE

## 2024-12-30 PROCEDURE — 93005 ELECTROCARDIOGRAM TRACING: CPT | Performed by: INTERNAL MEDICINE

## 2024-12-30 PROCEDURE — 6360000004 HC RX CONTRAST MEDICATION: Performed by: INTERNAL MEDICINE

## 2024-12-30 PROCEDURE — 2500000003 HC RX 250 WO HCPCS: Performed by: EMERGENCY MEDICINE

## 2024-12-30 PROCEDURE — 82962 GLUCOSE BLOOD TEST: CPT

## 2024-12-30 PROCEDURE — 85025 COMPLETE CBC W/AUTO DIFF WBC: CPT

## 2024-12-30 PROCEDURE — 80053 COMPREHEN METABOLIC PANEL: CPT

## 2024-12-30 PROCEDURE — 6360000002 HC RX W HCPCS: Performed by: INTERNAL MEDICINE

## 2024-12-30 RX ORDER — 0.9 % SODIUM CHLORIDE 0.9 %
250 INTRAVENOUS SOLUTION INTRAVENOUS ONCE
Status: COMPLETED | OUTPATIENT
Start: 2024-12-30 | End: 2024-12-30

## 2024-12-30 RX ORDER — ATENOLOL 25 MG/1
12.5 TABLET ORAL DAILY
Status: DISCONTINUED | OUTPATIENT
Start: 2024-12-30 | End: 2024-12-31 | Stop reason: HOSPADM

## 2024-12-30 RX ADMIN — SODIUM CHLORIDE 250 ML: 9 INJECTION, SOLUTION INTRAVENOUS at 16:42

## 2024-12-30 RX ADMIN — ACETAMINOPHEN 650 MG: 325 TABLET ORAL at 04:30

## 2024-12-30 RX ADMIN — ASPIRIN 81 MG: 81 TABLET, COATED ORAL at 09:32

## 2024-12-30 RX ADMIN — LISINOPRIL 10 MG: 10 TABLET ORAL at 09:32

## 2024-12-30 RX ADMIN — ESCITALOPRAM OXALATE 5 MG: 10 TABLET ORAL at 09:32

## 2024-12-30 RX ADMIN — TICAGRELOR 90 MG: 90 TABLET ORAL at 09:32

## 2024-12-30 RX ADMIN — TICAGRELOR 90 MG: 90 TABLET ORAL at 21:21

## 2024-12-30 RX ADMIN — SODIUM CHLORIDE, PRESERVATIVE FREE 10 ML: 5 INJECTION INTRAVENOUS at 21:23

## 2024-12-30 RX ADMIN — COLCHICINE 0.6 MG: 0.6 TABLET, FILM COATED ORAL at 09:32

## 2024-12-30 RX ADMIN — ENOXAPARIN SODIUM 40 MG: 100 INJECTION SUBCUTANEOUS at 09:32

## 2024-12-30 RX ADMIN — PERFLUTREN 1.5 ML: 6.52 INJECTION, SUSPENSION INTRAVENOUS at 13:20

## 2024-12-30 RX ADMIN — COLCHICINE 0.6 MG: 0.6 TABLET, FILM COATED ORAL at 21:22

## 2024-12-30 RX ADMIN — ATENOLOL 12.5 MG: 25 TABLET ORAL at 14:26

## 2024-12-30 RX ADMIN — PRAVASTATIN SODIUM 20 MG: 10 TABLET ORAL at 21:21

## 2024-12-30 RX ADMIN — ALUMINUM HYDROXIDE, MAGNESIUM HYDROXIDE, AND SIMETHICONE 30 ML: 200; 200; 20 SUSPENSION ORAL at 21:30

## 2024-12-30 RX ADMIN — PANTOPRAZOLE SODIUM 40 MG: 40 TABLET, DELAYED RELEASE ORAL at 09:32

## 2024-12-30 ASSESSMENT — PAIN DESCRIPTION - LOCATION
LOCATION: HEAD
LOCATION: HEAD;CHEST

## 2024-12-30 ASSESSMENT — PAIN SCALES - GENERAL
PAINLEVEL_OUTOF10: 3
PAINLEVEL_OUTOF10: 3

## 2024-12-30 ASSESSMENT — PAIN DESCRIPTION - DESCRIPTORS
DESCRIPTORS: ACHING
DESCRIPTORS: ACHING;SORE

## 2024-12-30 NOTE — PLAN OF CARE
End of Shift Note    Bedside shift change report given to ANATOLIY Wade (oncoming nurse) by Osiris Everett RN (offgoing nurse).  Report included the following information SBAR, Procedure Summary, and MAR    Shift worked:  [id number]     Shift summary and any significant changes:    VSS throughout shift.   ECHO needs to be completed   Right ulnar site CDI, no hematoma.    Concerns for physician to address:      Zone phone for oncoming shift:   [phone]       Activity:  Level of Assistance: Independent  Number times ambulated in hallways past shift: 0  Number of times OOB to chair past shift: 1    Cardiac:   Cardiac Monitoring: Yes      Cardiac Rhythm: Sinus rhythm, Sinus avinash    Access:  Current line(s): PIV     Genitourinary:   Urinary Status: Voiding, Bathroom privileges    Respiratory:   O2 Device: None (Room air)  Chronic home O2 use?: NO  Incentive spirometer at bedside: NO    GI:  Last BM (including prior to admit): 12/28/24  Current diet:  DIET ONE TIME MESSAGE;  ADULT DIET; Regular; 5 carb choices (75 gm/meal); Low Fat/Low Chol/High Fiber/2 gm Na; No Caffeine  Passing flatus: YES    Pain Management:   Patient states pain is manageable on current regimen: YES    Skin:  Jayce Scale Score: 21  Interventions: Wound Offloading (Prevention Methods): Repositioning    Patient Safety:  Fall Risk: Nursing Judgement-Fall Risk High(Add Comments): No  Fall Risk Interventions  Nursing Judgement-Fall Risk High(Add Comments): No  Toilet Every 2 Hours-In Advance of Need: Yes  Hourly Visual Checks: In bed, Eyes closed  Fall Visual Posted: Socks  Room Door Open: Deferred to promote rest  Alarm On: Bed  Patient Moved Closer to Nursing Station: No    Active Consults:   None    Length of Stay:  Expected LOS: 2  Actual LOS: 1    Osiris Everett RN      Problem: Discharge Planning  Goal: Discharge to home or other facility with appropriate resources  Outcome: Progressing     Problem: Safety - Adult  Goal: Free from fall

## 2024-12-30 NOTE — CARDIO/PULMONARY
Chart reviewed: Patient is 79 y.o. male admitted with STEMI (ST elevation myocardial infarction) (HCC) [I21.3]  ST elevation myocardial infarction (STEMI), unspecified artery (HCC) [I21.3]    Education: MI education folder, with catheterization brochure, to bedside of Ander Kelsey.                                                Educated using teach back method. Reviewed MI diagnosis definition and purpose of intervention.  Discussed risk factors for CAD to include the following: family history, elevated BMI, hyperlipidemia, hypertension, diabetes, stress, and smoking. Discussed Heart Healthy/Low Sodium (2000 mg) diet. Discussed follow up appointments with cardiologist, signs and symptoms of angina, and what to report to physician after discharge.  Emphasized the value of cardiac rehab. Discussed Cardiac Rehab Program format, benefits, and encouraged enrollment to assist with risk modification and management. Initial Cardiac Rehab Program intake appointment scheduled for 1/20/25 @ 1300 and appointment information is on the AVS.    Ander Kelsey verbalized understanding with questions answered.     MICHELLE LUONG RN

## 2024-12-31 VITALS
OXYGEN SATURATION: 97 % | TEMPERATURE: 98.1 F | SYSTOLIC BLOOD PRESSURE: 148 MMHG | RESPIRATION RATE: 18 BRPM | HEART RATE: 50 BPM | DIASTOLIC BLOOD PRESSURE: 70 MMHG | BODY MASS INDEX: 23.36 KG/M2 | HEIGHT: 67 IN | WEIGHT: 148.81 LBS

## 2024-12-31 LAB
ALBUMIN SERPL-MCNC: 3.2 G/DL (ref 3.5–5)
ALBUMIN/GLOB SERPL: 0.9 (ref 1.1–2.2)
ALP SERPL-CCNC: 81 U/L (ref 45–117)
ALT SERPL-CCNC: 32 U/L (ref 12–78)
ANION GAP SERPL CALC-SCNC: 6 MMOL/L (ref 2–12)
AST SERPL-CCNC: 63 U/L (ref 15–37)
BASOPHILS # BLD: 0 K/UL (ref 0–0.1)
BASOPHILS NFR BLD: 0 % (ref 0–1)
BILIRUB SERPL-MCNC: 0.5 MG/DL (ref 0.2–1)
BUN SERPL-MCNC: 20 MG/DL (ref 6–20)
BUN/CREAT SERPL: 17 (ref 12–20)
CALCIUM SERPL-MCNC: 9.1 MG/DL (ref 8.5–10.1)
CHLORIDE SERPL-SCNC: 108 MMOL/L (ref 97–108)
CO2 SERPL-SCNC: 21 MMOL/L (ref 21–32)
CREAT SERPL-MCNC: 1.2 MG/DL (ref 0.7–1.3)
DIFFERENTIAL METHOD BLD: ABNORMAL
ECHO BSA: 1.81 M2
ECHO LA DIAMETER INDEX: 1.91 CM/M2
ECHO LA DIAMETER: 3.4 CM
ECHO LV EF PHYSICIAN: 55 %
EKG ATRIAL RATE: 50 BPM
EKG ATRIAL RATE: 51 BPM
EKG DIAGNOSIS: NORMAL
EKG DIAGNOSIS: NORMAL
EKG P AXIS: 58 DEGREES
EKG P AXIS: 61 DEGREES
EKG P-R INTERVAL: 138 MS
EKG P-R INTERVAL: 144 MS
EKG Q-T INTERVAL: 450 MS
EKG Q-T INTERVAL: 458 MS
EKG QRS DURATION: 68 MS
EKG QRS DURATION: 70 MS
EKG QTC CALCULATION (BAZETT): 414 MS
EKG QTC CALCULATION (BAZETT): 417 MS
EKG R AXIS: 27 DEGREES
EKG R AXIS: 31 DEGREES
EKG T AXIS: -38 DEGREES
EKG T AXIS: 57 DEGREES
EKG VENTRICULAR RATE: 50 BPM
EKG VENTRICULAR RATE: 51 BPM
EOSINOPHIL # BLD: 0 K/UL (ref 0–0.4)
EOSINOPHIL NFR BLD: 0 % (ref 0–7)
ERYTHROCYTE [DISTWIDTH] IN BLOOD BY AUTOMATED COUNT: 14.5 % (ref 11.5–14.5)
GLOBULIN SER CALC-MCNC: 3.7 G/DL (ref 2–4)
GLUCOSE BLD STRIP.AUTO-MCNC: 152 MG/DL (ref 65–117)
GLUCOSE BLD STRIP.AUTO-MCNC: 221 MG/DL (ref 65–117)
GLUCOSE SERPL-MCNC: 165 MG/DL (ref 65–100)
HCT VFR BLD AUTO: 42.2 % (ref 36.6–50.3)
HGB BLD-MCNC: 14 G/DL (ref 12.1–17)
IMM GRANULOCYTES # BLD AUTO: 0.1 K/UL (ref 0–0.04)
IMM GRANULOCYTES NFR BLD AUTO: 1 % (ref 0–0.5)
LYMPHOCYTES # BLD: 1.1 K/UL (ref 0.8–3.5)
LYMPHOCYTES NFR BLD: 9 % (ref 12–49)
MAGNESIUM SERPL-MCNC: 2.4 MG/DL (ref 1.6–2.4)
MCH RBC QN AUTO: 31.2 PG (ref 26–34)
MCHC RBC AUTO-ENTMCNC: 33.2 G/DL (ref 30–36.5)
MCV RBC AUTO: 94 FL (ref 80–99)
MONOCYTES # BLD: 1.1 K/UL (ref 0–1)
MONOCYTES NFR BLD: 9 % (ref 5–13)
NEUTS SEG # BLD: 9.9 K/UL (ref 1.8–8)
NEUTS SEG NFR BLD: 81 % (ref 32–75)
NRBC # BLD: 0 K/UL (ref 0–0.01)
NRBC BLD-RTO: 0 PER 100 WBC
PHOSPHATE SERPL-MCNC: 2.9 MG/DL (ref 2.6–4.7)
PLATELET # BLD AUTO: 144 K/UL (ref 150–400)
PMV BLD AUTO: 13 FL (ref 8.9–12.9)
POTASSIUM SERPL-SCNC: 4 MMOL/L (ref 3.5–5.1)
PROT SERPL-MCNC: 6.9 G/DL (ref 6.4–8.2)
RBC # BLD AUTO: 4.49 M/UL (ref 4.1–5.7)
SERVICE CMNT-IMP: ABNORMAL
SERVICE CMNT-IMP: ABNORMAL
SODIUM SERPL-SCNC: 135 MMOL/L (ref 136–145)
WBC # BLD AUTO: 12.2 K/UL (ref 4.1–11.1)

## 2024-12-31 PROCEDURE — 6370000000 HC RX 637 (ALT 250 FOR IP): Performed by: INTERNAL MEDICINE

## 2024-12-31 PROCEDURE — 80053 COMPREHEN METABOLIC PANEL: CPT

## 2024-12-31 PROCEDURE — 83735 ASSAY OF MAGNESIUM: CPT

## 2024-12-31 PROCEDURE — 6370000000 HC RX 637 (ALT 250 FOR IP): Performed by: NURSE PRACTITIONER

## 2024-12-31 PROCEDURE — 82962 GLUCOSE BLOOD TEST: CPT

## 2024-12-31 PROCEDURE — 85025 COMPLETE CBC W/AUTO DIFF WBC: CPT

## 2024-12-31 PROCEDURE — 2500000003 HC RX 250 WO HCPCS: Performed by: EMERGENCY MEDICINE

## 2024-12-31 PROCEDURE — 6360000002 HC RX W HCPCS: Performed by: INTERNAL MEDICINE

## 2024-12-31 PROCEDURE — 84100 ASSAY OF PHOSPHORUS: CPT

## 2024-12-31 PROCEDURE — 36415 COLL VENOUS BLD VENIPUNCTURE: CPT

## 2024-12-31 RX ORDER — ATENOLOL 25 MG/1
12.5 TABLET ORAL DAILY
Qty: 15 TABLET | Refills: 0 | Status: SHIPPED | OUTPATIENT
Start: 2025-01-01 | End: 2025-01-31

## 2024-12-31 RX ORDER — NITROGLYCERIN 0.4 MG/1
0.4 TABLET SUBLINGUAL EVERY 5 MIN PRN
Qty: 25 TABLET | Refills: 0 | Status: SHIPPED | OUTPATIENT
Start: 2024-12-31

## 2024-12-31 RX ORDER — ONDANSETRON 4 MG/1
4 TABLET, ORALLY DISINTEGRATING ORAL EVERY 8 HOURS PRN
Qty: 21 TABLET | Refills: 0 | Status: SHIPPED | OUTPATIENT
Start: 2024-12-31

## 2024-12-31 RX ORDER — COLCHICINE 0.6 MG/1
0.6 TABLET ORAL 2 TIMES DAILY
Qty: 60 TABLET | Refills: 0 | Status: SHIPPED | OUTPATIENT
Start: 2024-12-31 | End: 2025-01-30

## 2024-12-31 RX ADMIN — ASPIRIN 81 MG: 81 TABLET, COATED ORAL at 08:53

## 2024-12-31 RX ADMIN — SODIUM CHLORIDE, PRESERVATIVE FREE 10 ML: 5 INJECTION INTRAVENOUS at 12:00

## 2024-12-31 RX ADMIN — TICAGRELOR 90 MG: 90 TABLET ORAL at 08:58

## 2024-12-31 RX ADMIN — LISINOPRIL 10 MG: 10 TABLET ORAL at 08:53

## 2024-12-31 RX ADMIN — PANTOPRAZOLE SODIUM 40 MG: 40 TABLET, DELAYED RELEASE ORAL at 08:53

## 2024-12-31 RX ADMIN — INSULIN LISPRO 1 UNITS: 100 INJECTION, SOLUTION INTRAVENOUS; SUBCUTANEOUS at 11:53

## 2024-12-31 RX ADMIN — COLCHICINE 0.6 MG: 0.6 TABLET, FILM COATED ORAL at 08:53

## 2024-12-31 RX ADMIN — ESCITALOPRAM OXALATE 5 MG: 10 TABLET ORAL at 08:53

## 2024-12-31 RX ADMIN — ENOXAPARIN SODIUM 40 MG: 100 INJECTION SUBCUTANEOUS at 09:00

## 2024-12-31 ASSESSMENT — PAIN - FUNCTIONAL ASSESSMENT: PAIN_FUNCTIONAL_ASSESSMENT: ACTIVITIES ARE NOT PREVENTED

## 2024-12-31 NOTE — PLAN OF CARE
Problem: Chronic Conditions and Co-morbidities  Goal: Patient's chronic conditions and co-morbidity symptoms are monitored and maintained or improved  12/31/2024 0933 by Emmett Crews RN  Outcome: Progressing  12/30/2024 2108 by Sherry Hassan RN  Outcome: Progressing     Problem: Discharge Planning  Goal: Discharge to home or other facility with appropriate resources  12/31/2024 0933 by Emmett Crews RN  Outcome: Progressing  12/30/2024 2108 by Sherry Hassan RN  Outcome: Progressing     Problem: ABCDS Injury Assessment  Goal: Absence of physical injury  12/31/2024 0933 by Emmett Crews RN  Outcome: Progressing  12/30/2024 2108 by Sherry Hassan RN  Outcome: Progressing     Problem: Safety - Adult  Goal: Free from fall injury  12/31/2024 0933 by Emmett Crews RN  Outcome: Progressing  12/30/2024 2108 by Sherry Hassan RN  Outcome: Progressing     Problem: Pain  Goal: Verbalizes/displays adequate comfort level or baseline comfort level  12/31/2024 0933 by Emmett Crews RN  Outcome: Progressing  12/30/2024 2108 by Sherry Hassan RN  Outcome: Progressing     Problem: Cardiovascular - Adult  Goal: Maintains optimal cardiac output and hemodynamic stability  12/31/2024 0933 by Emmett Crews RN  Outcome: Progressing  12/30/2024 2108 by Sherry Hassan RN  Outcome: Progressing  Goal: Absence of cardiac dysrhythmias or at baseline  12/31/2024 0933 by Emmett Crews RN  Outcome: Progressing  12/30/2024 2108 by Sherry Hassan RN  Outcome: Progressing

## 2024-12-31 NOTE — PROGRESS NOTES
[facility]  [address] [place] [address]  [phone]  [email or web address]  CARDIOLOGY PROGRESS NOTE    12/30/2024 12:30 PM    Admit Date: 12/29/2024    Admit Diagnosis:   STEMI (ST elevation myocardial infarction) (HCC) [I21.3]  ST elevation myocardial infarction (STEMI), unspecified artery (HCC) [I21.3]    Subjective:     Ander Kelsey was seen and examined at the bedside.  No chest pain.    /74   Pulse 60   Temp 98.5 °F (36.9 °C) (Oral)   Resp 10   Ht 1.702 m (5' 7\")   Wt 67.6 kg (149 lb 0.5 oz)   SpO2 96%   BMI 23.34 kg/m²     Current Facility-Administered Medications   Medication Dose Route Frequency    sodium chloride flush 0.9 % injection 5-40 mL  5-40 mL IntraVENous 2 times per day    sodium chloride flush 0.9 % injection 5-40 mL  5-40 mL IntraVENous PRN    0.9 % sodium chloride infusion   IntraVENous PRN    aspirin chewable tablet 324 mg  324 mg Oral Once    nitroGLYCERIN (NITROSTAT) SL tablet 0.4 mg  0.4 mg SubLINGual Q5 Min PRN    0.9 % sodium chloride infusion   IntraVENous PRN    aspirin EC tablet 81 mg  81 mg Oral Daily    ticagrelor (BRILINTA) tablet 90 mg  90 mg Oral BID    0.9 % sodium chloride infusion   IntraVENous PRN    escitalopram (LEXAPRO) tablet 5 mg  5 mg Oral Daily    pantoprazole (PROTONIX) tablet 40 mg  40 mg Oral Daily    lisinopril (PRINIVIL;ZESTRIL) tablet 10 mg  10 mg Oral Daily    latanoprost (XALATAN) 0.005 % ophthalmic solution 1 drop  1 drop Both Eyes Daily    0.9 % sodium chloride infusion   IntraVENous PRN    ondansetron (ZOFRAN-ODT) disintegrating tablet 4 mg  4 mg Oral Q8H PRN    Or    ondansetron (ZOFRAN) injection 4 mg  4 mg IntraVENous Q6H PRN    acetaminophen (TYLENOL) tablet 650 mg  650 mg Oral Q6H PRN    Or    acetaminophen (TYLENOL) suppository 650 mg  650 mg Rectal Q6H PRN    polyethylene glycol (GLYCOLAX) packet 17 g  17 g Oral Daily PRN    aluminum & magnesium hydroxide-simethicone (MAALOX) 
      Hospitalist Progress Note    NAME:   Ander Kelsey   : 1945   MRN: [id number]     Date/Time: 2024 9:43 AM  Patient PCP: Toni Fontanez MD    Estimated discharge date:   Barriers: Echocardiogram, cardiology clearance      Assessment / Plan:  Inferior wall MI status post stents 2024  Pericarditis  Monitor patient in telemetry.  The patient is status postcardiac cath and 2 drug-eluting stents.  Continue with aspirin and Brilinta.  Also continue with pravastatin.  Morphine for pain.  Sublingual nitroglycerin for chest discomfort.  Will get echocardiogram to assess heart function.  : Patient had chest pain pleuritic in nature with nausea yesterday evening which is likely pericarditis.  Cardiology started him on colchicine 0.6 mg twice daily.  This morning he denies any chest pain or dyspnea.  No nausea as well.  Waiting for echocardiogram at this point.     HTN  HLD  Continue with lisinopril and pravastatin.     GERD  Continue with Protonix.     Diabetes mellitus  Fingerstick glucose and sliding scale insulin.     Hard of hearing        Medical Decision Making:   I personally reviewed labs: CBC, BMP, LFT, troponin, magnesium, phosphorus  I personally reviewed imaging: EKG  I personally reviewed EKG:  Toxic drug monitoring: H&H while patient is on Lovenox  Discussed case with: The patient, RN, wife at the bedside.        Code Status: Full code  DVT Prophylaxis: Lovenox   GI Prophylaxis:  Baseline: Independent from home     Subjective:     Chief Complaint / Reason for Physician Visit  \" Follow-up for inferior wall MI, pericarditis, HTN, HLD, GERD, diabetes mellitus, hard of hearing.\".  Discussed with RN events overnight.       Objective:     VITALS:   Last 24hrs VS reviewed since prior progress note. Most recent are:  Patient Vitals for the past 24 hrs:   BP Temp Temp src Pulse Resp SpO2 Weight   24 0415 124/71 98.8 °F (37.1 °C) Oral 56 16 97 % 67.6 kg (149 lb 0.5 oz)   24 2306 
0630 Pt arrived from CCL with a right Ulnar site. TR band at 11cc  Right ulnar site clean dry and intact, no hematoma.   Integrilin to end @ 0800 per MD Hugo    0700 1cc of air removed from TR band, right finger tips red and cool         
0700: Bedside report from ANATOLIY Newell. Assessment completed and care assumed.     End of Shift Note    Bedside shift change report given to ANATOLIY Newell (oncoming nurse) by Keren Saha RN (offgoing nurse).  Report included the following information Nurse Handoff Report    Shift worked:  7a-7p     Shift summary and any significant changes:     Patient c/o intermit chest pain throughout shift. Multiple EKGs completed. Cardiology and hospitalist aware. New orders received. Chest pain was accompanied by nausea. Medicated per orders. Did not attempt to ambulate patient in the hallway d/t chest pain.     Concerns for physician to address:  Patient still c/o chest pain. Has not been relieved with any interventions. Pain increases with movement and deep breathing      Zone phone for oncoming shift:   [phone]       Activity:  Activity: In bed  Number times ambulated in hallways past shift: 0  Number of times OOB to chair past shift: 2    Cardiac:   Cardiac Monitoring: Yes      Cardiac Rhythm: Sinus avinash    Access:  Current line(s): PIV     Genitourinary:   Urinary status: voiding    Respiratory:   O2 Device: None (Room air)  Chronic home O2 use?: NO  Incentive spirometer at bedside: NO       GI:  Last BM (including prior to admit): 12/28/24 (Per pt)  Current diet:    DIET ONE TIME MESSAGE;  ADULT DIET; Regular; 5 carb choices (75 gm/meal); Low Fat/Low Chol/High Fiber/2 gm Na; No Caffeine  Passing flatus: YES  Tolerating current diet: YES       Pain Management:   Patient states pain is manageable on current regimen: NO    Skin:  Jayce Scale Score: 21  Interventions: float heels and increase time out of bed    Patient Safety:  Fall Score: Rasmussen Total Score: 60  Interventions: bed/chair alarm and gripper socks       Length of Stay:  Expected LOS: 2  Actual LOS: 0      Keren Saha RN   
1330: Discharge teaching provided to both the patient and patient's wife. All questions answered. Patient's site care was discussed as well as follow-up appointments.   
1455 - PCP hospital follow-up transitional care appointment has been scheduled with Dr. Toni Fontanez on 1/2/25 1126. [facility] information on AVS for patient resource.   Pending patient discharge.       Attempted to schedule PCP hospital follow up. Sent PCP office a message, awaiting return call from PCP office with appt information. [facility] information on AVS for patient resource.  Pending patient discharge.   
1615: Patient c/o chest pain. Patient has been complaining of chest pain since MI on Sunday morning. Cardiology is aware of consistent pain/ tightness. Pain has been 1/10 consistently throughout shift, but staff was alerted that pain was now more intense and accompanied with nausea and vomiting. EKG completed and on chart.  Cardiology paged and hospitalist informed.   1630: New orders received    End of Shift Note    Bedside shift change report given to ANATOLIY Powell (oncoming nurse) by Keren Saha RN (offgoing nurse).  Report included the following information Nurse Handoff Report    Shift worked:  7a-7p     Shift summary and any significant changes:     See note above     Concerns for physician to address:  Unresolved chest pain     Zone phone for oncoming shift:   [phone]       Activity:  Activity: To bathroom, In bed  Number times ambulated in hallways past shift: 2  Number of times OOB to chair past shift: 4    Cardiac:   Cardiac Monitoring: Yes      Cardiac Rhythm: Sinus rhythm    Access:  Current line(s): PIV     Genitourinary:   Urinary status: voiding    Respiratory:   O2 Device: None (Room air)  Chronic home O2 use?: NO  Incentive spirometer at bedside: NO       GI:  Last BM (including prior to admit): 12/28/24  Current diet:    DIET ONE TIME MESSAGE;  ADULT DIET; Regular; 5 carb choices (75 gm/meal); Low Fat/Low Chol/High Fiber/2 gm Na; No Caffeine  Passing flatus: Yes  Tolerating current diet: YES       Pain Management:   Patient states pain is manageable on current regimen: YES    Skin:  Jayce Scale Score: 21  Interventions: increase time out of bed    Patient Safety:  Fall Score: Rasmussen Total Score: 60  Interventions: pt to call before getting OOB       Length of Stay:  Expected LOS: 2  Actual LOS: 1      Keren Saha RN   
Called by RN regarding persistent chest pain in the past few hours.  Pain was initially accompanied with nausea.    No relief with zofran, protonix or Maalox.    RN to administer nitroglycerin at the time of my call.    EKG completed, reviewed.  Sinus bradycardia.  No new ST-T changes.  New inferior infarct consistent with recent MI.      Pt reports the pain is worse with inspiration and worse with movement in bed.    Likely post MI pericarditis.  Treat with PRN tylenol as ordered.  Will also order colchicine 0.6 mg BID starting this evening.    Echo pending tomorrow AM.    Fouzia Avalos, DENNIS - NP  12/29/2024  4:11 PM    [facility]  [address] [place] [address] [phone]  [email or web address]     
Echo unremarkable  
OCCUPATIONAL THERAPY EVALUATION/DISCHARGE  Patient: Ander Kelsey (79 y.o. male)  Date: 12/30/2024  Primary Diagnosis: STEMI (ST elevation myocardial infarction) (HCC) [I21.3]  ST elevation myocardial infarction (STEMI), unspecified artery (HCC) [I21.3]  Procedure(s) (LRB):  Left heart cath / coronary angiography (N/A)  Percutaneous coronary intervention (N/A)  Insert stent og coronary (N/A) 1 Day Post-Op     Precautions:                    ASSESSMENT :  Based on the objective data below, the patient is functioning at his independent baseline for ADLs and functional mobility. Patient received sitting in recliner chair with family present in room and cleared for therapy by nursing. He completed all ADLs and transfers without assist demonstrating good safety awareness. Patient walked in hallway with PT and demonstrated intact balance. Education provided on energy conservation and pacing strategies to utilize once home. Patient was left sitting with all needs met, VSS, and family present in room.     Functional Outcome Measure:  The patient scored 24/24 on the [facility] outcome measure which is indicative of patient is functioning at his baseline for ADLs.      Further skilled acute occupational therapy is not indicated at this time.     PLAN :  Recommend with staff: OOB for all meals, supervision to bathroom     Recommendation for discharge: (in order for the patient to meet his/her long term goals):   No skilled occupational therapy    Other factors to consider for discharge: no additional factors    IF patient discharges home will need the following DME: none     SUBJECTIVE:   Patient stated, “I had a lot of things scheduled but I've cancelled all of them.”    OBJECTIVE DATA SUMMARY:     Past Medical History:   Diagnosis Date    Mann's esophagus 1990    long segment, esophageal bleed    Bell's palsy     BPH (benign prostatic hyperplasia)     Chronic kidney disease     Stage IV    Colon polyps 2007    Dr. Enriquez/[facility] 
Orders received, chart reviewed and patient evaluated by occupational therapy. Pending progression with skilled acute occupational therapy, recommend:    No skilled occupational therapy    Recommend with nursing patient to complete as able in order to maintain strength, endurance and independence: OOB to chair 3x/day, ADLs with independence and performing toileting with x1 assist. Thank you for your assistance.     Full evaluation to follow.     Tatyana Crowe, OTR/L, OTD    
Vicente Blanco Alan M. Jette.  Validity of the AM-PAC “6-Clicks” Inpatient Daily Activity and Basic Mobility Short Forms. Physical Therapy Mar 2014, [phone]) [phone]; DOI: 10.2522/ptj.81553309  2. Miller Padilla, Bhavana GO, Hien GO. Association of AM-PAC \"6-Clicks\" Basic Mobility and Daily Activity Scores With Discharge Destination. Phys Ther. 2021 Apr 4;101(4):mhzi754. doi: 10.1093/ptj/dfrh430. PMID: 95873677.  3. Danyell J, Madelin D, Jessie S, Sanket K, Hipolito S. Activity Measure for Post-Acute Care \"6-Clicks\" Basic Mobility Scores Predict Discharge Destination After Acute Care Hospitalization in Select Patient Groups: A Retrospective, Observational Study. Arch Rehabil Res Clin Transl. 2022 Jul 16;4(3):787149. doi: 10.1016/j.arrct.2022.745212. PMID: 82856709; PMCID: INN1844771.  4. Cathy CASTILLO, Sarah S, Nora W, Carolee P. AM-PAC Short Forms Manual 4.0. Revised 2/2020.                                                                                                                                                                                                                              Pain Intervention(s):       Activity Tolerance:   Good    After treatment:   Patient left in no apparent distress sitting up in chair, Call bell within reach, and Caregiver / family present      COMMUNICATION/EDUCATION:   The patient's plan of care was discussed with: occupational therapist and registered nurse    Patient Education  Education Given To: Patient;Family  Education Provided: Role of Therapy;Plan of Care  Education Method: Verbal  Barriers to Learning: None  Education Outcome: Verbalized understanding    Thank you for this referral.  Shila Lynn, PT, DPT  Minutes: 8      Physical Therapy Evaluation Charge Determination   History Examination Presentation Decision-Making   HIGH Complexity :3+ comorbidities / personal factors will impact the outcome/ POC  LOW Complexity : 1-2 Standardized

## 2024-12-31 NOTE — DISCHARGE SUMMARY
tablet  Commonly known as: PROTONIX               Where to Get Your Medications        These medications were sent to [id number] - [place], [place] - [address] - P [phone] - F [phone] [address] [place] [address]      Phone: [phone]   atenolol 25 MG tablet  colchicine 0.6 MG tablet  nitroGLYCERIN 0.4 MG SL tablet  ondansetron 4 MG disintegrating tablet  ticagrelor 90 MG Tabs tablet             DISPOSITION:    Home with Family:    x   Home with HH/PT/OT/RN:    SNF/LTC:    JUAN:    OTHER:            Code status: Full code  Recommended diet: cardiac diet and diabetic diet  Recommended activity: activity as tolerated  Wound care: See surgical/procedure care instructions      Follow up with:   PCP : Toni Fontanez MD Lee, Jason P, MD  [address] [facility]  [place] [address] [phone]    Go on 1/2/2025  at 9:30am for your PCP hospital follow up.    [place] [id number] [facility]  [address] [place] [address] [phone]  Follow up  As needed - [facility] is a mobile urgent care provider that comes to your home. You may call them if you would like to set up an appt to be seen for a follow up while waiting to be seen by your PCP.    Cassidy Hugo MD  [address] [facility] [address] [phone]    Schedule an appointment as soon as possible for a visit in 1 week(s)  To schedule your CARDIOLOGY hospital follow up.          Total time in minutes spent coordinating this discharge (includes going over instructions, follow-up, prescriptions, and preparing report for sign off to her PCP) :  35 minutes

## 2024-12-31 NOTE — DISCHARGE INSTRUCTIONS
Please continue taking colchicine for pericarditis.  Continue taking DAPT.  F/u with PCP and cardiology outpatient.

## 2024-12-31 NOTE — PLAN OF CARE
Problem: Chronic Conditions and Co-morbidities  Goal: Patient's chronic conditions and co-morbidity symptoms are monitored and maintained or improved  12/31/2024 1354 by Emmett Crews RN  Outcome: Adequate for Discharge  12/31/2024 0933 by Emmett Crews RN  Outcome: Progressing     Problem: Discharge Planning  Goal: Discharge to home or other facility with appropriate resources  12/31/2024 1354 by Emmett Crews RN  Outcome: Adequate for Discharge  12/31/2024 0933 by Emmett Crews RN  Outcome: Progressing     Problem: ABCDS Injury Assessment  Goal: Absence of physical injury  12/31/2024 1354 by Emmett Crews RN  Outcome: Adequate for Discharge  12/31/2024 0933 by Emmett Crews RN  Outcome: Progressing     Problem: Safety - Adult  Goal: Free from fall injury  12/31/2024 1354 by Emmett Crews RN  Outcome: Adequate for Discharge  12/31/2024 0933 by Emmett Crews RN  Outcome: Progressing     Problem: Pain  Goal: Verbalizes/displays adequate comfort level or baseline comfort level  12/31/2024 1354 by Emmett Crews RN  Outcome: Adequate for Discharge  12/31/2024 0933 by Emmett Crews RN  Outcome: Progressing     Problem: Cardiovascular - Adult  Goal: Maintains optimal cardiac output and hemodynamic stability  12/31/2024 1354 by Emmett Crews RN  Outcome: Adequate for Discharge  12/31/2024 0933 by Emmett Crews RN  Outcome: Progressing  Goal: Absence of cardiac dysrhythmias or at baseline  12/31/2024 1354 by Emmett Crews RN  Outcome: Adequate for Discharge  12/31/2024 0933 by Emmett Crews RN  Outcome: Progressing

## 2024-12-31 NOTE — CARE COORDINATION
Care Management Initial Assessment       RUR:14%  Readmission? No  1st IM letter given? Yes - 12/30/2024  1st  letter given: No    Met with paitent and wife - lives in condo - no steps to enter- is independent with ADL's and IADL's and driving - has BP cuff - uses no DME - no skilled needs identifed at this time.  Wife to transport to home.  EMMANUEL PHAM, MSW      Advance Care Planning     General Advance Care Planning (ACP) Conversation    Date of Conversation: 12/31/2024  Conducted with: Patient with Decision Making Capacity  Other persons present: Spouse Britany    Healthcare Decision Maker:   Primary Decision Maker: Britany Kelsey - Spouse - [phone]     Length of Voluntary ACP Conversation in minutes:  <16 minutes (Non-Billable)    EMMANUEL HPAM, MSW       12/31/24 1226   Service Assessment   Patient Orientation Alert and Oriented   Cognition Alert   History Provided By Patient   Primary Caregiver Self   Support Systems Spouse/Significant Other   Patient's Healthcare Decision Maker is: Named in Scanned ACP Document   PCP Verified by CM Yes   Last Visit to PCP Within last 3 months   Prior Functional Level Independent in ADLs/IADLs   Current Functional Level Independent in ADLs/IADLs   Can patient return to prior living arrangement Yes   Ability to make needs known: Good   Family able to assist with home care needs: Yes   Would you like for me to discuss the discharge plan with any other family members/significant others, and if so, who? Yes  (Britany Kelsey wife)   Financial Resources Medicare;Other (Comment)  (Kelsey)   Social/Functional History   Lives With Spouse   Type of Home Condo  (2 story but no steps to enter and bedroom on 1st floor)   Active  Yes   Discharge Planning   Patient expects to be discharged to: House   Services At/After Discharge   Services At/After Discharge None  (follow-up with PCP/Specialsit)   Mode of Transport at Discharge Other (see comment)   Confirm Follow Up

## 2025-01-01 LAB
EKG ATRIAL RATE: 54 BPM
EKG DIAGNOSIS: NORMAL
EKG P AXIS: 60 DEGREES
EKG P-R INTERVAL: 140 MS
EKG Q-T INTERVAL: 468 MS
EKG QRS DURATION: 72 MS
EKG QTC CALCULATION (BAZETT): 443 MS
EKG R AXIS: 54 DEGREES
EKG T AXIS: -66 DEGREES
EKG VENTRICULAR RATE: 54 BPM

## 2025-01-02 ENCOUNTER — OFFICE VISIT (OUTPATIENT)
Age: 80
End: 2025-01-02

## 2025-01-02 VITALS
RESPIRATION RATE: 16 BRPM | SYSTOLIC BLOOD PRESSURE: 100 MMHG | OXYGEN SATURATION: 99 % | WEIGHT: 150 LBS | BODY MASS INDEX: 23.54 KG/M2 | DIASTOLIC BLOOD PRESSURE: 50 MMHG | TEMPERATURE: 97.3 F | HEIGHT: 67 IN | HEART RATE: 48 BPM

## 2025-01-02 DIAGNOSIS — N18.30 STAGE 3 CHRONIC KIDNEY DISEASE, UNSPECIFIED WHETHER STAGE 3A OR 3B CKD (HCC): ICD-10-CM

## 2025-01-02 DIAGNOSIS — R53.83 FATIGUE, UNSPECIFIED TYPE: ICD-10-CM

## 2025-01-02 DIAGNOSIS — R00.1 BRADYCARDIA: ICD-10-CM

## 2025-01-02 DIAGNOSIS — I21.3 ST ELEVATION MYOCARDIAL INFARCTION (STEMI), UNSPECIFIED ARTERY (HCC): Primary | ICD-10-CM

## 2025-01-02 DIAGNOSIS — E11.42 TYPE 2 DIABETES MELLITUS WITH DIABETIC POLYNEUROPATHY, WITHOUT LONG-TERM CURRENT USE OF INSULIN (HCC): ICD-10-CM

## 2025-01-02 DIAGNOSIS — R19.7 DIARRHEA, UNSPECIFIED TYPE: ICD-10-CM

## 2025-01-02 ASSESSMENT — PATIENT HEALTH QUESTIONNAIRE - PHQ9
SUM OF ALL RESPONSES TO PHQ9 QUESTIONS 1 & 2: 0
SUM OF ALL RESPONSES TO PHQ QUESTIONS 1-9: 0
SUM OF ALL RESPONSES TO PHQ QUESTIONS 1-9: 0
1. LITTLE INTEREST OR PLEASURE IN DOING THINGS: NOT AT ALL
2. FEELING DOWN, DEPRESSED OR HOPELESS: NOT AT ALL
SUM OF ALL RESPONSES TO PHQ QUESTIONS 1-9: 0
SUM OF ALL RESPONSES TO PHQ QUESTIONS 1-9: 0

## 2025-01-02 NOTE — PROGRESS NOTES
HISTORY OF PRESENT ILLNESS   Ander Kelsey   is a 79 y.o.  male.  hx HTN DM02  with neuropathy HLD anemia d/t AVM ckd 3 due to HTN , Barretts esophagus memory loss hx ischemic colitis    Here for RAMYA admitted 12/29-12/31 for STEMI-IWMI s.p LENY x2-Lcx and RCA stenosis  Echo EF 60. ?pericarditits--colcrys for post MI CP    New meds on d/c atenolol, brilinta  and colcrys  Aspirin continued    A1c 7.3  Fsbs 85 this AM on toujeo 25 units qd now    C/o feeling very fatigued  Slept much of day yesterday  Bp lower yesterday and today    Some heaviness in chest earlier today  Date of Admission: 12/29/2024  Date of Discharge: 12/31/2024  Attending Physician: Bernie att. providers found     Discharge Diagnosis:   Inferior wall MI status post stents 12/29/2024  Pericarditis  HTN  HLD  GERD  Diabetes mellitus  Hard of hearing           Consultations:  None        Brief Admission History/Reason for Admission Per Izabela Casey MD: Ander Kelsey is a 79 y.o.  male with PMHx significant for HTN, HLD, CKD 4, Bell's palsy, diabetes mellitus, gout, BPH, GERD, hard of hearing and former smoker.  Overnight patient presented with chest with diaphoresis.  He was feeling weak and tired lately.  He did not have any palpitation, syncope, nausea or vomiting.  He was brought to the ED for further evaluation.     In the ED, patient was found to have inferior wall MI.  He was taken to Cath Lab emergently and got 2 stents.  Patient has been bradycardic with heart rate between 48-57.  Blood pressure has been on the higher side and last 1 was 161/93.  Rest of his vital signs are stable.     Initial EKG showed sinus bradycardia 54 bpm, QTc of 411, right axis deviation.  The subsequent EKG showed inferior wall MI.     Lab work showed BUN/creatinine of 14/1.2 and EGFR of 61 consistent with CKD 3.  Hyperglycemia 206.  Rest of the CBC, BMP and LFT within normal limits.  Troponin of 113.     The patient was treated as inferior wall MI and was taken to

## 2025-01-02 NOTE — PROGRESS NOTES
\"Have you been to the ER, urgent care clinic since your last visit?  Hospitalized since your last visit?\"    Hospital follow up - STEMI    “Have you seen or consulted any other health care providers outside our system since your last visit?”    NO

## 2025-01-16 NOTE — TELEPHONE ENCOUNTER
Requested Prescriptions     Pending Prescriptions Disp Refills    Continuous Glucose  (DEXCOM G7 ) HUNTER 1 each 0     Sig: Use as directed. Dx:  E11.9

## 2025-01-17 RX ORDER — ACYCLOVIR 400 MG/1
TABLET ORAL
Qty: 1 EACH | Refills: 0 | Status: SHIPPED | OUTPATIENT
Start: 2025-01-17

## 2025-01-20 ENCOUNTER — HOSPITAL ENCOUNTER (OUTPATIENT)
Facility: HOSPITAL | Age: 80
Setting detail: RECURRING SERIES
Discharge: HOME | End: 2025-01-23
Attending: INTERNAL MEDICINE
Payer: MEDICARE

## 2025-01-20 VITALS — BODY MASS INDEX: 22.71 KG/M2 | WEIGHT: 145 LBS

## 2025-01-20 PROCEDURE — 93798 PHYS/QHP OP CAR RHAB W/ECG: CPT

## 2025-01-20 PROCEDURE — 93797 PHYS/QHP OP CAR RHAB WO ECG: CPT

## 2025-01-20 ASSESSMENT — EXERCISE STRESS TEST
PEAK_HR: 103
PEAK_RPE: 12
PEAK_METS: 2
PEAK_BP: 120/60

## 2025-01-20 ASSESSMENT — PATIENT HEALTH QUESTIONNAIRE - PHQ9
10. IF YOU CHECKED OFF ANY PROBLEMS, HOW DIFFICULT HAVE THESE PROBLEMS MADE IT FOR YOU TO DO YOUR WORK, TAKE CARE OF THINGS AT HOME, OR GET ALONG WITH OTHER PEOPLE: NOT DIFFICULT AT ALL
SUM OF ALL RESPONSES TO PHQ QUESTIONS 1-9: 10
SUM OF ALL RESPONSES TO PHQ QUESTIONS 1-9: 10
8. MOVING OR SPEAKING SO SLOWLY THAT OTHER PEOPLE COULD HAVE NOTICED. OR THE OPPOSITE, BEING SO FIGETY OR RESTLESS THAT YOU HAVE BEEN MOVING AROUND A LOT MORE THAN USUAL: NOT AT ALL
SUM OF ALL RESPONSES TO PHQ QUESTIONS 1-9: 10
1. LITTLE INTEREST OR PLEASURE IN DOING THINGS: MORE THAN HALF THE DAYS
3. TROUBLE FALLING OR STAYING ASLEEP: NEARLY EVERY DAY
SUM OF ALL RESPONSES TO PHQ QUESTIONS 1-9: 10
4. FEELING TIRED OR HAVING LITTLE ENERGY: NEARLY EVERY DAY
7. TROUBLE CONCENTRATING ON THINGS, SUCH AS READING THE NEWSPAPER OR WATCHING TELEVISION: NOT AT ALL
SUM OF ALL RESPONSES TO PHQ9 QUESTIONS 1 & 2: 3
6. FEELING BAD ABOUT YOURSELF - OR THAT YOU ARE A FAILURE OR HAVE LET YOURSELF OR YOUR FAMILY DOWN: NOT AT ALL
9. THOUGHTS THAT YOU WOULD BE BETTER OFF DEAD, OR OF HURTING YOURSELF: NOT AT ALL
5. POOR APPETITE OR OVEREATING: SEVERAL DAYS
2. FEELING DOWN, DEPRESSED OR HOPELESS: SEVERAL DAYS

## 2025-01-20 NOTE — CARDIO/PULMONARY
INTAKE APPOINTMENT NOTE  2025    NAME: Ander Kelsey : 1945 AGE: 79 y.o.  GENDER: male    CARDIAC REHAB ADMITTING DIAGNOSIS: ST elevation myocardial infarction (STEMI), unspecified artery (HCC) [I21.3]    REFERRING PHYSICIAN: Cassidy Hugo,*    MEDICAL HX:  Past Medical History:   Diagnosis Date    Mann's esophagus     long segment, esophageal bleed    Bell's palsy     BPH (benign prostatic hyperplasia)     Chronic kidney disease     Stage IV    Colon polyps     Dr. Enriquez/[facility]    DDD (degenerative disc disease), cervical     Diabetes (HCC)     Fracture of left leg     lower, casted    GERD (gastroesophageal reflux disease)     Mann's esophagus long segment    Glaucoma     bilateral    Gout     Hearing loss     has bilateral hearing aids    Heart attack (HCC) 2024    Hypercholesterolemia     Hyperlipidemia     Hypertension     Relative polycythemia        LABS:     Lab Results   Component Value Date/Time    NBU3SDFX 7.5 2024 10:46 AM     Lab Results   Component Value Date/Time    CHOL 151 2024 11:13 AM    HDL 46 2024 11:13 AM    LDL 50.8 2024 11:13 AM    VLDL 54.2 2024 11:13 AM         ANTHROPOMETRICS:      Ht Readings from Last 1 Encounters:   25 1.702 m (5' 7\")      Wt Readings from Last 1 Encounters:   25 65.8 kg (145 lb)        WAIST: 34       VISIT SUMMARY:    Ander Kelsey 79 y.o. presented to Cardiac Rehab for program orientation and 6 minute walk test today with a primary diagnosis of ST elevation myocardial infarction (STEMI), unspecified artery (HCC) [I21.3]. EF is 55 % Cardiac risk factors include family history, dyslipidemia, hypertension.    Patient is a former smoker, quit in . Ander Kelsey is  and lives with his wife. Patient was evaluated for depression using the PHQ-9 assessment tool with a result of 10 which is considered moderate. The result was discussed with patient. Results faxed to PCP. He

## 2025-01-23 ENCOUNTER — HOSPITAL ENCOUNTER (OUTPATIENT)
Facility: HOSPITAL | Age: 80
Setting detail: RECURRING SERIES
Discharge: HOME | End: 2025-01-26
Attending: INTERNAL MEDICINE
Payer: MEDICARE

## 2025-01-23 VITALS — WEIGHT: 146 LBS | BODY MASS INDEX: 22.87 KG/M2

## 2025-01-23 PROCEDURE — 93798 PHYS/QHP OP CAR RHAB W/ECG: CPT

## 2025-01-23 ASSESSMENT — EXERCISE STRESS TEST
PEAK_RPE: 11
PEAK_METS: 2
PEAK_HR: 133

## 2025-01-27 ENCOUNTER — HOSPITAL ENCOUNTER (OUTPATIENT)
Facility: HOSPITAL | Age: 80
Setting detail: RECURRING SERIES
Discharge: HOME | End: 2025-01-30
Attending: INTERNAL MEDICINE
Payer: MEDICARE

## 2025-01-27 VITALS — BODY MASS INDEX: 23.02 KG/M2 | WEIGHT: 147 LBS

## 2025-01-27 PROCEDURE — 93798 PHYS/QHP OP CAR RHAB W/ECG: CPT

## 2025-01-27 PROCEDURE — 93797 PHYS/QHP OP CAR RHAB WO ECG: CPT

## 2025-01-27 ASSESSMENT — EXERCISE STRESS TEST
PEAK_METS: 2
PEAK_RPE: 11
PEAK_HR: 132

## 2025-01-27 NOTE — CARDIO/PULMONARY
Cardiac Rehab Nutrition Assessment- 1:1 Evaluation  2025      NAME: Ander Kelsey : 1945 AGE: 79 y.o.  GENDER: male  CARDIAC REHAB ADMITTING DIAGNOSIS: ST elevation myocardial infarction (STEMI), unspecified artery (HCC) [I21.3]    PROBLEM LIST:  Patient Active Problem List   Diagnosis    Essential hypertension, benign    Gout    CKD (chronic kidney disease) stage 3, GFR 30-59 ml/min ([facility])    Polycythemia    BPH (benign prostatic hyperplasia)    Mann's esophagus    Type 2 diabetes mellitus with chronic kidney disease ([facility])    Pure hypercholesterolemia    GI bleed    Primary open angle glaucoma    Chronic renal disease, stage III ([facility])    Controlled type 2 diabetes mellitus with diabetic neuropathy, with long-term current use of insulin ([facility])    Memory loss    Colitis    ST elevation myocardial infarction involving right coronary artery ([facility])    STEMI (ST elevation myocardial infarction) ([facility])         LABS:   Lab Results   Component Value Date/Time    IUM1AVPD 7.5 2024 10:46 AM     Lab Results   Component Value Date/Time    CHOL 151 2024 11:13 AM    HDL 46 2024 11:13 AM    LDL 50.8 2024 11:13 AM    VLDL 54.2 2024 11:13 AM         MEDICATIONS/SUPPLEMENTS:   Scheduled Meds:  Continuous Infusions:  PRN Meds:.  Prior to Admission medications    Medication Sig Start Date End Date Taking? Authorizing Provider   Continuous Glucose  (DEXCOM G7 ) HUNTER Use as directed. Dx:  E11.9 25   Afshin Reilly MD   nitroGLYCERIN (NITROSTAT) 0.4 MG SL tablet Place 1 tablet under the tongue every 5 minutes as needed for Chest pain up to max of 3 total doses. If no relief after 1 dose, call 911. 24   Izabela Casey MD   ondansetron (ZOFRAN-ODT) 4 MG disintegrating tablet Take 1 tablet by mouth every 8 hours as needed for Nausea or Vomiting 24   Izabela Casey MD   atenolol (TENORMIN) 25 MG tablet Take 0.5 tablets by mouth daily  Patient not

## 2025-01-30 ENCOUNTER — HOSPITAL ENCOUNTER (OUTPATIENT)
Facility: HOSPITAL | Age: 80
Setting detail: RECURRING SERIES
End: 2025-01-30
Attending: INTERNAL MEDICINE
Payer: MEDICARE

## 2025-01-30 VITALS — BODY MASS INDEX: 22.55 KG/M2 | WEIGHT: 144 LBS

## 2025-01-30 PROCEDURE — 93798 PHYS/QHP OP CAR RHAB W/ECG: CPT

## 2025-01-30 ASSESSMENT — EXERCISE STRESS TEST
PEAK_HR: 131
PEAK_RPE: 11
PEAK_METS: 2

## 2025-02-03 ENCOUNTER — APPOINTMENT (OUTPATIENT)
Facility: HOSPITAL | Age: 80
End: 2025-02-03
Attending: INTERNAL MEDICINE
Payer: MEDICARE

## 2025-02-06 ENCOUNTER — APPOINTMENT (OUTPATIENT)
Facility: HOSPITAL | Age: 80
End: 2025-02-06
Attending: INTERNAL MEDICINE
Payer: MEDICARE

## 2025-02-10 ENCOUNTER — APPOINTMENT (OUTPATIENT)
Facility: HOSPITAL | Age: 80
End: 2025-02-10
Attending: INTERNAL MEDICINE
Payer: MEDICARE

## 2025-02-13 ENCOUNTER — APPOINTMENT (OUTPATIENT)
Facility: HOSPITAL | Age: 80
End: 2025-02-13
Attending: INTERNAL MEDICINE
Payer: MEDICARE

## 2025-02-17 ENCOUNTER — HOSPITAL ENCOUNTER (OUTPATIENT)
Facility: HOSPITAL | Age: 80
Setting detail: RECURRING SERIES
Discharge: HOME | End: 2025-02-20
Attending: INTERNAL MEDICINE
Payer: MEDICARE

## 2025-02-17 VITALS — WEIGHT: 144 LBS | BODY MASS INDEX: 22.55 KG/M2

## 2025-02-17 PROCEDURE — 93798 PHYS/QHP OP CAR RHAB W/ECG: CPT

## 2025-02-17 RX ORDER — PRAVASTATIN SODIUM 20 MG
TABLET ORAL
Qty: 90 TABLET | Refills: 3 | Status: SHIPPED | OUTPATIENT
Start: 2025-02-17

## 2025-02-17 ASSESSMENT — EXERCISE STRESS TEST
PEAK_RPE: 11
PEAK_METS: 2
PEAK_HR: 122

## 2025-02-20 ENCOUNTER — APPOINTMENT (OUTPATIENT)
Facility: HOSPITAL | Age: 80
End: 2025-02-20
Attending: INTERNAL MEDICINE
Payer: MEDICARE

## 2025-02-24 ENCOUNTER — HOSPITAL ENCOUNTER (OUTPATIENT)
Facility: HOSPITAL | Age: 80
Setting detail: RECURRING SERIES
Discharge: HOME | End: 2025-02-27
Attending: INTERNAL MEDICINE
Payer: MEDICARE

## 2025-02-24 VITALS — BODY MASS INDEX: 22.55 KG/M2 | WEIGHT: 144 LBS

## 2025-02-24 PROCEDURE — 93798 PHYS/QHP OP CAR RHAB W/ECG: CPT

## 2025-02-24 ASSESSMENT — EXERCISE STRESS TEST
PEAK_RPE: 11
PEAK_HR: 125
PEAK_METS: 2

## 2025-02-27 ENCOUNTER — HOSPITAL ENCOUNTER (OUTPATIENT)
Facility: HOSPITAL | Age: 80
Setting detail: RECURRING SERIES
End: 2025-02-27
Attending: INTERNAL MEDICINE
Payer: MEDICARE

## 2025-02-27 VITALS — WEIGHT: 145 LBS | BODY MASS INDEX: 22.71 KG/M2

## 2025-02-27 PROCEDURE — 93798 PHYS/QHP OP CAR RHAB W/ECG: CPT

## 2025-02-27 RX ORDER — INSULIN GLARGINE 300 U/ML
25 INJECTION, SOLUTION SUBCUTANEOUS DAILY
Qty: 5 ADJUSTABLE DOSE PRE-FILLED PEN SYRINGE | Refills: 3 | Status: SHIPPED | OUTPATIENT
Start: 2025-02-27

## 2025-02-27 ASSESSMENT — EXERCISE STRESS TEST
PEAK_RPE: 11
PEAK_HR: 123
PEAK_METS: 2

## 2025-02-27 NOTE — TELEPHONE ENCOUNTER
Requested Prescriptions     Pending Prescriptions Disp Refills    TOUJEO SOLOSTAR 300 UNIT/ML concentrated injection pen       Sig: Inject 25 Units into the skin daily

## 2025-03-03 ENCOUNTER — HOSPITAL ENCOUNTER (OUTPATIENT)
Facility: HOSPITAL | Age: 80
Setting detail: RECURRING SERIES
Discharge: HOME | End: 2025-03-06
Attending: INTERNAL MEDICINE
Payer: MEDICARE

## 2025-03-03 ENCOUNTER — TELEPHONE (OUTPATIENT)
Age: 80
End: 2025-03-03

## 2025-03-03 VITALS — BODY MASS INDEX: 22.9 KG/M2 | WEIGHT: 146.2 LBS

## 2025-03-03 DIAGNOSIS — Z79.4 TYPE 2 DIABETES MELLITUS WITHOUT COMPLICATION, WITH LONG-TERM CURRENT USE OF INSULIN (HCC): Primary | ICD-10-CM

## 2025-03-03 DIAGNOSIS — E11.9 TYPE 2 DIABETES MELLITUS WITHOUT COMPLICATION, WITH LONG-TERM CURRENT USE OF INSULIN (HCC): Primary | ICD-10-CM

## 2025-03-03 PROCEDURE — 93798 PHYS/QHP OP CAR RHAB W/ECG: CPT

## 2025-03-03 ASSESSMENT — EXERCISE STRESS TEST
PEAK_METS: 2.2
PEAK_HR: 120
PEAK_RPE: 11

## 2025-03-03 NOTE — TELEPHONE ENCOUNTER
The patient's wife called me today with concerns about overnight hypoglycemia on Toujeo 25 units once in the morning.  She did check fingerstick blood sugars versus the Dexcom data and there was good correlation between the 2.  However she is also very concerned about the fact that her 's blood sugar spike when he eats or drinks carbohydrate.  Notably, yesterday he had bread cheese and a regular Coke and his blood sugar spiked to 300.  He is unwilling to make changes in terms of his diet.  On the other hand, his wife is concerned about the lows and the highs.              After long conversation, I have elected to switch him to Jardiance 10 mg and drop the Toujeo to 20 units.  They were very resistant to the idea of mealtime insulin which I offered as the preferred strategy at this point.  Clearly GLP-1's are not acceptable given his adverse effects most recently

## 2025-03-06 ENCOUNTER — APPOINTMENT (OUTPATIENT)
Facility: HOSPITAL | Age: 80
End: 2025-03-06
Attending: INTERNAL MEDICINE
Payer: MEDICARE

## 2025-03-10 ENCOUNTER — HOSPITAL ENCOUNTER (OUTPATIENT)
Facility: HOSPITAL | Age: 80
Setting detail: RECURRING SERIES
Discharge: HOME | End: 2025-03-13
Attending: INTERNAL MEDICINE
Payer: MEDICARE

## 2025-03-10 VITALS — WEIGHT: 146 LBS | BODY MASS INDEX: 22.87 KG/M2

## 2025-03-10 PROCEDURE — 93798 PHYS/QHP OP CAR RHAB W/ECG: CPT

## 2025-03-10 ASSESSMENT — EXERCISE STRESS TEST
PEAK_METS: 2.2
PEAK_HR: 116
PEAK_RPE: 11

## 2025-03-13 ENCOUNTER — HOSPITAL ENCOUNTER (OUTPATIENT)
Facility: HOSPITAL | Age: 80
Setting detail: RECURRING SERIES
Discharge: HOME | End: 2025-03-16
Attending: INTERNAL MEDICINE
Payer: MEDICARE

## 2025-03-13 VITALS — BODY MASS INDEX: 22.87 KG/M2 | WEIGHT: 146 LBS

## 2025-03-13 PROCEDURE — 93798 PHYS/QHP OP CAR RHAB W/ECG: CPT

## 2025-03-13 ASSESSMENT — EXERCISE STRESS TEST
PEAK_HR: 108
PEAK_METS: 2.2
PEAK_RPE: 11

## 2025-03-17 ENCOUNTER — HOSPITAL ENCOUNTER (OUTPATIENT)
Facility: HOSPITAL | Age: 80
Setting detail: RECURRING SERIES
Discharge: HOME | End: 2025-03-20
Attending: INTERNAL MEDICINE
Payer: MEDICARE

## 2025-03-17 VITALS — BODY MASS INDEX: 22.71 KG/M2 | WEIGHT: 145 LBS

## 2025-03-17 PROCEDURE — 93798 PHYS/QHP OP CAR RHAB W/ECG: CPT

## 2025-03-17 ASSESSMENT — EXERCISE STRESS TEST
PEAK_METS: 2.2
PEAK_RPE: 11
PEAK_HR: 124

## 2025-03-20 ENCOUNTER — HOSPITAL ENCOUNTER (OUTPATIENT)
Facility: HOSPITAL | Age: 80
Setting detail: RECURRING SERIES
Discharge: HOME | End: 2025-03-23
Attending: INTERNAL MEDICINE
Payer: MEDICARE

## 2025-03-20 VITALS — WEIGHT: 144.2 LBS | BODY MASS INDEX: 22.58 KG/M2

## 2025-03-20 PROCEDURE — 93798 PHYS/QHP OP CAR RHAB W/ECG: CPT

## 2025-03-20 ASSESSMENT — EXERCISE STRESS TEST
PEAK_METS: 2.5
PEAK_RPE: 11
PEAK_HR: 115

## 2025-03-22 SDOH — HEALTH STABILITY: PHYSICAL HEALTH: ON AVERAGE, HOW MANY MINUTES DO YOU ENGAGE IN EXERCISE AT THIS LEVEL?: 60 MIN

## 2025-03-22 SDOH — HEALTH STABILITY: PHYSICAL HEALTH: ON AVERAGE, HOW MANY DAYS PER WEEK DO YOU ENGAGE IN MODERATE TO STRENUOUS EXERCISE (LIKE A BRISK WALK)?: 2 DAYS

## 2025-03-22 ASSESSMENT — LIFESTYLE VARIABLES
HOW OFTEN DURING THE LAST YEAR HAVE YOU FAILED TO DO WHAT WAS NORMALLY EXPECTED FROM YOU BECAUSE OF DRINKING: NEVER
HOW OFTEN DURING THE LAST YEAR HAVE YOU HAD A FEELING OF GUILT OR REMORSE AFTER DRINKING: NEVER
HOW OFTEN DO YOU HAVE A DRINK CONTAINING ALCOHOL: 5
HOW OFTEN DURING THE LAST YEAR HAVE YOU FOUND THAT YOU WERE NOT ABLE TO STOP DRINKING ONCE YOU HAD STARTED: NEVER
HOW OFTEN DURING THE LAST YEAR HAVE YOU HAD A FEELING OF GUILT OR REMORSE AFTER DRINKING: NEVER
HAVE YOU OR SOMEONE ELSE BEEN INJURED AS A RESULT OF YOUR DRINKING: NO
HOW OFTEN DO YOU HAVE A DRINK CONTAINING ALCOHOL: 4 OR MORE TIMES A WEEK
HOW OFTEN DO YOU HAVE SIX OR MORE DRINKS ON ONE OCCASION: 1
HAS A RELATIVE, FRIEND, DOCTOR, OR ANOTHER HEALTH PROFESSIONAL EXPRESSED CONCERN ABOUT YOUR DRINKING OR SUGGESTED YOU CUT DOWN: NO
HOW OFTEN DURING THE LAST YEAR HAVE YOU BEEN UNABLE TO REMEMBER WHAT HAPPENED THE NIGHT BEFORE BECAUSE YOU HAD BEEN DRINKING: NEVER
HOW MANY STANDARD DRINKS CONTAINING ALCOHOL DO YOU HAVE ON A TYPICAL DAY: 1
HOW OFTEN DURING THE LAST YEAR HAVE YOU FAILED TO DO WHAT WAS NORMALLY EXPECTED FROM YOU BECAUSE OF DRINKING: NEVER
HOW OFTEN DURING THE LAST YEAR HAVE YOU FOUND THAT YOU WERE NOT ABLE TO STOP DRINKING ONCE YOU HAD STARTED: NEVER
HOW OFTEN DURING THE LAST YEAR HAVE YOU BEEN UNABLE TO REMEMBER WHAT HAPPENED THE NIGHT BEFORE BECAUSE YOU HAD BEEN DRINKING: NEVER
HAS A RELATIVE, FRIEND, DOCTOR, OR ANOTHER HEALTH PROFESSIONAL EXPRESSED CONCERN ABOUT YOUR DRINKING OR SUGGESTED YOU CUT DOWN: NO
HOW OFTEN DURING THE LAST YEAR HAVE YOU NEEDED AN ALCOHOLIC DRINK FIRST THING IN THE MORNING TO GET YOURSELF GOING AFTER A NIGHT OF HEAVY DRINKING: NEVER
HAVE YOU OR SOMEONE ELSE BEEN INJURED AS A RESULT OF YOUR DRINKING: NO
HOW OFTEN DURING THE LAST YEAR HAVE YOU NEEDED AN ALCOHOLIC DRINK FIRST THING IN THE MORNING TO GET YOURSELF GOING AFTER A NIGHT OF HEAVY DRINKING: NEVER
HOW MANY STANDARD DRINKS CONTAINING ALCOHOL DO YOU HAVE ON A TYPICAL DAY: 1 OR 2

## 2025-03-22 ASSESSMENT — PATIENT HEALTH QUESTIONNAIRE - PHQ9
SUM OF ALL RESPONSES TO PHQ QUESTIONS 1-9: 0
1. LITTLE INTEREST OR PLEASURE IN DOING THINGS: NOT AT ALL
SUM OF ALL RESPONSES TO PHQ QUESTIONS 1-9: 0
2. FEELING DOWN, DEPRESSED OR HOPELESS: NOT AT ALL
SUM OF ALL RESPONSES TO PHQ QUESTIONS 1-9: 0
SUM OF ALL RESPONSES TO PHQ QUESTIONS 1-9: 0

## 2025-03-23 NOTE — PROGRESS NOTES
HISTORY OF PRESENT ILLNESS   Ander Kelsey   is a 79 y.o.  male.  hx  CAD s/p IWMI 12/2024 LENY x2 Lcx and RCA HTN DM02  with neuropathy HLD anemia d/t AVM ckd 3 due to HTN , Barretts esophagus memory loss (negative amyloid PET and neuropsych testing at [facility] negative for dementia) hx ischemic colitis and medicare wellness--  CARD Dr Keane--ef wnl-had recent labs. In cardiac rehab  URO Dr hSukla-grace elevated PSA .  Nephro Dr Massey-cece--cr 1.39 this month. Sodium bicarb added  Off BB due to bradycardia    Not concentrating well since heart attack and less energy since then. Not depressed  On lexapro 5 mg qd  Recent LDL 43  Last A1c 7.5    Glucose on toujeo 20 u and jardiance-- avg     3d avg is 160  Has Dexcom--Dr Allen daviesrrs at [facility] and 2 organizatios  Last OV  Here for [facility] admitted 12/29-12/31 for STEMI-IWMI s.p LENY x2-Lcx and RCA stenosis  Echo EF 60. ?pericarditits--colcrys for post MI CP     New meds on d/c atenolol, brilinta  and colcrys  Aspirin continued     A1c 7.3  Fsbs 85 this AM on toujeo 25 units qd now     C/o feeling very fatigued  Slept much of day yesterday  Bp lower yesterday and today     Some heaviness in chest earlier today  Date of Admission: 12/29/2024  Date of Discharge: 12/31/2024  Attending Physician: No att. providers found     Discharge Diagnosis:   Inferior wall MI status post stents 12/29/2024  Pericarditis  HTN  HLD  GERD  Diabetes mellitus  Hard of hearing  Patient Active Problem List    Diagnosis Date Noted    ST elevation myocardial infarction involving right coronary artery (HCC) 12/29/2024    STEMI (ST elevation myocardial infarction) (HCC) 12/29/2024    Colitis 06/23/2024    Memory loss 08/14/2023    Chronic renal disease, stage III ([facility]) 06/01/2022    Controlled type 2 diabetes mellitus with diabetic neuropathy, with long-term current use of insulin ([facility]) 06/01/2022    Type 2 diabetes mellitus with chronic kidney disease ([facility]) 12/17/2021    GI

## 2025-03-24 ENCOUNTER — HOSPITAL ENCOUNTER (OUTPATIENT)
Facility: HOSPITAL | Age: 80
Setting detail: RECURRING SERIES
Discharge: HOME | End: 2025-03-27
Attending: INTERNAL MEDICINE
Payer: MEDICARE

## 2025-03-24 VITALS — WEIGHT: 144 LBS | BODY MASS INDEX: 22.55 KG/M2

## 2025-03-24 PROCEDURE — 93798 PHYS/QHP OP CAR RHAB W/ECG: CPT

## 2025-03-24 ASSESSMENT — EXERCISE STRESS TEST
PEAK_RPE: 11
PEAK_METS: 2.5
PEAK_HR: 109

## 2025-03-25 ENCOUNTER — OFFICE VISIT (OUTPATIENT)
Age: 80
End: 2025-03-25
Payer: MEDICARE

## 2025-03-25 VITALS
TEMPERATURE: 97.3 F | OXYGEN SATURATION: 99 % | RESPIRATION RATE: 16 BRPM | HEART RATE: 73 BPM | HEIGHT: 67 IN | BODY MASS INDEX: 22.84 KG/M2 | WEIGHT: 145.5 LBS | DIASTOLIC BLOOD PRESSURE: 60 MMHG | SYSTOLIC BLOOD PRESSURE: 100 MMHG

## 2025-03-25 DIAGNOSIS — R97.20 ELEVATED PSA: ICD-10-CM

## 2025-03-25 DIAGNOSIS — N18.31 CKD STAGE 3A, GFR 45-59 ML/MIN (HCC): ICD-10-CM

## 2025-03-25 DIAGNOSIS — E11.42 TYPE 2 DIABETES MELLITUS WITH DIABETIC POLYNEUROPATHY, WITH LONG-TERM CURRENT USE OF INSULIN (HCC): ICD-10-CM

## 2025-03-25 DIAGNOSIS — E78.5 HYPERLIPIDEMIA, UNSPECIFIED HYPERLIPIDEMIA TYPE: ICD-10-CM

## 2025-03-25 DIAGNOSIS — Z00.00 MEDICARE ANNUAL WELLNESS VISIT, SUBSEQUENT: ICD-10-CM

## 2025-03-25 DIAGNOSIS — I25.10 CORONARY ARTERY DISEASE INVOLVING NATIVE CORONARY ARTERY OF NATIVE HEART WITHOUT ANGINA PECTORIS: Primary | ICD-10-CM

## 2025-03-25 DIAGNOSIS — I10 HYPERTENSION, UNSPECIFIED TYPE: ICD-10-CM

## 2025-03-25 DIAGNOSIS — Z79.4 TYPE 2 DIABETES MELLITUS WITH DIABETIC POLYNEUROPATHY, WITH LONG-TERM CURRENT USE OF INSULIN (HCC): ICD-10-CM

## 2025-03-25 LAB — HBA1C MFR BLD: 6.7 %

## 2025-03-25 PROCEDURE — 1123F ACP DISCUSS/DSCN MKR DOCD: CPT | Performed by: INTERNAL MEDICINE

## 2025-03-25 PROCEDURE — 99214 OFFICE O/P EST MOD 30 MIN: CPT | Performed by: INTERNAL MEDICINE

## 2025-03-25 PROCEDURE — G0439 PPPS, SUBSEQ VISIT: HCPCS | Performed by: INTERNAL MEDICINE

## 2025-03-25 PROCEDURE — 1036F TOBACCO NON-USER: CPT | Performed by: INTERNAL MEDICINE

## 2025-03-25 PROCEDURE — PBSHW AMB POC HEMOGLOBIN A1C: Performed by: INTERNAL MEDICINE

## 2025-03-25 PROCEDURE — 83036 HEMOGLOBIN GLYCOSYLATED A1C: CPT | Performed by: INTERNAL MEDICINE

## 2025-03-25 PROCEDURE — 3044F HG A1C LEVEL LT 7.0%: CPT | Performed by: INTERNAL MEDICINE

## 2025-03-25 PROCEDURE — 3078F DIAST BP <80 MM HG: CPT | Performed by: INTERNAL MEDICINE

## 2025-03-25 PROCEDURE — 3074F SYST BP LT 130 MM HG: CPT | Performed by: INTERNAL MEDICINE

## 2025-03-25 PROCEDURE — 1159F MED LIST DOCD IN RCRD: CPT | Performed by: INTERNAL MEDICINE

## 2025-03-25 PROCEDURE — G8420 CALC BMI NORM PARAMETERS: HCPCS | Performed by: INTERNAL MEDICINE

## 2025-03-25 PROCEDURE — G8427 DOCREV CUR MEDS BY ELIG CLIN: HCPCS | Performed by: INTERNAL MEDICINE

## 2025-03-25 RX ORDER — ESCITALOPRAM OXALATE 10 MG/1
10 TABLET ORAL DAILY
Qty: 90 TABLET | Refills: 3 | Status: SHIPPED | OUTPATIENT
Start: 2025-03-25

## 2025-03-25 RX ORDER — SODIUM BICARBONATE 650 MG/1
1 TABLET ORAL DAILY
COMMUNITY
Start: 2025-03-07

## 2025-03-25 NOTE — PROGRESS NOTES
\"Have you been to the ER, urgent care clinic since your last visit?  Hospitalized since your last visit?\"    no    “Have you seen or consulted any other health care providers outside our system since your last visit?”    Dr jerod zhao - nepheyad Hugo

## 2025-03-27 ENCOUNTER — HOSPITAL ENCOUNTER (OUTPATIENT)
Facility: HOSPITAL | Age: 80
Setting detail: RECURRING SERIES
Discharge: HOME | End: 2025-03-30
Attending: INTERNAL MEDICINE
Payer: MEDICARE

## 2025-03-27 VITALS — WEIGHT: 144 LBS | BODY MASS INDEX: 22.55 KG/M2

## 2025-03-27 PROCEDURE — 93798 PHYS/QHP OP CAR RHAB W/ECG: CPT

## 2025-03-27 RX ORDER — FOSINOPRIL SODIUM 40 MG/1
40 TABLET ORAL DAILY
Qty: 90 TABLET | Refills: 3 | Status: SHIPPED | OUTPATIENT
Start: 2025-03-27

## 2025-03-27 ASSESSMENT — EXERCISE STRESS TEST
PEAK_RPE: 12
PEAK_METS: 3.6
PEAK_HR: 103

## 2025-03-31 ENCOUNTER — HOSPITAL ENCOUNTER (OUTPATIENT)
Facility: HOSPITAL | Age: 80
Setting detail: RECURRING SERIES
Discharge: HOME | End: 2025-04-03
Attending: INTERNAL MEDICINE
Payer: MEDICARE

## 2025-03-31 VITALS — BODY MASS INDEX: 22.71 KG/M2 | WEIGHT: 145 LBS

## 2025-03-31 PROCEDURE — 93798 PHYS/QHP OP CAR RHAB W/ECG: CPT

## 2025-03-31 ASSESSMENT — EXERCISE STRESS TEST
PEAK_METS: 3.6
PEAK_RPE: 12
PEAK_HR: 121

## 2025-04-03 ENCOUNTER — APPOINTMENT (OUTPATIENT)
Facility: HOSPITAL | Age: 80
End: 2025-04-03
Attending: INTERNAL MEDICINE
Payer: MEDICARE

## 2025-04-07 ENCOUNTER — HOSPITAL ENCOUNTER (OUTPATIENT)
Facility: HOSPITAL | Age: 80
Setting detail: RECURRING SERIES
Discharge: HOME | End: 2025-04-10
Attending: INTERNAL MEDICINE
Payer: MEDICARE

## 2025-04-07 VITALS — BODY MASS INDEX: 22.49 KG/M2 | WEIGHT: 143.6 LBS

## 2025-04-07 PROCEDURE — 93798 PHYS/QHP OP CAR RHAB W/ECG: CPT

## 2025-04-07 ASSESSMENT — EXERCISE STRESS TEST
PEAK_METS: 3.7
PEAK_HR: 121
PEAK_RPE: 12

## 2025-04-14 ENCOUNTER — HOSPITAL ENCOUNTER (OUTPATIENT)
Facility: HOSPITAL | Age: 80
Setting detail: RECURRING SERIES
Discharge: HOME | End: 2025-04-17
Attending: INTERNAL MEDICINE
Payer: MEDICARE

## 2025-04-14 VITALS — BODY MASS INDEX: 22.52 KG/M2 | WEIGHT: 143.8 LBS

## 2025-04-14 PROCEDURE — 93798 PHYS/QHP OP CAR RHAB W/ECG: CPT

## 2025-04-14 ASSESSMENT — EXERCISE STRESS TEST
PEAK_HR: 122
PEAK_METS: 3.7
PEAK_RPE: 12

## 2025-04-17 ENCOUNTER — HOSPITAL ENCOUNTER (OUTPATIENT)
Facility: HOSPITAL | Age: 80
Setting detail: RECURRING SERIES
Discharge: HOME | End: 2025-04-20
Attending: INTERNAL MEDICINE
Payer: MEDICARE

## 2025-04-17 VITALS — BODY MASS INDEX: 22.65 KG/M2 | WEIGHT: 144.6 LBS

## 2025-04-17 PROCEDURE — 93798 PHYS/QHP OP CAR RHAB W/ECG: CPT

## 2025-04-17 ASSESSMENT — EXERCISE STRESS TEST
PEAK_METS: 3.7
PEAK_RPE: 12
PEAK_HR: 92

## 2025-04-21 ENCOUNTER — HOSPITAL ENCOUNTER (OUTPATIENT)
Facility: HOSPITAL | Age: 80
Setting detail: RECURRING SERIES
Discharge: HOME | End: 2025-04-24
Attending: INTERNAL MEDICINE
Payer: MEDICARE

## 2025-04-21 VITALS — BODY MASS INDEX: 22.6 KG/M2 | WEIGHT: 144.3 LBS

## 2025-04-21 PROCEDURE — 93798 PHYS/QHP OP CAR RHAB W/ECG: CPT

## 2025-04-21 ASSESSMENT — EXERCISE STRESS TEST
PEAK_HR: 110
PEAK_RPE: 12

## 2025-04-28 ENCOUNTER — HOSPITAL ENCOUNTER (OUTPATIENT)
Facility: HOSPITAL | Age: 80
Setting detail: RECURRING SERIES
Discharge: HOME | End: 2025-05-01
Attending: INTERNAL MEDICINE
Payer: MEDICARE

## 2025-04-28 VITALS — WEIGHT: 142.8 LBS | BODY MASS INDEX: 22.37 KG/M2

## 2025-04-28 PROCEDURE — 93798 PHYS/QHP OP CAR RHAB W/ECG: CPT

## 2025-04-28 ASSESSMENT — EXERCISE STRESS TEST
PEAK_RPE: 12
PEAK_METS: 3.7
PEAK_HR: 129

## 2025-05-01 ENCOUNTER — APPOINTMENT (OUTPATIENT)
Facility: HOSPITAL | Age: 80
End: 2025-05-01
Attending: INTERNAL MEDICINE
Payer: MEDICARE

## 2025-05-05 ENCOUNTER — APPOINTMENT (OUTPATIENT)
Facility: HOSPITAL | Age: 80
End: 2025-05-05
Attending: INTERNAL MEDICINE
Payer: MEDICARE

## 2025-05-12 ENCOUNTER — HOSPITAL ENCOUNTER (OUTPATIENT)
Facility: HOSPITAL | Age: 80
Setting detail: RECURRING SERIES
Discharge: HOME | End: 2025-05-15
Attending: INTERNAL MEDICINE
Payer: MEDICARE

## 2025-05-12 VITALS — BODY MASS INDEX: 23.02 KG/M2 | WEIGHT: 147 LBS

## 2025-05-12 PROCEDURE — 93798 PHYS/QHP OP CAR RHAB W/ECG: CPT

## 2025-05-12 ASSESSMENT — EXERCISE STRESS TEST
PEAK_RPE: 12
PEAK_HR: 106
PEAK_METS: 3.7

## 2025-05-15 DIAGNOSIS — Z79.4 TYPE 2 DIABETES MELLITUS WITHOUT COMPLICATION, WITH LONG-TERM CURRENT USE OF INSULIN (HCC): ICD-10-CM

## 2025-05-15 DIAGNOSIS — E11.9 TYPE 2 DIABETES MELLITUS WITHOUT COMPLICATION, WITH LONG-TERM CURRENT USE OF INSULIN (HCC): ICD-10-CM

## 2025-05-19 ENCOUNTER — APPOINTMENT (OUTPATIENT)
Facility: HOSPITAL | Age: 80
End: 2025-05-19
Payer: MEDICARE

## 2025-05-29 ENCOUNTER — HOSPITAL ENCOUNTER (OUTPATIENT)
Facility: HOSPITAL | Age: 80
Setting detail: RECURRING SERIES
End: 2025-05-29
Payer: MEDICARE

## 2025-05-29 VITALS — BODY MASS INDEX: 22.33 KG/M2 | WEIGHT: 142.6 LBS

## 2025-05-29 PROCEDURE — 93798 PHYS/QHP OP CAR RHAB W/ECG: CPT

## 2025-05-29 ASSESSMENT — EXERCISE STRESS TEST
PEAK_METS: 3.7
PEAK_HR: 113
PEAK_RPE: 12

## 2025-06-16 ENCOUNTER — OFFICE VISIT (OUTPATIENT)
Age: 80
End: 2025-06-16
Payer: MEDICARE

## 2025-06-16 VITALS
DIASTOLIC BLOOD PRESSURE: 75 MMHG | BODY MASS INDEX: 22.63 KG/M2 | HEIGHT: 67 IN | SYSTOLIC BLOOD PRESSURE: 123 MMHG | HEART RATE: 71 BPM | WEIGHT: 144.2 LBS

## 2025-06-16 DIAGNOSIS — Z79.4 TYPE 2 DIABETES MELLITUS WITHOUT COMPLICATION, WITH LONG-TERM CURRENT USE OF INSULIN (HCC): Primary | ICD-10-CM

## 2025-06-16 DIAGNOSIS — E11.9 TYPE 2 DIABETES MELLITUS WITHOUT COMPLICATION, WITH LONG-TERM CURRENT USE OF INSULIN (HCC): Primary | ICD-10-CM

## 2025-06-16 LAB — HBA1C MFR BLD: 7 %

## 2025-06-16 PROCEDURE — 99214 OFFICE O/P EST MOD 30 MIN: CPT | Performed by: INTERNAL MEDICINE

## 2025-06-16 PROCEDURE — 83036 HEMOGLOBIN GLYCOSYLATED A1C: CPT | Performed by: INTERNAL MEDICINE

## 2025-06-16 NOTE — PROGRESS NOTES
This is a 78-year-old white male with a history of type 2 diabetes mellitus x 10 years .  He had been on insulin plus for victoza with good result.  He then was switched to Ozempic.He was hospitalized June 2024 with an episode of severe abdominal pain.  It was presumed it was related to the initiation of Ozempic and so the Ozempic has been stopped.      Past medical history  He has chronic kidney disease stage III a with a recent creatinine of 1.28.    He has degenerative disc disease and has received a number of steroid injections into his neck.  He has received no steroid injections in the last 6 months.  Since I saw him last, he apparently went through right wrist surgery in October of this year.  He apparently had a carpal bone removed at his proximal thumb.  He has had a lot of pain and is in a removable cast.  He was seen by podiatry for some neuropathic symptoms.  He was given a prescription for Metanex which he said really exacerbated his esophageal reflux and so he stopped taking it.  In December 2024, patient presented with SSCP and was found to have inferior wall MI.  He was taken to Cath Lab emergently and got 2 stents. Patient has been bradycardic with heart rate between 48-57. He is now scheduled for pacemaker.     Current Medications  Toujeo 25 units AM  Jardiance 10 mg  Dexcom G7       Regarding his diabetes, he had been doing very well..His last A1c was 7.0%.  A1c today 7.0 %. . His blood sugars in the morning are excellent ranging between . There really is not any change in his diet or his physical activity.He checks his blood sugars twice daily. He has coffee and a fruit bar or pack of peanut butter crackers for breakfast.  Lunch is a sandwich with chips and diet Pepsi.  Dinner can be chicken with half a baked potato and a salad or pork chop or manicotti and a salad.  He does not snack at bedtime.    Examination  Blood pressure 123/75  Pulse 71  Weight 65 kg  BMI 22.6  HEENT

## 2025-06-19 ENCOUNTER — TRANSCRIBE ORDERS (OUTPATIENT)
Facility: HOSPITAL | Age: 80
End: 2025-06-19

## 2025-06-19 ENCOUNTER — HOSPITAL ENCOUNTER (OUTPATIENT)
Facility: HOSPITAL | Age: 80
Discharge: HOME | End: 2025-06-22
Payer: MEDICARE

## 2025-06-19 DIAGNOSIS — I49.5 SSS (SICK SINUS SYNDROME) (HCC): ICD-10-CM

## 2025-06-19 DIAGNOSIS — I49.5 SSS (SICK SINUS SYNDROME) (HCC): Primary | ICD-10-CM

## 2025-06-19 PROCEDURE — 71046 X-RAY EXAM CHEST 2 VIEWS: CPT

## (undated) DEVICE — (D)SYR 10ML 1/5ML GRAD NSAF -- PKGING CHANGE USE ITEM 338027

## (undated) DEVICE — Device

## (undated) DEVICE — GLOVE ORANGE PI 7   MSG9070

## (undated) DEVICE — CATH BLLN ANGIO 2.50X12MM SC EUPHORA RX

## (undated) DEVICE — TOWEL 4 PLY TISS 19X30 SUE WHT

## (undated) DEVICE — SYRINGE 50ML E/T

## (undated) DEVICE — BLADE OPHTH GRN ROUNDED TIP 1 SIDE SHRP GRINDLESS MINI-BLDE

## (undated) DEVICE — ENDOSCOPY PUMP TUBING/ CAP SET: Brand: ERBE

## (undated) DEVICE — SYR 10ML LUER LOK 1/5ML GRAD --

## (undated) DEVICE — STRIP,CLOSURE,WOUND,MEDI-STRIP,1/4X3: Brand: MEDLINE

## (undated) DEVICE — BASIN EMSIS 16OZ GRAPHITE PLAS KID SHP MOLD GRAD FOR ORAL

## (undated) DEVICE — KENDALL RADIOLUCENT FOAM MONITORING ELECTRODE RECTANGULAR SHAPE: Brand: KENDALL

## (undated) DEVICE — BLOCK BITE ENDOSCP AD 21 MM W/ DIL BLU LF DISP

## (undated) DEVICE — CATH BLLN ANGIO 3.50X27MM NC EUPHORIA RX

## (undated) DEVICE — CATH BLLN ANGIO 3.50X6MM NC EUPHORIA RX

## (undated) DEVICE — BAG SPEC BIOHZRD 10 X 10 IN --

## (undated) DEVICE — KIT ACCS INTRO 4FR L10CM NDL 21GA L7CM GWIRE L40CM

## (undated) DEVICE — CUFF BLD PRSS AD CLTH SGL TB W/ BAYNT CONN ROUNDED CORNER

## (undated) DEVICE — ZIMMER® STERILE DISPOSABLE TOURNIQUET CUFF WITH PLC, DUAL PORT, SINGLE BLADDER, 18 IN. (46 CM)

## (undated) DEVICE — SET ADMIN 16ML TBNG L100IN 2 Y INJ SITE IV PIGGY BK DISP

## (undated) DEVICE — SOLIDIFIER MEDC 1200ML -- CONVERT TO 356117

## (undated) DEVICE — GLIDESHEATH SLENDER ACCESS KIT: Brand: GLIDESHEATH SLENDER

## (undated) DEVICE — FIAPC® PROBE W/ FILTER 2200 C OD 2.3MM/6.9FR; L 2.2M/7.2FT: Brand: ERBE

## (undated) DEVICE — CONTAINER SPEC 20 ML LID NEUT BUFF FORMALIN 10 % POLYPR STS

## (undated) DEVICE — Device: Brand: MEDEX

## (undated) DEVICE — SOLUTION IRRIG 1000ML 0.9% SOD CHL USP POUR PLAS BTL

## (undated) DEVICE — CORD ES L12FT BPLR FRCP

## (undated) DEVICE — SET GRAV CK VLV NEEDLESS ST 3 GANGED 4WAY STPCOCK HI FLO 10

## (undated) DEVICE — GOWN,SIRUS,FABRNF,XL,20/CS: Brand: MEDLINE

## (undated) DEVICE — CATH IV AUTOGRD BC PNK 20GA 25 -- INSYTE

## (undated) DEVICE — SURGIFOAM SPNG SZ 12-7

## (undated) DEVICE — PROVE COVER: Brand: UNBRANDED

## (undated) DEVICE — SOLIDIFIER FLD 2OZ 1500CC N DISINF IN BTL DISP SAFESORB

## (undated) DEVICE — ENDOSCOPIC KIT COMPLIANCE ENDOKIT

## (undated) DEVICE — GUIDEWIRE VASC L260CM 0.035IN J TIP L3MM PTFE FIX COR NAMIC

## (undated) DEVICE — SUTURE ETHLN SZ 3-0 L18IN NONABSORBABLE BLK PS-2 L19MM 3/8 1669H

## (undated) DEVICE — SYR 3ML LL TIP 1/10ML GRAD --

## (undated) DEVICE — FORCEPS BX L160CM DIA8MM GRSP DISECT CUP TIP NONLOCKING ROT

## (undated) DEVICE — 1200 GUARD II KIT W/5MM TUBE W/O VAC TUBE: Brand: GUARDIAN

## (undated) DEVICE — RUNTHROUGH NS EXTRA FLOPPY PTCA GUIDEWIRE: Brand: RUNTHROUGH

## (undated) DEVICE — SUTURE FIBERWIRE SZ 2 W/ TAPERED NEEDLE BLUE L38IN NONABSORB BLU L26.5MM 1/2 CIRCLE AR7200

## (undated) DEVICE — BITEBLOCK 54FR W/ DENT RIM BLOX

## (undated) DEVICE — PADDING UNDERCAST W3INXL12FT RAYON POLY SYN NONADHESIVE

## (undated) DEVICE — SYSTEM REPROC CBL 3 LD DISPOSABLE

## (undated) DEVICE — MEDI-TRACE CADENCE ADULT, DEFIBRILLATION ELECTRODE -RTS  (10 PR/PK) - PHYSIO-CONTROL: Brand: MEDI-TRACE CADENCE

## (undated) DEVICE — HI-TORQUE VERSACORE FLOPPY GUIDE WIRE SYSTEM 145 CM: Brand: HI-TORQUE VERSACORE

## (undated) DEVICE — SUTURE ETHBND EXCEL SZ 3-0 L36IN NONABSORBABLE GRN RB-1 X558H

## (undated) DEVICE — NEONATAL-ADULT SPO2 SENSOR: Brand: NELLCOR

## (undated) DEVICE — Z DISCONTINUED PER MEDLINE LINE GAS SAMPLING O2/CO2 LNG AD 13 FT NSL W/ TBNG FILTERLINE

## (undated) DEVICE — NEEDLE HYPO 18GA L1.5IN PNK S STL HUB POLYPR SHLD REG BVL

## (undated) DEVICE — FORCEPS BX L240CM JAW DIA2.8MM L CAP W/ NDL MIC MESH TOOTH

## (undated) DEVICE — CATHETER COR DIAG PIGTAILS PIG 145 CRV 5FR 110CM 6 SIDE H

## (undated) DEVICE — BASIN EMESIS 500CC ROSE 250/CS 60/PLT: Brand: MEDEGEN MEDICAL PRODUCTS, LLC

## (undated) DEVICE — C-ARM: Brand: UNBRANDED

## (undated) DEVICE — FIAPC® PROBE W/ FILTER 2200 A OD 2.3MM/6.9FR; L 2.2M/7.2FT: Brand: ERBE

## (undated) DEVICE — GLOVE SURG SZ 7 L12IN FNGR THK79MIL GRN LTX FREE

## (undated) DEVICE — CATHETER COR DIAG 4.0 5FR 100CM 0 SIDE H

## (undated) DEVICE — MEDI-VAC YANK SUCT HNDL W/TPRD BULBOUS TIP: Brand: CARDINAL HEALTH

## (undated) DEVICE — ELECTRODE,RADIOTRANSLUCENT,FOAM,5PK: Brand: MEDLINE

## (undated) DEVICE — BLADE,CARBON-STEEL,15,STRL,DISPOSABLE,TB: Brand: MEDLINE

## (undated) DEVICE — CUSTOM KT PTCA INFL DEV K05 00053H (ORDER MUTLIPLES OF 5 EACH)

## (undated) DEVICE — CLIP INT L235CM WRK CHAN DIA2.8MM OPN 11MM LCK MECHANISM MR

## (undated) DEVICE — MASTISOL ADHESIVE LIQ 2/3ML

## (undated) DEVICE — 3M™ TEGADERM™ TRANSPARENT FILM DRESSING FRAME STYLE, 1626W, 4 IN X 4-3/4 IN (10 CM X 12 CM), 50/CT 4CT/CASE: Brand: 3M™ TEGADERM™

## (undated) DEVICE — CATH BLLN ANGIO 2.50X30MM SC EUPHORA RX

## (undated) DEVICE — REM POLYHESIVE ADULT PATIENT RETURN ELECTRODE: Brand: VALLEYLAB

## (undated) DEVICE — AMBU SPUR II ADULT WITH OPEN RESERVOIR 40, WITH DURACLEAR FACE MASK SIZE MEDIUM ADULT AND WITH PEEP VALVE. 6 PCS/BOX: Brand: SPUR® II ADULT RESUSCITATORSINGLE PATIENT USE RESUSCITATOR

## (undated) DEVICE — HAND-MRMCASU: Brand: MEDLINE INDUSTRIES, INC.

## (undated) DEVICE — SPLINT WR VELC FOAM NEUT POS DISP FOR RAD ART ACC SFT STRP

## (undated) DEVICE — TRAP ENDOSCP POLYP 2 CHMBR DRAWER TYP

## (undated) DEVICE — CATHETER IV 22GA L1IN OD0.8382-0.9144MM ID0.6096-0.6858MM 382523

## (undated) DEVICE — BANDAGE COMPR W4INXL5YD WHT BGE POLY COT M E WRP WV HK AND

## (undated) DEVICE — YANKAUER,TAPERED BULBOUS TIP,W/O VENT: Brand: MEDLINE

## (undated) DEVICE — GLOVE SURG SZ 65 THK91MIL LTX FREE SYN POLYISOPRENE

## (undated) DEVICE — SUTURE VCRL SZ 3-0 L27IN ABSRB UD L26MM SH 1/2 CIR J416H

## (undated) DEVICE — HYPODERMIC SAFETY NEEDLE: Brand: MAGELLAN

## (undated) DEVICE — NEEDLE SCLERO 25GA L240CM OD0.51MM ID0.24MM EXTN L4MM SHTH

## (undated) DEVICE — CLIPPING DEVICE: Brand: RESOLUTION CLIP

## (undated) DEVICE — IV START KIT: Brand: MEDLINE

## (undated) DEVICE — GOWN,SIRUS,NONRNF,SETINSLV,XL,20/CS: Brand: MEDLINE

## (undated) DEVICE — CATH IV AUTOGRD BC BLU 22GA 25 -- INSYTE

## (undated) DEVICE — TR BAND RADIAL ARTERY COMPRESSION DEVICE: Brand: TR BAND

## (undated) DEVICE — SNARE ENDOSCP L240CM LOOP W27MM SHTH DIA2.4MM WRK CHN 2.8MM

## (undated) DEVICE — FORCEPS BX L240CM JAW DIA2.4MM ORNG L CAP W/ NDL DISP RAD

## (undated) DEVICE — CATHETER GUID 5FR GWIRE 0.058IN COR EXTRA BKUP SUPP 3.5 ACT

## (undated) DEVICE — SUTURE MCRYL SZ 4-0 L27IN ABSRB UD L19MM PS-2 1/2 CIR PRIM Y426H